# Patient Record
Sex: FEMALE | Employment: OTHER | URBAN - NONMETROPOLITAN AREA
[De-identification: names, ages, dates, MRNs, and addresses within clinical notes are randomized per-mention and may not be internally consistent; named-entity substitution may affect disease eponyms.]

---

## 2023-01-16 PROBLEM — R39.15 URINARY URGENCY: Status: ACTIVE | Noted: 2023-01-16

## 2023-01-16 PROBLEM — F32.A DEPRESSION: Status: ACTIVE | Noted: 2023-01-16

## 2023-01-16 PROBLEM — K59.00 CONSTIPATION: Status: ACTIVE | Noted: 2023-01-16

## 2023-01-16 PROBLEM — M86.9 OSTEOMYELITIS (HCC): Status: ACTIVE | Noted: 2023-01-16

## 2023-03-13 PROBLEM — G40.901 STATUS EPILEPTICUS (HCC): Status: ACTIVE | Noted: 2023-03-13

## 2023-03-14 PROBLEM — M86.612: Status: ACTIVE | Noted: 2023-03-14

## 2023-03-15 PROBLEM — R29.898 WEAKNESS OF BOTH LOWER EXTREMITIES: Status: ACTIVE | Noted: 2023-03-15

## 2023-03-15 PROBLEM — F89 DEVELOPMENTAL DISORDER: Status: ACTIVE | Noted: 2023-03-15

## 2023-03-27 PROBLEM — D64.9 ANEMIA: Status: ACTIVE | Noted: 2023-03-27

## 2023-03-27 PROBLEM — E87.6 HYPOKALEMIA: Status: ACTIVE | Noted: 2023-03-27

## 2023-06-26 PROBLEM — G40.909 SEIZURE DISORDER (HCC): Status: ACTIVE | Noted: 2023-01-16

## 2023-06-26 PROBLEM — Z86.69 HISTORY OF STATUS EPILEPTICUS: Status: ACTIVE | Noted: 2023-06-26

## 2023-06-26 PROBLEM — Z87.39 HISTORY OF OSTEOMYELITIS: Status: ACTIVE | Noted: 2023-06-26

## 2023-07-13 ENCOUNTER — APPOINTMENT (OUTPATIENT)
Dept: GENERAL RADIOLOGY | Age: 68
DRG: 101 | End: 2023-07-13
Payer: MEDICARE

## 2023-07-13 ENCOUNTER — HOSPITAL ENCOUNTER (INPATIENT)
Age: 68
LOS: 1 days | Discharge: ANOTHER HOSPITAL | DRG: 101 | End: 2023-07-14
Attending: EMERGENCY MEDICINE | Admitting: HOSPITALIST
Payer: MEDICARE

## 2023-07-13 ENCOUNTER — APPOINTMENT (OUTPATIENT)
Dept: CT IMAGING | Age: 68
DRG: 101 | End: 2023-07-13
Payer: MEDICARE

## 2023-07-13 DIAGNOSIS — R56.9 SEIZURE (HCC): Primary | ICD-10-CM

## 2023-07-13 LAB
ALBUMIN SERPL BCG-MCNC: 4.2 G/DL (ref 3.5–5.1)
ALP SERPL-CCNC: 89 U/L (ref 38–126)
ALT SERPL W/O P-5'-P-CCNC: 16 U/L (ref 11–66)
AMPHETAMINES UR QL SCN: NEGATIVE
ANION GAP SERPL CALC-SCNC: 21 MEQ/L (ref 8–16)
ARTERIAL PATENCY WRIST A: POSITIVE
AST SERPL-CCNC: 18 U/L (ref 5–40)
BACTERIA URNS QL MICRO: ABNORMAL /HPF
BARBITURATES UR QL SCN: NEGATIVE
BASE EXCESS BLDA CALC-SCNC: 0.3 MMOL/L (ref -2.5–2.5)
BASOPHILS ABSOLUTE: 0 THOU/MM3 (ref 0–0.1)
BASOPHILS NFR BLD AUTO: 0.4 %
BDY SITE: ABNORMAL
BENZODIAZ UR QL SCN: POSITIVE
BILIRUB CONJ SERPL-MCNC: < 0.2 MG/DL (ref 0–0.3)
BILIRUB SERPL-MCNC: 0.4 MG/DL (ref 0.3–1.2)
BILIRUB UR QL STRIP.AUTO: NEGATIVE
BUN SERPL-MCNC: 14 MG/DL (ref 7–22)
BZE UR QL SCN: NEGATIVE
CALCIUM SERPL-MCNC: 9.8 MG/DL (ref 8.5–10.5)
CANNABINOIDS UR QL SCN: NEGATIVE
CASTS #/AREA URNS LPF: ABNORMAL /LPF
CASTS 2: ABNORMAL /LPF
CHARACTER UR: ABNORMAL
CHLORIDE SERPL-SCNC: 100 MEQ/L (ref 98–111)
CK SERPL-CCNC: 76 U/L (ref 30–135)
CO2 SERPL-SCNC: 19 MEQ/L (ref 23–33)
COLLECTED BY:: ABNORMAL
COLOR: YELLOW
CREAT SERPL-MCNC: 0.7 MG/DL (ref 0.4–1.2)
CRYSTALS URNS MICRO: ABNORMAL
DEPRECATED RDW RBC AUTO: 46.5 FL (ref 35–45)
DEVICE: ABNORMAL
EKG ATRIAL RATE: 107 BPM
EKG P AXIS: 56 DEGREES
EKG P-R INTERVAL: 144 MS
EKG Q-T INTERVAL: 354 MS
EKG QRS DURATION: 82 MS
EKG QTC CALCULATION (BAZETT): 472 MS
EKG R AXIS: 49 DEGREES
EKG T AXIS: 63 DEGREES
EKG VENTRICULAR RATE: 107 BPM
EOSINOPHIL NFR BLD AUTO: 1 %
EOSINOPHILS ABSOLUTE: 0 THOU/MM3 (ref 0–0.4)
EPITHELIAL CELLS, UA: ABNORMAL /HPF
ERYTHROCYTE [DISTWIDTH] IN BLOOD BY AUTOMATED COUNT: 12.9 % (ref 11.5–14.5)
FENTANYL: NEGATIVE
FIO2 ON VENT O2 ANALYZER: 4 %
GFR SERPL CREATININE-BSD FRML MDRD: > 60 ML/MIN/1.73M2
GLUCOSE BLD STRIP.AUTO-MCNC: 109 MG/DL (ref 70–108)
GLUCOSE SERPL-MCNC: 107 MG/DL (ref 70–108)
GLUCOSE UR QL STRIP.AUTO: NEGATIVE MG/DL
HCO3 BLDA-SCNC: 25 MMOL/L (ref 23–28)
HCT VFR BLD AUTO: 42.3 % (ref 37–47)
HGB BLD-MCNC: 13.4 GM/DL (ref 12–16)
HGB UR QL STRIP.AUTO: NEGATIVE
IMM GRANULOCYTES # BLD AUTO: 0.02 THOU/MM3 (ref 0–0.07)
IMM GRANULOCYTES NFR BLD AUTO: 0.4 %
KETONES UR QL STRIP.AUTO: 15
LACTIC ACID, SEPSIS: 2.5 MMOL/L (ref 0.5–1.9)
LACTIC ACID, SEPSIS: 6.4 MMOL/L (ref 0.5–1.9)
LIPASE SERPL-CCNC: 27.6 U/L (ref 5.6–51.3)
LYMPHOCYTES ABSOLUTE: 2.3 THOU/MM3 (ref 1–4.8)
LYMPHOCYTES NFR BLD AUTO: 46.1 %
MAGNESIUM SERPL-MCNC: 1.9 MG/DL (ref 1.6–2.4)
MCH RBC QN AUTO: 30.9 PG (ref 26–33)
MCHC RBC AUTO-ENTMCNC: 31.7 GM/DL (ref 32.2–35.5)
MCV RBC AUTO: 97.5 FL (ref 81–99)
MISCELLANEOUS 2: ABNORMAL
MONOCYTES ABSOLUTE: 0.5 THOU/MM3 (ref 0.4–1.3)
MONOCYTES NFR BLD AUTO: 10.7 %
NEUTROPHILS NFR BLD AUTO: 41.4 %
NITRITE UR QL STRIP: POSITIVE
NRBC BLD AUTO-RTO: 0 /100 WBC
OPIATES UR QL SCN: NEGATIVE
OSMOLALITY SERPL CALC.SUM OF ELEC: 280.3 MOSMOL/KG (ref 275–300)
OXYCODONE, OPI5M: NEGATIVE
PCO2 BLDA: 38 MMHG (ref 35–45)
PCP UR QL SCN: NEGATIVE
PH BLDA: 7.42 [PH] (ref 7.35–7.45)
PH UR STRIP.AUTO: 7 [PH] (ref 5–9)
PLATELET # BLD AUTO: 153 THOU/MM3 (ref 130–400)
PMV BLD AUTO: 13.5 FL (ref 9.4–12.4)
PO2 BLDA: 125 MMHG (ref 71–104)
POTASSIUM SERPL-SCNC: 3.9 MEQ/L (ref 3.5–5.2)
PROLACTIN SERPL-MCNC: 38.8 NG/ML
PROT SERPL-MCNC: 7.5 G/DL (ref 6.1–8)
PROT UR STRIP.AUTO-MCNC: NEGATIVE MG/DL
RBC # BLD AUTO: 4.34 MILL/MM3 (ref 4.2–5.4)
RBC URINE: ABNORMAL /HPF
RENAL EPI CELLS #/AREA URNS HPF: ABNORMAL /[HPF]
SAO2 % BLDA: 99 %
SEGMENTED NEUTROPHILS ABSOLUTE COUNT: 2 THOU/MM3 (ref 1.8–7.7)
SODIUM SERPL-SCNC: 140 MEQ/L (ref 135–145)
SP GR UR REFRACT.AUTO: 1.02 (ref 1–1.03)
TROPONIN, HIGH SENSITIVITY: 16 NG/L (ref 0–12)
TROPONIN, HIGH SENSITIVITY: 20 NG/L (ref 0–12)
TSH SERPL DL<=0.005 MIU/L-ACNC: 1.96 UIU/ML (ref 0.4–4.2)
UROBILINOGEN, URINE: 0.2 EU/DL (ref 0–1)
VALPROATE SERPL-MCNC: 53.3 UG/ML (ref 50–100)
WBC # BLD AUTO: 4.9 THOU/MM3 (ref 4.8–10.8)
WBC #/AREA URNS HPF: ABNORMAL /HPF
WBC #/AREA URNS HPF: ABNORMAL /[HPF]
YEAST LIKE FUNGI URNS QL MICRO: ABNORMAL

## 2023-07-13 PROCEDURE — 6360000002 HC RX W HCPCS

## 2023-07-13 PROCEDURE — 31500 INSERT EMERGENCY AIRWAY: CPT

## 2023-07-13 PROCEDURE — 80175 DRUG SCREEN QUAN LAMOTRIGINE: CPT

## 2023-07-13 PROCEDURE — 6360000002 HC RX W HCPCS: Performed by: STUDENT IN AN ORGANIZED HEALTH CARE EDUCATION/TRAINING PROGRAM

## 2023-07-13 PROCEDURE — 6360000002 HC RX W HCPCS: Performed by: NURSE PRACTITIONER

## 2023-07-13 PROCEDURE — 6370000000 HC RX 637 (ALT 250 FOR IP): Performed by: STUDENT IN AN ORGANIZED HEALTH CARE EDUCATION/TRAINING PROGRAM

## 2023-07-13 PROCEDURE — 80307 DRUG TEST PRSMV CHEM ANLYZR: CPT

## 2023-07-13 PROCEDURE — 02HV33Z INSERTION OF INFUSION DEVICE INTO SUPERIOR VENA CAVA, PERCUTANEOUS APPROACH: ICD-10-PCS

## 2023-07-13 PROCEDURE — 95819 EEG AWAKE AND ASLEEP: CPT

## 2023-07-13 PROCEDURE — 2580000003 HC RX 258: Performed by: STUDENT IN AN ORGANIZED HEALTH CARE EDUCATION/TRAINING PROGRAM

## 2023-07-13 PROCEDURE — 93010 ELECTROCARDIOGRAM REPORT: CPT | Performed by: INTERNAL MEDICINE

## 2023-07-13 PROCEDURE — 70450 CT HEAD/BRAIN W/O DYE: CPT

## 2023-07-13 PROCEDURE — 87086 URINE CULTURE/COLONY COUNT: CPT

## 2023-07-13 PROCEDURE — 85025 COMPLETE CBC W/AUTO DIFF WBC: CPT

## 2023-07-13 PROCEDURE — 81001 URINALYSIS AUTO W/SCOPE: CPT

## 2023-07-13 PROCEDURE — 2700000000 HC OXYGEN THERAPY PER DAY

## 2023-07-13 PROCEDURE — 87077 CULTURE AEROBIC IDENTIFY: CPT

## 2023-07-13 PROCEDURE — 82948 REAGENT STRIP/BLOOD GLUCOSE: CPT

## 2023-07-13 PROCEDURE — 89220 SPUTUM SPECIMEN COLLECTION: CPT

## 2023-07-13 PROCEDURE — 83735 ASSAY OF MAGNESIUM: CPT

## 2023-07-13 PROCEDURE — 94761 N-INVAS EAR/PLS OXIMETRY MLT: CPT

## 2023-07-13 PROCEDURE — 87186 SC STD MICRODIL/AGAR DIL: CPT

## 2023-07-13 PROCEDURE — 80177 DRUG SCRN QUAN LEVETIRACETAM: CPT

## 2023-07-13 PROCEDURE — 03HC33Z INSERTION OF INFUSION DEVICE INTO LEFT RADIAL ARTERY, PERCUTANEOUS APPROACH: ICD-10-PCS

## 2023-07-13 PROCEDURE — 0CJS8ZZ INSPECTION OF LARYNX, VIA NATURAL OR ARTIFICIAL OPENING ENDOSCOPIC: ICD-10-PCS

## 2023-07-13 PROCEDURE — 71045 X-RAY EXAM CHEST 1 VIEW: CPT

## 2023-07-13 PROCEDURE — 82803 BLOOD GASES ANY COMBINATION: CPT

## 2023-07-13 PROCEDURE — 93005 ELECTROCARDIOGRAM TRACING: CPT | Performed by: STUDENT IN AN ORGANIZED HEALTH CARE EDUCATION/TRAINING PROGRAM

## 2023-07-13 PROCEDURE — 82550 ASSAY OF CK (CPK): CPT

## 2023-07-13 PROCEDURE — 36556 INSERT NON-TUNNEL CV CATH: CPT

## 2023-07-13 PROCEDURE — 83605 ASSAY OF LACTIC ACID: CPT

## 2023-07-13 PROCEDURE — 82248 BILIRUBIN DIRECT: CPT

## 2023-07-13 PROCEDURE — 83690 ASSAY OF LIPASE: CPT

## 2023-07-13 PROCEDURE — 2500000003 HC RX 250 WO HCPCS

## 2023-07-13 PROCEDURE — 95816 EEG AWAKE AND DROWSY: CPT | Performed by: PSYCHIATRY & NEUROLOGY

## 2023-07-13 PROCEDURE — 36415 COLL VENOUS BLD VENIPUNCTURE: CPT

## 2023-07-13 PROCEDURE — 99285 EMERGENCY DEPT VISIT HI MDM: CPT

## 2023-07-13 PROCEDURE — 84484 ASSAY OF TROPONIN QUANT: CPT

## 2023-07-13 PROCEDURE — 2580000003 HC RX 258

## 2023-07-13 PROCEDURE — 2000000000 HC ICU R&B

## 2023-07-13 PROCEDURE — 84146 ASSAY OF PROLACTIN: CPT

## 2023-07-13 PROCEDURE — 5A1935Z RESPIRATORY VENTILATION, LESS THAN 24 CONSECUTIVE HOURS: ICD-10-PCS

## 2023-07-13 PROCEDURE — 80053 COMPREHEN METABOLIC PANEL: CPT

## 2023-07-13 PROCEDURE — 80164 ASSAY DIPROPYLACETIC ACD TOT: CPT

## 2023-07-13 PROCEDURE — 96375 TX/PRO/DX INJ NEW DRUG ADDON: CPT

## 2023-07-13 PROCEDURE — 6360000002 HC RX W HCPCS: Performed by: INTERNAL MEDICINE

## 2023-07-13 PROCEDURE — 96374 THER/PROPH/DIAG INJ IV PUSH: CPT

## 2023-07-13 PROCEDURE — 84443 ASSAY THYROID STIM HORMONE: CPT

## 2023-07-13 PROCEDURE — 36600 WITHDRAWAL OF ARTERIAL BLOOD: CPT

## 2023-07-13 PROCEDURE — 94002 VENT MGMT INPAT INIT DAY: CPT

## 2023-07-13 RX ORDER — ATROPINE SULFATE 0.1 MG/ML
INJECTION INTRAVENOUS
Status: DISCONTINUED
Start: 2023-07-13 | End: 2023-07-14 | Stop reason: HOSPADM

## 2023-07-13 RX ORDER — MORPHINE SULFATE 2 MG/ML
INJECTION, SOLUTION INTRAMUSCULAR; INTRAVENOUS
Status: SHIPPED | OUTPATIENT
Start: 2023-07-13

## 2023-07-13 RX ORDER — DIVALPROEX SODIUM 500 MG/1
500 TABLET, EXTENDED RELEASE ORAL DAILY
Status: DISCONTINUED | OUTPATIENT
Start: 2023-07-14 | End: 2023-07-13

## 2023-07-13 RX ORDER — HYDRALAZINE HYDROCHLORIDE 20 MG/ML
20 INJECTION INTRAMUSCULAR; INTRAVENOUS ONCE
Status: COMPLETED | OUTPATIENT
Start: 2023-07-13 | End: 2023-07-13

## 2023-07-13 RX ORDER — MIDAZOLAM HYDROCHLORIDE 1 MG/ML
INJECTION INTRAMUSCULAR; INTRAVENOUS
Status: COMPLETED
Start: 2023-07-13 | End: 2023-07-13

## 2023-07-13 RX ORDER — SODIUM CHLORIDE 0.9 % (FLUSH) 0.9 %
5-40 SYRINGE (ML) INJECTION EVERY 12 HOURS SCHEDULED
Status: DISCONTINUED | OUTPATIENT
Start: 2023-07-13 | End: 2023-07-14 | Stop reason: HOSPADM

## 2023-07-13 RX ORDER — MORPHINE SULFATE 2 MG/ML
INJECTION, SOLUTION INTRAMUSCULAR; INTRAVENOUS
Status: COMPLETED
Start: 2023-07-13 | End: 2023-07-13

## 2023-07-13 RX ORDER — LORAZEPAM 2 MG/ML
INJECTION INTRAMUSCULAR
Status: COMPLETED
Start: 2023-07-13 | End: 2023-07-13

## 2023-07-13 RX ORDER — MIDAZOLAM HYDROCHLORIDE 1 MG/ML
INJECTION INTRAMUSCULAR; INTRAVENOUS
Status: COMPLETED | OUTPATIENT
Start: 2023-07-13 | End: 2023-07-13

## 2023-07-13 RX ORDER — ONDANSETRON 2 MG/ML
4 INJECTION INTRAMUSCULAR; INTRAVENOUS EVERY 6 HOURS PRN
Status: DISCONTINUED | OUTPATIENT
Start: 2023-07-13 | End: 2023-07-14 | Stop reason: HOSPADM

## 2023-07-13 RX ORDER — FENTANYL CITRATE 50 UG/ML
INJECTION, SOLUTION INTRAMUSCULAR; INTRAVENOUS
Status: COMPLETED | OUTPATIENT
Start: 2023-07-13 | End: 2023-07-13

## 2023-07-13 RX ORDER — SODIUM CHLORIDE 9 MG/ML
INJECTION, SOLUTION INTRAVENOUS PRN
Status: DISCONTINUED | OUTPATIENT
Start: 2023-07-13 | End: 2023-07-14 | Stop reason: HOSPADM

## 2023-07-13 RX ORDER — PROPOFOL 10 MG/ML
5-50 INJECTION, EMULSION INTRAVENOUS CONTINUOUS
Status: DISCONTINUED | OUTPATIENT
Start: 2023-07-13 | End: 2023-07-14 | Stop reason: HOSPADM

## 2023-07-13 RX ORDER — SODIUM CHLORIDE 0.9 % (FLUSH) 0.9 %
10 SYRINGE (ML) INJECTION PRN
Status: DISCONTINUED | OUTPATIENT
Start: 2023-07-13 | End: 2023-07-14 | Stop reason: HOSPADM

## 2023-07-13 RX ORDER — NOREPINEPHRINE BITARTRATE 0.06 MG/ML
INJECTION, SOLUTION INTRAVENOUS
Status: DISCONTINUED
Start: 2023-07-13 | End: 2023-07-14 | Stop reason: HOSPADM

## 2023-07-13 RX ORDER — LORAZEPAM 2 MG/ML
1 INJECTION INTRAMUSCULAR ONCE
Status: COMPLETED | OUTPATIENT
Start: 2023-07-13 | End: 2023-07-13

## 2023-07-13 RX ORDER — LAMOTRIGINE 100 MG/1
100 TABLET, EXTENDED RELEASE ORAL DAILY
Status: DISCONTINUED | OUTPATIENT
Start: 2023-07-14 | End: 2023-07-14 | Stop reason: HOSPADM

## 2023-07-13 RX ORDER — LEVETIRACETAM 500 MG/1
1500 TABLET ORAL 2 TIMES DAILY
Status: DISCONTINUED | OUTPATIENT
Start: 2023-07-13 | End: 2023-07-13

## 2023-07-13 RX ORDER — POTASSIUM CHLORIDE 20 MEQ/1
40 TABLET, EXTENDED RELEASE ORAL PRN
Status: DISCONTINUED | OUTPATIENT
Start: 2023-07-13 | End: 2023-07-14 | Stop reason: HOSPADM

## 2023-07-13 RX ORDER — NOREPINEPHRINE BITARTRATE 0.06 MG/ML
1-100 INJECTION, SOLUTION INTRAVENOUS CONTINUOUS
Status: DISCONTINUED | OUTPATIENT
Start: 2023-07-13 | End: 2023-07-14 | Stop reason: HOSPADM

## 2023-07-13 RX ORDER — FENTANYL CITRATE-0.9 % NACL/PF 10 MCG/ML
25-200 PLASTIC BAG, INJECTION (ML) INTRAVENOUS CONTINUOUS
Status: DISCONTINUED | OUTPATIENT
Start: 2023-07-13 | End: 2023-07-13

## 2023-07-13 RX ORDER — ACETAMINOPHEN 650 MG/1
650 SUPPOSITORY RECTAL EVERY 6 HOURS PRN
Status: DISCONTINUED | OUTPATIENT
Start: 2023-07-13 | End: 2023-07-14 | Stop reason: HOSPADM

## 2023-07-13 RX ORDER — MAGNESIUM SULFATE IN WATER 40 MG/ML
2000 INJECTION, SOLUTION INTRAVENOUS PRN
Status: DISCONTINUED | OUTPATIENT
Start: 2023-07-13 | End: 2023-07-14 | Stop reason: HOSPADM

## 2023-07-13 RX ORDER — POTASSIUM CHLORIDE 7.45 MG/ML
10 INJECTION INTRAVENOUS PRN
Status: DISCONTINUED | OUTPATIENT
Start: 2023-07-13 | End: 2023-07-14 | Stop reason: HOSPADM

## 2023-07-13 RX ORDER — VENLAFAXINE 50 MG/1
50 TABLET ORAL 3 TIMES DAILY
Status: DISCONTINUED | OUTPATIENT
Start: 2023-07-13 | End: 2023-07-14 | Stop reason: HOSPADM

## 2023-07-13 RX ORDER — KETAMINE HCL IN NACL, ISO-OSM 100MG/10ML
SYRINGE (ML) INJECTION
Status: COMPLETED | OUTPATIENT
Start: 2023-07-13 | End: 2023-07-13

## 2023-07-13 RX ORDER — LORAZEPAM 2 MG/ML
1 INJECTION INTRAMUSCULAR
Status: COMPLETED | OUTPATIENT
Start: 2023-07-13 | End: 2023-07-13

## 2023-07-13 RX ORDER — ENOXAPARIN SODIUM 100 MG/ML
30 INJECTION SUBCUTANEOUS 2 TIMES DAILY
Status: DISCONTINUED | OUTPATIENT
Start: 2023-07-13 | End: 2023-07-14 | Stop reason: HOSPADM

## 2023-07-13 RX ORDER — POLYETHYLENE GLYCOL 3350 17 G/17G
17 POWDER, FOR SOLUTION ORAL DAILY PRN
Status: DISCONTINUED | OUTPATIENT
Start: 2023-07-13 | End: 2023-07-14 | Stop reason: HOSPADM

## 2023-07-13 RX ORDER — ENOXAPARIN SODIUM 100 MG/ML
40 INJECTION SUBCUTANEOUS DAILY
Status: DISCONTINUED | OUTPATIENT
Start: 2023-07-13 | End: 2023-07-13 | Stop reason: DRUGHIGH

## 2023-07-13 RX ORDER — KETAMINE HCL IN NACL, ISO-OSM 100MG/10ML
SYRINGE (ML) INJECTION
Status: DISCONTINUED
Start: 2023-07-13 | End: 2023-07-14 | Stop reason: HOSPADM

## 2023-07-13 RX ORDER — ATROPINE SULFATE 1 MG/ML
INJECTION, SOLUTION INTRAMUSCULAR; INTRAVENOUS; SUBCUTANEOUS
Status: COMPLETED | OUTPATIENT
Start: 2023-07-13 | End: 2023-07-13

## 2023-07-13 RX ORDER — ACETAMINOPHEN 325 MG/1
650 TABLET ORAL EVERY 6 HOURS PRN
Status: DISCONTINUED | OUTPATIENT
Start: 2023-07-13 | End: 2023-07-14 | Stop reason: HOSPADM

## 2023-07-13 RX ORDER — SODIUM CHLORIDE 9 MG/ML
INJECTION, SOLUTION INTRAVENOUS CONTINUOUS
Status: DISCONTINUED | OUTPATIENT
Start: 2023-07-13 | End: 2023-07-14 | Stop reason: HOSPADM

## 2023-07-13 RX ORDER — PROPOFOL 10 MG/ML
INJECTION, EMULSION INTRAVENOUS
Status: COMPLETED
Start: 2023-07-13 | End: 2023-07-13

## 2023-07-13 RX ORDER — ONDANSETRON 4 MG/1
4 TABLET, ORALLY DISINTEGRATING ORAL EVERY 8 HOURS PRN
Status: DISCONTINUED | OUTPATIENT
Start: 2023-07-13 | End: 2023-07-14 | Stop reason: HOSPADM

## 2023-07-13 RX ORDER — DEXTROSE MONOHYDRATE 100 MG/ML
INJECTION, SOLUTION INTRAVENOUS CONTINUOUS PRN
Status: DISCONTINUED | OUTPATIENT
Start: 2023-07-13 | End: 2023-07-14 | Stop reason: HOSPADM

## 2023-07-13 RX ORDER — FENTANYL CITRATE 50 UG/ML
INJECTION, SOLUTION INTRAMUSCULAR; INTRAVENOUS
Status: DISCONTINUED
Start: 2023-07-13 | End: 2023-07-14 | Stop reason: HOSPADM

## 2023-07-13 RX ADMIN — FENTANYL CITRATE 50 MCG: 50 INJECTION, SOLUTION INTRAMUSCULAR; INTRAVENOUS at 20:22

## 2023-07-13 RX ADMIN — SODIUM CHLORIDE, PRESERVATIVE FREE 10 ML: 5 INJECTION INTRAVENOUS at 21:00

## 2023-07-13 RX ADMIN — LEVETIRACETAM 1500 MG: 100 INJECTION, SOLUTION INTRAVENOUS at 23:03

## 2023-07-13 RX ADMIN — Medication: at 20:16

## 2023-07-13 RX ADMIN — MIDAZOLAM HYDROCHLORIDE: 1 INJECTION, SOLUTION INTRAMUSCULAR; INTRAVENOUS at 19:58

## 2023-07-13 RX ADMIN — VALPROATE SODIUM 2000 MG: 100 INJECTION, SOLUTION INTRAVENOUS at 23:17

## 2023-07-13 RX ADMIN — LORAZEPAM 1 MG: 2 INJECTION INTRAMUSCULAR at 13:18

## 2023-07-13 RX ADMIN — MORPHINE SULFATE: 2 INJECTION, SOLUTION INTRAMUSCULAR; INTRAVENOUS at 19:57

## 2023-07-13 RX ADMIN — PROPOFOL 10 MCG/KG/MIN: 10 INJECTION, EMULSION INTRAVENOUS at 20:41

## 2023-07-13 RX ADMIN — ATROPINE SULFATE: 0.1 INJECTION INTRAVENOUS at 20:19

## 2023-07-13 RX ADMIN — MIDAZOLAM: 1 INJECTION INTRAMUSCULAR; INTRAVENOUS at 19:58

## 2023-07-13 RX ADMIN — LORAZEPAM 1 MG: 2 INJECTION INTRAMUSCULAR; INTRAVENOUS at 13:18

## 2023-07-13 RX ADMIN — SODIUM CHLORIDE: 9 INJECTION, SOLUTION INTRAVENOUS at 18:40

## 2023-07-13 RX ADMIN — Medication 50 MG: at 20:16

## 2023-07-13 RX ADMIN — VENLAFAXINE HYDROCHLORIDE 50 MG: 50 TABLET ORAL at 23:12

## 2023-07-13 RX ADMIN — FENTANYL CITRATE 50 MCG: 50 INJECTION, SOLUTION INTRAMUSCULAR; INTRAVENOUS at 20:30

## 2023-07-13 RX ADMIN — HYDRALAZINE HYDROCHLORIDE 20 MG: 20 INJECTION INTRAMUSCULAR; INTRAVENOUS at 23:19

## 2023-07-13 RX ADMIN — Medication 100 MG: at 19:58

## 2023-07-13 RX ADMIN — MIDAZOLAM: 1 INJECTION INTRAMUSCULAR; INTRAVENOUS at 20:13

## 2023-07-13 RX ADMIN — LORAZEPAM 1 MG: 2 INJECTION INTRAMUSCULAR; INTRAVENOUS at 13:25

## 2023-07-13 RX ADMIN — LEVETIRACETAM 1500 MG: 100 INJECTION, SOLUTION INTRAVENOUS at 13:15

## 2023-07-13 RX ADMIN — ATROPINE SULFATE 1 MG: 1 INJECTION, SOLUTION INTRAMUSCULAR; INTRAVENOUS; SUBCUTANEOUS at 20:19

## 2023-07-13 RX ADMIN — MIDAZOLAM HYDROCHLORIDE: 1 INJECTION, SOLUTION INTRAMUSCULAR; INTRAVENOUS at 20:13

## 2023-07-13 RX ADMIN — Medication 5 MCG/MIN: at 20:31

## 2023-07-13 RX ADMIN — ENOXAPARIN SODIUM 30 MG: 100 INJECTION SUBCUTANEOUS at 23:49

## 2023-07-13 RX ADMIN — MORPHINE SULFATE: 2 INJECTION, SOLUTION INTRAMUSCULAR; INTRAVENOUS at 20:17

## 2023-07-13 ASSESSMENT — PULMONARY FUNCTION TESTS: PIF_VALUE: 19

## 2023-07-13 NOTE — ED NOTES
ED to inpatient nurses report    Chief Complaint   Patient presents with    Seizures      Present to ED from nursing home  LOC: alert to only name  Vital signs   Vitals:    07/13/23 1320 07/13/23 1329 07/13/23 1451 07/13/23 1518   BP:  (!) 163/95  (!) 134/99   Pulse: (!) 104 (!) 101 (!) 104 (!) 103   Resp: 13 25 (!) 31 30   Temp:       TempSrc:       SpO2: 98% 97% 96% 94%   Weight:          Oxygen Baseline 94% on 5L o2 via nc    Current needs required 5L O2 via nc Bipap/Cpap No  LDAs:   Peripheral IV 07/13/23 Left Hand (Active)   Site Assessment Clean, dry & intact 07/13/23 1159   Line Status Flushed;Brisk blood return;Normal saline locked 07/13/23 1159   Dressing Status Clean, dry & intact 07/13/23 1159   Dressing Type Transparent 07/13/23 1159   Dressing Intervention New 07/13/23 1159     Mobility: Fully dependent  Pending ED orders: none  Present condition: stable      C-SSRS    Swallow Screening    Preferred Language: Kb     Electronically signed by Mel Emmanuel RN on 7/13/2023 at 4:23 PM       Mel Emmanuel RN  07/13/23 3963

## 2023-07-13 NOTE — PROGRESS NOTES
[facility] Laboratory Technician Worksheet      EEG Date: 2023    Name: Nicolette Schafer   : 1955   Age: 75 y.o. SEX: female    ROOM: ed/ MRN: [id number]           CSN: [id number]      Ordering Provider: Con monet/admit: qi green  EEG Number: [id number] Time of Test: 7445    Hand: unknown   Sedation: no  ativan at 1325    H.V. Done: No  age protocol  Photic: Yes    Sleep: Yes  Drowsy: No   Sleep Deprived: No    Seizures observed: tremors and jerks noted    Mentality no command follow but arousable      Clinical History:seizure. L leg twitch, hx seizure    Past Medical History:       Diagnosis Date    ADHD     Depression     Learning disability     Seizure ([address])     Urinary urgency        Scheduled Meds:  Continuous Infusions:   sodium chloride       PRN Meds:.     Technician: Angel Shen 2023

## 2023-07-13 NOTE — ED NOTES
Pt transported to [facility]. Floor contacted prior to transport, spoke to PERLA Mackenzie  07/13/23 1523

## 2023-07-13 NOTE — SIGNIFICANT EVENT
Call placed to [employer] answering service for consent for emergent intubation secondary to inability to protect airway secondary to status epilepticus. Awaiting response.     Electronically signed by Yoana Inman MD on 7/13/23 at 7:36 PM EDT

## 2023-07-13 NOTE — ED NOTES
Pt now resting on cot with eyes closed. No movement noted in head or leg.  Slight twitching noted to left wrist.     Jared Ventura, DEZ  07/13/23 3401

## 2023-07-13 NOTE — PROGRESS NOTES
Virtual nurse attempted to complete admission with patient but she is sleeping/resting and not answering. Bedside nurse notified via [employer].

## 2023-07-13 NOTE — ED PROVIDER NOTES
[facility] EMERGENCY DEPT    Pt Name: Ronan Em  MRN: [id number]  [facility] 1955  Date of evaluation: 7/13/2023  Resident Physician: Apoorva Huggins MD  Attending Physician: Cleveland Recinos DO      CHIEF COMPLAINT       Chief Complaint   Patient presents with    Seizures     HISTORY OF PRESENT ILLNESS   Ronan Em is a 75 y.o. female with PMHx of epilepsy on Depakote  who presents to the emergency department from nursing home, brought in by EMS for evaluation of seizure. Per EMS, patient had a seizure at the nursing home. EMS was called and in route to the emergency department she had another seizure. She received 2 mg of intranasal Versed. Patient arrives postictal.    The patient has no other acute complaints at this time. PASTMEDICAL HISTORY     Past Medical History:   Diagnosis Date    ADHD     Depression     Learning disability     Seizure [facility])     Urinary urgency        Patient Active Problem List   Diagnosis Code    Osteomyelitis ([address] ) M86.9    Wound infection complicating hardware, sequela T84. 7XXS    Urinary urgency R39.15    Attention deficit hyperactivity disorder (ADHD) F90.9    Depression F32. A    Seizure disorder ([address] ) G40.909    Constipation K59.00    Status epilepticus (HCC) G40.901    Chronic osteomyelitis of left shoulder region [facility]) V31.785    Developmental disorder F89    Weakness of both lower extremities R29.898    Anemia D64.9    Hypokalemia E87.6    History of osteomyelitis Z87.39    History of status epilepticus Z86.69     SURGICAL HISTORY       Past Surgical History:   Procedure Laterality Date    SHOULDER SURGERY Left     ORIF    -- had hardware infection afterwards requiring IV abx       CURRENT MEDICATIONS       Previous Medications    AMPHETAMINE-DEXTROAMPHETAMINE (ADDERALL, 5MG,) 5 MG TABLET    Take 1 tablet by mouth daily for 30 days.  Max Daily Amount: 5 mg    DIVALPROEX (DEPAKOTE ER) 500 MG EXTENDED RELEASE TABLET    Take 1 tablet by mouth daily

## 2023-07-13 NOTE — SIGNIFICANT EVENT
Spoke to [facility] Pay on call at [facility]. Confirmed patient's identity. Confirmed full code. She provides consent for the procedure emergent intubation.     Electronically signed by Miguel Thurman MD on 7/13/23 at 7:42 PM EDT

## 2023-07-13 NOTE — ED NOTES
Pt transported to [facility] on cart in stable condition. Spoke with floor prior to transportation.       [facility]  07/13/23 7246

## 2023-07-13 NOTE — ED NOTES
Pt head, left arm and left leg began to twitch again at this time. Second dose of ativan administered per order.      Kiki Gonzalez RN  07/13/23 9458

## 2023-07-13 NOTE — ED NOTES
EMS was called to nursing home for seizure activity. It is reported that patient is currently being weaned off of her seizure medication. Unsure of why this is being done. Pt was postictal when ems arrival. EMS reports pt had another seizure in route and was given 5mg versed intranasally. Upon arrival, pt sleeping with 4L o2 on via nc. Pt moving arms towards chest and moaning when sternal rubbed, but no other response is noted. EKG completed upon arrival. Glucose checked. No seizure activity noted. Pt hooked up to monitor and telemetry.       Angelina Kumar RN  07/13/23 8520

## 2023-07-13 NOTE — ED NOTES
Left leg stopped twitching, left arm remains twitching at this time.       Mariana Galvez, DEZ  07/13/23 6255

## 2023-07-13 NOTE — H&P
History & Physical       Patient: Oumou Guzmán  YOB: 1955    MRN: [id number]     Acct: [de-identified]    PCP: Pete Smith MD    Date of Admission: 7/13/2023    Date of Service: Patient seen / examined on 07/13/23 and admitted to Inpatient with expected LOS greater than two midnights due to medical therapy. ASSESSMENT / PLAN:  Epilepsy with breakthrough seizure: with history of status epilepticus. Presented with witnessed recurrent seizures. Possibly secondary to medication changes. Metabolic work-up relatively nonsignificant with electrolytes, TSH and CK WNL. Medications reviewed, no seizure threshold lowering agents. Valproic acid level WNL. Prolactin level 38.8. From nursing facility, anticipate compliance with home lamictal 100 mg p.o daily, Keppra 750 mg p.o twice daily and depakote 500 mg p.o daily. Per report, recently tirating off Depakote. S/p 2 mg intranal versed and ativan 1 mg x1. Neurology following. Plan for EEG. Seizure and fall precautions in place. HAGMA, mild: secondary to lactic acidosis in the setting of seizures. Low suspicion for infectious process or hypoperfusion. Unclear if hypoxic during seizure onset, on 4 L NC. Check ABG. LFTs WNL. UA pending. Unlikely medication related as from nursing home, defer ASA/ethanol/acetaminophen levels. Trend lactic acid every 6 hours unitl < 2.0 x2. Started on IVF. 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 Previously followed with Dr. Ruy Sharpe, neurology but unclear if currently following with neurology. Depression: Continue venlafaxine 50 mg p.o BID      Chief Complaint:  Seizure     History of Present Illness:  75 y.o. female with PMH epilepsy/seizures, depression, ADHD and learning disability who presented to [address] from nursing home for following witnessed seizures.  Per report the patient is on three

## 2023-07-13 NOTE — SIGNIFICANT EVENT
The patient was reexamined and noted with no change in mentation since having seen her in the ED when she was noted to be lethargic, responsive to pain and grunting. This at the time was presumed due having been given Ativan. Additionally, the CNP at the nursing home reached out to clarify miscommunication. She stated that the patient is difficult was not being weaned, in fact her valproic acid level was low on 7/7 at 19.1. She continued to have reported seizure-like activity at [facility] and therefore Depakote was increased to 750 mg p.o. twice daily from previously 500 mg p.o. twice daily. She had remained on Keppra 750 mg 2 tablets twice daily with recent levels noted to be mildly elevated. Furthermore, the patient had been referred to see outpatient neurology but an appointment was not available till September. In the interim, the patient's routine 20-minute EEG showed abnormal awake EEG, slowing suggestive of moderate nonspecific encephalopathy, presence of right temporal lateralization periodic changes at 1-2 Hz considered an ictal/interictal spectrum. The case was again discussed with inpatient neurology who recommended that given the patient is on 3 antiepileptics but continues to have no change in baseline, concerning for status epilepticus the patient needs transferred to higher care facility for continuous EEG monitoring. Patient will be transferred to ICU for intubation and chemical sedation. Transfer has been initiated.

## 2023-07-14 ENCOUNTER — APPOINTMENT (OUTPATIENT)
Dept: GENERAL RADIOLOGY | Age: 68
DRG: 100 | End: 2023-07-14
Attending: PSYCHIATRY & NEUROLOGY
Payer: MEDICARE

## 2023-07-14 ENCOUNTER — HOSPITAL ENCOUNTER (INPATIENT)
Age: 68
LOS: 17 days | Discharge: NURSING, REHAB OR LONG-TERM CARE | DRG: 100 | End: 2023-07-31
Attending: PSYCHIATRY & NEUROLOGY | Admitting: PSYCHIATRY & NEUROLOGY
Payer: MEDICARE

## 2023-07-14 VITALS
HEIGHT: 61 IN | TEMPERATURE: 98.4 F | SYSTOLIC BLOOD PRESSURE: 143 MMHG | OXYGEN SATURATION: 100 % | RESPIRATION RATE: 16 BRPM | WEIGHT: 222.94 LBS | BODY MASS INDEX: 42.09 KG/M2 | HEART RATE: 103 BPM | DIASTOLIC BLOOD PRESSURE: 58 MMHG

## 2023-07-14 DIAGNOSIS — G40.919 BREAKTHROUGH SEIZURE (HCC): ICD-10-CM

## 2023-07-14 DIAGNOSIS — G40.911 NEW ONSET REFRACTORY STATUS EPILEPTICUS (HCC): ICD-10-CM

## 2023-07-14 DIAGNOSIS — G93.40 ENCEPHALOPATHY ACUTE: Primary | ICD-10-CM

## 2023-07-14 LAB
ALLEN TEST: ABNORMAL
ANION GAP SERPL CALCULATED.3IONS-SCNC: 16 MMOL/L (ref 9–17)
BACTERIA UR CULT: ABNORMAL
BASOPHILS # BLD: 0.03 K/UL (ref 0–0.2)
BASOPHILS NFR BLD: 0 % (ref 0–2)
BUN SERPL-MCNC: 14 MG/DL (ref 8–23)
CALCIUM SERPL-MCNC: 9.1 MG/DL (ref 8.6–10.4)
CHLORIDE SERPL-SCNC: 104 MMOL/L (ref 98–107)
CO2 SERPL-SCNC: 21 MMOL/L (ref 20–31)
CREAT SERPL-MCNC: 0.5 MG/DL (ref 0.5–0.9)
EKG ATRIAL RATE: 92 BPM
EKG P AXIS: 55 DEGREES
EKG P-R INTERVAL: 138 MS
EKG Q-T INTERVAL: 402 MS
EKG QRS DURATION: 88 MS
EKG QTC CALCULATION (BAZETT): 497 MS
EKG R AXIS: 29 DEGREES
EKG T AXIS: 47 DEGREES
EKG VENTRICULAR RATE: 92 BPM
EOSINOPHIL # BLD: <0.03 K/UL (ref 0–0.44)
EOSINOPHILS RELATIVE PERCENT: 0 % (ref 1–4)
ERYTHROCYTE [DISTWIDTH] IN BLOOD BY AUTOMATED COUNT: 12.9 % (ref 11.8–14.4)
FIO2: 40
GFR SERPL CREATININE-BSD FRML MDRD: >60 ML/MIN/1.73M2
GLUCOSE BLD-MCNC: 102 MG/DL (ref 65–105)
GLUCOSE BLD-MCNC: 107 MG/DL (ref 65–105)
GLUCOSE BLD-MCNC: 80 MG/DL (ref 65–105)
GLUCOSE SERPL-MCNC: 109 MG/DL (ref 70–99)
HCT VFR BLD AUTO: 39.1 % (ref 36.3–47.1)
HGB BLD-MCNC: 13.2 G/DL (ref 11.9–15.1)
IMM GRANULOCYTES # BLD AUTO: 0.05 K/UL (ref 0–0.3)
IMM GRANULOCYTES NFR BLD: 0 %
LACTIC ACID, WHOLE BLOOD: 1.2 MMOL/L (ref 0.7–2.1)
LAMOTRIGINE SERPL-MCNC: 4.1 UG/ML (ref 3–15)
LEVETIRACETAM SERPL-MCNC: 64 UG/ML (ref 10–40)
LYMPHOCYTES # BLD: 29 % (ref 24–43)
LYMPHOCYTES NFR BLD: 3.63 K/UL (ref 1.1–3.7)
MCH RBC QN AUTO: 31.5 PG (ref 25.2–33.5)
MCHC RBC AUTO-ENTMCNC: 33.8 G/DL (ref 28.4–34.8)
MCV RBC AUTO: 93.3 FL (ref 82.6–102.9)
MODE: ABNORMAL
MONOCYTES NFR BLD: 1.07 K/UL (ref 0.1–1.2)
MONOCYTES NFR BLD: 9 % (ref 3–12)
NEUTROPHILS NFR BLD: 62 % (ref 36–65)
NEUTS SEG NFR BLD: 7.74 K/UL (ref 1.5–8.1)
NRBC BLD-RTO: 0 PER 100 WBC
O2 DEVICE/FLOW/%: ABNORMAL
ORGANISM: ABNORMAL
PLATELET # BLD AUTO: 175 K/UL (ref 138–453)
PMV BLD AUTO: 13 FL (ref 8.1–13.5)
POC HCO3: 24.6 MMOL/L (ref 21–28)
POC O2 SATURATION: 99.3 % (ref 94–98)
POC PCO2: 36.6 MM HG (ref 35–48)
POC PH: 7.44 (ref 7.35–7.45)
POC PO2: 142.3 MM HG (ref 83–108)
POSITIVE BASE EXCESS, ART: 0.7 MMOL/L (ref 0–3)
POTASSIUM SERPL-SCNC: 3.9 MMOL/L (ref 3.7–5.3)
RBC # BLD AUTO: 4.19 M/UL (ref 3.95–5.11)
SAMPLE SITE: ABNORMAL
SODIUM SERPL-SCNC: 141 MMOL/L (ref 135–144)
TROPONIN I SERPL HS-MCNC: 29 NG/L (ref 0–14)
TROPONIN I SERPL HS-MCNC: 30 NG/L (ref 0–14)
TROPONIN I SERPL HS-MCNC: 35 NG/L (ref 0–14)
WBC OTHER # BLD: 12.5 K/UL (ref 3.5–11.3)

## 2023-07-14 PROCEDURE — C9113 INJ PANTOPRAZOLE SODIUM, VIA: HCPCS | Performed by: REGISTERED NURSE

## 2023-07-14 PROCEDURE — 93005 ELECTROCARDIOGRAM TRACING: CPT | Performed by: REGISTERED NURSE

## 2023-07-14 PROCEDURE — 2500000003 HC RX 250 WO HCPCS: Performed by: REGISTERED NURSE

## 2023-07-14 PROCEDURE — 71045 X-RAY EXAM CHEST 1 VIEW: CPT

## 2023-07-14 PROCEDURE — 95700 EEG CONT REC W/VID EEG TECH: CPT

## 2023-07-14 PROCEDURE — A4216 STERILE WATER/SALINE, 10 ML: HCPCS | Performed by: REGISTERED NURSE

## 2023-07-14 PROCEDURE — 85027 COMPLETE CBC AUTOMATED: CPT

## 2023-07-14 PROCEDURE — 0DH67UZ INSERTION OF FEEDING DEVICE INTO STOMACH, VIA NATURAL OR ARTIFICIAL OPENING: ICD-10-PCS | Performed by: PSYCHIATRY & NEUROLOGY

## 2023-07-14 PROCEDURE — 2580000003 HC RX 258: Performed by: STUDENT IN AN ORGANIZED HEALTH CARE EDUCATION/TRAINING PROGRAM

## 2023-07-14 PROCEDURE — 99291 CRITICAL CARE FIRST HOUR: CPT | Performed by: PSYCHIATRY & NEUROLOGY

## 2023-07-14 PROCEDURE — 6370000000 HC RX 637 (ALT 250 FOR IP): Performed by: REGISTERED NURSE

## 2023-07-14 PROCEDURE — 2580000003 HC RX 258: Performed by: EMERGENCY MEDICINE

## 2023-07-14 PROCEDURE — 82803 BLOOD GASES ANY COMBINATION: CPT

## 2023-07-14 PROCEDURE — 80048 BASIC METABOLIC PNL TOTAL CA: CPT

## 2023-07-14 PROCEDURE — 6360000002 HC RX W HCPCS

## 2023-07-14 PROCEDURE — 6360000002 HC RX W HCPCS: Performed by: PSYCHIATRY & NEUROLOGY

## 2023-07-14 PROCEDURE — 37799 UNLISTED PX VASCULAR SURGERY: CPT

## 2023-07-14 PROCEDURE — C9254 INJECTION, LACOSAMIDE: HCPCS | Performed by: STUDENT IN AN ORGANIZED HEALTH CARE EDUCATION/TRAINING PROGRAM

## 2023-07-14 PROCEDURE — 6360000002 HC RX W HCPCS: Performed by: EMERGENCY MEDICINE

## 2023-07-14 PROCEDURE — 6360000002 HC RX W HCPCS: Performed by: STUDENT IN AN ORGANIZED HEALTH CARE EDUCATION/TRAINING PROGRAM

## 2023-07-14 PROCEDURE — 87040 BLOOD CULTURE FOR BACTERIA: CPT

## 2023-07-14 PROCEDURE — 51798 US URINE CAPACITY MEASURE: CPT

## 2023-07-14 PROCEDURE — 84484 ASSAY OF TROPONIN QUANT: CPT

## 2023-07-14 PROCEDURE — 6360000002 HC RX W HCPCS: Performed by: REGISTERED NURSE

## 2023-07-14 PROCEDURE — 5A1935Z RESPIRATORY VENTILATION, LESS THAN 24 CONSECUTIVE HOURS: ICD-10-PCS | Performed by: PSYCHIATRY & NEUROLOGY

## 2023-07-14 PROCEDURE — 83605 ASSAY OF LACTIC ACID: CPT

## 2023-07-14 PROCEDURE — 95714 VEEG EA 12-26 HR UNMNTR: CPT

## 2023-07-14 PROCEDURE — 2700000000 HC OXYGEN THERAPY PER DAY

## 2023-07-14 PROCEDURE — 2580000003 HC RX 258: Performed by: PSYCHIATRY & NEUROLOGY

## 2023-07-14 PROCEDURE — 94761 N-INVAS EAR/PLS OXIMETRY MLT: CPT

## 2023-07-14 PROCEDURE — 82947 ASSAY GLUCOSE BLOOD QUANT: CPT

## 2023-07-14 PROCEDURE — 74018 RADEX ABDOMEN 1 VIEW: CPT

## 2023-07-14 PROCEDURE — 2580000003 HC RX 258: Performed by: REGISTERED NURSE

## 2023-07-14 PROCEDURE — 94002 VENT MGMT INPAT INIT DAY: CPT

## 2023-07-14 PROCEDURE — 93005 ELECTROCARDIOGRAM TRACING: CPT | Performed by: PSYCHIATRY & NEUROLOGY

## 2023-07-14 PROCEDURE — 2000000000 HC ICU R&B

## 2023-07-14 PROCEDURE — 36415 COLL VENOUS BLD VENIPUNCTURE: CPT

## 2023-07-14 RX ORDER — LEVETIRACETAM 500 MG/5ML
2000 INJECTION, SOLUTION, CONCENTRATE INTRAVENOUS EVERY 12 HOURS
Status: DISCONTINUED | OUTPATIENT
Start: 2023-07-15 | End: 2023-07-19

## 2023-07-14 RX ORDER — ACETAMINOPHEN 325 MG/1
650 TABLET ORAL EVERY 6 HOURS PRN
Status: DISCONTINUED | OUTPATIENT
Start: 2023-07-14 | End: 2023-07-31 | Stop reason: HOSPADM

## 2023-07-14 RX ORDER — 0.9 % SODIUM CHLORIDE 0.9 %
1000 INTRAVENOUS SOLUTION INTRAVENOUS ONCE
Status: COMPLETED | OUTPATIENT
Start: 2023-07-14 | End: 2023-07-14

## 2023-07-14 RX ORDER — ONDANSETRON 2 MG/ML
4 INJECTION INTRAMUSCULAR; INTRAVENOUS EVERY 6 HOURS PRN
Status: DISCONTINUED | OUTPATIENT
Start: 2023-07-14 | End: 2023-07-31 | Stop reason: HOSPADM

## 2023-07-14 RX ORDER — FENTANYL CITRATE 50 UG/ML
INJECTION, SOLUTION INTRAMUSCULAR; INTRAVENOUS
Status: COMPLETED
Start: 2023-07-14 | End: 2023-07-14

## 2023-07-14 RX ORDER — ENOXAPARIN SODIUM 100 MG/ML
40 INJECTION SUBCUTANEOUS DAILY
Status: DISCONTINUED | OUTPATIENT
Start: 2023-07-14 | End: 2023-07-31 | Stop reason: HOSPADM

## 2023-07-14 RX ORDER — FENTANYL CITRATE 50 UG/ML
25 INJECTION, SOLUTION INTRAMUSCULAR; INTRAVENOUS ONCE
Status: COMPLETED | OUTPATIENT
Start: 2023-07-14 | End: 2023-07-14

## 2023-07-14 RX ORDER — SODIUM CHLORIDE 0.9 % (FLUSH) 0.9 %
5-40 SYRINGE (ML) INJECTION EVERY 12 HOURS SCHEDULED
Status: DISCONTINUED | OUTPATIENT
Start: 2023-07-14 | End: 2023-07-31 | Stop reason: HOSPADM

## 2023-07-14 RX ORDER — CHLORHEXIDINE GLUCONATE 0.12 MG/ML
15 RINSE ORAL 2 TIMES DAILY
Status: DISCONTINUED | OUTPATIENT
Start: 2023-07-14 | End: 2023-07-14

## 2023-07-14 RX ORDER — SODIUM CHLORIDE 0.9 % (FLUSH) 0.9 %
5-40 SYRINGE (ML) INJECTION PRN
Status: DISCONTINUED | OUTPATIENT
Start: 2023-07-14 | End: 2023-07-31 | Stop reason: HOSPADM

## 2023-07-14 RX ORDER — SODIUM CHLORIDE 9 MG/ML
INJECTION, SOLUTION INTRAVENOUS CONTINUOUS
Status: DISCONTINUED | OUTPATIENT
Start: 2023-07-14 | End: 2023-07-19

## 2023-07-14 RX ORDER — ACETAMINOPHEN 325 MG/1
325 TABLET ORAL EVERY 4 HOURS PRN
Status: ON HOLD | COMMUNITY
End: 2023-07-30 | Stop reason: HOSPADM

## 2023-07-14 RX ORDER — LEVETIRACETAM 500 MG/5ML
1000 INJECTION, SOLUTION, CONCENTRATE INTRAVENOUS EVERY 12 HOURS
Status: DISCONTINUED | OUTPATIENT
Start: 2023-07-14 | End: 2023-07-14

## 2023-07-14 RX ORDER — DIVALPROEX SODIUM 250 MG/1
250 TABLET, EXTENDED RELEASE ORAL DAILY
Status: ON HOLD | COMMUNITY
End: 2023-07-30 | Stop reason: HOSPADM

## 2023-07-14 RX ORDER — POLYETHYLENE GLYCOL 3350 17 G/17G
17 POWDER, FOR SOLUTION ORAL DAILY PRN
Status: DISCONTINUED | OUTPATIENT
Start: 2023-07-14 | End: 2023-07-31 | Stop reason: HOSPADM

## 2023-07-14 RX ORDER — PROPOFOL 10 MG/ML
5-50 INJECTION, EMULSION INTRAVENOUS CONTINUOUS
Status: DISCONTINUED | OUTPATIENT
Start: 2023-07-14 | End: 2023-07-14

## 2023-07-14 RX ORDER — LEVETIRACETAM 500 MG/5ML
1500 INJECTION, SOLUTION, CONCENTRATE INTRAVENOUS EVERY 12 HOURS
Status: DISCONTINUED | OUTPATIENT
Start: 2023-07-14 | End: 2023-07-14

## 2023-07-14 RX ORDER — ACETAMINOPHEN 650 MG/1
650 SUPPOSITORY RECTAL EVERY 6 HOURS PRN
Status: DISCONTINUED | OUTPATIENT
Start: 2023-07-14 | End: 2023-07-31 | Stop reason: HOSPADM

## 2023-07-14 RX ORDER — SODIUM CHLORIDE 9 MG/ML
INJECTION, SOLUTION INTRAVENOUS PRN
Status: DISCONTINUED | OUTPATIENT
Start: 2023-07-14 | End: 2023-07-31 | Stop reason: HOSPADM

## 2023-07-14 RX ORDER — 0.9 % SODIUM CHLORIDE 0.9 %
500 INTRAVENOUS SOLUTION INTRAVENOUS ONCE
Status: DISCONTINUED | OUTPATIENT
Start: 2023-07-14 | End: 2023-07-14

## 2023-07-14 RX ORDER — LAMOTRIGINE 25 MG/1
50 TABLET ORAL 2 TIMES DAILY
Status: DISCONTINUED | OUTPATIENT
Start: 2023-07-14 | End: 2023-07-17

## 2023-07-14 RX ORDER — FENTANYL CITRATE 50 UG/ML
50 INJECTION, SOLUTION INTRAMUSCULAR; INTRAVENOUS ONCE
Status: COMPLETED | OUTPATIENT
Start: 2023-07-14 | End: 2023-07-14

## 2023-07-14 RX ORDER — ONDANSETRON 4 MG/1
4 TABLET, ORALLY DISINTEGRATING ORAL EVERY 8 HOURS PRN
Status: DISCONTINUED | OUTPATIENT
Start: 2023-07-14 | End: 2023-07-31 | Stop reason: HOSPADM

## 2023-07-14 RX ORDER — MORPHINE SULFATE 2 MG/ML
INJECTION, SOLUTION INTRAMUSCULAR; INTRAVENOUS
Status: COMPLETED | OUTPATIENT
Start: 2023-07-13 | End: 2023-07-13

## 2023-07-14 RX ORDER — LEVETIRACETAM 15 MG/ML
1500 INJECTION INTRAVASCULAR EVERY 12 HOURS
Status: DISCONTINUED | OUTPATIENT
Start: 2023-07-14 | End: 2023-07-14

## 2023-07-14 RX ADMIN — FENTANYL CITRATE 50 MCG: 50 INJECTION, SOLUTION INTRAMUSCULAR; INTRAVENOUS at 18:17

## 2023-07-14 RX ADMIN — CHLORHEXIDINE GLUCONATE 15 ML: 1.2 RINSE ORAL at 09:00

## 2023-07-14 RX ADMIN — SODIUM CHLORIDE, PRESERVATIVE FREE 10 ML: 5 INJECTION INTRAVENOUS at 20:05

## 2023-07-14 RX ADMIN — VALPROATE SODIUM 500 MG: 100 INJECTION, SOLUTION INTRAVENOUS at 23:48

## 2023-07-14 RX ADMIN — ENOXAPARIN SODIUM 40 MG: 100 INJECTION SUBCUTANEOUS at 10:37

## 2023-07-14 RX ADMIN — VALPROATE SODIUM 500 MG: 100 INJECTION, SOLUTION INTRAVENOUS at 06:34

## 2023-07-14 RX ADMIN — SODIUM CHLORIDE, PRESERVATIVE FREE 10 ML: 5 INJECTION INTRAVENOUS at 09:00

## 2023-07-14 RX ADMIN — ONDANSETRON 4 MG: 2 INJECTION INTRAMUSCULAR; INTRAVENOUS at 13:25

## 2023-07-14 RX ADMIN — SODIUM CHLORIDE 1000 ML: 9 INJECTION, SOLUTION INTRAVENOUS at 12:17

## 2023-07-14 RX ADMIN — FENTANYL CITRATE 25 MCG: 50 INJECTION, SOLUTION INTRAMUSCULAR; INTRAVENOUS at 17:59

## 2023-07-14 RX ADMIN — LAMOTRIGINE 50 MG: 25 TABLET ORAL at 20:05

## 2023-07-14 RX ADMIN — SODIUM CHLORIDE: 9 INJECTION, SOLUTION INTRAVENOUS at 03:18

## 2023-07-14 RX ADMIN — VALPROATE SODIUM 500 MG: 100 INJECTION, SOLUTION INTRAVENOUS at 15:33

## 2023-07-14 RX ADMIN — CEFEPIME 1000 MG: 1 INJECTION, POWDER, FOR SOLUTION INTRAMUSCULAR; INTRAVENOUS at 03:22

## 2023-07-14 RX ADMIN — PROPOFOL 30 MCG/KG/MIN: 10 INJECTION, EMULSION INTRAVENOUS at 03:23

## 2023-07-14 RX ADMIN — PROPOFOL 35 MCG/KG/MIN: 10 INJECTION, EMULSION INTRAVENOUS at 00:24

## 2023-07-14 RX ADMIN — SODIUM CHLORIDE 40 MG: 9 INJECTION INTRAMUSCULAR; INTRAVENOUS; SUBCUTANEOUS at 10:37

## 2023-07-14 RX ADMIN — PROPOFOL 20 MCG/KG/MIN: 10 INJECTION, EMULSION INTRAVENOUS at 05:44

## 2023-07-14 RX ADMIN — ACETAMINOPHEN 650 MG: 325 TABLET ORAL at 20:14

## 2023-07-14 RX ADMIN — ZIPRASIDONE MESYLATE 10 MG: 20 INJECTION, POWDER, LYOPHILIZED, FOR SOLUTION INTRAMUSCULAR at 19:20

## 2023-07-14 RX ADMIN — LEVETIRACETAM 3000 MG: 100 INJECTION, SOLUTION INTRAVENOUS at 20:17

## 2023-07-14 RX ADMIN — SODIUM CHLORIDE: 9 INJECTION, SOLUTION INTRAVENOUS at 02:27

## 2023-07-14 RX ADMIN — LACOSAMIDE 200 MG: 10 INJECTION INTRAVENOUS at 23:51

## 2023-07-14 RX ADMIN — LEVETIRACETAM 1500 MG: 100 INJECTION, SOLUTION INTRAVENOUS at 11:41

## 2023-07-14 RX ADMIN — PROPOFOL 20 MCG/KG/MIN: 10 INJECTION, EMULSION INTRAVENOUS at 04:55

## 2023-07-14 RX ADMIN — SODIUM CHLORIDE 1000 ML: 9 INJECTION, SOLUTION INTRAVENOUS at 15:43

## 2023-07-14 RX ADMIN — CEFTRIAXONE SODIUM 1000 MG: 10 INJECTION, POWDER, FOR SOLUTION INTRAVENOUS at 15:39

## 2023-07-14 ASSESSMENT — PULMONARY FUNCTION TESTS
PIF_VALUE: 20
PIF_VALUE: 19
PIF_VALUE: 20
PIF_VALUE: 19
PIF_VALUE: 21
PIF_VALUE: 14
PIF_VALUE: 20
PIF_VALUE: 21
PIF_VALUE: 19
PIF_VALUE: 21
PIF_VALUE: 21
PIF_VALUE: 26
PIF_VALUE: 18
PIF_VALUE: 18
PIF_VALUE: 20
PIF_VALUE: 20

## 2023-07-14 NOTE — CARE COORDINATION
Transitional planning: Phone call to [facility] office to obtain address to send IMM for signature. Spoke to [place] who provided address. [address] for Dar Barnard RN Case Manager with [facility] [phone] (fax [phone]) requesting return call to confirm if patient is a bed hold. Call back number provided. Referral placed to [facility]. 1329 Phone call from Dar Barnard RN Case Manager with [facility] confirming patient is a bed hold and can return when discharged.

## 2023-07-14 NOTE — CARE COORDINATION
Case Management Assessment  Initial Evaluation    Date/Time of Evaluation: 7/14/2023 11:10 AM  Assessment Completed by: Austin Ramírez RN    If patient is discharged prior to next notation, then this note serves as note for discharge by case management. Patient Name: Rodrigue Ledesma                   YOB: 1955  Diagnosis: Status epilepticus [facility]) Jordana Velázquez                   Date / Time: 7/14/2023  2:34 AM    Patient Admission Status: Inpatient   Readmission Risk (Low < 19, Mod (19-27), High > 27): Readmission Risk Score: 14.6    Current PCP: Amanda Leon MD  PCP verified by CM? (P) Yes ([facility] staff physician)    Chart Reviewed: Yes      History Provided by: (P) Patient  Patient Orientation: (P) Alert and Oriented    Patient Cognition: (P) Alert    Hospitalization in the last 30 days (Readmission):  No    If yes, Readmission Assessment in CM Navigator will be completed.     Advance Directives:      Code Status: Full Code   Patient's Primary Decision Maker is: (P) Named in Scanned ACP Document      Discharge Planning:    Patient lives with: (P) Other (Comment) (extended care facility) Type of Home: (P) Long-Term Care  Primary Care Giver: (P) Other (Comment) (extended care facility)  Patient Support Systems include: (P) Other (Comment) (extended care facility)   Current Financial resources: (P) Medicaid, Medicare  Current community resources: (P) ECF/Home Care, Institutional Placement  Current services prior to admission: (P) Extended Care Facility            Current DME:              Type of Home Care services:  (P) None    ADLS  Prior functional level: (P) Assistance with the following:, Dressing, Bathing, Cooking  Current functional level: (P) Assistance with the following:, Dressing, Bathing, Cooking    PT AM-PAC:   /24  OT AM-PAC:   /24    Family can provide assistance at DC: (P) Other (comment) (extended care facility staff)  Would you like Case Management to discuss the discharge

## 2023-07-14 NOTE — CONSULTS
CRITICAL CARE Consults      Patient:  Ronan Em    Unit/Bed:4D-17/017-A  YOB: 1955  MRN: [id number]   PCP: Triston Blanco MD  Date of Admission: 7/13/2023  Chief Complaint:- Seizure, AMS    Assessment and Plan:    Epilepsy with breakthrough seizure, concern for status epilepticus:  Initial presentation with recurrent seizures, noted previous history of status. OP anti-epileptics currently being up-titrated. On arrival, EEG performed that demonstrated acute ictal state. Neuro consulted per hospitalist and patient was recommended for transfer to [facility] for continuous EEG. Transfer initiated per hospitalist. Discussed case with neuro-interventionalist over phone who accepted admission. Patient was given Keppra load, Depacon load. Abx/cultures per below. Transferred to [facility] throughout the night. UTI: UA on arrival: 25-50 WBC, many bacteria, cloudy, moderate leukocyte esterase. Urine cx pending. Blood cultures ordered. Cefepime initiated, tolerated Rocephim previously  HAGMA:  CO2 19 on admission, AG 21. Likely 2/2 lactic acidosis. Trending lactic, receiving IVF/abx. Spastic diplegic cerebral palsy:  History of. Primary congenital diplegia with predominantly spastic L hemiparesis. Unclear if currently follows with neurology. Depression:  Noted. Continued on venlafaxine. INITIAL H AND P AND ICU COURSE:  74 yo F PMHx epilepsy/seizures, ADHD, learning disability who presented to [address] [place] [address] from nursing home following witnessed seizure activity. Patient reportedly on 3 OP anti-epileptics that are actively being up-titrated due to uncontrolled seizures at home. On arrival, patient reportedly post-ictal with multiple episodes of seizure-like activity. She was given ativan and neurology consulted. Patient underwent EEG on 7/14/23 that demonstrated an ictal state. Neurology recommended transfer for continuous EEG as well as transfer to the ICU for intubation and propofol.
dosing when appropriate to reduce the radiation dose to as low as reasonably achievable. FINDINGS: Diffuse cerebral and cerebellar volume loss. Periventricular  and subcortical white matter hypodensities that likely relate to chronic microangiopathic disease. No ventriculomegaly. No midline shift or mass effect. No acute intracranial hemorrhage. No intracranial collection. Gray-white differentiation is unremarkable. The posterior fossa is unremarkable. The craniocervical junction is unremarkable. No acute bony abnormality. The  paranasal sinuses are clear. The  mastoid air cells are clear. The orbits are unremarkable. 1. No acute intracranial abnormality. 2. Sequela of chronic microvascular changes. **This report has been created using voice recognition software. It may contain minor errors which are inherent in voice recognition technology. ** Final report electronically signed by Dr Levy Leyden on 7/13/2023 1:00 PM    XR CHEST PORTABLE    Result Date: 7/13/2023  PROCEDURE: XR CHEST PORTABLE CLINICAL INFORMATION: AMS COMPARISON: 3/13/2023 TECHNIQUE: AP portable chest radiograph performed. FINDINGS: No focal pulmonary consolidation. Cardiac silhouette is not enlarged. No pleural effusion. No pneumothorax. No acute bony abnormality. 1. No acute cardiopulmonary finding. **This report has been created using voice recognition software. It may contain minor errors which are inherent in voice recognition technology. ** Final report electronically signed by Dr Levy Leyden on 7/13/2023 12:41 PM        Assessment and Plan:        Breakthrough Seizure, concern for status epilepticus:  Patient given 2mg IN Versed, 1mg Ativan, 1500mg Keppra in ED  Stat EEG obtained following Keppra load and revealed presence of right temporal lateralized periodic discharges at 1-2 Hz considered in ictal/interictal spectrum, long term monitoring should be considered. Patient on Depakote, Lamictal, and Keppra at baseline.

## 2023-07-14 NOTE — DISCHARGE SUMMARY
Apparently, Pt was transferred- as previously initiated and planned- to Neuro ICU at [facility] in stable medical conditions. For further detailed information please refer to the progress notes from the same day.

## 2023-07-14 NOTE — PROGRESS NOTES
A size 7.5 endotracheal tube was successfully placed using a size 4 blade. The Lot number for the endotracheal tube was [id number]. Tube secured 24cm at the lip. Placement verified by positive color change and bilateral breath sounds.

## 2023-07-14 NOTE — CARE COORDINATION
07/14/23 0849   Readmission Assessment   Number of Days since last admission? 1-7 days  (Readmission assessment not appropriate-transfer from [facility])   Previous Disposition Other (comment)  (Readmission assessment not appropriate-transfer from [address] [place] [address])   Who is being Interviewed Patient   What was the patient's/caregiver's perception as to why they think they needed to return back to the hospital? Other (Comment)   Did you visit your Primary Care Physician after you left the hospital, before you returned this time? No   Why weren't you able to visit your PCP? Other (Comment)   Did you see a specialist, such as Cardiac, Pulmonary, Orthopedic Physician, etc. after you left the hospital? No   Who advised the patient to return to the hospital? Other (Comment)   Does the patient report anything that got in the way of taking their medications? No   In our efforts to provide the best possible care to you and others like you, can you think of anything that we could have done to help you after you left the hospital the first time, so that you might not have needed to return so soon?  Other (Comment)  (Readmission assessment not appropriate-transfer from [address] [place] [address])

## 2023-07-14 NOTE — PROGRESS NOTES
2030 BP 81/59  2031 Levo started at 5 mcg  2033 BP 54/26   Levo increased 15 mcg  2036 BP 65/33  100 indigo pushed by Dr. Janell Hernandez  2038 R IJ CVC placed by Dr. Montse Chaudhry  2040 Levo increased 20mcg  2041 Propofol restarted 10mcg   Levo decreased 15mcg  2045 Propofol increased 20mcg  2058 Levo decreased to 5 mcg  2059 Levo stopped  2101 len placed by Dr. Dariana Noble  2111 [address] placed 60  2117 xray for line placement  2118 ETT retracted 21 MARYLIN  0037- Pt left with [facility] at this time. Report given to transport RN all infusions verified at this time. 6558- Report called to [facility] ang given to [facility] RN at this time. All questions answered at this time.

## 2023-07-15 LAB
ANION GAP SERPL CALCULATED.3IONS-SCNC: 11 MMOL/L (ref 9–17)
BASOPHILS # BLD: <0.03 K/UL (ref 0–0.2)
BASOPHILS NFR BLD: 0 % (ref 0–2)
BUN SERPL-MCNC: 10 MG/DL (ref 8–23)
CALCIUM SERPL-MCNC: 8.8 MG/DL (ref 8.6–10.4)
CHLORIDE SERPL-SCNC: 106 MMOL/L (ref 98–107)
CO2 SERPL-SCNC: 23 MMOL/L (ref 20–31)
CREAT SERPL-MCNC: 0.4 MG/DL (ref 0.5–0.9)
EKG ATRIAL RATE: 106 BPM
EKG P AXIS: 64 DEGREES
EKG P-R INTERVAL: 152 MS
EKG Q-T INTERVAL: 358 MS
EKG QRS DURATION: 82 MS
EKG QTC CALCULATION (BAZETT): 475 MS
EKG R AXIS: 48 DEGREES
EKG T AXIS: 49 DEGREES
EKG VENTRICULAR RATE: 106 BPM
EOSINOPHIL # BLD: <0.03 K/UL (ref 0–0.44)
EOSINOPHILS RELATIVE PERCENT: 0 % (ref 1–4)
ERYTHROCYTE [DISTWIDTH] IN BLOOD BY AUTOMATED COUNT: 12.9 % (ref 11.8–14.4)
GFR SERPL CREATININE-BSD FRML MDRD: >60 ML/MIN/1.73M2
GLUCOSE SERPL-MCNC: 97 MG/DL (ref 70–99)
HCT VFR BLD AUTO: 36.8 % (ref 36.3–47.1)
HGB BLD-MCNC: 12.1 G/DL (ref 11.9–15.1)
IMM GRANULOCYTES # BLD AUTO: 0.04 K/UL (ref 0–0.3)
IMM GRANULOCYTES NFR BLD: 0 %
LYMPHOCYTES # BLD: 22 % (ref 24–43)
LYMPHOCYTES NFR BLD: 2.13 K/UL (ref 1.1–3.7)
MCH RBC QN AUTO: 31 PG (ref 25.2–33.5)
MCHC RBC AUTO-ENTMCNC: 32.9 G/DL (ref 28.4–34.8)
MCV RBC AUTO: 94.4 FL (ref 82.6–102.9)
METER GLUCOSE: 71 MG/DL (ref 65–105)
METER GLUCOSE: 72 MG/DL (ref 65–105)
METER GLUCOSE: 75 MG/DL (ref 65–105)
METER GLUCOSE: 76 MG/DL (ref 65–105)
METER GLUCOSE: 87 MG/DL (ref 65–105)
METER GLUCOSE: 88 MG/DL (ref 65–105)
MONOCYTES NFR BLD: 1.07 K/UL (ref 0.1–1.2)
MONOCYTES NFR BLD: 11 % (ref 3–12)
NEUTROPHILS NFR BLD: 66 % (ref 36–65)
NEUTS SEG NFR BLD: 6.44 K/UL (ref 1.5–8.1)
NRBC BLD-RTO: 0 PER 100 WBC
PLATELET # BLD AUTO: ABNORMAL K/UL (ref 138–453)
PLATELET, FLUORESCENCE: ABNORMAL K/UL (ref 138–453)
POTASSIUM SERPL-SCNC: 4.9 MMOL/L (ref 3.7–5.3)
RBC # BLD AUTO: 3.9 M/UL (ref 3.95–5.11)
REASON FOR REJECTION: NORMAL
SODIUM SERPL-SCNC: 140 MMOL/L (ref 135–144)
SPECIMEN SOURCE: NORMAL
WBC OTHER # BLD: 9.7 K/UL (ref 3.5–11.3)
ZZ NTE CLEAN UP: ORDERED TEST: NORMAL

## 2023-07-15 PROCEDURE — 94761 N-INVAS EAR/PLS OXIMETRY MLT: CPT

## 2023-07-15 PROCEDURE — 6370000000 HC RX 637 (ALT 250 FOR IP): Performed by: REGISTERED NURSE

## 2023-07-15 PROCEDURE — 80048 BASIC METABOLIC PNL TOTAL CA: CPT

## 2023-07-15 PROCEDURE — 85055 RETICULATED PLATELET ASSAY: CPT

## 2023-07-15 PROCEDURE — A4216 STERILE WATER/SALINE, 10 ML: HCPCS | Performed by: REGISTERED NURSE

## 2023-07-15 PROCEDURE — 2500000003 HC RX 250 WO HCPCS: Performed by: REGISTERED NURSE

## 2023-07-15 PROCEDURE — 6360000002 HC RX W HCPCS: Performed by: EMERGENCY MEDICINE

## 2023-07-15 PROCEDURE — 2580000003 HC RX 258: Performed by: REGISTERED NURSE

## 2023-07-15 PROCEDURE — 2580000003 HC RX 258: Performed by: PSYCHIATRY & NEUROLOGY

## 2023-07-15 PROCEDURE — 6360000002 HC RX W HCPCS: Performed by: REGISTERED NURSE

## 2023-07-15 PROCEDURE — 2700000000 HC OXYGEN THERAPY PER DAY

## 2023-07-15 PROCEDURE — 85027 COMPLETE CBC AUTOMATED: CPT

## 2023-07-15 PROCEDURE — 95720 EEG PHY/QHP EA INCR W/VEEG: CPT | Performed by: PSYCHIATRY & NEUROLOGY

## 2023-07-15 PROCEDURE — 95714 VEEG EA 12-26 HR UNMNTR: CPT

## 2023-07-15 PROCEDURE — 2500000003 HC RX 250 WO HCPCS: Performed by: PSYCHIATRY & NEUROLOGY

## 2023-07-15 PROCEDURE — 36415 COLL VENOUS BLD VENIPUNCTURE: CPT

## 2023-07-15 PROCEDURE — 82947 ASSAY GLUCOSE BLOOD QUANT: CPT

## 2023-07-15 PROCEDURE — 2000000000 HC ICU R&B

## 2023-07-15 PROCEDURE — C9254 INJECTION, LACOSAMIDE: HCPCS | Performed by: STUDENT IN AN ORGANIZED HEALTH CARE EDUCATION/TRAINING PROGRAM

## 2023-07-15 PROCEDURE — C9113 INJ PANTOPRAZOLE SODIUM, VIA: HCPCS | Performed by: REGISTERED NURSE

## 2023-07-15 PROCEDURE — 2580000003 HC RX 258: Performed by: STUDENT IN AN ORGANIZED HEALTH CARE EDUCATION/TRAINING PROGRAM

## 2023-07-15 PROCEDURE — 6360000002 HC RX W HCPCS: Performed by: PSYCHIATRY & NEUROLOGY

## 2023-07-15 PROCEDURE — 99232 SBSQ HOSP IP/OBS MODERATE 35: CPT | Performed by: PSYCHIATRY & NEUROLOGY

## 2023-07-15 PROCEDURE — 6360000002 HC RX W HCPCS: Performed by: STUDENT IN AN ORGANIZED HEALTH CARE EDUCATION/TRAINING PROGRAM

## 2023-07-15 RX ORDER — DEXTROSE MONOHYDRATE 100 MG/ML
INJECTION, SOLUTION INTRAVENOUS CONTINUOUS PRN
Status: DISCONTINUED | OUTPATIENT
Start: 2023-07-15 | End: 2023-07-31 | Stop reason: HOSPADM

## 2023-07-15 RX ADMIN — LAMOTRIGINE 50 MG: 25 TABLET ORAL at 08:29

## 2023-07-15 RX ADMIN — VALPROATE SODIUM 1000 MG: 100 INJECTION, SOLUTION INTRAVENOUS at 16:29

## 2023-07-15 RX ADMIN — LEVETIRACETAM 2000 MG: 100 INJECTION, SOLUTION INTRAVENOUS at 08:20

## 2023-07-15 RX ADMIN — ACETAMINOPHEN 650 MG: 325 TABLET ORAL at 08:20

## 2023-07-15 RX ADMIN — LEVETIRACETAM 2000 MG: 100 INJECTION, SOLUTION INTRAVENOUS at 19:59

## 2023-07-15 RX ADMIN — VALPROATE SODIUM 3500 MG: 100 INJECTION, SOLUTION INTRAVENOUS at 10:15

## 2023-07-15 RX ADMIN — CEFTRIAXONE SODIUM 1000 MG: 10 INJECTION, POWDER, FOR SOLUTION INTRAVENOUS at 15:29

## 2023-07-15 RX ADMIN — VALPROATE SODIUM 500 MG: 100 INJECTION, SOLUTION INTRAVENOUS at 06:29

## 2023-07-15 RX ADMIN — SODIUM CHLORIDE: 9 INJECTION, SOLUTION INTRAVENOUS at 06:05

## 2023-07-15 RX ADMIN — LACOSAMIDE 100 MG: 10 INJECTION INTRAVENOUS at 20:19

## 2023-07-15 RX ADMIN — ENOXAPARIN SODIUM 40 MG: 100 INJECTION SUBCUTANEOUS at 08:19

## 2023-07-15 RX ADMIN — LACOSAMIDE 400 MG: 10 INJECTION INTRAVENOUS at 23:21

## 2023-07-15 RX ADMIN — LACOSAMIDE 100 MG: 10 INJECTION INTRAVENOUS at 10:12

## 2023-07-15 RX ADMIN — SODIUM CHLORIDE 40 MG: 9 INJECTION INTRAMUSCULAR; INTRAVENOUS; SUBCUTANEOUS at 08:21

## 2023-07-15 RX ADMIN — ACETAMINOPHEN 650 MG: 325 TABLET ORAL at 16:34

## 2023-07-15 RX ADMIN — LAMOTRIGINE 50 MG: 25 TABLET ORAL at 20:00

## 2023-07-15 RX ADMIN — SODIUM CHLORIDE, PRESERVATIVE FREE 10 ML: 5 INJECTION INTRAVENOUS at 20:26

## 2023-07-16 ENCOUNTER — APPOINTMENT (OUTPATIENT)
Dept: CT IMAGING | Age: 68
DRG: 100 | End: 2023-07-16
Attending: PSYCHIATRY & NEUROLOGY
Payer: MEDICARE

## 2023-07-16 ENCOUNTER — APPOINTMENT (OUTPATIENT)
Dept: MRI IMAGING | Age: 68
DRG: 100 | End: 2023-07-16
Attending: PSYCHIATRY & NEUROLOGY
Payer: MEDICARE

## 2023-07-16 ENCOUNTER — APPOINTMENT (OUTPATIENT)
Dept: GENERAL RADIOLOGY | Age: 68
DRG: 100 | End: 2023-07-16
Attending: PSYCHIATRY & NEUROLOGY
Payer: MEDICARE

## 2023-07-16 LAB
AMMONIA PLAS-SCNC: 27 UMOL/L (ref 11–51)
ANION GAP SERPL CALCULATED.3IONS-SCNC: 10 MMOL/L (ref 9–17)
BACTERIA BLD AEROBE CULT: NORMAL
BACTERIA BLD AEROBE CULT: NORMAL
BASOPHILS # BLD: <0.03 K/UL (ref 0–0.2)
BASOPHILS NFR BLD: 0 % (ref 0–2)
BUN SERPL-MCNC: 10 MG/DL (ref 8–23)
CALCIUM SERPL-MCNC: 9 MG/DL (ref 8.6–10.4)
CHLORIDE SERPL-SCNC: 106 MMOL/L (ref 98–107)
CO2 SERPL-SCNC: 24 MMOL/L (ref 20–31)
CREAT SERPL-MCNC: 0.4 MG/DL (ref 0.5–0.9)
EOSINOPHIL # BLD: 0.04 K/UL (ref 0–0.44)
EOSINOPHILS RELATIVE PERCENT: 1 % (ref 1–4)
ERYTHROCYTE [DISTWIDTH] IN BLOOD BY AUTOMATED COUNT: 12.9 % (ref 11.8–14.4)
GFR SERPL CREATININE-BSD FRML MDRD: >60 ML/MIN/1.73M2
GLUCOSE SERPL-MCNC: 94 MG/DL (ref 70–99)
HCT VFR BLD AUTO: 33.5 % (ref 36.3–47.1)
HGB BLD-MCNC: 10.5 G/DL (ref 11.9–15.1)
IMM GRANULOCYTES # BLD AUTO: 0.03 K/UL (ref 0–0.3)
IMM GRANULOCYTES NFR BLD: 0 %
INR PPP: 1.2
LYMPHOCYTES # BLD: 41 % (ref 24–43)
LYMPHOCYTES NFR BLD: 2.8 K/UL (ref 1.1–3.7)
MAGNESIUM SERPL-MCNC: 1.8 MG/DL (ref 1.6–2.6)
MCH RBC QN AUTO: 30.8 PG (ref 25.2–33.5)
MCHC RBC AUTO-ENTMCNC: 31.3 G/DL (ref 28.4–34.8)
MCV RBC AUTO: 98.2 FL (ref 82.6–102.9)
METER GLUCOSE: 79 MG/DL (ref 65–105)
METER GLUCOSE: 95 MG/DL (ref 65–105)
METER GLUCOSE: 95 MG/DL (ref 65–105)
MONOCYTES NFR BLD: 0.8 K/UL (ref 0.1–1.2)
MONOCYTES NFR BLD: 12 % (ref 3–12)
NEUTROPHILS NFR BLD: 46 % (ref 36–65)
NEUTS SEG NFR BLD: 3.18 K/UL (ref 1.5–8.1)
NRBC BLD-RTO: 0 PER 100 WBC
PARTIAL THROMBOPLASTIN TIME: 25.9 SEC (ref 23–36.5)
PLATELET # BLD AUTO: 118 K/UL (ref 138–453)
PMV BLD AUTO: 13 FL (ref 8.1–13.5)
POTASSIUM SERPL-SCNC: 3.4 MMOL/L (ref 3.7–5.3)
PROTHROMBIN TIME: 14.8 SEC (ref 11.7–14.9)
RBC # BLD AUTO: 3.41 M/UL (ref 3.95–5.11)
SODIUM SERPL-SCNC: 140 MMOL/L (ref 135–144)
VALPROATE FREE SERPL-MCNC: 31.5 UG/ML (ref 7–23)
VALPROATE SERPL-MCNC: 126 UG/ML (ref 50–125)
WBC OTHER # BLD: 6.9 K/UL (ref 3.5–11.3)

## 2023-07-16 PROCEDURE — 6370000000 HC RX 637 (ALT 250 FOR IP): Performed by: REGISTERED NURSE

## 2023-07-16 PROCEDURE — C9254 INJECTION, LACOSAMIDE: HCPCS | Performed by: STUDENT IN AN ORGANIZED HEALTH CARE EDUCATION/TRAINING PROGRAM

## 2023-07-16 PROCEDURE — 6360000002 HC RX W HCPCS

## 2023-07-16 PROCEDURE — 83735 ASSAY OF MAGNESIUM: CPT

## 2023-07-16 PROCEDURE — 82947 ASSAY GLUCOSE BLOOD QUANT: CPT

## 2023-07-16 PROCEDURE — 2580000003 HC RX 258: Performed by: EMERGENCY MEDICINE

## 2023-07-16 PROCEDURE — 2000000000 HC ICU R&B

## 2023-07-16 PROCEDURE — 6370000000 HC RX 637 (ALT 250 FOR IP): Performed by: STUDENT IN AN ORGANIZED HEALTH CARE EDUCATION/TRAINING PROGRAM

## 2023-07-16 PROCEDURE — 6360000002 HC RX W HCPCS: Performed by: PSYCHIATRY & NEUROLOGY

## 2023-07-16 PROCEDURE — C9113 INJ PANTOPRAZOLE SODIUM, VIA: HCPCS | Performed by: REGISTERED NURSE

## 2023-07-16 PROCEDURE — 36415 COLL VENOUS BLD VENIPUNCTURE: CPT

## 2023-07-16 PROCEDURE — 95714 VEEG EA 12-26 HR UNMNTR: CPT

## 2023-07-16 PROCEDURE — 2500000003 HC RX 250 WO HCPCS: Performed by: PSYCHIATRY & NEUROLOGY

## 2023-07-16 PROCEDURE — 6360000004 HC RX CONTRAST MEDICATION: Performed by: EMERGENCY MEDICINE

## 2023-07-16 PROCEDURE — 74018 RADEX ABDOMEN 1 VIEW: CPT

## 2023-07-16 PROCEDURE — 6360000002 HC RX W HCPCS: Performed by: EMERGENCY MEDICINE

## 2023-07-16 PROCEDURE — A4216 STERILE WATER/SALINE, 10 ML: HCPCS | Performed by: REGISTERED NURSE

## 2023-07-16 PROCEDURE — 80164 ASSAY DIPROPYLACETIC ACD TOT: CPT

## 2023-07-16 PROCEDURE — 2580000003 HC RX 258: Performed by: PSYCHIATRY & NEUROLOGY

## 2023-07-16 PROCEDURE — 70498 CT ANGIOGRAPHY NECK: CPT

## 2023-07-16 PROCEDURE — 94761 N-INVAS EAR/PLS OXIMETRY MLT: CPT

## 2023-07-16 PROCEDURE — 95720 EEG PHY/QHP EA INCR W/VEEG: CPT | Performed by: PSYCHIATRY & NEUROLOGY

## 2023-07-16 PROCEDURE — 80165 DIPROPYLACETIC ACID FREE: CPT

## 2023-07-16 PROCEDURE — 80048 BASIC METABOLIC PNL TOTAL CA: CPT

## 2023-07-16 PROCEDURE — 6370000000 HC RX 637 (ALT 250 FOR IP): Performed by: EMERGENCY MEDICINE

## 2023-07-16 PROCEDURE — 6360000002 HC RX W HCPCS: Performed by: STUDENT IN AN ORGANIZED HEALTH CARE EDUCATION/TRAINING PROGRAM

## 2023-07-16 PROCEDURE — 2580000003 HC RX 258: Performed by: STUDENT IN AN ORGANIZED HEALTH CARE EDUCATION/TRAINING PROGRAM

## 2023-07-16 PROCEDURE — 2580000003 HC RX 258: Performed by: REGISTERED NURSE

## 2023-07-16 PROCEDURE — 70551 MRI BRAIN STEM W/O DYE: CPT

## 2023-07-16 PROCEDURE — 99232 SBSQ HOSP IP/OBS MODERATE 35: CPT | Performed by: PSYCHIATRY & NEUROLOGY

## 2023-07-16 PROCEDURE — 85730 THROMBOPLASTIN TIME PARTIAL: CPT

## 2023-07-16 PROCEDURE — 85610 PROTHROMBIN TIME: CPT

## 2023-07-16 PROCEDURE — 85027 COMPLETE CBC AUTOMATED: CPT

## 2023-07-16 PROCEDURE — 6360000002 HC RX W HCPCS: Performed by: REGISTERED NURSE

## 2023-07-16 PROCEDURE — 82140 ASSAY OF AMMONIA: CPT

## 2023-07-16 RX ORDER — FENTANYL CITRATE 50 UG/ML
50 INJECTION, SOLUTION INTRAMUSCULAR; INTRAVENOUS ONCE
Status: COMPLETED | OUTPATIENT
Start: 2023-07-16 | End: 2023-07-16

## 2023-07-16 RX ORDER — FENTANYL CITRATE 50 UG/ML
INJECTION, SOLUTION INTRAMUSCULAR; INTRAVENOUS
Status: COMPLETED
Start: 2023-07-16 | End: 2023-07-16

## 2023-07-16 RX ORDER — LORAZEPAM 2 MG/ML
INJECTION INTRAMUSCULAR
Status: COMPLETED
Start: 2023-07-16 | End: 2023-07-16

## 2023-07-16 RX ORDER — LORAZEPAM 2 MG/ML
0.5 INJECTION INTRAMUSCULAR ONCE
Status: COMPLETED | OUTPATIENT
Start: 2023-07-16 | End: 2023-07-16

## 2023-07-16 RX ORDER — 0.9 % SODIUM CHLORIDE 0.9 %
1000 INTRAVENOUS SOLUTION INTRAVENOUS ONCE
Status: COMPLETED | OUTPATIENT
Start: 2023-07-16 | End: 2023-07-16

## 2023-07-16 RX ORDER — TOPIRAMATE 100 MG/1
100 TABLET, FILM COATED ORAL 2 TIMES DAILY
Status: DISCONTINUED | OUTPATIENT
Start: 2023-07-17 | End: 2023-07-17

## 2023-07-16 RX ORDER — PHENOBARBITAL SODIUM 65 MG/ML
100 INJECTION INTRAMUSCULAR 2 TIMES DAILY
Status: DISCONTINUED | OUTPATIENT
Start: 2023-07-16 | End: 2023-07-18

## 2023-07-16 RX ADMIN — SODIUM CHLORIDE 1000 ML: 9 INJECTION, SOLUTION INTRAVENOUS at 15:10

## 2023-07-16 RX ADMIN — SODIUM CHLORIDE, PRESERVATIVE FREE 10 ML: 5 INJECTION INTRAVENOUS at 10:47

## 2023-07-16 RX ADMIN — FENTANYL CITRATE 50 MCG: 50 INJECTION, SOLUTION INTRAMUSCULAR; INTRAVENOUS at 21:00

## 2023-07-16 RX ADMIN — LEVETIRACETAM 2000 MG: 100 INJECTION, SOLUTION INTRAVENOUS at 08:41

## 2023-07-16 RX ADMIN — LORAZEPAM 0.5 MG: 2 INJECTION INTRAMUSCULAR at 10:37

## 2023-07-16 RX ADMIN — LACOSAMIDE 200 MG: 10 INJECTION INTRAVENOUS at 10:42

## 2023-07-16 RX ADMIN — PHENOBARBITAL SODIUM 100 MG: 65 INJECTION INTRAMUSCULAR; INTRAVENOUS at 20:15

## 2023-07-16 RX ADMIN — LAMOTRIGINE 50 MG: 25 TABLET ORAL at 08:42

## 2023-07-16 RX ADMIN — LEVETIRACETAM 2000 MG: 100 INJECTION, SOLUTION INTRAVENOUS at 20:00

## 2023-07-16 RX ADMIN — LACOSAMIDE 200 MG: 10 INJECTION INTRAVENOUS at 21:20

## 2023-07-16 RX ADMIN — CEFTRIAXONE SODIUM 1000 MG: 10 INJECTION, POWDER, FOR SOLUTION INTRAVENOUS at 15:34

## 2023-07-16 RX ADMIN — TOPIRAMATE 200 MG: 200 TABLET, FILM COATED ORAL at 22:15

## 2023-07-16 RX ADMIN — LORAZEPAM 0.5 MG: 2 INJECTION INTRAMUSCULAR; INTRAVENOUS at 10:37

## 2023-07-16 RX ADMIN — LAMOTRIGINE 50 MG: 25 TABLET ORAL at 20:28

## 2023-07-16 RX ADMIN — SODIUM CHLORIDE 40 MG: 9 INJECTION INTRAMUSCULAR; INTRAVENOUS; SUBCUTANEOUS at 08:41

## 2023-07-16 RX ADMIN — VALPROATE SODIUM 1000 MG: 100 INJECTION, SOLUTION INTRAVENOUS at 01:08

## 2023-07-16 RX ADMIN — VALPROATE SODIUM 1000 MG: 100 INJECTION, SOLUTION INTRAVENOUS at 17:44

## 2023-07-16 RX ADMIN — PHENOBARBITAL SODIUM 1411.8 MG: 130 INJECTION INTRAMUSCULAR; INTRAVENOUS at 10:29

## 2023-07-16 RX ADMIN — IOPAMIDOL 90 ML: 755 INJECTION, SOLUTION INTRAVENOUS at 17:20

## 2023-07-16 RX ADMIN — POTASSIUM BICARBONATE 40 MEQ: 782 TABLET, EFFERVESCENT ORAL at 08:40

## 2023-07-16 RX ADMIN — ENOXAPARIN SODIUM 40 MG: 100 INJECTION SUBCUTANEOUS at 08:41

## 2023-07-16 RX ADMIN — SODIUM CHLORIDE, PRESERVATIVE FREE 10 ML: 5 INJECTION INTRAVENOUS at 22:16

## 2023-07-16 RX ADMIN — VALPROATE SODIUM 1000 MG: 100 INJECTION, SOLUTION INTRAVENOUS at 08:44

## 2023-07-16 NOTE — FLOWSHEET NOTE
Upon assessment RN noticed pupils dilated with upward gaze  and not moving left arm to pain, Neuro Crit at bedside, MRI ordered and patient transported to MRI with RN and DR monitored

## 2023-07-16 NOTE — FLOWSHEET NOTE
Patient's BP is low , Neuro Crit notified.  Orders received Well Child Visit at 12 Months   AMBULATORY CARE:   A well child visit  is when your child sees a healthcare provider to prevent health problems  Well child visits are used to track your child's growth and development  It is also a time for you to ask questions and to get information on how to keep your child safe  Write down your questions so you remember to ask them  Your child should have regular well child visits from birth to 16 years  Development milestones your child may reach at 12 months:  Each child develops at his or her own pace  Your child might have already reached the following milestones, or he or she may reach them later:  · Stand by himself or herself, walk with 1 hand held, or take a few steps on his or her own    · Say words other than mama or kumar    · Repeat words he or she hears or name objects, such as book    ·  objects with his or her fingers, including food he or she feeds himself or herself    · Play with others, such as rolling or throwing a ball with someone    · Sleep for 8 to 10 hours every night and take 1 to 2 naps per day  Keep your child safe in the car:   · Always place your child in a rear-facing car seat  Choose a seat that meets the Federal Motor Vehicle Safety Standard 213  Make sure the child safety seat has a harness and clip  Also make sure that the harness and clips fit snugly against your child  There should be no more than a finger width of space between the strap and your child's chest  Ask your healthcare provider for more information on car safety seats  · Always put your child's car seat in the back seat  Never put your child's car seat in the front  This will help prevent him or her from being injured in an accident  Keep your child safe at home:   · Place addison at the top and bottom of stairs  Always make sure that the gate is closed and locked  Ed Crews will help protect your child from injury       · Place guards over windows on the second floor or higher  This will prevent your child from falling out of the window  Keep furniture away from windows  · Secure heavy or large items  This includes bookshelves, TVs, dressers, cabinets, and lamps  Make sure these items are held in place or nailed into the wall  · Keep all medicines, car supplies, lawn supplies, and cleaning supplies out of your child's reach  Keep these items in a locked cabinet or closet  Call Poison Help (0-143.636.9070) if your child eats anything that could be harmful  · Store and lock all guns and weapons  Make sure all guns are unloaded before you store them  Make sure your child cannot reach or find where weapons are kept  Never  leave a loaded gun unattended  Keep your child safe in the sun and near water:   · Always keep your child within reach near water  This includes any time you are near ponds, lakes, pools, the ocean, or the bathtub  Never  leave your child alone in the bathtub or sink  A child can drown in less than 1 inch of water  · Put sunscreen on your child  Ask your healthcare provider which sunscreen is safe for your child  Do not apply sunscreen to your child's eyes, mouth, or hands  Other ways to keep your child safe:   · Always follow directions on the medicine label when you give your child medicine  Ask your child's healthcare provider for directions if you do not know how to give the medicine  If your child misses a dose, do not double the next dose  Ask how to make up the missed dose  Do not give aspirin to children under 25years of age  Your child could develop Reye syndrome if he takes aspirin  Reye syndrome can cause life-threatening brain and liver damage  Check your child's medicine labels for aspirin, salicylates, or oil of wintergreen  · Keep plastic bags, latex balloons, and small objects away from your child  This includes marbles and small toys  These items can cause choking or suffocation   Regularly check the floor for these objects  · Do not let your child use a walker  Walkers are not safe for your child  Walkers do not help your child learn to walk  Your child can roll down the stairs  Walkers also allow your child to reach higher  Your child might reach for hot drinks, grab pot handles off the stove, or reach for medicines or other unsafe items  · Never leave your child in a room alone  Make sure there is always a responsible adult with your child  What you need to know about nutrition for your child:   · Give your child a variety of healthy foods  Healthy foods include fruits, vegetables, lean meats, and whole grains  Cut all foods into small pieces  Ask your healthcare provider how much of each type of food your child needs  The following are examples of healthy foods:     ¨ Whole grains such as bread, hot or cold cereal, and cooked pasta or rice    ¨ Protein from lean meats, chicken, fish, beans, or eggs    [place] such as whole milk, cheese, or yogurt    ¨ Vegetables such as carrots, broccoli, or spinach    ¨ Fruits such as strawberries, oranges, apples, or tomatoes    · Give your child whole milk until he or she is 3years old  Give your child no more than 2 to 3 cups of whole milk each day  Your child's body needs the extra fat in whole milk to help him or her grow  After your child turns 2, he or she can drink skim or low-fat milk (such as 1% or 2% milk)  · Limit foods high in fat and sugar  These foods do not have the nutrients your child needs to be healthy  Food high in fat and sugar include snack foods (potato chips, candy, and other sweets), juice, fruit drinks, and soda  If your child eats these foods often, he or she may eat fewer healthy foods during meals  He or she may gain too much weight  · Do not give your child foods that could cause him or her to choke  Examples include nuts, popcorn, and hard, raw vegetables  Cut round or hard foods into thin slices   Grapes and hotdogs are examples of round foods  Carrots are an example of hard foods  · Give your child 3 meals and 2 to 3 snacks per day  Cut all food into small pieces  Examples of healthy snacks include applesauce, bananas, crackers, and cheese  · Encourage your child to feed himself or herself  Give your child a cup to drink from and spoon to eat with  Be patient with your child  Food may end up on the floor or on your child instead of in his or her mouth  It will take time for him or her to learn how to use a spoon to feed himself or herself  · Have your child eat with other family members  This give your child the opportunity to watch and learn how others eat  · Let your child decide how much to eat  Give your child small portions  Let your child have another serving if he or she asks for one  Your child will be very hungry on some days and want to eat more  For example, your child may want to eat more on days when he or she is more active  Your child may also eat more if he or she is going through a growth spurt  There may be days when he or she eats less than usual      · Know that picky eating is a normal behavior in children under 3years of age  Your child may like a certain food on one day and then decide he or she does not like it the next day  He or she may eat only 1 or 2 foods for a whole week or longer  Your child may not like mixed foods, or he or she may not want different foods on the plate to touch  These eating habits are all normal  Continue to offer 2 or 3 different foods at each meal, even if your child is going through this phase  Keep your child's teeth healthy:   · Help your child brush his or her teeth 2 times each day  Brush his or her teeth after breakfast and before bed  Use a soft toothbrush and plain water  · Take your child to the dentist regularly  A dentist can make sure your child's teeth and gums are developing properly   Your child may be given a fluoride treatment to prevent cavities  Ask your child's dentist how often he or she needs to visit  Create routines for your child:   · Have your child take at least 1 nap each day  Plan the nap early enough in the day so your child is still tired at bedtime  Your child needs between 8 to 10 hours of sleep every night  · Create a bedtime routine  This may include 1 hour of calm and quiet activities before bed  You can read to your child or listen to music  Brush your child's teeth during his or her bedtime routine  · Plan for family time  Start family traditions such as going for a walk, listening to music, or playing games  Do not watch TV during family time  Have your child play with other family members during family time  Other ways to support your child:   · Do not punish your child with hitting, spanking, or yelling  Never  shake your child  Tell your child "no " Give your child short and simple rules  Put your child in time-out for 1 to 2 minutes in his or her crib or playpen  You can distract your child with a new activity when he or she behaves badly  Make sure everyone who cares for your child disciplines him or her the same way  · Reward your child for good behavior  This will encourage your child to behave well  · Talk to your child's healthcare provider about TV time  Experts usually recommend no TV for children younger than 18 months  Your child's brain will develop best through interaction with other people  This includes video chatting through a computer or phone with family or friends  Talk to your child's healthcare provider if you want to let your child watch TV  He or she can help you set healthy limits  Your provider may also be able to recommend appropriate programs for your child  · Engage with your child if he or she watches TV  Do not let your child watch TV alone, if possible  You or another adult should watch with your child  Talk with your child about what he or she is watching   When TV time is done, try to apply what you and your child saw  For example, if your child saw someone throw a ball, have your child throw a ball  TV time should never replace active playtime  Turn the TV off when your child plays  Do not let your child watch TV during meals or within 1 hour of bedtime  · Read to your child  This will comfort your child and help his or her brain develop  Point to pictures as you read  This will help your child make connections between pictures and words  Have other family members or caregivers read to your child  · Play with your child  This will help your child develop social skills, motor skills, and speech  · Take your child to play groups or activities  Let your child play with other children  This will help him or her grow and develop  · Respect your child's fear of strangers  It is normal for your child to be afraid of strangers at this age  Do not force your child to talk or play with people he or she does not know  What you need to know about your child's next well child visit:  Your child's healthcare provider will tell you when to bring him or her in again  The next well child visit is usually at 15 months  Contact your child's healthcare provider if you have questions or concerns about his or her health or care before the next visit  Your child's healthcare provider will discuss your child's speech, feelings, and sleep  He or she will also ask about your child's temper tantrums and how you discipline your child  Your child may get the following vaccines at his or her next visit: hepatitis B, hepatitis A, DTaP, HiB, pneumococcal, polio, MMR, and chickenpox  Remember to take your child in for a yearly flu vaccine  © 2017 [facility] Information is for End User's use only and may not be sold, redistributed or otherwise used for commercial purposes   All illustrations and images included in CareNotes® are the copyrighted property of KARLEY BOLANOS Mulugeta  or Jamie Pina  The above information is an  only  It is not intended as medical advice for individual conditions or treatments  Talk to your doctor, nurse or pharmacist before following any medical regimen to see if it is safe and effective for you

## 2023-07-17 ENCOUNTER — APPOINTMENT (OUTPATIENT)
Dept: INTERVENTIONAL RADIOLOGY/VASCULAR | Age: 68
DRG: 100 | End: 2023-07-17
Attending: PSYCHIATRY & NEUROLOGY
Payer: MEDICARE

## 2023-07-17 ENCOUNTER — APPOINTMENT (OUTPATIENT)
Dept: GENERAL RADIOLOGY | Age: 68
DRG: 100 | End: 2023-07-17
Attending: PSYCHIATRY & NEUROLOGY
Payer: MEDICARE

## 2023-07-17 LAB
ALBUMIN SERPL-MCNC: 3.3 G/DL (ref 3.5–5.2)
ALBUMIN SERPL-MCNC: NORMAL G/DL (ref 3.5–5.2)
ALBUMIN/GLOB SERPL: 1.7 {RATIO} (ref 1–2.5)
ALBUMIN/GLOB SERPL: NORMAL {RATIO} (ref 1–2.5)
ALP SERPL-CCNC: 48 U/L (ref 35–104)
ALP SERPL-CCNC: NORMAL U/L (ref 35–104)
ALT SERPL-CCNC: 10 U/L (ref 5–33)
ALT SERPL-CCNC: NORMAL U/L (ref 5–33)
ANION GAP SERPL CALCULATED.3IONS-SCNC: 8 MMOL/L (ref 9–17)
ANION GAP SERPL CALCULATED.3IONS-SCNC: NORMAL MMOL/L (ref 9–17)
APPEARANCE CSF: CLEAR
AST SERPL-CCNC: 18 U/L
AST SERPL-CCNC: NORMAL U/L
BASOPHILS # BLD: <0.03 K/UL (ref 0–0.2)
BASOPHILS NFR BLD: 0 % (ref 0–2)
BILIRUB DIRECT SERPL-MCNC: <0.1 MG/DL
BILIRUB DIRECT SERPL-MCNC: NORMAL MG/DL
BILIRUB INDIRECT SERPL-MCNC: ABNORMAL MG/DL (ref 0–1)
BILIRUB SERPL-MCNC: 0.2 MG/DL (ref 0.3–1.2)
BILIRUB SERPL-MCNC: NORMAL MG/DL (ref 0.3–1.2)
BUN SERPL-MCNC: 16 MG/DL (ref 8–23)
BUN SERPL-MCNC: NORMAL MG/DL (ref 8–23)
C GATTII+NEOFOR DNA CSF QL NAA+NON-PROBE: NOT DETECTED
CALCIUM SERPL-MCNC: 8.6 MG/DL (ref 8.6–10.4)
CALCIUM SERPL-MCNC: NORMAL MG/DL (ref 8.6–10.4)
CHLORIDE SERPL-SCNC: 104 MMOL/L (ref 98–107)
CHLORIDE SERPL-SCNC: NORMAL MMOL/L (ref 98–107)
CHOLEST SERPL-MCNC: 144 MG/DL
CHOLESTEROL/HDL RATIO: 3.5
CMV DNA CSF QL NAA+NON-PROBE: NOT DETECTED
CO2 SERPL-SCNC: 27 MMOL/L (ref 20–31)
CO2 SERPL-SCNC: NORMAL MMOL/L (ref 20–31)
CREAT SERPL-MCNC: 0.3 MG/DL (ref 0.5–0.9)
CREAT SERPL-MCNC: NORMAL MG/DL (ref 0.5–0.9)
E COLI K1 DNA CSF QL NAA+NON-PROBE: NOT DETECTED
EOSINOPHIL # BLD: 0.08 K/UL (ref 0–0.44)
EOSINOPHILS RELATIVE PERCENT: 2 % (ref 1–4)
ERYTHROCYTE [DISTWIDTH] IN BLOOD BY AUTOMATED COUNT: 12.9 % (ref 11.8–14.4)
EST. AVERAGE GLUCOSE BLD GHB EST-MCNC: 94 MG/DL
EV RNA CSF QL NAA+NON-PROBE: NOT DETECTED
GFR SERPL CREATININE-BSD FRML MDRD: >60 ML/MIN/1.73M2
GFR SERPL CREATININE-BSD FRML MDRD: NORMAL ML/MIN/1.73M2
GLUCOSE CSF-MCNC: 64 MG/DL (ref 40–70)
GLUCOSE SERPL-MCNC: 120 MG/DL (ref 70–99)
GLUCOSE SERPL-MCNC: NORMAL MG/DL (ref 70–99)
GP B STREP DNA CSF QL NAA+NON-PROBE: NOT DETECTED
HAEM INFLU DNA CSF QL NAA+NON-PROBE: NOT DETECTED
HBA1C MFR BLD: 4.9 % (ref 4–6)
HCT VFR BLD AUTO: 31.1 % (ref 36.3–47.1)
HDLC SERPL-MCNC: 41 MG/DL
HGB BLD-MCNC: 10 G/DL (ref 11.9–15.1)
HHV6 DNA CSF QL NAA+NON-PROBE: NOT DETECTED
HSV1 DNA CSF QL NAA+NON-PROBE: NOT DETECTED
HSV2 DNA CSF QL NAA+NON-PROBE: NOT DETECTED
IMM GRANULOCYTES # BLD AUTO: <0.03 K/UL (ref 0–0.3)
IMM GRANULOCYTES NFR BLD: 0 %
L MONOCYTOG DNA CSF QL NAA+NON-PROBE: NOT DETECTED
LDLC SERPL CALC-MCNC: 86 MG/DL (ref 0–130)
LYMPHOCYTES # BLD: 25 % (ref 24–43)
LYMPHOCYTES NFR BLD: 1.39 K/UL (ref 1.1–3.7)
MAGNESIUM SERPL-MCNC: 1.6 MG/DL (ref 1.6–2.6)
MCH RBC QN AUTO: 31.9 PG (ref 25.2–33.5)
MCHC RBC AUTO-ENTMCNC: 32.2 G/DL (ref 28.4–34.8)
MCV RBC AUTO: 99.4 FL (ref 82.6–102.9)
METER GLUCOSE: 97 MG/DL (ref 65–105)
MONOCYTES NFR BLD: 0.62 K/UL (ref 0.1–1.2)
MONOCYTES NFR BLD: 11 % (ref 3–12)
N MEN DNA CSF QL NAA+NON-PROBE: NOT DETECTED
NEUTROPHILS NFR BLD: 61 % (ref 36–65)
NEUTS SEG NFR BLD: 3.37 K/UL (ref 1.5–8.1)
NRBC BLD-RTO: 0 PER 100 WBC
NUC CELL # FLD MANUAL: 1 CELLS/UL
PARECHOVIRUS A RNA CSF QL NAA+NON-PROBE: NOT DETECTED
PHENOBARBITAL: 24.6 UG/ML (ref 15–40)
PLATELET # BLD AUTO: ABNORMAL K/UL (ref 138–453)
PLATELET, FLUORESCENCE: 108 K/UL (ref 138–453)
PLATELETS.RETICULATED NFR BLD AUTO: 8.3 % (ref 1.1–10.3)
POTASSIUM SERPL-SCNC: 3.7 MMOL/L (ref 3.7–5.3)
POTASSIUM SERPL-SCNC: NORMAL MMOL/L (ref 3.7–5.3)
PROT CSF-MCNC: 43.7 MG/DL (ref 15–45)
PROT SERPL-MCNC: 5.3 G/DL (ref 6.4–8.3)
PROT SERPL-MCNC: NORMAL G/DL (ref 6.4–8.3)
RBC # BLD AUTO: 3.13 M/UL (ref 3.95–5.11)
RBC # FLD MANUAL: 3 CELLS/UL
REASON FOR REJECTION: NORMAL
S PNEUM DNA CSF QL NAA+NON-PROBE: NOT DETECTED
SODIUM SERPL-SCNC: 139 MMOL/L (ref 135–144)
SODIUM SERPL-SCNC: NORMAL MMOL/L (ref 135–144)
SPECIMEN DESCRIPTION: NORMAL
SPECIMEN SOURCE: NORMAL
SPECIMEN VOL CSF: 12 ML
TRIGL SERPL-MCNC: 83 MG/DL
TUBE # CSF: 3
VALPROATE FREE MFR SERPL: 14.9 % (ref 5–18.4)
VALPROATE FREE SERPL-MCNC: 30.9 UG/ML (ref 7–23)
VALPROATE FREE SERPL-MCNC: 9.7 UG/ML (ref 7–23)
VALPROATE SERPL-MCNC: 65 UG/ML (ref 50–125)
VALPROATE SERPL-MCNC: 97 UG/ML (ref 50–125)
VZV DNA CSF QL NAA+NON-PROBE: NOT DETECTED
WBC OTHER # BLD: 5.5 K/UL (ref 3.5–11.3)
XANTHOCHROMIA CSF QL: ABNORMAL
ZZ NTE CLEAN UP: ORDERED TEST: NORMAL

## 2023-07-17 PROCEDURE — 86255 FLUORESCENT ANTIBODY SCREEN: CPT

## 2023-07-17 PROCEDURE — 85027 COMPLETE CBC AUTOMATED: CPT

## 2023-07-17 PROCEDURE — 82945 GLUCOSE OTHER FLUID: CPT

## 2023-07-17 PROCEDURE — C9254 INJECTION, LACOSAMIDE: HCPCS | Performed by: STUDENT IN AN ORGANIZED HEALTH CARE EDUCATION/TRAINING PROGRAM

## 2023-07-17 PROCEDURE — 6360000002 HC RX W HCPCS: Performed by: PSYCHIATRY & NEUROLOGY

## 2023-07-17 PROCEDURE — 94640 AIRWAY INHALATION TREATMENT: CPT

## 2023-07-17 PROCEDURE — 2580000003 HC RX 258: Performed by: STUDENT IN AN ORGANIZED HEALTH CARE EDUCATION/TRAINING PROGRAM

## 2023-07-17 PROCEDURE — 87070 CULTURE OTHR SPECIMN AEROBIC: CPT

## 2023-07-17 PROCEDURE — 80164 ASSAY DIPROPYLACETIC ACD TOT: CPT

## 2023-07-17 PROCEDURE — 36415 COLL VENOUS BLD VENIPUNCTURE: CPT

## 2023-07-17 PROCEDURE — 2580000003 HC RX 258

## 2023-07-17 PROCEDURE — 80165 DIPROPYLACETIC ACID FREE: CPT

## 2023-07-17 PROCEDURE — 80184 ASSAY OF PHENOBARBITAL: CPT

## 2023-07-17 PROCEDURE — 6360000002 HC RX W HCPCS: Performed by: REGISTERED NURSE

## 2023-07-17 PROCEDURE — 87205 SMEAR GRAM STAIN: CPT

## 2023-07-17 PROCEDURE — 6360000002 HC RX W HCPCS: Performed by: EMERGENCY MEDICINE

## 2023-07-17 PROCEDURE — 2580000003 HC RX 258: Performed by: REGISTERED NURSE

## 2023-07-17 PROCEDURE — 86341 ISLET CELL ANTIBODY: CPT

## 2023-07-17 PROCEDURE — A4216 STERILE WATER/SALINE, 10 ML: HCPCS | Performed by: REGISTERED NURSE

## 2023-07-17 PROCEDURE — 85055 RETICULATED PLATELET ASSAY: CPT

## 2023-07-17 PROCEDURE — 80061 LIPID PANEL: CPT

## 2023-07-17 PROCEDURE — 6370000000 HC RX 637 (ALT 250 FOR IP): Performed by: EMERGENCY MEDICINE

## 2023-07-17 PROCEDURE — 83036 HEMOGLOBIN GLYCOSYLATED A1C: CPT

## 2023-07-17 PROCEDURE — 89050 BODY FLUID CELL COUNT: CPT

## 2023-07-17 PROCEDURE — 95720 EEG PHY/QHP EA INCR W/VEEG: CPT | Performed by: PSYCHIATRY & NEUROLOGY

## 2023-07-17 PROCEDURE — 2580000003 HC RX 258: Performed by: PSYCHIATRY & NEUROLOGY

## 2023-07-17 PROCEDURE — 2500000003 HC RX 250 WO HCPCS: Performed by: EMERGENCY MEDICINE

## 2023-07-17 PROCEDURE — 62328 DX LMBR SPI PNXR W/FLUOR/CT: CPT

## 2023-07-17 PROCEDURE — 95714 VEEG EA 12-26 HR UNMNTR: CPT

## 2023-07-17 PROCEDURE — 84182 PROTEIN WESTERN BLOT TEST: CPT

## 2023-07-17 PROCEDURE — 87483 CNS DNA AMP PROBE TYPE 12-25: CPT

## 2023-07-17 PROCEDURE — 71045 X-RAY EXAM CHEST 1 VIEW: CPT

## 2023-07-17 PROCEDURE — 99291 CRITICAL CARE FIRST HOUR: CPT | Performed by: RADIOLOGY

## 2023-07-17 PROCEDURE — 2500000003 HC RX 250 WO HCPCS: Performed by: PSYCHIATRY & NEUROLOGY

## 2023-07-17 PROCEDURE — 80048 BASIC METABOLIC PNL TOTAL CA: CPT

## 2023-07-17 PROCEDURE — 83735 ASSAY OF MAGNESIUM: CPT

## 2023-07-17 PROCEDURE — 84157 ASSAY OF PROTEIN OTHER: CPT

## 2023-07-17 PROCEDURE — 2580000003 HC RX 258: Performed by: EMERGENCY MEDICINE

## 2023-07-17 PROCEDURE — 6370000000 HC RX 637 (ALT 250 FOR IP): Performed by: REGISTERED NURSE

## 2023-07-17 PROCEDURE — C9113 INJ PANTOPRAZOLE SODIUM, VIA: HCPCS | Performed by: REGISTERED NURSE

## 2023-07-17 PROCEDURE — 86317 IMMUNOASSAY INFECTIOUS AGENT: CPT

## 2023-07-17 PROCEDURE — 0035U NEURO CSF PRION PRTN QUAL: CPT

## 2023-07-17 PROCEDURE — 2709999900 IR LUMBAR PUNCTURE FOR DIAGNOSIS

## 2023-07-17 PROCEDURE — 80076 HEPATIC FUNCTION PANEL: CPT

## 2023-07-17 PROCEDURE — 87015 SPECIMEN INFECT AGNT CONCNTJ: CPT

## 2023-07-17 PROCEDURE — 2000000000 HC ICU R&B

## 2023-07-17 PROCEDURE — 82947 ASSAY GLUCOSE BLOOD QUANT: CPT

## 2023-07-17 PROCEDURE — 6360000002 HC RX W HCPCS: Performed by: STUDENT IN AN ORGANIZED HEALTH CARE EDUCATION/TRAINING PROGRAM

## 2023-07-17 PROCEDURE — 009U3ZX DRAINAGE OF SPINAL CANAL, PERCUTANEOUS APPROACH, DIAGNOSTIC: ICD-10-PCS | Performed by: STUDENT IN AN ORGANIZED HEALTH CARE EDUCATION/TRAINING PROGRAM

## 2023-07-17 PROCEDURE — 6370000000 HC RX 637 (ALT 250 FOR IP)

## 2023-07-17 RX ORDER — 0.9 % SODIUM CHLORIDE 0.9 %
500 INTRAVENOUS SOLUTION INTRAVENOUS ONCE
Status: COMPLETED | OUTPATIENT
Start: 2023-07-17 | End: 2023-07-17

## 2023-07-17 RX ORDER — LABETALOL HYDROCHLORIDE 5 MG/ML
5 INJECTION, SOLUTION INTRAVENOUS ONCE
Status: COMPLETED | OUTPATIENT
Start: 2023-07-17 | End: 2023-07-17

## 2023-07-17 RX ORDER — MAGNESIUM SULFATE IN WATER 40 MG/ML
2000 INJECTION, SOLUTION INTRAVENOUS ONCE
Status: COMPLETED | OUTPATIENT
Start: 2023-07-17 | End: 2023-07-17

## 2023-07-17 RX ORDER — LAMOTRIGINE 25 MG/1
75 TABLET ORAL 2 TIMES DAILY
Status: DISCONTINUED | OUTPATIENT
Start: 2023-07-17 | End: 2023-07-18

## 2023-07-17 RX ORDER — IPRATROPIUM BROMIDE AND ALBUTEROL SULFATE 2.5; .5 MG/3ML; MG/3ML
1 SOLUTION RESPIRATORY (INHALATION) EVERY 4 HOURS PRN
Status: DISCONTINUED | OUTPATIENT
Start: 2023-07-17 | End: 2023-07-31 | Stop reason: HOSPADM

## 2023-07-17 RX ADMIN — VALPROATE SODIUM 1000 MG: 100 INJECTION, SOLUTION INTRAVENOUS at 01:43

## 2023-07-17 RX ADMIN — LEVETIRACETAM 2000 MG: 100 INJECTION, SOLUTION INTRAVENOUS at 19:32

## 2023-07-17 RX ADMIN — LACOSAMIDE 200 MG: 10 INJECTION INTRAVENOUS at 12:20

## 2023-07-17 RX ADMIN — SODIUM CHLORIDE: 9 INJECTION, SOLUTION INTRAVENOUS at 22:23

## 2023-07-17 RX ADMIN — IPRATROPIUM BROMIDE AND ALBUTEROL SULFATE 1 DOSE: .5; 3 SOLUTION RESPIRATORY (INHALATION) at 00:55

## 2023-07-17 RX ADMIN — LACOSAMIDE 200 MG: 10 INJECTION INTRAVENOUS at 20:55

## 2023-07-17 RX ADMIN — MAGNESIUM SULFATE HEPTAHYDRATE 2000 MG: 40 INJECTION, SOLUTION INTRAVENOUS at 14:14

## 2023-07-17 RX ADMIN — SODIUM CHLORIDE 500 ML: 0.9 INJECTION, SOLUTION INTRAVENOUS at 03:20

## 2023-07-17 RX ADMIN — LAMOTRIGINE 75 MG: 25 TABLET ORAL at 20:47

## 2023-07-17 RX ADMIN — CEFTRIAXONE SODIUM 1000 MG: 10 INJECTION, POWDER, FOR SOLUTION INTRAVENOUS at 18:56

## 2023-07-17 RX ADMIN — SERTRALINE 50 MG: 50 TABLET, FILM COATED ORAL at 18:56

## 2023-07-17 RX ADMIN — Medication 5 MG: at 00:54

## 2023-07-17 RX ADMIN — SODIUM CHLORIDE 40 MG: 9 INJECTION INTRAMUSCULAR; INTRAVENOUS; SUBCUTANEOUS at 12:18

## 2023-07-17 RX ADMIN — LEVETIRACETAM 2000 MG: 100 INJECTION, SOLUTION INTRAVENOUS at 08:49

## 2023-07-17 RX ADMIN — ENOXAPARIN SODIUM 40 MG: 100 INJECTION SUBCUTANEOUS at 12:18

## 2023-07-17 RX ADMIN — SODIUM CHLORIDE, PRESERVATIVE FREE 10 ML: 5 INJECTION INTRAVENOUS at 20:55

## 2023-07-17 RX ADMIN — LAMOTRIGINE 50 MG: 25 TABLET ORAL at 12:13

## 2023-07-17 RX ADMIN — PHENOBARBITAL SODIUM 100 MG: 65 INJECTION INTRAMUSCULAR; INTRAVENOUS at 20:43

## 2023-07-17 RX ADMIN — VALPROATE SODIUM 1000 MG: 100 INJECTION, SOLUTION INTRAVENOUS at 18:55

## 2023-07-17 RX ADMIN — PHENOBARBITAL SODIUM 100 MG: 65 INJECTION INTRAMUSCULAR; INTRAVENOUS at 09:03

## 2023-07-17 NOTE — BRIEF OP NOTE
Brief Postoperative Note Lumbar Puncture    Bertrand Jimenez  YOB: 1955  [id number]    Pre-operative Diagnosis: Seizure    Post-operative Diagnosis: Same    Procedure: Fluoro guided Lumbar Puncture    Anesthesia: 1% Lidocaine    Surgeons/Assistants: Traci Chirinos PA-C    Complications: None    EBL: Minimal    Specimens: Obtained and sent to lab    Fluoroscopy guided lumbar puncture. 20 gauge spinal needle. L2-L3. 12 ml clear CSF obtained. Dressing applied. Instructions given.     Electronically signed by BERT Murrieta on 7/17/2023 at 10:06 AM

## 2023-07-18 LAB
ANION GAP SERPL CALCULATED.3IONS-SCNC: 9 MMOL/L (ref 9–17)
BASOPHILS # BLD: <0.03 K/UL (ref 0–0.2)
BASOPHILS NFR BLD: 0 % (ref 0–2)
BUN SERPL-MCNC: 14 MG/DL (ref 8–23)
CALCIUM SERPL-MCNC: 8.5 MG/DL (ref 8.6–10.4)
CHLORIDE SERPL-SCNC: 102 MMOL/L (ref 98–107)
CO2 SERPL-SCNC: 24 MMOL/L (ref 20–31)
CREAT SERPL-MCNC: 0.3 MG/DL (ref 0.5–0.9)
EOSINOPHIL # BLD: 0.16 K/UL (ref 0–0.44)
EOSINOPHILS RELATIVE PERCENT: 3 % (ref 1–4)
ERYTHROCYTE [DISTWIDTH] IN BLOOD BY AUTOMATED COUNT: 13.1 % (ref 11.8–14.4)
GFR SERPL CREATININE-BSD FRML MDRD: >60 ML/MIN/1.73M2
GLUCOSE SERPL-MCNC: 114 MG/DL (ref 70–99)
HCT VFR BLD AUTO: 32.2 % (ref 36.3–47.1)
HGB BLD-MCNC: 10.9 G/DL (ref 11.9–15.1)
IMM GRANULOCYTES # BLD AUTO: <0.03 K/UL (ref 0–0.3)
IMM GRANULOCYTES NFR BLD: 0 %
LYMPHOCYTES # BLD: 36 % (ref 24–43)
LYMPHOCYTES NFR BLD: 1.81 K/UL (ref 1.1–3.7)
MAGNESIUM SERPL-MCNC: 2.1 MG/DL (ref 1.6–2.6)
MCH RBC QN AUTO: 31.9 PG (ref 25.2–33.5)
MCHC RBC AUTO-ENTMCNC: 33.9 G/DL (ref 28.4–34.8)
MCV RBC AUTO: 94.2 FL (ref 82.6–102.9)
MONOCYTES NFR BLD: 0.9 K/UL (ref 0.1–1.2)
MONOCYTES NFR BLD: 18 % (ref 3–12)
NEUTROPHILS NFR BLD: 42 % (ref 36–65)
NEUTS SEG NFR BLD: 2.09 K/UL (ref 1.5–8.1)
NRBC BLD-RTO: 0 PER 100 WBC
PLATELET # BLD AUTO: 97 K/UL (ref 138–453)
PMV BLD AUTO: 13 FL (ref 8.1–13.5)
POTASSIUM SERPL-SCNC: 3.7 MMOL/L (ref 3.7–5.3)
RBC # BLD AUTO: 3.42 M/UL (ref 3.95–5.11)
SODIUM SERPL-SCNC: 135 MMOL/L (ref 135–144)
WBC OTHER # BLD: 5 K/UL (ref 3.5–11.3)

## 2023-07-18 PROCEDURE — 6360000002 HC RX W HCPCS: Performed by: REGISTERED NURSE

## 2023-07-18 PROCEDURE — 94761 N-INVAS EAR/PLS OXIMETRY MLT: CPT

## 2023-07-18 PROCEDURE — 2500000003 HC RX 250 WO HCPCS: Performed by: EMERGENCY MEDICINE

## 2023-07-18 PROCEDURE — 2580000003 HC RX 258: Performed by: STUDENT IN AN ORGANIZED HEALTH CARE EDUCATION/TRAINING PROGRAM

## 2023-07-18 PROCEDURE — 36415 COLL VENOUS BLD VENIPUNCTURE: CPT

## 2023-07-18 PROCEDURE — 2700000000 HC OXYGEN THERAPY PER DAY

## 2023-07-18 PROCEDURE — 6370000000 HC RX 637 (ALT 250 FOR IP): Performed by: STUDENT IN AN ORGANIZED HEALTH CARE EDUCATION/TRAINING PROGRAM

## 2023-07-18 PROCEDURE — A4216 STERILE WATER/SALINE, 10 ML: HCPCS | Performed by: REGISTERED NURSE

## 2023-07-18 PROCEDURE — 95714 VEEG EA 12-26 HR UNMNTR: CPT

## 2023-07-18 PROCEDURE — 6370000000 HC RX 637 (ALT 250 FOR IP): Performed by: EMERGENCY MEDICINE

## 2023-07-18 PROCEDURE — 95720 EEG PHY/QHP EA INCR W/VEEG: CPT | Performed by: PSYCHIATRY & NEUROLOGY

## 2023-07-18 PROCEDURE — 80048 BASIC METABOLIC PNL TOTAL CA: CPT

## 2023-07-18 PROCEDURE — 85027 COMPLETE CBC AUTOMATED: CPT

## 2023-07-18 PROCEDURE — 99232 SBSQ HOSP IP/OBS MODERATE 35: CPT | Performed by: RADIOLOGY

## 2023-07-18 PROCEDURE — 6360000002 HC RX W HCPCS: Performed by: STUDENT IN AN ORGANIZED HEALTH CARE EDUCATION/TRAINING PROGRAM

## 2023-07-18 PROCEDURE — 2580000003 HC RX 258: Performed by: EMERGENCY MEDICINE

## 2023-07-18 PROCEDURE — 6360000002 HC RX W HCPCS: Performed by: PSYCHIATRY & NEUROLOGY

## 2023-07-18 PROCEDURE — 2580000003 HC RX 258: Performed by: REGISTERED NURSE

## 2023-07-18 PROCEDURE — C9254 INJECTION, LACOSAMIDE: HCPCS | Performed by: STUDENT IN AN ORGANIZED HEALTH CARE EDUCATION/TRAINING PROGRAM

## 2023-07-18 PROCEDURE — C9113 INJ PANTOPRAZOLE SODIUM, VIA: HCPCS | Performed by: REGISTERED NURSE

## 2023-07-18 PROCEDURE — 83735 ASSAY OF MAGNESIUM: CPT

## 2023-07-18 PROCEDURE — 2000000000 HC ICU R&B

## 2023-07-18 PROCEDURE — 6360000002 HC RX W HCPCS: Performed by: EMERGENCY MEDICINE

## 2023-07-18 RX ORDER — PHENOBARBITAL SODIUM 130 MG/ML
100 INJECTION INTRAMUSCULAR 2 TIMES DAILY
Status: DISCONTINUED | OUTPATIENT
Start: 2023-07-18 | End: 2023-07-19

## 2023-07-18 RX ORDER — ASPIRIN 81 MG/1
81 TABLET, CHEWABLE ORAL DAILY
Status: DISCONTINUED | OUTPATIENT
Start: 2023-07-18 | End: 2023-07-31 | Stop reason: HOSPADM

## 2023-07-18 RX ORDER — LAMOTRIGINE 100 MG/1
100 TABLET ORAL 2 TIMES DAILY
Status: DISCONTINUED | OUTPATIENT
Start: 2023-07-18 | End: 2023-07-31 | Stop reason: HOSPADM

## 2023-07-18 RX ORDER — ATORVASTATIN CALCIUM 20 MG/1
20 TABLET, FILM COATED ORAL NIGHTLY
Status: DISCONTINUED | OUTPATIENT
Start: 2023-07-18 | End: 2023-07-31 | Stop reason: HOSPADM

## 2023-07-18 RX ADMIN — LACOSAMIDE 200 MG: 10 INJECTION INTRAVENOUS at 21:14

## 2023-07-18 RX ADMIN — ENOXAPARIN SODIUM 40 MG: 100 INJECTION SUBCUTANEOUS at 08:46

## 2023-07-18 RX ADMIN — PHENOBARBITAL SODIUM 100 MG: 65 INJECTION INTRAMUSCULAR; INTRAVENOUS at 08:47

## 2023-07-18 RX ADMIN — VALPROATE SODIUM 1000 MG: 100 INJECTION, SOLUTION INTRAVENOUS at 08:49

## 2023-07-18 RX ADMIN — ATORVASTATIN CALCIUM 20 MG: 20 TABLET, FILM COATED ORAL at 20:13

## 2023-07-18 RX ADMIN — LAMOTRIGINE 75 MG: 25 TABLET ORAL at 08:47

## 2023-07-18 RX ADMIN — LEVETIRACETAM 2000 MG: 100 INJECTION, SOLUTION INTRAVENOUS at 08:47

## 2023-07-18 RX ADMIN — SODIUM CHLORIDE 40 MG: 9 INJECTION INTRAMUSCULAR; INTRAVENOUS; SUBCUTANEOUS at 08:46

## 2023-07-18 RX ADMIN — LEVETIRACETAM 2000 MG: 100 INJECTION, SOLUTION INTRAVENOUS at 20:13

## 2023-07-18 RX ADMIN — ASPIRIN 81 MG CHEWABLE TABLET 81 MG: 81 TABLET CHEWABLE at 20:30

## 2023-07-18 RX ADMIN — CEFTRIAXONE SODIUM 1000 MG: 10 INJECTION, POWDER, FOR SOLUTION INTRAVENOUS at 17:03

## 2023-07-18 RX ADMIN — VALPROATE SODIUM 1000 MG: 100 INJECTION, SOLUTION INTRAVENOUS at 01:54

## 2023-07-18 RX ADMIN — SODIUM CHLORIDE, PRESERVATIVE FREE 10 ML: 5 INJECTION INTRAVENOUS at 20:18

## 2023-07-18 RX ADMIN — LACOSAMIDE 200 MG: 10 INJECTION INTRAVENOUS at 10:59

## 2023-07-18 RX ADMIN — SERTRALINE 50 MG: 50 TABLET, FILM COATED ORAL at 08:46

## 2023-07-18 RX ADMIN — VALPROATE SODIUM 1000 MG: 100 INJECTION, SOLUTION INTRAVENOUS at 16:10

## 2023-07-18 RX ADMIN — LAMOTRIGINE 100 MG: 100 TABLET ORAL at 20:30

## 2023-07-18 RX ADMIN — PHENOBARBITAL SODIUM 100 MG: 130 INJECTION INTRAMUSCULAR; INTRAVENOUS at 20:13

## 2023-07-19 LAB
ANION GAP SERPL CALCULATED.3IONS-SCNC: 11 MMOL/L (ref 9–17)
BACTERIA BLD AEROBE CULT: NORMAL
BACTERIA BLD AEROBE CULT: NORMAL
BASOPHILS # BLD: <0.03 K/UL (ref 0–0.2)
BASOPHILS NFR BLD: 1 % (ref 0–2)
BUN SERPL-MCNC: 15 MG/DL (ref 8–23)
CALCIUM SERPL-MCNC: 8.3 MG/DL (ref 8.6–10.4)
CHLORIDE SERPL-SCNC: 103 MMOL/L (ref 98–107)
CO2 SERPL-SCNC: 27 MMOL/L (ref 20–31)
CREAT SERPL-MCNC: 0.3 MG/DL (ref 0.5–0.9)
EOSINOPHIL # BLD: 0.15 K/UL (ref 0–0.44)
EOSINOPHILS RELATIVE PERCENT: 3 % (ref 1–4)
ERYTHROCYTE [DISTWIDTH] IN BLOOD BY AUTOMATED COUNT: 13.2 % (ref 11.8–14.4)
GFR SERPL CREATININE-BSD FRML MDRD: >60 ML/MIN/1.73M2
GLUCOSE SERPL-MCNC: 97 MG/DL (ref 70–99)
HCT VFR BLD AUTO: 34.2 % (ref 36.3–47.1)
HGB BLD-MCNC: 10.7 G/DL (ref 11.9–15.1)
IMM GRANULOCYTES # BLD AUTO: <0.03 K/UL (ref 0–0.3)
IMM GRANULOCYTES NFR BLD: 1 %
LV EF: 55 %
LVEF MODALITY: NORMAL
LYMPHOCYTES # BLD: 36 % (ref 24–43)
LYMPHOCYTES NFR BLD: 1.6 K/UL (ref 1.1–3.7)
MAGNESIUM SERPL-MCNC: 2 MG/DL (ref 1.6–2.6)
MCH RBC QN AUTO: 30.8 PG (ref 25.2–33.5)
MCHC RBC AUTO-ENTMCNC: 31.3 G/DL (ref 28.4–34.8)
MCV RBC AUTO: 98.6 FL (ref 82.6–102.9)
METER GLUCOSE: 120 MG/DL (ref 65–105)
MONOCYTES NFR BLD: 0.69 K/UL (ref 0.1–1.2)
MONOCYTES NFR BLD: 16 % (ref 3–12)
NEUTROPHILS NFR BLD: 43 % (ref 36–65)
NEUTS SEG NFR BLD: 1.93 K/UL (ref 1.5–8.1)
NRBC BLD-RTO: 0 PER 100 WBC
PLATELET # BLD AUTO: 140 K/UL (ref 138–453)
PMV BLD AUTO: 12.6 FL (ref 8.1–13.5)
POTASSIUM SERPL-SCNC: 3.5 MMOL/L (ref 3.7–5.3)
RBC # BLD AUTO: 3.47 M/UL (ref 3.95–5.11)
SODIUM SERPL-SCNC: 141 MMOL/L (ref 135–144)
WBC OTHER # BLD: 4.4 K/UL (ref 3.5–11.3)

## 2023-07-19 PROCEDURE — 6370000000 HC RX 637 (ALT 250 FOR IP): Performed by: STUDENT IN AN ORGANIZED HEALTH CARE EDUCATION/TRAINING PROGRAM

## 2023-07-19 PROCEDURE — 93306 TTE W/DOPPLER COMPLETE: CPT

## 2023-07-19 PROCEDURE — 80048 BASIC METABOLIC PNL TOTAL CA: CPT

## 2023-07-19 PROCEDURE — 2700000000 HC OXYGEN THERAPY PER DAY

## 2023-07-19 PROCEDURE — 2580000003 HC RX 258: Performed by: REGISTERED NURSE

## 2023-07-19 PROCEDURE — 2580000003 HC RX 258: Performed by: EMERGENCY MEDICINE

## 2023-07-19 PROCEDURE — A4216 STERILE WATER/SALINE, 10 ML: HCPCS | Performed by: REGISTERED NURSE

## 2023-07-19 PROCEDURE — 6360000002 HC RX W HCPCS: Performed by: EMERGENCY MEDICINE

## 2023-07-19 PROCEDURE — 36415 COLL VENOUS BLD VENIPUNCTURE: CPT

## 2023-07-19 PROCEDURE — 94761 N-INVAS EAR/PLS OXIMETRY MLT: CPT

## 2023-07-19 PROCEDURE — 99232 SBSQ HOSP IP/OBS MODERATE 35: CPT | Performed by: RADIOLOGY

## 2023-07-19 PROCEDURE — 6360000002 HC RX W HCPCS: Performed by: REGISTERED NURSE

## 2023-07-19 PROCEDURE — 6370000000 HC RX 637 (ALT 250 FOR IP): Performed by: NURSE PRACTITIONER

## 2023-07-19 PROCEDURE — C9254 INJECTION, LACOSAMIDE: HCPCS | Performed by: STUDENT IN AN ORGANIZED HEALTH CARE EDUCATION/TRAINING PROGRAM

## 2023-07-19 PROCEDURE — 95714 VEEG EA 12-26 HR UNMNTR: CPT

## 2023-07-19 PROCEDURE — C9113 INJ PANTOPRAZOLE SODIUM, VIA: HCPCS | Performed by: REGISTERED NURSE

## 2023-07-19 PROCEDURE — 2500000003 HC RX 250 WO HCPCS: Performed by: EMERGENCY MEDICINE

## 2023-07-19 PROCEDURE — 6360000002 HC RX W HCPCS: Performed by: PSYCHIATRY & NEUROLOGY

## 2023-07-19 PROCEDURE — 2580000003 HC RX 258: Performed by: STUDENT IN AN ORGANIZED HEALTH CARE EDUCATION/TRAINING PROGRAM

## 2023-07-19 PROCEDURE — 6370000000 HC RX 637 (ALT 250 FOR IP): Performed by: EMERGENCY MEDICINE

## 2023-07-19 PROCEDURE — 95720 EEG PHY/QHP EA INCR W/VEEG: CPT | Performed by: PSYCHIATRY & NEUROLOGY

## 2023-07-19 PROCEDURE — 6360000002 HC RX W HCPCS: Performed by: STUDENT IN AN ORGANIZED HEALTH CARE EDUCATION/TRAINING PROGRAM

## 2023-07-19 PROCEDURE — 82947 ASSAY GLUCOSE BLOOD QUANT: CPT

## 2023-07-19 PROCEDURE — 83735 ASSAY OF MAGNESIUM: CPT

## 2023-07-19 PROCEDURE — 2060000000 HC ICU INTERMEDIATE R&B

## 2023-07-19 PROCEDURE — 85027 COMPLETE CBC AUTOMATED: CPT

## 2023-07-19 RX ORDER — PHENOBARBITAL 20 MG/5ML
100 ELIXIR ORAL 2 TIMES DAILY
Status: DISCONTINUED | OUTPATIENT
Start: 2023-07-19 | End: 2023-07-20

## 2023-07-19 RX ORDER — VALPROIC ACID 250 MG/5ML
1000 SOLUTION ORAL EVERY 8 HOURS SCHEDULED
Status: DISCONTINUED | OUTPATIENT
Start: 2023-07-19 | End: 2023-07-31 | Stop reason: HOSPADM

## 2023-07-19 RX ORDER — LEVETIRACETAM 100 MG/ML
2000 SOLUTION ORAL 2 TIMES DAILY
Status: DISCONTINUED | OUTPATIENT
Start: 2023-07-19 | End: 2023-07-31 | Stop reason: HOSPADM

## 2023-07-19 RX ORDER — LACOSAMIDE 10 MG/ML
200 SOLUTION ORAL 2 TIMES DAILY
Status: DISCONTINUED | OUTPATIENT
Start: 2023-07-19 | End: 2023-07-31 | Stop reason: HOSPADM

## 2023-07-19 RX ADMIN — VALPROATE SODIUM 1000 MG: 100 INJECTION, SOLUTION INTRAVENOUS at 08:24

## 2023-07-19 RX ADMIN — SODIUM CHLORIDE 40 MG: 9 INJECTION INTRAMUSCULAR; INTRAVENOUS; SUBCUTANEOUS at 08:42

## 2023-07-19 RX ADMIN — VALPROATE SODIUM 1000 MG: 100 INJECTION, SOLUTION INTRAVENOUS at 01:00

## 2023-07-19 RX ADMIN — POTASSIUM BICARBONATE 40 MEQ: 782 TABLET, EFFERVESCENT ORAL at 18:15

## 2023-07-19 RX ADMIN — ENOXAPARIN SODIUM 40 MG: 100 INJECTION SUBCUTANEOUS at 08:26

## 2023-07-19 RX ADMIN — LEVETIRACETAM 2000 MG: 100 INJECTION, SOLUTION INTRAVENOUS at 08:27

## 2023-07-19 RX ADMIN — SODIUM CHLORIDE: 9 INJECTION, SOLUTION INTRAVENOUS at 09:42

## 2023-07-19 RX ADMIN — LAMOTRIGINE 100 MG: 100 TABLET ORAL at 08:42

## 2023-07-19 RX ADMIN — LACOSAMIDE 200 MG: 10 INJECTION INTRAVENOUS at 09:12

## 2023-07-19 RX ADMIN — ASPIRIN 81 MG CHEWABLE TABLET 81 MG: 81 TABLET CHEWABLE at 08:42

## 2023-07-19 RX ADMIN — SODIUM CHLORIDE, PRESERVATIVE FREE 10 ML: 5 INJECTION INTRAVENOUS at 22:03

## 2023-07-19 RX ADMIN — LAMOTRIGINE 100 MG: 100 TABLET ORAL at 21:15

## 2023-07-19 RX ADMIN — ATORVASTATIN CALCIUM 20 MG: 20 TABLET, FILM COATED ORAL at 21:22

## 2023-07-19 RX ADMIN — SERTRALINE 50 MG: 50 TABLET, FILM COATED ORAL at 08:42

## 2023-07-19 RX ADMIN — PHENOBARBITAL 100 MG: 20 LIQUID ORAL at 21:15

## 2023-07-19 RX ADMIN — SODIUM CHLORIDE, PRESERVATIVE FREE 10 ML: 5 INJECTION INTRAVENOUS at 08:42

## 2023-07-19 RX ADMIN — CEFTRIAXONE SODIUM 1000 MG: 10 INJECTION, POWDER, FOR SOLUTION INTRAVENOUS at 15:12

## 2023-07-19 RX ADMIN — LACOSAMIDE 200 MG: 10 SOLUTION ORAL at 21:15

## 2023-07-19 RX ADMIN — LEVETIRACETAM 2000 MG: 500 SOLUTION ORAL at 21:15

## 2023-07-19 RX ADMIN — VALPROIC ACID 1000 MG: 250 SOLUTION ORAL at 18:15

## 2023-07-19 RX ADMIN — PHENOBARBITAL SODIUM 100 MG: 130 INJECTION INTRAMUSCULAR; INTRAVENOUS at 08:22

## 2023-07-19 NOTE — H&P
Neuro ICU History & Physical    Patient Name: Paula Zimmer  Patient : 1955  Room/Bed: 0126/0126-01  Code Status: Full code   Allergies: Allergies   Allergen Reactions    Pcn [Penicillins]        CHIEF COMPLAINT     Seizures     HPI    History Obtained From: [facility], patient intubated     The patient is a 75 y.o. female with a PMH of ADHD, cerebral palsy, depression, and epilepsy (home meds: Keppra 750 mg BID, Lamictal  mg daily, Depakote  mg daily), who presented as a transfer from [facility]. Kianna[facility] due to concerns for status epilepticus. Per OSH notes, patient was transferred to OSH from her nursing home after multiple witnessed seizures. Per OSH notes, the nursing home was trying to attempting to wean her off her seizure medication. It is unclear why the ptient was being weaned off of her medication. Patient had another witnessed seizure by EMS and received intranasal versed. Patient had left sided twitching in the ED at the OSH. She was then given ativan x2 with resolution of symptoms. Prior to the seizure in the ED, the patient was apparently lethargic but answering questions appropriately. She was loaded with 1500 mg Keppra on arrival. Neurology was consulted at the OSH. CT head showed no acute abnormalities. An EEG was completed and showed right temporal lateralized periodic discharges at 1-2 Hz considered in ictal/interictal spectrum. Neurology recommended the patient be transferred to the ICU for intubation and sedation with propofol. After intubation, patient was loaded with another 1500 mg Keppra and 2g Valproate. Patient was supposed to be started on cefepime at the OSH for UTI but first dose was never given. Patient was transferred to [address] [place] [address] for escalation of care and LTME. Upon arrival to the NICU, patient was following minimal commands with sedation held. Patient able to stick out her tongue, wiggles toes on the right. Patient seems to be weaker on the left.  Unsure if this is
BID  levETIRAcetam (KEPPRA) 100 MG/ML solution 2,000 mg, 2,000 mg, Oral, BID  valproic acid (DEPAKENE) 250 MG/5ML oral solution 1,000 mg, 1,000 mg, Per NG tube, 3 times per day  PHENobarbital (LUMINAL) 20 MG/5ML elixir 100 mg, 100 mg, Per NG tube, BID  potassium bicarb-citric acid (EFFER-K) effervescent tablet 40 mEq, 40 mEq, Per NG tube, Once  lamoTRIgine (LAMICTAL) tablet 100 mg, 100 mg, Orogastric, BID  atorvastatin (LIPITOR) tablet 20 mg, 20 mg, Oral, Nightly  aspirin chewable tablet 81 mg, 81 mg, Oral, Daily  ipratropium 0.5 mg-albuterol 2.5 mg (DUONEB) nebulizer solution 1 Dose, 1 Dose, Inhalation, Q4H PRN  sertraline (ZOLOFT) tablet 50 mg, 50 mg, Per NG tube, Daily  cefTRIAXone (ROCEPHIN) 1,000 mg in sterile water 10 mL IV syringe, 1,000 mg, IntraVENous, Q24H  glucose chewable tablet 16 g, 4 tablet, Oral, PRN  dextrose bolus 10% 125 mL, 125 mL, IntraVENous, PRN **OR** dextrose bolus 10% 250 mL, 250 mL, IntraVENous, PRN  glucagon (rDNA) injection 1 mg, 1 mg, SubCUTAneous, PRN  dextrose 10 % infusion, , IntraVENous, Continuous PRN  sodium chloride flush 0.9 % injection 5-40 mL, 5-40 mL, IntraVENous, 2 times per day  sodium chloride flush 0.9 % injection 5-40 mL, 5-40 mL, IntraVENous, PRN  0.9 % sodium chloride infusion, , IntraVENous, PRN  enoxaparin (LOVENOX) injection 40 mg, 40 mg, SubCUTAneous, Daily  ondansetron (ZOFRAN-ODT) disintegrating tablet 4 mg, 4 mg, Oral, Q8H PRN **OR** ondansetron (ZOFRAN) injection 4 mg, 4 mg, IntraVENous, Q6H PRN  polyethylene glycol (GLYCOLAX) packet 17 g, 17 g, Per NG tube, Daily PRN  acetaminophen (TYLENOL) tablet 650 mg, 650 mg, Oral, Q6H PRN **OR** acetaminophen (TYLENOL) suppository 650 mg, 650 mg, Rectal, Q6H PRN  pantoprazole (PROTONIX) 40 mg in sodium chloride (PF) 0.9 % 10 mL injection, 40 mg, IntraVENous, Daily  Facility-Administered Medications Ordered in Other Encounters: morphine injection, , , Once PRN  Allergies:  Pcn [penicillins]  Physical Exam     Vitals:

## 2023-07-20 PROBLEM — G80.9 CEREBRAL PALSY (HCC): Status: ACTIVE | Noted: 2023-07-20

## 2023-07-20 PROBLEM — G93.40 ENCEPHALOPATHY ACUTE: Status: ACTIVE | Noted: 2023-07-20

## 2023-07-20 LAB
ANION GAP SERPL CALCULATED.3IONS-SCNC: 10 MMOL/L (ref 9–17)
BASOPHILS # BLD: <0.03 K/UL (ref 0–0.2)
BASOPHILS NFR BLD: 0 % (ref 0–2)
BUN SERPL-MCNC: 16 MG/DL (ref 8–23)
CALCIUM SERPL-MCNC: 8.8 MG/DL (ref 8.6–10.4)
CHLORIDE SERPL-SCNC: 100 MMOL/L (ref 98–107)
CO2 SERPL-SCNC: 27 MMOL/L (ref 20–31)
CREAT SERPL-MCNC: 0.3 MG/DL (ref 0.5–0.9)
EOSINOPHIL # BLD: 0.11 K/UL (ref 0–0.44)
EOSINOPHILS RELATIVE PERCENT: 2 % (ref 1–4)
ERYTHROCYTE [DISTWIDTH] IN BLOOD BY AUTOMATED COUNT: 13 % (ref 11.8–14.4)
GFR SERPL CREATININE-BSD FRML MDRD: >60 ML/MIN/1.73M2
GLUCOSE SERPL-MCNC: 110 MG/DL (ref 70–99)
HCT VFR BLD AUTO: 34.3 % (ref 36.3–47.1)
HGB BLD-MCNC: 11.2 G/DL (ref 11.9–15.1)
IMM GRANULOCYTES # BLD AUTO: <0.03 K/UL (ref 0–0.3)
IMM GRANULOCYTES NFR BLD: 0 %
LAMOTRIGINE SERPL-MCNC: 4.4 UG/ML (ref 3–15)
LYMPHOCYTES NFR BLD: 1.4 K/UL (ref 1.1–3.7)
LYMPHOCYTES RELATIVE PERCENT: 28 % (ref 24–43)
MAGNESIUM SERPL-MCNC: 1.8 MG/DL (ref 1.6–2.6)
MCH RBC QN AUTO: 31.3 PG (ref 25.2–33.5)
MCHC RBC AUTO-ENTMCNC: 32.7 G/DL (ref 28.4–34.8)
MCV RBC AUTO: 95.8 FL (ref 82.6–102.9)
METER GLUCOSE: 107 MG/DL (ref 65–105)
METER GLUCOSE: 113 MG/DL (ref 65–105)
METER GLUCOSE: 116 MG/DL (ref 65–105)
MICROORGANISM SPEC CULT: ABNORMAL
MICROORGANISM/AGENT SPEC: ABNORMAL
MONOCYTES NFR BLD: 0.75 K/UL (ref 0.1–1.2)
MONOCYTES NFR BLD: 15 % (ref 3–12)
NEUTROPHILS NFR BLD: 55 % (ref 36–65)
NEUTS SEG NFR BLD: 2.72 K/UL (ref 1.5–8.1)
NRBC BLD-RTO: 0 PER 100 WBC
PHENOBARBITAL: 29 UG/ML (ref 15–40)
PLATELET # BLD AUTO: 124 K/UL (ref 138–453)
PMV BLD AUTO: 12.5 FL (ref 8.1–13.5)
POTASSIUM SERPL-SCNC: 3.8 MMOL/L (ref 3.7–5.3)
RBC # BLD AUTO: 3.58 M/UL (ref 3.95–5.11)
SODIUM SERPL-SCNC: 137 MMOL/L (ref 135–144)
SPECIMEN DESCRIPTION: ABNORMAL
VALPROATE FREE MFR SERPL: 22.3 % (ref 5–18.4)
VALPROATE FREE SERPL-MCNC: 20.5 UG/ML (ref 7–23)
VALPROATE SERPL-MCNC: 92 UG/ML (ref 50–125)
WBC OTHER # BLD: 5 K/UL (ref 3.5–11.3)

## 2023-07-20 PROCEDURE — 6360000002 HC RX W HCPCS: Performed by: REGISTERED NURSE

## 2023-07-20 PROCEDURE — 2580000003 HC RX 258: Performed by: REGISTERED NURSE

## 2023-07-20 PROCEDURE — 6360000002 HC RX W HCPCS: Performed by: EMERGENCY MEDICINE

## 2023-07-20 PROCEDURE — 2580000003 HC RX 258: Performed by: EMERGENCY MEDICINE

## 2023-07-20 PROCEDURE — 80175 DRUG SCREEN QUAN LAMOTRIGINE: CPT

## 2023-07-20 PROCEDURE — A4216 STERILE WATER/SALINE, 10 ML: HCPCS | Performed by: REGISTERED NURSE

## 2023-07-20 PROCEDURE — 6370000000 HC RX 637 (ALT 250 FOR IP): Performed by: NURSE PRACTITIONER

## 2023-07-20 PROCEDURE — 80048 BASIC METABOLIC PNL TOTAL CA: CPT

## 2023-07-20 PROCEDURE — 6370000000 HC RX 637 (ALT 250 FOR IP): Performed by: STUDENT IN AN ORGANIZED HEALTH CARE EDUCATION/TRAINING PROGRAM

## 2023-07-20 PROCEDURE — 80165 DIPROPYLACETIC ACID FREE: CPT

## 2023-07-20 PROCEDURE — 80184 ASSAY OF PHENOBARBITAL: CPT

## 2023-07-20 PROCEDURE — 99221 1ST HOSP IP/OBS SF/LOW 40: CPT | Performed by: FAMILY MEDICINE

## 2023-07-20 PROCEDURE — 85027 COMPLETE CBC AUTOMATED: CPT

## 2023-07-20 PROCEDURE — 36415 COLL VENOUS BLD VENIPUNCTURE: CPT

## 2023-07-20 PROCEDURE — C9113 INJ PANTOPRAZOLE SODIUM, VIA: HCPCS | Performed by: REGISTERED NURSE

## 2023-07-20 PROCEDURE — 80235 DRUG ASSAY LACOSAMIDE: CPT

## 2023-07-20 PROCEDURE — 6370000000 HC RX 637 (ALT 250 FOR IP): Performed by: EMERGENCY MEDICINE

## 2023-07-20 PROCEDURE — 95720 EEG PHY/QHP EA INCR W/VEEG: CPT | Performed by: PSYCHIATRY & NEUROLOGY

## 2023-07-20 PROCEDURE — 80164 ASSAY DIPROPYLACETIC ACD TOT: CPT

## 2023-07-20 PROCEDURE — 2060000000 HC ICU INTERMEDIATE R&B

## 2023-07-20 PROCEDURE — 83735 ASSAY OF MAGNESIUM: CPT

## 2023-07-20 PROCEDURE — 99233 SBSQ HOSP IP/OBS HIGH 50: CPT | Performed by: PSYCHIATRY & NEUROLOGY

## 2023-07-20 PROCEDURE — 95714 VEEG EA 12-26 HR UNMNTR: CPT

## 2023-07-20 PROCEDURE — 82947 ASSAY GLUCOSE BLOOD QUANT: CPT

## 2023-07-20 RX ORDER — PHENOBARBITAL 20 MG/5ML
100 ELIXIR ORAL 2 TIMES DAILY
Status: DISCONTINUED | OUTPATIENT
Start: 2023-07-20 | End: 2023-07-21

## 2023-07-20 RX ADMIN — LAMOTRIGINE 100 MG: 100 TABLET ORAL at 21:43

## 2023-07-20 RX ADMIN — SERTRALINE 50 MG: 50 TABLET, FILM COATED ORAL at 08:28

## 2023-07-20 RX ADMIN — LAMOTRIGINE 100 MG: 100 TABLET ORAL at 08:58

## 2023-07-20 RX ADMIN — CEFTRIAXONE SODIUM 1000 MG: 10 INJECTION, POWDER, FOR SOLUTION INTRAVENOUS at 14:36

## 2023-07-20 RX ADMIN — PHENOBARBITAL 100 MG: 20 LIQUID ORAL at 08:58

## 2023-07-20 RX ADMIN — LEVETIRACETAM 2000 MG: 500 SOLUTION ORAL at 21:43

## 2023-07-20 RX ADMIN — LEVETIRACETAM 2000 MG: 500 SOLUTION ORAL at 08:29

## 2023-07-20 RX ADMIN — VALPROIC ACID 1000 MG: 250 SOLUTION ORAL at 21:44

## 2023-07-20 RX ADMIN — ATORVASTATIN CALCIUM 20 MG: 20 TABLET, FILM COATED ORAL at 21:48

## 2023-07-20 RX ADMIN — ENOXAPARIN SODIUM 40 MG: 100 INJECTION SUBCUTANEOUS at 08:28

## 2023-07-20 RX ADMIN — VALPROIC ACID 1000 MG: 250 SOLUTION ORAL at 05:42

## 2023-07-20 RX ADMIN — LACOSAMIDE 200 MG: 10 SOLUTION ORAL at 08:28

## 2023-07-20 RX ADMIN — PHENOBARBITAL 100 MG: 20 LIQUID ORAL at 21:44

## 2023-07-20 RX ADMIN — VALPROIC ACID 1000 MG: 250 SOLUTION ORAL at 14:36

## 2023-07-20 RX ADMIN — ASPIRIN 81 MG CHEWABLE TABLET 81 MG: 81 TABLET CHEWABLE at 08:28

## 2023-07-20 RX ADMIN — SODIUM CHLORIDE, PRESERVATIVE FREE 10 ML: 5 INJECTION INTRAVENOUS at 08:29

## 2023-07-20 RX ADMIN — LACOSAMIDE 200 MG: 10 SOLUTION ORAL at 21:45

## 2023-07-20 RX ADMIN — SODIUM CHLORIDE, PRESERVATIVE FREE 10 ML: 5 INJECTION INTRAVENOUS at 21:45

## 2023-07-20 RX ADMIN — SODIUM CHLORIDE 40 MG: 9 INJECTION INTRAMUSCULAR; INTRAVENOUS; SUBCUTANEOUS at 08:28

## 2023-07-20 NOTE — CARE COORDINATION
CM discussed patient with bedside RN Isaias Munoz. NG tube in place and RN reports patient is still too lethargic to attempt PO intake at this time. Patient is bed hold at  [facility] with plans to return at discharge. CM called and spoke with Randolph Brower at [facility] and she states that patient is unable to return with NG tube. PEG tube if patient unable to have PO intake at discharge. Patient has legal guardian Jodie Hart [phone]. JAMEEL discussed with Dr. Dexter Velarde that patient is unable to return to [facility] with NG tube. If patient continues to need TF PEG tube options will need to be discussed with legal guardian prior to patient being ready for discharge.

## 2023-07-21 LAB
ANION GAP SERPL CALCULATED.3IONS-SCNC: 10 MMOL/L (ref 9–17)
BASOPHILS # BLD: 0.03 K/UL (ref 0–0.2)
BASOPHILS NFR BLD: 1 % (ref 0–2)
BUN SERPL-MCNC: 23 MG/DL (ref 8–23)
CALCIUM SERPL-MCNC: 8.8 MG/DL (ref 8.6–10.4)
CHLORIDE SERPL-SCNC: 96 MMOL/L (ref 98–107)
CO2 SERPL-SCNC: 29 MMOL/L (ref 20–31)
CREAT SERPL-MCNC: 0.4 MG/DL (ref 0.5–0.9)
EOSINOPHIL # BLD: 0.17 K/UL (ref 0–0.44)
EOSINOPHILS RELATIVE PERCENT: 3 % (ref 1–4)
ERYTHROCYTE [DISTWIDTH] IN BLOOD BY AUTOMATED COUNT: 13.2 % (ref 11.8–14.4)
GFR SERPL CREATININE-BSD FRML MDRD: >60 ML/MIN/1.73M2
GLUCOSE SERPL-MCNC: 102 MG/DL (ref 70–99)
HCT VFR BLD AUTO: 36.2 % (ref 36.3–47.1)
HGB BLD-MCNC: 11.3 G/DL (ref 11.9–15.1)
IMM GRANULOCYTES # BLD AUTO: 0.05 K/UL (ref 0–0.3)
IMM GRANULOCYTES NFR BLD: 1 %
LYMPHOCYTES NFR BLD: 2 K/UL (ref 1.1–3.7)
LYMPHOCYTES RELATIVE PERCENT: 30 % (ref 24–43)
MAGNESIUM SERPL-MCNC: 2.1 MG/DL (ref 1.6–2.6)
MCH RBC QN AUTO: 30.9 PG (ref 25.2–33.5)
MCHC RBC AUTO-ENTMCNC: 31.2 G/DL (ref 28.4–34.8)
MCV RBC AUTO: 98.9 FL (ref 82.6–102.9)
METER GLUCOSE: 110 MG/DL (ref 65–105)
METER GLUCOSE: 123 MG/DL (ref 65–105)
METER GLUCOSE: 92 MG/DL (ref 65–105)
METER GLUCOSE: 98 MG/DL (ref 65–105)
MONOCYTES NFR BLD: 0.85 K/UL (ref 0.1–1.2)
MONOCYTES NFR BLD: 13 % (ref 3–12)
NEUTROPHILS NFR BLD: 52 % (ref 36–65)
NEUTS SEG NFR BLD: 3.51 K/UL (ref 1.5–8.1)
NRBC BLD-RTO: 0 PER 100 WBC
PLATELET # BLD AUTO: 138 K/UL (ref 138–453)
PMV BLD AUTO: 12.9 FL (ref 8.1–13.5)
POTASSIUM SERPL-SCNC: 3.9 MMOL/L (ref 3.7–5.3)
RBC # BLD AUTO: 3.66 M/UL (ref 3.95–5.11)
SEND OUT REPORT: NORMAL
SODIUM SERPL-SCNC: 135 MMOL/L (ref 135–144)
TEST NAME: NORMAL
WBC OTHER # BLD: 6.6 K/UL (ref 3.5–11.3)

## 2023-07-21 PROCEDURE — 94761 N-INVAS EAR/PLS OXIMETRY MLT: CPT

## 2023-07-21 PROCEDURE — 83735 ASSAY OF MAGNESIUM: CPT

## 2023-07-21 PROCEDURE — C9113 INJ PANTOPRAZOLE SODIUM, VIA: HCPCS | Performed by: REGISTERED NURSE

## 2023-07-21 PROCEDURE — 82947 ASSAY GLUCOSE BLOOD QUANT: CPT

## 2023-07-21 PROCEDURE — 2580000003 HC RX 258: Performed by: REGISTERED NURSE

## 2023-07-21 PROCEDURE — 2060000000 HC ICU INTERMEDIATE R&B

## 2023-07-21 PROCEDURE — 6370000000 HC RX 637 (ALT 250 FOR IP): Performed by: STUDENT IN AN ORGANIZED HEALTH CARE EDUCATION/TRAINING PROGRAM

## 2023-07-21 PROCEDURE — 6370000000 HC RX 637 (ALT 250 FOR IP): Performed by: NURSE PRACTITIONER

## 2023-07-21 PROCEDURE — 6370000000 HC RX 637 (ALT 250 FOR IP): Performed by: EMERGENCY MEDICINE

## 2023-07-21 PROCEDURE — 95720 EEG PHY/QHP EA INCR W/VEEG: CPT | Performed by: PSYCHIATRY & NEUROLOGY

## 2023-07-21 PROCEDURE — 99233 SBSQ HOSP IP/OBS HIGH 50: CPT | Performed by: PSYCHIATRY & NEUROLOGY

## 2023-07-21 PROCEDURE — 80048 BASIC METABOLIC PNL TOTAL CA: CPT

## 2023-07-21 PROCEDURE — 6360000002 HC RX W HCPCS: Performed by: REGISTERED NURSE

## 2023-07-21 PROCEDURE — 95714 VEEG EA 12-26 HR UNMNTR: CPT

## 2023-07-21 PROCEDURE — 85027 COMPLETE CBC AUTOMATED: CPT

## 2023-07-21 PROCEDURE — 2700000000 HC OXYGEN THERAPY PER DAY

## 2023-07-21 PROCEDURE — A4216 STERILE WATER/SALINE, 10 ML: HCPCS | Performed by: REGISTERED NURSE

## 2023-07-21 PROCEDURE — 36415 COLL VENOUS BLD VENIPUNCTURE: CPT

## 2023-07-21 PROCEDURE — 6370000000 HC RX 637 (ALT 250 FOR IP): Performed by: PSYCHIATRY & NEUROLOGY

## 2023-07-21 RX ORDER — PHENOBARBITAL 20 MG/5ML
60 ELIXIR ORAL 2 TIMES DAILY
Status: DISCONTINUED | OUTPATIENT
Start: 2023-07-21 | End: 2023-07-23

## 2023-07-21 RX ADMIN — LACOSAMIDE 200 MG: 10 SOLUTION ORAL at 09:20

## 2023-07-21 RX ADMIN — SODIUM CHLORIDE, PRESERVATIVE FREE 10 ML: 5 INJECTION INTRAVENOUS at 09:21

## 2023-07-21 RX ADMIN — SODIUM CHLORIDE 40 MG: 9 INJECTION INTRAMUSCULAR; INTRAVENOUS; SUBCUTANEOUS at 09:20

## 2023-07-21 RX ADMIN — ENOXAPARIN SODIUM 40 MG: 100 INJECTION SUBCUTANEOUS at 09:20

## 2023-07-21 RX ADMIN — PHENOBARBITAL 60 MG: 20 LIQUID ORAL at 20:11

## 2023-07-21 RX ADMIN — ATORVASTATIN CALCIUM 20 MG: 20 TABLET, FILM COATED ORAL at 20:14

## 2023-07-21 RX ADMIN — VALPROIC ACID 1000 MG: 250 SOLUTION ORAL at 05:48

## 2023-07-21 RX ADMIN — LACOSAMIDE 200 MG: 10 SOLUTION ORAL at 20:08

## 2023-07-21 RX ADMIN — VALPROIC ACID 1000 MG: 250 SOLUTION ORAL at 14:53

## 2023-07-21 RX ADMIN — LEVETIRACETAM 2000 MG: 500 SOLUTION ORAL at 09:19

## 2023-07-21 RX ADMIN — PHENOBARBITAL 100 MG: 20 LIQUID ORAL at 09:19

## 2023-07-21 RX ADMIN — LAMOTRIGINE 100 MG: 100 TABLET ORAL at 20:06

## 2023-07-21 RX ADMIN — SERTRALINE 50 MG: 50 TABLET, FILM COATED ORAL at 09:35

## 2023-07-21 RX ADMIN — ASPIRIN 81 MG CHEWABLE TABLET 81 MG: 81 TABLET CHEWABLE at 09:20

## 2023-07-21 RX ADMIN — LAMOTRIGINE 100 MG: 100 TABLET ORAL at 09:19

## 2023-07-21 RX ADMIN — LEVETIRACETAM 2000 MG: 500 SOLUTION ORAL at 20:06

## 2023-07-21 RX ADMIN — VALPROIC ACID 1000 MG: 250 SOLUTION ORAL at 20:06

## 2023-07-21 NOTE — CARE COORDINATION
[address] Flow/Interdisciplinary Rounds Progress Note    Quality Flow Rounds held on July 21, 2023 at [facility] Attending:  Bedside Nurse and     Barriers to Discharge: Clinical status    Anticipated Discharge Date:   7/23/23    Anticipated Discharge Disposition: Bed hold at [facility]. Readmission Risk              Risk of Unplanned Readmission:  21           Discussed patient goal for the day, patient clinical progression, and barriers to discharge. Day 5 LTME. Patient still unable to take PO and requiring NG for tube feeds. Patient unable to return to [facility] with NG tube. CM spoke with Dr. Chad Celestin during IDR and discussed the importance of potential PEG tube being addressed prior to patient being ready for discharge.       Kirby Messer RN  July 21, 2023

## 2023-07-22 LAB
ANION GAP SERPL CALCULATED.3IONS-SCNC: 11 MMOL/L (ref 9–17)
BASOPHILS # BLD: 0.03 K/UL (ref 0–0.2)
BASOPHILS NFR BLD: 0 % (ref 0–2)
BUN SERPL-MCNC: 23 MG/DL (ref 8–23)
CALCIUM SERPL-MCNC: 8.8 MG/DL (ref 8.6–10.4)
CHLORIDE SERPL-SCNC: 98 MMOL/L (ref 98–107)
CO2 SERPL-SCNC: 24 MMOL/L (ref 20–31)
CREAT SERPL-MCNC: 0.3 MG/DL (ref 0.5–0.9)
EOSINOPHIL # BLD: 0.21 K/UL (ref 0–0.44)
EOSINOPHILS RELATIVE PERCENT: 2 % (ref 1–4)
ERYTHROCYTE [DISTWIDTH] IN BLOOD BY AUTOMATED COUNT: 13.1 % (ref 11.8–14.4)
GFR SERPL CREATININE-BSD FRML MDRD: >60 ML/MIN/1.73M2
GLUCOSE SERPL-MCNC: 104 MG/DL (ref 70–99)
HCT VFR BLD AUTO: 36.6 % (ref 36.3–47.1)
HGB BLD-MCNC: 11.5 G/DL (ref 11.9–15.1)
IMM GRANULOCYTES # BLD AUTO: 0.04 K/UL (ref 0–0.3)
IMM GRANULOCYTES NFR BLD: 1 %
LYMPHOCYTES NFR BLD: 2.29 K/UL (ref 1.1–3.7)
LYMPHOCYTES RELATIVE PERCENT: 27 % (ref 24–43)
MAGNESIUM SERPL-MCNC: 2.3 MG/DL (ref 1.6–2.6)
MCH RBC QN AUTO: 31.6 PG (ref 25.2–33.5)
MCHC RBC AUTO-ENTMCNC: 31.4 G/DL (ref 28.4–34.8)
MCV RBC AUTO: 100.5 FL (ref 82.6–102.9)
METER GLUCOSE: 104 MG/DL (ref 65–105)
METER GLUCOSE: 110 MG/DL (ref 65–105)
METER GLUCOSE: 113 MG/DL (ref 65–105)
METER GLUCOSE: 98 MG/DL (ref 65–105)
MONOCYTES NFR BLD: 0.98 K/UL (ref 0.1–1.2)
MONOCYTES NFR BLD: 11 % (ref 3–12)
NEUTROPHILS NFR BLD: 59 % (ref 36–65)
NEUTS SEG NFR BLD: 5.05 K/UL (ref 1.5–8.1)
NRBC BLD-RTO: 0 PER 100 WBC
PLATELET # BLD AUTO: 162 K/UL (ref 138–453)
PMV BLD AUTO: 12.7 FL (ref 8.1–13.5)
POTASSIUM SERPL-SCNC: 4.3 MMOL/L (ref 3.7–5.3)
RBC # BLD AUTO: 3.64 M/UL (ref 3.95–5.11)
SODIUM SERPL-SCNC: 133 MMOL/L (ref 135–144)
WBC OTHER # BLD: 8.6 K/UL (ref 3.5–11.3)

## 2023-07-22 PROCEDURE — 85027 COMPLETE CBC AUTOMATED: CPT

## 2023-07-22 PROCEDURE — 80048 BASIC METABOLIC PNL TOTAL CA: CPT

## 2023-07-22 PROCEDURE — 6370000000 HC RX 637 (ALT 250 FOR IP): Performed by: NURSE PRACTITIONER

## 2023-07-22 PROCEDURE — 6370000000 HC RX 637 (ALT 250 FOR IP): Performed by: STUDENT IN AN ORGANIZED HEALTH CARE EDUCATION/TRAINING PROGRAM

## 2023-07-22 PROCEDURE — 83735 ASSAY OF MAGNESIUM: CPT

## 2023-07-22 PROCEDURE — 82947 ASSAY GLUCOSE BLOOD QUANT: CPT

## 2023-07-22 PROCEDURE — 6360000002 HC RX W HCPCS: Performed by: REGISTERED NURSE

## 2023-07-22 PROCEDURE — 6370000000 HC RX 637 (ALT 250 FOR IP): Performed by: PSYCHIATRY & NEUROLOGY

## 2023-07-22 PROCEDURE — A4216 STERILE WATER/SALINE, 10 ML: HCPCS | Performed by: REGISTERED NURSE

## 2023-07-22 PROCEDURE — 2580000003 HC RX 258: Performed by: REGISTERED NURSE

## 2023-07-22 PROCEDURE — 2060000000 HC ICU INTERMEDIATE R&B

## 2023-07-22 PROCEDURE — 36415 COLL VENOUS BLD VENIPUNCTURE: CPT

## 2023-07-22 PROCEDURE — 99232 SBSQ HOSP IP/OBS MODERATE 35: CPT | Performed by: PSYCHIATRY & NEUROLOGY

## 2023-07-22 PROCEDURE — 6370000000 HC RX 637 (ALT 250 FOR IP): Performed by: EMERGENCY MEDICINE

## 2023-07-22 PROCEDURE — 95714 VEEG EA 12-26 HR UNMNTR: CPT

## 2023-07-22 PROCEDURE — C9113 INJ PANTOPRAZOLE SODIUM, VIA: HCPCS | Performed by: REGISTERED NURSE

## 2023-07-22 RX ADMIN — LACOSAMIDE 200 MG: 10 SOLUTION ORAL at 21:03

## 2023-07-22 RX ADMIN — LACOSAMIDE 200 MG: 10 SOLUTION ORAL at 09:04

## 2023-07-22 RX ADMIN — VALPROIC ACID 1000 MG: 250 SOLUTION ORAL at 15:04

## 2023-07-22 RX ADMIN — SODIUM CHLORIDE, PRESERVATIVE FREE 10 ML: 5 INJECTION INTRAVENOUS at 09:05

## 2023-07-22 RX ADMIN — SODIUM CHLORIDE 40 MG: 9 INJECTION INTRAMUSCULAR; INTRAVENOUS; SUBCUTANEOUS at 09:04

## 2023-07-22 RX ADMIN — ATORVASTATIN CALCIUM 20 MG: 20 TABLET, FILM COATED ORAL at 21:02

## 2023-07-22 RX ADMIN — ENOXAPARIN SODIUM 40 MG: 100 INJECTION SUBCUTANEOUS at 09:04

## 2023-07-22 RX ADMIN — PHENOBARBITAL 60 MG: 20 LIQUID ORAL at 09:04

## 2023-07-22 RX ADMIN — VALPROIC ACID 1000 MG: 250 SOLUTION ORAL at 21:30

## 2023-07-22 RX ADMIN — LAMOTRIGINE 100 MG: 100 TABLET ORAL at 21:02

## 2023-07-22 RX ADMIN — LAMOTRIGINE 100 MG: 100 TABLET ORAL at 09:04

## 2023-07-22 RX ADMIN — PHENOBARBITAL 60 MG: 20 LIQUID ORAL at 21:03

## 2023-07-22 RX ADMIN — LEVETIRACETAM 2000 MG: 500 SOLUTION ORAL at 21:03

## 2023-07-22 RX ADMIN — VALPROIC ACID 1000 MG: 250 SOLUTION ORAL at 05:18

## 2023-07-22 RX ADMIN — SERTRALINE 50 MG: 50 TABLET, FILM COATED ORAL at 09:04

## 2023-07-22 RX ADMIN — ASPIRIN 81 MG CHEWABLE TABLET 81 MG: 81 TABLET CHEWABLE at 09:04

## 2023-07-22 RX ADMIN — LEVETIRACETAM 2000 MG: 500 SOLUTION ORAL at 09:04

## 2023-07-22 NOTE — DISCHARGE INSTR - COC
Continuity of Care Form    Patient Name: Denise Zuniga   :  1955  MRN:  [id number]    Admit date:  2023  Discharge date:  23    Code Status Order: Full Code   Advance Directives:     Admitting Physician:  Zahira Manzo MD  PCP: Janine Michelle MD    Discharging Nurse: [facility] Unit/Room#: 0108/0108-01  Discharging Unit Phone Number: [phone]    Emergency Contact:   Extended Emergency Contact Information  Primary Emergency Contact: Diana Constantino)  [address] Phone: [phone]  Mobile Phone: [phone]  Relation: Other  Preferred language: English   needed? No    Past Surgical History:  Past Surgical History:   Procedure Laterality Date    SHOULDER SURGERY Left     ORIF    -- had hardware infection afterwards requiring IV abx       Immunization History: There is no immunization history on file for this patient. Active Problems:  Patient Active Problem List   Diagnosis Code    Osteomyelitis ([address]) M86.9    Wound infection complicating hardware, sequela T84. 7XXS    Urinary urgency R39.15    Attention deficit hyperactivity disorder (ADHD) F90.9    Depression F32. A    Seizure disorder ([address]) G40.909    Constipation K59.00    Status epilepticus (HCC) G40.901    Chronic osteomyelitis of left shoulder region [facility]) A59.909    Developmental disorder F89    Weakness of both lower extremities R29.898    Normocytic anemia D64.9    Hypokalemia E87.6    History of osteomyelitis Z87.39    History of status epilepticus Z86.69    Seizure ([address]) R56.9    Breakthrough seizure ([address]) G40.919    Encephalopathy acute G93.40    Cerebral palsy (HCC) G80.9       Isolation/Infection:   Isolation            No Isolation          Patient Infection Status       Infection Onset Added Last Indicated Last Indicated By Review Planned Expiration Resolved Resolved By    None active    Resolved    C-diff Rule Out 23 Clostridium Difficile

## 2023-07-23 LAB
ANION GAP SERPL CALCULATED.3IONS-SCNC: 9 MMOL/L (ref 9–17)
BASOPHILS # BLD: 0.04 K/UL (ref 0–0.2)
BASOPHILS NFR BLD: 0 % (ref 0–2)
BUN SERPL-MCNC: 21 MG/DL (ref 8–23)
CALCIUM SERPL-MCNC: 9.1 MG/DL (ref 8.6–10.4)
CHLORIDE SERPL-SCNC: 98 MMOL/L (ref 98–107)
CO2 SERPL-SCNC: 27 MMOL/L (ref 20–31)
CREAT SERPL-MCNC: 0.4 MG/DL (ref 0.5–0.9)
EOSINOPHIL # BLD: 0.29 K/UL (ref 0–0.44)
EOSINOPHILS RELATIVE PERCENT: 3 % (ref 1–4)
ERYTHROCYTE [DISTWIDTH] IN BLOOD BY AUTOMATED COUNT: 13.2 % (ref 11.8–14.4)
GFR SERPL CREATININE-BSD FRML MDRD: >60 ML/MIN/1.73M2
GLUCOSE SERPL-MCNC: 96 MG/DL (ref 70–99)
HCT VFR BLD AUTO: 39.6 % (ref 36.3–47.1)
HGB BLD-MCNC: 12 G/DL (ref 11.9–15.1)
IMM GRANULOCYTES # BLD AUTO: 0.06 K/UL (ref 0–0.3)
IMM GRANULOCYTES NFR BLD: 1 %
LYMPHOCYTES NFR BLD: 2.51 K/UL (ref 1.1–3.7)
LYMPHOCYTES RELATIVE PERCENT: 26 % (ref 24–43)
MAGNESIUM SERPL-MCNC: 2.3 MG/DL (ref 1.6–2.6)
MCH RBC QN AUTO: 30.9 PG (ref 25.2–33.5)
MCHC RBC AUTO-ENTMCNC: 30.3 G/DL (ref 28.4–34.8)
MCV RBC AUTO: 102.1 FL (ref 82.6–102.9)
METER GLUCOSE: 101 MG/DL (ref 65–105)
METER GLUCOSE: 105 MG/DL (ref 65–105)
METER GLUCOSE: 107 MG/DL (ref 65–105)
METER GLUCOSE: 97 MG/DL (ref 65–105)
MONOCYTES NFR BLD: 1.17 K/UL (ref 0.1–1.2)
MONOCYTES NFR BLD: 12 % (ref 3–12)
NEUTROPHILS NFR BLD: 58 % (ref 36–65)
NEUTS SEG NFR BLD: 5.75 K/UL (ref 1.5–8.1)
NRBC BLD-RTO: 0 PER 100 WBC
PLATELET # BLD AUTO: 142 K/UL (ref 138–453)
PMV BLD AUTO: 12.1 FL (ref 8.1–13.5)
POTASSIUM SERPL-SCNC: 4.8 MMOL/L (ref 3.7–5.3)
RBC # BLD AUTO: 3.88 M/UL (ref 3.95–5.11)
SODIUM SERPL-SCNC: 134 MMOL/L (ref 135–144)
WBC OTHER # BLD: 9.8 K/UL (ref 3.5–11.3)

## 2023-07-23 PROCEDURE — 6370000000 HC RX 637 (ALT 250 FOR IP): Performed by: NURSE PRACTITIONER

## 2023-07-23 PROCEDURE — 83735 ASSAY OF MAGNESIUM: CPT

## 2023-07-23 PROCEDURE — 6370000000 HC RX 637 (ALT 250 FOR IP): Performed by: STUDENT IN AN ORGANIZED HEALTH CARE EDUCATION/TRAINING PROGRAM

## 2023-07-23 PROCEDURE — 85027 COMPLETE CBC AUTOMATED: CPT

## 2023-07-23 PROCEDURE — 2060000000 HC ICU INTERMEDIATE R&B

## 2023-07-23 PROCEDURE — 6360000002 HC RX W HCPCS: Performed by: REGISTERED NURSE

## 2023-07-23 PROCEDURE — A4216 STERILE WATER/SALINE, 10 ML: HCPCS | Performed by: REGISTERED NURSE

## 2023-07-23 PROCEDURE — 2580000003 HC RX 258: Performed by: REGISTERED NURSE

## 2023-07-23 PROCEDURE — 6370000000 HC RX 637 (ALT 250 FOR IP): Performed by: PSYCHIATRY & NEUROLOGY

## 2023-07-23 PROCEDURE — 95711 VEEG 2-12 HR UNMONITORED: CPT

## 2023-07-23 PROCEDURE — C9113 INJ PANTOPRAZOLE SODIUM, VIA: HCPCS | Performed by: REGISTERED NURSE

## 2023-07-23 PROCEDURE — 36415 COLL VENOUS BLD VENIPUNCTURE: CPT

## 2023-07-23 PROCEDURE — 80048 BASIC METABOLIC PNL TOTAL CA: CPT

## 2023-07-23 PROCEDURE — 82947 ASSAY GLUCOSE BLOOD QUANT: CPT

## 2023-07-23 PROCEDURE — 99232 SBSQ HOSP IP/OBS MODERATE 35: CPT | Performed by: PSYCHIATRY & NEUROLOGY

## 2023-07-23 PROCEDURE — 6370000000 HC RX 637 (ALT 250 FOR IP): Performed by: EMERGENCY MEDICINE

## 2023-07-23 PROCEDURE — 95720 EEG PHY/QHP EA INCR W/VEEG: CPT | Performed by: PSYCHIATRY & NEUROLOGY

## 2023-07-23 RX ORDER — PHENOBARBITAL 20 MG/5ML
30 ELIXIR ORAL EVERY MORNING
Status: DISCONTINUED | OUTPATIENT
Start: 2023-07-24 | End: 2023-07-31 | Stop reason: HOSPADM

## 2023-07-23 RX ORDER — PHENOBARBITAL 20 MG/5ML
60 ELIXIR ORAL NIGHTLY
Status: DISCONTINUED | OUTPATIENT
Start: 2023-07-23 | End: 2023-07-31 | Stop reason: HOSPADM

## 2023-07-23 RX ADMIN — SODIUM CHLORIDE, PRESERVATIVE FREE 10 ML: 5 INJECTION INTRAVENOUS at 09:53

## 2023-07-23 RX ADMIN — VALPROIC ACID 1000 MG: 250 SOLUTION ORAL at 20:43

## 2023-07-23 RX ADMIN — PHENOBARBITAL 60 MG: 20 LIQUID ORAL at 09:52

## 2023-07-23 RX ADMIN — VALPROIC ACID 1000 MG: 250 SOLUTION ORAL at 15:04

## 2023-07-23 RX ADMIN — ATORVASTATIN CALCIUM 20 MG: 20 TABLET, FILM COATED ORAL at 20:49

## 2023-07-23 RX ADMIN — LACOSAMIDE 200 MG: 10 SOLUTION ORAL at 09:52

## 2023-07-23 RX ADMIN — LAMOTRIGINE 100 MG: 100 TABLET ORAL at 09:52

## 2023-07-23 RX ADMIN — SERTRALINE 50 MG: 50 TABLET, FILM COATED ORAL at 09:53

## 2023-07-23 RX ADMIN — VALPROIC ACID 1000 MG: 250 SOLUTION ORAL at 06:03

## 2023-07-23 RX ADMIN — ENOXAPARIN SODIUM 40 MG: 100 INJECTION SUBCUTANEOUS at 09:52

## 2023-07-23 RX ADMIN — SODIUM CHLORIDE 40 MG: 9 INJECTION INTRAMUSCULAR; INTRAVENOUS; SUBCUTANEOUS at 09:53

## 2023-07-23 RX ADMIN — LACOSAMIDE 200 MG: 10 SOLUTION ORAL at 20:45

## 2023-07-23 RX ADMIN — LEVETIRACETAM 2000 MG: 500 SOLUTION ORAL at 20:44

## 2023-07-23 RX ADMIN — ASPIRIN 81 MG CHEWABLE TABLET 81 MG: 81 TABLET CHEWABLE at 09:52

## 2023-07-23 RX ADMIN — LEVETIRACETAM 2000 MG: 500 SOLUTION ORAL at 09:52

## 2023-07-23 RX ADMIN — PHENOBARBITAL 60 MG: 20 LIQUID ORAL at 20:42

## 2023-07-23 RX ADMIN — LAMOTRIGINE 100 MG: 100 TABLET ORAL at 20:45

## 2023-07-23 NOTE — CARE COORDINATION
Transitional Planning  LTME plan to be discontinued. Patient still has NG tube, spoke with Dr. Edyta Vargas, notified unable to return to [facility] with NG and need feeding plan.

## 2023-07-24 LAB
ANION GAP SERPL CALCULATED.3IONS-SCNC: 12 MMOL/L (ref 9–17)
BASOPHILS # BLD: 0.05 K/UL (ref 0–0.2)
BASOPHILS NFR BLD: 0 % (ref 0–2)
BUN SERPL-MCNC: 23 MG/DL (ref 8–23)
CALCIUM SERPL-MCNC: 9.4 MG/DL (ref 8.6–10.4)
CHLORIDE SERPL-SCNC: 99 MMOL/L (ref 98–107)
CO2 SERPL-SCNC: 21 MMOL/L (ref 20–31)
CREAT SERPL-MCNC: 0.4 MG/DL (ref 0.5–0.9)
EOSINOPHIL # BLD: 0.28 K/UL (ref 0–0.44)
EOSINOPHILS RELATIVE PERCENT: 2 % (ref 1–4)
ERYTHROCYTE [DISTWIDTH] IN BLOOD BY AUTOMATED COUNT: 13.1 % (ref 11.8–14.4)
GFR SERPL CREATININE-BSD FRML MDRD: >60 ML/MIN/1.73M2
GLUCOSE SERPL-MCNC: 100 MG/DL (ref 70–99)
HCT VFR BLD AUTO: 39.5 % (ref 36.3–47.1)
HGB BLD-MCNC: 12.5 G/DL (ref 11.9–15.1)
IMM GRANULOCYTES # BLD AUTO: 0.09 K/UL (ref 0–0.3)
IMM GRANULOCYTES NFR BLD: 1 %
LACOSAMIDE: 8.1 UG/ML (ref 1–10)
LYMPHOCYTES NFR BLD: 3.21 K/UL (ref 1.1–3.7)
LYMPHOCYTES RELATIVE PERCENT: 27 % (ref 24–43)
MAGNESIUM SERPL-MCNC: 2.8 MG/DL (ref 1.6–2.6)
MCH RBC QN AUTO: 31.7 PG (ref 25.2–33.5)
MCHC RBC AUTO-ENTMCNC: 31.6 G/DL (ref 28.4–34.8)
MCV RBC AUTO: 100.3 FL (ref 82.6–102.9)
METER GLUCOSE: 100 MG/DL (ref 65–105)
METER GLUCOSE: 106 MG/DL (ref 65–105)
METER GLUCOSE: 115 MG/DL (ref 65–105)
MONOCYTES NFR BLD: 1.34 K/UL (ref 0.1–1.2)
MONOCYTES NFR BLD: 11 % (ref 3–12)
NEUTROPHILS NFR BLD: 59 % (ref 36–65)
NEUTS SEG NFR BLD: 6.85 K/UL (ref 1.5–8.1)
NRBC BLD-RTO: 0 PER 100 WBC
PLATELET # BLD AUTO: 193 K/UL (ref 138–453)
PMV BLD AUTO: 12.3 FL (ref 8.1–13.5)
POTASSIUM SERPL-SCNC: 4.9 MMOL/L (ref 3.7–5.3)
RBC # BLD AUTO: 3.94 M/UL (ref 3.95–5.11)
SODIUM SERPL-SCNC: 132 MMOL/L (ref 135–144)
WBC OTHER # BLD: 11.8 K/UL (ref 3.5–11.3)

## 2023-07-24 PROCEDURE — C9113 INJ PANTOPRAZOLE SODIUM, VIA: HCPCS | Performed by: REGISTERED NURSE

## 2023-07-24 PROCEDURE — 80048 BASIC METABOLIC PNL TOTAL CA: CPT

## 2023-07-24 PROCEDURE — 6370000000 HC RX 637 (ALT 250 FOR IP): Performed by: NURSE PRACTITIONER

## 2023-07-24 PROCEDURE — 2060000000 HC ICU INTERMEDIATE R&B

## 2023-07-24 PROCEDURE — 6370000000 HC RX 637 (ALT 250 FOR IP): Performed by: EMERGENCY MEDICINE

## 2023-07-24 PROCEDURE — 6370000000 HC RX 637 (ALT 250 FOR IP): Performed by: PSYCHIATRY & NEUROLOGY

## 2023-07-24 PROCEDURE — 36415 COLL VENOUS BLD VENIPUNCTURE: CPT

## 2023-07-24 PROCEDURE — 85027 COMPLETE CBC AUTOMATED: CPT

## 2023-07-24 PROCEDURE — 6370000000 HC RX 637 (ALT 250 FOR IP): Performed by: STUDENT IN AN ORGANIZED HEALTH CARE EDUCATION/TRAINING PROGRAM

## 2023-07-24 PROCEDURE — 2580000003 HC RX 258: Performed by: REGISTERED NURSE

## 2023-07-24 PROCEDURE — 82947 ASSAY GLUCOSE BLOOD QUANT: CPT

## 2023-07-24 PROCEDURE — 6360000002 HC RX W HCPCS: Performed by: REGISTERED NURSE

## 2023-07-24 PROCEDURE — A4216 STERILE WATER/SALINE, 10 ML: HCPCS | Performed by: REGISTERED NURSE

## 2023-07-24 PROCEDURE — 83735 ASSAY OF MAGNESIUM: CPT

## 2023-07-24 RX ORDER — LANSOPRAZOLE 30 MG/1
30 TABLET, ORALLY DISINTEGRATING, DELAYED RELEASE ORAL
Status: DISCONTINUED | OUTPATIENT
Start: 2023-07-25 | End: 2023-07-31 | Stop reason: HOSPADM

## 2023-07-24 RX ADMIN — VALPROIC ACID 1000 MG: 250 SOLUTION ORAL at 04:28

## 2023-07-24 RX ADMIN — SERTRALINE 50 MG: 50 TABLET, FILM COATED ORAL at 14:00

## 2023-07-24 RX ADMIN — LEVETIRACETAM 2000 MG: 500 SOLUTION ORAL at 09:43

## 2023-07-24 RX ADMIN — LACOSAMIDE 200 MG: 10 SOLUTION ORAL at 09:43

## 2023-07-24 RX ADMIN — PHENOBARBITAL 30 MG: 20 ELIXIR ORAL at 09:43

## 2023-07-24 RX ADMIN — ASPIRIN 81 MG CHEWABLE TABLET 81 MG: 81 TABLET CHEWABLE at 09:39

## 2023-07-24 RX ADMIN — SODIUM CHLORIDE, PRESERVATIVE FREE 10 ML: 5 INJECTION INTRAVENOUS at 20:35

## 2023-07-24 RX ADMIN — LACOSAMIDE 200 MG: 10 SOLUTION ORAL at 20:34

## 2023-07-24 RX ADMIN — ENOXAPARIN SODIUM 40 MG: 100 INJECTION SUBCUTANEOUS at 09:39

## 2023-07-24 RX ADMIN — LEVETIRACETAM 2000 MG: 500 SOLUTION ORAL at 20:34

## 2023-07-24 RX ADMIN — PHENOBARBITAL 60 MG: 20 LIQUID ORAL at 21:12

## 2023-07-24 RX ADMIN — SODIUM CHLORIDE 40 MG: 9 INJECTION INTRAMUSCULAR; INTRAVENOUS; SUBCUTANEOUS at 09:39

## 2023-07-24 RX ADMIN — ATORVASTATIN CALCIUM 20 MG: 20 TABLET, FILM COATED ORAL at 20:37

## 2023-07-24 RX ADMIN — VALPROIC ACID 1000 MG: 250 SOLUTION ORAL at 20:33

## 2023-07-24 RX ADMIN — LAMOTRIGINE 100 MG: 100 TABLET ORAL at 09:43

## 2023-07-24 RX ADMIN — LAMOTRIGINE 100 MG: 100 TABLET ORAL at 20:34

## 2023-07-25 ENCOUNTER — APPOINTMENT (OUTPATIENT)
Dept: GENERAL RADIOLOGY | Age: 68
DRG: 100 | End: 2023-07-25
Attending: PSYCHIATRY & NEUROLOGY
Payer: MEDICARE

## 2023-07-25 LAB
ANION GAP SERPL CALCULATED.3IONS-SCNC: 15 MMOL/L (ref 9–17)
BASOPHILS # BLD: 0 K/UL (ref 0–0.2)
BASOPHILS NFR BLD: 0 % (ref 0–2)
BUN SERPL-MCNC: 24 MG/DL (ref 8–23)
CALCIUM SERPL-MCNC: 9.3 MG/DL (ref 8.6–10.4)
CHLORIDE SERPL-SCNC: 95 MMOL/L (ref 98–107)
CO2 SERPL-SCNC: 24 MMOL/L (ref 20–31)
CREAT SERPL-MCNC: 0.4 MG/DL (ref 0.5–0.9)
EOSINOPHIL # BLD: 0 K/UL (ref 0–0.4)
EOSINOPHILS RELATIVE PERCENT: 0 % (ref 1–4)
ERYTHROCYTE [DISTWIDTH] IN BLOOD BY AUTOMATED COUNT: 13.2 % (ref 11.8–14.4)
GFR SERPL CREATININE-BSD FRML MDRD: >60 ML/MIN/1.73M2
GLUCOSE SERPL-MCNC: 108 MG/DL (ref 70–99)
HCT VFR BLD AUTO: 38.8 % (ref 36.3–47.1)
HGB BLD-MCNC: 12.3 G/DL (ref 11.9–15.1)
IMM GRANULOCYTES # BLD AUTO: 0.12 K/UL (ref 0–0.3)
IMM GRANULOCYTES NFR BLD: 1 %
LYMPHOCYTES NFR BLD: 4.37 K/UL (ref 1–4.8)
LYMPHOCYTES RELATIVE PERCENT: 38 % (ref 24–44)
MAGNESIUM SERPL-MCNC: 2.1 MG/DL (ref 1.6–2.6)
MCH RBC QN AUTO: 31.5 PG (ref 25.2–33.5)
MCHC RBC AUTO-ENTMCNC: 31.7 G/DL (ref 28.4–34.8)
MCV RBC AUTO: 99.2 FL (ref 82.6–102.9)
METER GLUCOSE: 104 MG/DL (ref 65–105)
METER GLUCOSE: 109 MG/DL (ref 65–105)
METER GLUCOSE: 99 MG/DL (ref 65–105)
MONOCYTES NFR BLD: 1.04 K/UL (ref 0.1–0.8)
MONOCYTES NFR BLD: 9 % (ref 1–7)
MORPHOLOGY: NORMAL
NEUTROPHILS NFR BLD: 52 % (ref 36–66)
NEUTS SEG NFR BLD: 5.97 K/UL (ref 1.8–7.7)
NRBC BLD-RTO: 0 PER 100 WBC
PLATELET # BLD AUTO: ABNORMAL K/UL (ref 138–453)
PLATELET, FLUORESCENCE: ABNORMAL K/UL (ref 138–453)
POTASSIUM SERPL-SCNC: 4.6 MMOL/L (ref 3.7–5.3)
RBC # BLD AUTO: 3.91 M/UL (ref 3.95–5.11)
SODIUM SERPL-SCNC: 134 MMOL/L (ref 135–144)
WBC OTHER # BLD: 11.5 K/UL (ref 3.5–11.3)

## 2023-07-25 PROCEDURE — 80048 BASIC METABOLIC PNL TOTAL CA: CPT

## 2023-07-25 PROCEDURE — 6370000000 HC RX 637 (ALT 250 FOR IP): Performed by: NURSE PRACTITIONER

## 2023-07-25 PROCEDURE — 36415 COLL VENOUS BLD VENIPUNCTURE: CPT

## 2023-07-25 PROCEDURE — 82947 ASSAY GLUCOSE BLOOD QUANT: CPT

## 2023-07-25 PROCEDURE — 99232 SBSQ HOSP IP/OBS MODERATE 35: CPT | Performed by: PSYCHIATRY & NEUROLOGY

## 2023-07-25 PROCEDURE — 6370000000 HC RX 637 (ALT 250 FOR IP): Performed by: EMERGENCY MEDICINE

## 2023-07-25 PROCEDURE — 74230 X-RAY XM SWLNG FUNCJ C+: CPT

## 2023-07-25 PROCEDURE — 83735 ASSAY OF MAGNESIUM: CPT

## 2023-07-25 PROCEDURE — 92611 MOTION FLUOROSCOPY/SWALLOW: CPT

## 2023-07-25 PROCEDURE — 97163 PT EVAL HIGH COMPLEX 45 MIN: CPT

## 2023-07-25 PROCEDURE — 6370000000 HC RX 637 (ALT 250 FOR IP): Performed by: STUDENT IN AN ORGANIZED HEALTH CARE EDUCATION/TRAINING PROGRAM

## 2023-07-25 PROCEDURE — 6370000000 HC RX 637 (ALT 250 FOR IP): Performed by: PSYCHIATRY & NEUROLOGY

## 2023-07-25 PROCEDURE — 6360000002 HC RX W HCPCS: Performed by: REGISTERED NURSE

## 2023-07-25 PROCEDURE — 2580000003 HC RX 258: Performed by: REGISTERED NURSE

## 2023-07-25 PROCEDURE — 97530 THERAPEUTIC ACTIVITIES: CPT

## 2023-07-25 PROCEDURE — 6370000000 HC RX 637 (ALT 250 FOR IP): Performed by: REGISTERED NURSE

## 2023-07-25 PROCEDURE — 85027 COMPLETE CBC AUTOMATED: CPT

## 2023-07-25 PROCEDURE — 85055 RETICULATED PLATELET ASSAY: CPT

## 2023-07-25 PROCEDURE — 99213 OFFICE O/P EST LOW 20 MIN: CPT

## 2023-07-25 PROCEDURE — 2060000000 HC ICU INTERMEDIATE R&B

## 2023-07-25 PROCEDURE — 97167 OT EVAL HIGH COMPLEX 60 MIN: CPT

## 2023-07-25 RX ADMIN — VALPROIC ACID 1000 MG: 250 SOLUTION ORAL at 14:01

## 2023-07-25 RX ADMIN — SODIUM CHLORIDE, PRESERVATIVE FREE 10 ML: 5 INJECTION INTRAVENOUS at 20:06

## 2023-07-25 RX ADMIN — ENOXAPARIN SODIUM 40 MG: 100 INJECTION SUBCUTANEOUS at 08:35

## 2023-07-25 RX ADMIN — LEVETIRACETAM 2000 MG: 500 SOLUTION ORAL at 20:05

## 2023-07-25 RX ADMIN — LAMOTRIGINE 100 MG: 100 TABLET ORAL at 08:58

## 2023-07-25 RX ADMIN — VALPROIC ACID 1000 MG: 250 SOLUTION ORAL at 06:39

## 2023-07-25 RX ADMIN — LEVETIRACETAM 2000 MG: 500 SOLUTION ORAL at 08:57

## 2023-07-25 RX ADMIN — ATORVASTATIN CALCIUM 20 MG: 20 TABLET, FILM COATED ORAL at 20:12

## 2023-07-25 RX ADMIN — LAMOTRIGINE 100 MG: 100 TABLET ORAL at 20:05

## 2023-07-25 RX ADMIN — LACOSAMIDE 200 MG: 10 SOLUTION ORAL at 20:29

## 2023-07-25 RX ADMIN — ASPIRIN 81 MG CHEWABLE TABLET 81 MG: 81 TABLET CHEWABLE at 08:36

## 2023-07-25 RX ADMIN — LACOSAMIDE 200 MG: 10 SOLUTION ORAL at 08:55

## 2023-07-25 RX ADMIN — LANSOPRAZOLE 30 MG: 30 TABLET, ORALLY DISINTEGRATING, DELAYED RELEASE ORAL at 09:10

## 2023-07-25 RX ADMIN — SERTRALINE 50 MG: 50 TABLET, FILM COATED ORAL at 09:10

## 2023-07-25 RX ADMIN — VALPROIC ACID 1000 MG: 250 SOLUTION ORAL at 20:05

## 2023-07-25 RX ADMIN — PHENOBARBITAL 60 MG: 20 LIQUID ORAL at 20:05

## 2023-07-25 RX ADMIN — PHENOBARBITAL 30 MG: 20 ELIXIR ORAL at 08:55

## 2023-07-25 NOTE — CARE COORDINATION
[address] Flow/Interdisciplinary Rounds Progress Note    Quality Flow Rounds held on July 25, 2023 at [facility] Attending:  Bedside Nurse, , , and Nursing Unit Leadership    Barriers to Discharge: Feeding plan    Anticipated Discharge Date:   TBD    Anticipated Discharge Disposition: [facility]    Readmission Risk              Risk of Unplanned Readmission:  23           Discussed patient goal for the day, patient clinical progression, and barriers to discharge. RN reports that patient failed bedside swallow. Await feeding plan from neurology. Patient still has NG for TF. Unable to return to [facility] with NG tube.        Dmitry Win RN  July 25, 2023

## 2023-07-26 LAB
ANION GAP SERPL CALCULATED.3IONS-SCNC: 9 MMOL/L (ref 9–17)
BASOPHILS # BLD: 0.04 K/UL (ref 0–0.2)
BASOPHILS NFR BLD: 1 % (ref 0–2)
BUN SERPL-MCNC: 24 MG/DL (ref 8–23)
CALCIUM SERPL-MCNC: 9.1 MG/DL (ref 8.6–10.4)
CHLORIDE SERPL-SCNC: 97 MMOL/L (ref 98–107)
CO2 SERPL-SCNC: 28 MMOL/L (ref 20–31)
CREAT SERPL-MCNC: 0.4 MG/DL (ref 0.5–0.9)
EOSINOPHIL # BLD: 0.25 K/UL (ref 0–0.44)
EOSINOPHILS RELATIVE PERCENT: 3 % (ref 1–4)
ERYTHROCYTE [DISTWIDTH] IN BLOOD BY AUTOMATED COUNT: 13.2 % (ref 11.8–14.4)
GFR SERPL CREATININE-BSD FRML MDRD: >60 ML/MIN/1.73M2
GLUCOSE SERPL-MCNC: 98 MG/DL (ref 70–99)
HCT VFR BLD AUTO: 36.7 % (ref 36.3–47.1)
HGB BLD-MCNC: 12.3 G/DL (ref 11.9–15.1)
IMM GRANULOCYTES # BLD AUTO: 0.06 K/UL (ref 0–0.3)
IMM GRANULOCYTES NFR BLD: 1 %
LYMPHOCYTES NFR BLD: 3.17 K/UL (ref 1.1–3.7)
LYMPHOCYTES RELATIVE PERCENT: 36 % (ref 24–43)
MAGNESIUM SERPL-MCNC: 2.1 MG/DL (ref 1.6–2.6)
MCH RBC QN AUTO: 32.5 PG (ref 25.2–33.5)
MCHC RBC AUTO-ENTMCNC: 33.5 G/DL (ref 28.4–34.8)
MCV RBC AUTO: 97.1 FL (ref 82.6–102.9)
METER GLUCOSE: 110 MG/DL (ref 65–105)
METER GLUCOSE: 120 MG/DL (ref 65–105)
MONOCYTES NFR BLD: 1.25 K/UL (ref 0.1–1.2)
MONOCYTES NFR BLD: 14 % (ref 3–12)
NEUTROPHILS NFR BLD: 45 % (ref 36–65)
NEUTS SEG NFR BLD: 4.08 K/UL (ref 1.5–8.1)
NRBC BLD-RTO: 0 PER 100 WBC
PLATELET # BLD AUTO: 179 K/UL (ref 138–453)
PMV BLD AUTO: 11.9 FL (ref 8.1–13.5)
POTASSIUM SERPL-SCNC: 4.5 MMOL/L (ref 3.7–5.3)
RBC # BLD AUTO: 3.78 M/UL (ref 3.95–5.11)
SODIUM SERPL-SCNC: 134 MMOL/L (ref 135–144)
WBC OTHER # BLD: 8.9 K/UL (ref 3.5–11.3)

## 2023-07-26 PROCEDURE — 6370000000 HC RX 637 (ALT 250 FOR IP): Performed by: STUDENT IN AN ORGANIZED HEALTH CARE EDUCATION/TRAINING PROGRAM

## 2023-07-26 PROCEDURE — 36415 COLL VENOUS BLD VENIPUNCTURE: CPT

## 2023-07-26 PROCEDURE — 6370000000 HC RX 637 (ALT 250 FOR IP): Performed by: REGISTERED NURSE

## 2023-07-26 PROCEDURE — 82947 ASSAY GLUCOSE BLOOD QUANT: CPT

## 2023-07-26 PROCEDURE — 6370000000 HC RX 637 (ALT 250 FOR IP): Performed by: PSYCHIATRY & NEUROLOGY

## 2023-07-26 PROCEDURE — 2060000000 HC ICU INTERMEDIATE R&B

## 2023-07-26 PROCEDURE — 6360000002 HC RX W HCPCS: Performed by: REGISTERED NURSE

## 2023-07-26 PROCEDURE — 83735 ASSAY OF MAGNESIUM: CPT

## 2023-07-26 PROCEDURE — 99232 SBSQ HOSP IP/OBS MODERATE 35: CPT | Performed by: PSYCHIATRY & NEUROLOGY

## 2023-07-26 PROCEDURE — 6370000000 HC RX 637 (ALT 250 FOR IP): Performed by: NURSE PRACTITIONER

## 2023-07-26 PROCEDURE — 92526 ORAL FUNCTION THERAPY: CPT

## 2023-07-26 PROCEDURE — 6370000000 HC RX 637 (ALT 250 FOR IP): Performed by: EMERGENCY MEDICINE

## 2023-07-26 PROCEDURE — 85027 COMPLETE CBC AUTOMATED: CPT

## 2023-07-26 PROCEDURE — 2580000003 HC RX 258: Performed by: REGISTERED NURSE

## 2023-07-26 PROCEDURE — 80048 BASIC METABOLIC PNL TOTAL CA: CPT

## 2023-07-26 PROCEDURE — 6370000000 HC RX 637 (ALT 250 FOR IP)

## 2023-07-26 RX ORDER — DIPHENHYDRAMINE HCL 25 MG
25 TABLET ORAL ONCE
Status: COMPLETED | OUTPATIENT
Start: 2023-07-26 | End: 2023-07-26

## 2023-07-26 RX ADMIN — LEVETIRACETAM 2000 MG: 500 SOLUTION ORAL at 20:27

## 2023-07-26 RX ADMIN — LACOSAMIDE 200 MG: 10 SOLUTION ORAL at 20:28

## 2023-07-26 RX ADMIN — PHENOBARBITAL 30 MG: 20 ELIXIR ORAL at 09:38

## 2023-07-26 RX ADMIN — SODIUM CHLORIDE, PRESERVATIVE FREE 10 ML: 5 INJECTION INTRAVENOUS at 20:29

## 2023-07-26 RX ADMIN — VALPROIC ACID 1000 MG: 250 SOLUTION ORAL at 14:36

## 2023-07-26 RX ADMIN — VALPROIC ACID 1000 MG: 250 SOLUTION ORAL at 06:11

## 2023-07-26 RX ADMIN — LANSOPRAZOLE 30 MG: 30 TABLET, ORALLY DISINTEGRATING, DELAYED RELEASE ORAL at 09:34

## 2023-07-26 RX ADMIN — LAMOTRIGINE 100 MG: 100 TABLET ORAL at 21:30

## 2023-07-26 RX ADMIN — LEVETIRACETAM 2000 MG: 500 SOLUTION ORAL at 09:37

## 2023-07-26 RX ADMIN — DIPHENHYDRAMINE HCL 25 MG: 25 TABLET ORAL at 22:47

## 2023-07-26 RX ADMIN — ASPIRIN 81 MG CHEWABLE TABLET 81 MG: 81 TABLET CHEWABLE at 09:37

## 2023-07-26 RX ADMIN — SERTRALINE 50 MG: 50 TABLET, FILM COATED ORAL at 09:37

## 2023-07-26 RX ADMIN — LACOSAMIDE 200 MG: 10 SOLUTION ORAL at 09:37

## 2023-07-26 RX ADMIN — ACETAMINOPHEN 650 MG: 325 TABLET ORAL at 22:48

## 2023-07-26 RX ADMIN — ENOXAPARIN SODIUM 40 MG: 100 INJECTION SUBCUTANEOUS at 09:38

## 2023-07-26 RX ADMIN — ATORVASTATIN CALCIUM 20 MG: 20 TABLET, FILM COATED ORAL at 20:26

## 2023-07-26 RX ADMIN — PHENOBARBITAL 60 MG: 20 LIQUID ORAL at 20:29

## 2023-07-26 RX ADMIN — VALPROIC ACID 1000 MG: 250 SOLUTION ORAL at 21:30

## 2023-07-26 RX ADMIN — SODIUM CHLORIDE, PRESERVATIVE FREE 10 ML: 5 INJECTION INTRAVENOUS at 09:38

## 2023-07-26 RX ADMIN — LAMOTRIGINE 100 MG: 100 TABLET ORAL at 09:37

## 2023-07-26 NOTE — CARE COORDINATION
5664  [facility] Flow/Interdisciplinary Rounds Progress Note    Quality Flow Rounds held on July 26, 2023 at [address] Attending:   and Nursing Unit Leadership, Neurology Resident    Barriers to Discharge: Unable to return to [facility] with NG and now in restraints    Anticipated Discharge Date:   7/29/23    Anticipated Discharge Disposition: Return to [facility]    Readmission Risk              Risk of Unplanned Readmission:  24           Discussed patient goal for the day, patient clinical progression, and barriers to discharge. Patient failed barium swallow yesterday. Dr. Светлана Raines reports plan to possibly resume LTME. Neurology awaits call back for patient's legal guardian to discuss possible PEG tube placement. Patient is bed hold at [facility] and is unable to return with NG tube. 1500- CM spoke with bedside RN William Barahona who states that patient has been agitated and pulling at NG tube. Patient now in wrist restraints. LTME not restarted. Await neurology to follow up with patient's [employer] legal guardian regarding PEG tube placement. Patient Guardian information listed in Epic. Patient has [employer] legal guardianDeann Challenger [phone]. After 5pm [employer] 24h on call [phone]. Telephone encounter documented in [facility] from 7/13 by family medicine Dr. Debarah Hodgkins, MD \"Patient baseline: learning disabled with somewhat difficult to understand speech. Often speaks loudly. Is able to make her needs known. Mainly wheelchair bound. \"      [phone] CM spoke with Dr. Светлана Raines regarding feeding plan. Legal guardian contacted and updated by Dr. Светлана Raines. Plan for repeat barium swallow tomorrow.

## 2023-07-26 NOTE — CARE COORDINATION
PHQ-9 depression screen deferred due to pt having legal guardian. Pt  not appropriate for assessment.

## 2023-07-27 ENCOUNTER — APPOINTMENT (OUTPATIENT)
Dept: GENERAL RADIOLOGY | Age: 68
DRG: 100 | End: 2023-07-27
Attending: PSYCHIATRY & NEUROLOGY
Payer: MEDICARE

## 2023-07-27 LAB
ANION GAP SERPL CALCULATED.3IONS-SCNC: 13 MMOL/L (ref 9–17)
BASOPHILS # BLD: 0.04 K/UL (ref 0–0.2)
BASOPHILS NFR BLD: 0 % (ref 0–2)
BUN SERPL-MCNC: 24 MG/DL (ref 8–23)
CALCIUM SERPL-MCNC: 9.1 MG/DL (ref 8.6–10.4)
CHLORIDE SERPL-SCNC: 96 MMOL/L (ref 98–107)
CO2 SERPL-SCNC: 25 MMOL/L (ref 20–31)
CREAT SERPL-MCNC: 0.4 MG/DL (ref 0.5–0.9)
EOSINOPHIL # BLD: 0.26 K/UL (ref 0–0.44)
EOSINOPHILS RELATIVE PERCENT: 3 % (ref 1–4)
ERYTHROCYTE [DISTWIDTH] IN BLOOD BY AUTOMATED COUNT: 13.2 % (ref 11.8–14.4)
GFR SERPL CREATININE-BSD FRML MDRD: >60 ML/MIN/1.73M2
GLUCOSE SERPL-MCNC: 105 MG/DL (ref 70–99)
HCT VFR BLD AUTO: 38.6 % (ref 36.3–47.1)
HGB BLD-MCNC: 12.2 G/DL (ref 11.9–15.1)
IMM GRANULOCYTES # BLD AUTO: 0.07 K/UL (ref 0–0.3)
IMM GRANULOCYTES NFR BLD: 1 %
LYMPHOCYTES NFR BLD: 3.77 K/UL (ref 1.1–3.7)
LYMPHOCYTES RELATIVE PERCENT: 40 % (ref 24–43)
MAGNESIUM SERPL-MCNC: 2.3 MG/DL (ref 1.6–2.6)
MCH RBC QN AUTO: 31.5 PG (ref 25.2–33.5)
MCHC RBC AUTO-ENTMCNC: 31.6 G/DL (ref 28.4–34.8)
MCV RBC AUTO: 99.7 FL (ref 82.6–102.9)
METER GLUCOSE: 100 MG/DL (ref 65–105)
METER GLUCOSE: 93 MG/DL (ref 65–105)
METER GLUCOSE: 99 MG/DL (ref 65–105)
MONOCYTES NFR BLD: 1.33 K/UL (ref 0.1–1.2)
MONOCYTES NFR BLD: 14 % (ref 3–12)
NEUTROPHILS NFR BLD: 42 % (ref 36–65)
NEUTS SEG NFR BLD: 4 K/UL (ref 1.5–8.1)
NRBC BLD-RTO: 0 PER 100 WBC
PLATELET # BLD AUTO: 211 K/UL (ref 138–453)
PMV BLD AUTO: 11.6 FL (ref 8.1–13.5)
POTASSIUM SERPL-SCNC: 4.3 MMOL/L (ref 3.7–5.3)
RBC # BLD AUTO: 3.87 M/UL (ref 3.95–5.11)
SODIUM SERPL-SCNC: 134 MMOL/L (ref 135–144)
WBC OTHER # BLD: 9.5 K/UL (ref 3.5–11.3)

## 2023-07-27 PROCEDURE — 6360000002 HC RX W HCPCS: Performed by: REGISTERED NURSE

## 2023-07-27 PROCEDURE — 85027 COMPLETE CBC AUTOMATED: CPT

## 2023-07-27 PROCEDURE — 6370000000 HC RX 637 (ALT 250 FOR IP): Performed by: PSYCHIATRY & NEUROLOGY

## 2023-07-27 PROCEDURE — 6370000000 HC RX 637 (ALT 250 FOR IP): Performed by: EMERGENCY MEDICINE

## 2023-07-27 PROCEDURE — 83735 ASSAY OF MAGNESIUM: CPT

## 2023-07-27 PROCEDURE — 82947 ASSAY GLUCOSE BLOOD QUANT: CPT

## 2023-07-27 PROCEDURE — 2060000000 HC ICU INTERMEDIATE R&B

## 2023-07-27 PROCEDURE — 94640 AIRWAY INHALATION TREATMENT: CPT

## 2023-07-27 PROCEDURE — 2700000000 HC OXYGEN THERAPY PER DAY

## 2023-07-27 PROCEDURE — 6370000000 HC RX 637 (ALT 250 FOR IP): Performed by: NURSE PRACTITIONER

## 2023-07-27 PROCEDURE — 97535 SELF CARE MNGMENT TRAINING: CPT

## 2023-07-27 PROCEDURE — 99221 1ST HOSP IP/OBS SF/LOW 40: CPT | Performed by: INTERNAL MEDICINE

## 2023-07-27 PROCEDURE — 97530 THERAPEUTIC ACTIVITIES: CPT

## 2023-07-27 PROCEDURE — 94761 N-INVAS EAR/PLS OXIMETRY MLT: CPT

## 2023-07-27 PROCEDURE — 36415 COLL VENOUS BLD VENIPUNCTURE: CPT

## 2023-07-27 PROCEDURE — 92611 MOTION FLUOROSCOPY/SWALLOW: CPT

## 2023-07-27 PROCEDURE — 80048 BASIC METABOLIC PNL TOTAL CA: CPT

## 2023-07-27 PROCEDURE — 2580000003 HC RX 258: Performed by: REGISTERED NURSE

## 2023-07-27 PROCEDURE — 6370000000 HC RX 637 (ALT 250 FOR IP)

## 2023-07-27 PROCEDURE — 99232 SBSQ HOSP IP/OBS MODERATE 35: CPT | Performed by: PSYCHIATRY & NEUROLOGY

## 2023-07-27 PROCEDURE — 6370000000 HC RX 637 (ALT 250 FOR IP): Performed by: REGISTERED NURSE

## 2023-07-27 PROCEDURE — 6370000000 HC RX 637 (ALT 250 FOR IP): Performed by: STUDENT IN AN ORGANIZED HEALTH CARE EDUCATION/TRAINING PROGRAM

## 2023-07-27 PROCEDURE — 74230 X-RAY XM SWLNG FUNCJ C+: CPT

## 2023-07-27 RX ADMIN — SODIUM CHLORIDE, PRESERVATIVE FREE 10 ML: 5 INJECTION INTRAVENOUS at 20:51

## 2023-07-27 RX ADMIN — ATORVASTATIN CALCIUM 20 MG: 20 TABLET, FILM COATED ORAL at 20:59

## 2023-07-27 RX ADMIN — LEVETIRACETAM 2000 MG: 500 SOLUTION ORAL at 21:00

## 2023-07-27 RX ADMIN — SERTRALINE 50 MG: 50 TABLET, FILM COATED ORAL at 09:24

## 2023-07-27 RX ADMIN — LEVETIRACETAM 2000 MG: 500 SOLUTION ORAL at 09:23

## 2023-07-27 RX ADMIN — ASPIRIN 81 MG CHEWABLE TABLET 81 MG: 81 TABLET CHEWABLE at 09:24

## 2023-07-27 RX ADMIN — IPRATROPIUM BROMIDE AND ALBUTEROL SULFATE 1 DOSE: .5; 3 SOLUTION RESPIRATORY (INHALATION) at 03:50

## 2023-07-27 RX ADMIN — PHENOBARBITAL 60 MG: 20 LIQUID ORAL at 23:24

## 2023-07-27 RX ADMIN — ONDANSETRON 4 MG: 2 INJECTION INTRAMUSCULAR; INTRAVENOUS at 03:30

## 2023-07-27 RX ADMIN — VALPROIC ACID 1000 MG: 250 SOLUTION ORAL at 09:24

## 2023-07-27 RX ADMIN — PHENOBARBITAL 30 MG: 20 ELIXIR ORAL at 14:16

## 2023-07-27 RX ADMIN — LANSOPRAZOLE 30 MG: 30 TABLET, ORALLY DISINTEGRATING, DELAYED RELEASE ORAL at 09:24

## 2023-07-27 RX ADMIN — VALPROIC ACID 1000 MG: 250 SOLUTION ORAL at 17:49

## 2023-07-27 RX ADMIN — LAMOTRIGINE 100 MG: 100 TABLET ORAL at 09:24

## 2023-07-27 RX ADMIN — ENOXAPARIN SODIUM 40 MG: 100 INJECTION SUBCUTANEOUS at 09:24

## 2023-07-27 RX ADMIN — LACOSAMIDE 200 MG: 10 SOLUTION ORAL at 09:24

## 2023-07-27 RX ADMIN — SODIUM CHLORIDE, PRESERVATIVE FREE 10 ML: 5 INJECTION INTRAVENOUS at 09:25

## 2023-07-27 RX ADMIN — LAMOTRIGINE 100 MG: 100 TABLET ORAL at 20:59

## 2023-07-27 RX ADMIN — LACOSAMIDE 200 MG: 10 SOLUTION ORAL at 20:59

## 2023-07-27 NOTE — CONSULTS
Comprehensive Nutrition Assessment    Type and Reason for Visit:  Consult (TF recommendations for vented pt)    Nutrition Recommendations/Plan:   If desired to start TF, suggest Peptide-based High Protein (Vital HP) at 45 mL/hr continuous (w/ propofol) or 55 mL/hr (if no propofol). Will monitor for start of TF. Malnutrition Assessment:  Malnutrition Status: At risk for malnutrition (Comment) (07/14/23 1239)    Context:  Acute Illness     Findings of the 6 clinical characteristics of malnutrition:  Energy Intake:  Unable to assess  Weight Loss:  Greater than 7.5% over 3 months     Body Fat Loss:  Unable to assess     Muscle Mass Loss:  Unable to assess    Fluid Accumulation:  Mild     Strength:  Not Performed    Nutrition Assessment:    74 yo F adm status epilepticus from [place]. [facility] significant for cerebral palsy, epilepsy. Pt intubated at visit, propofol off (was 12.1 mL/hr previously = 319 kcal). No family/guardian in room at visit. No BM noted. +1 pitting generalized edema noted. Nutrition Related Findings:    labs/meds reviewed Wound Type: Stage I, Pressure Injury (to coccyx)       Current Nutrition Intake & Therapies:    Average Meal Intake: NPO  Average Supplements Intake: NPO  Diet NPO    Anthropometric Measures:  Height: 5' 1\" (154.9 cm)  Ideal Body Weight (IBW): 105 lbs (48 kg)    Admission Body Weight: 207 lb 7.3 oz (94.1 kg) (7/14)  Current Body Weight: 207 lb 7.3 oz (94.1 kg), 197.6 % IBW.     Current BMI (kg/m2): 39.2  Usual Body Weight: 234 lb 9.6 oz (106.4 kg) (2/10/23)  % Weight Change (Calculated): -11.6  Weight Adjustment For: No Adjustment                 BMI Categories: Obese Class 2 (BMI 35.0 -39.9)    Estimated Daily Nutrient Needs:  Energy Requirements Based On: Kcal/kg  Weight Used for Energy Requirements: Admission  Energy (kcal/day): 1200 to 1400 kcal/day  Weight Used for Protein Requirements: Ideal  Protein (g/day): 90 to 100 g/day  Method Used for Fluid Requirements: 1
[place] MATT Munoz    GASTROENTEROLOGY CONSULT    Patient:   Maurice Ray   :    1955   Facility:   [address] [facility]   Date:    2023  Admission Dx:  Status epilepticus ([address]) [facility]  Requesting physician: Sindhu Warren MD  Reason for consult:  PEG tube placement   CC : Presented as transfer from [facility] hospital for status epilepticus    SUBJECTIVE     HISTORY OF PRESENT ILLNESS  This is a 75 y.o.  female who was admitted 2023 with Status epilepticus ([address] [facility]) [facility]. We have been asked to see the patient in consultation by Sindhu Warren MD for PEG tube placement    69-year-old female 69-year-old female with a history of ADHD, cerebral palsy, depression, and epilepsy. GI consulted for PEG tube placement by the neurology team.      Patient seen and examined. She cannot provide any history. Information obtained from her chart. Note copied from Dr Miguelito Ryan dated 2023    HPI:  Maurice Ray is a 75 y.o. female with a history of ADHD, cerebral palsy, depression, and epilepsy (home meds: Keppra 750 mg BID, Lamictal  mg daily, Depakote  mg daily), who presented as a transfer from [facility] on 2023 due to concerns for status epilepticus. Per chart review, patient was actively being weaned off of her ASM therapy while in nursing home, had developed multiple seizure events, and was presented to [facility]. Pt was subsequently intubated and transferred to [facility] for continuous monitoring. During admission, patient was started on antimicrobial therapy for UTI. Was started on Vimpat 07/15. EEG the following morning revealed frequent seizure activity. Depacon increased to 1,000 TID. On , patient was started on phenobarbital and maintenance of 100 mg BID. On , LP attempted but unsuccessful.  STAT MRI without contrast was obtained and showed a punctate area on the right side and
Nutrition Note    Start Peptide Based High protein formula at 55 mL/hr providing 1320 kcal and 115 gm protein. See full assessment 7/14.      Electronically signed by Madiha Aguirre RD on 7/15/23 at 8:55 AM EDT    Contact: [phone]
Anion Gap 10 9 - 17 mmol/L   Magnesium    Collection Time: 07/20/23  2:48 AM   Result Value Ref Range    Magnesium 1.8 1.6 - 2.6 mg/dL   POC Glucose Fingerstick    Collection Time: 07/20/23  7:48 AM   Result Value Ref Range    Meter Glucose 113 (H) 65 - 105 mg/dL   Valproic Acid Level, Total and Free    Collection Time: 07/20/23  7:59 AM   Result Value Ref Range    Valproic Acid Lvl 92 50 - 125 ug/mL    Valproic Acid, Free 20.5 7.0 - 23.0 ug/mL    Valproic Acid % Free 22.3 (H) 5.0 - 18.4 %   Lamotrigine Level    Collection Time: 07/20/23  7:59 AM   Result Value Ref Range    Lamotrigine Lvl 4.4 3.0 - 15.0 ug/mL   POC Glucose Fingerstick    Collection Time: 07/20/23  1:47 PM   Result Value Ref Range    Meter Glucose 107 (H) 65 - 105 mg/dL       Imaging/Diagonstics:  XR ABDOMEN (KUB) (SINGLE AP VIEW)    Result Date: 7/16/2023  Nonobstructive bowel gas pattern. No radiopaque metallic foreign body. Surgical clips are present in the gallbladder fossa. RECOMMENDATION: Please correlate for timing of the patient's cholecystectomy. In less cholecystectomy has been performed in the last 2-3 weeks it will be safe to proceed with MRI study. XR CHEST PORTABLE    Result Date: 7/17/2023  Interim removal of the endotracheal tube with unchanged right IJ line and OG tube. Mild pulmonary vascular congestion slightly worse compared to 07/14/2023. Possible tiny left pleural effusion. XR CHEST PORTABLE    Result Date: 7/14/2023  1. Appropriate positioning of support devices, as described. 2.  Mild vascular congestion and subsegmental atelectasis again noted. No new airspace disease identified in the interval.     XR CHEST PORTABLE    Result Date: 7/13/2023  Impression: Properly positioned endotracheal tube. Right central venous catheter with the tip terminating at the distal cavoatrial junction. This document has been electronically signed by: Wesly Carpenter.  Yolis Corona MD on 07/13/2023 09:47 PM    IR LUMBAR PUNCTURE FOR

## 2023-07-27 NOTE — CARE COORDINATION
Plan for PEG tomorrow. JAMEEL called and spoke with Aldair Holland from [facility] [phone] to give update. Aldair Holland to verify that they are able to order Vital AFT 1.2 TF. SNF is requesting both continuous and bolus TF recommendations at discharge. JAMEEL faxed updated clinical documentation to [facility] [phone].    9173- Message received from ST. MELANIE LOPEZ with [facility] requesting that TF recommendations be written for bolus feeds for Isosource 1.5. JAMEEL called and spoke with  dietitian Weyman Seip and she states that she will update recommendations.

## 2023-07-28 ENCOUNTER — ANESTHESIA EVENT (OUTPATIENT)
Dept: OPERATING ROOM | Age: 68
End: 2023-07-28
Payer: MEDICARE

## 2023-07-28 ENCOUNTER — ANESTHESIA (OUTPATIENT)
Dept: OPERATING ROOM | Age: 68
End: 2023-07-28
Payer: MEDICARE

## 2023-07-28 LAB
ANION GAP SERPL CALCULATED.3IONS-SCNC: 10 MMOL/L (ref 9–17)
BASOPHILS # BLD: <0.03 K/UL (ref 0–0.2)
BASOPHILS NFR BLD: 0 % (ref 0–2)
BUN SERPL-MCNC: 19 MG/DL (ref 8–23)
CALCIUM SERPL-MCNC: 9.1 MG/DL (ref 8.6–10.4)
CHLORIDE SERPL-SCNC: 97 MMOL/L (ref 98–107)
CO2 SERPL-SCNC: 28 MMOL/L (ref 20–31)
CREAT SERPL-MCNC: 0.4 MG/DL (ref 0.5–0.9)
EOSINOPHIL # BLD: 0.15 K/UL (ref 0–0.44)
EOSINOPHILS RELATIVE PERCENT: 2 % (ref 1–4)
ERYTHROCYTE [DISTWIDTH] IN BLOOD BY AUTOMATED COUNT: 13.1 % (ref 11.8–14.4)
GFR SERPL CREATININE-BSD FRML MDRD: >60 ML/MIN/1.73M2
GLUCOSE SERPL-MCNC: 100 MG/DL (ref 70–99)
HCT VFR BLD AUTO: 35.5 % (ref 36.3–47.1)
HGB BLD-MCNC: 11.6 G/DL (ref 11.9–15.1)
IMM GRANULOCYTES # BLD AUTO: 0.07 K/UL (ref 0–0.3)
IMM GRANULOCYTES NFR BLD: 1 %
LYMPHOCYTES NFR BLD: 2.47 K/UL (ref 1.1–3.7)
LYMPHOCYTES RELATIVE PERCENT: 34 % (ref 24–43)
MAGNESIUM SERPL-MCNC: 2.1 MG/DL (ref 1.6–2.6)
MCH RBC QN AUTO: 32 PG (ref 25.2–33.5)
MCHC RBC AUTO-ENTMCNC: 32.7 G/DL (ref 28.4–34.8)
MCV RBC AUTO: 97.8 FL (ref 82.6–102.9)
METER GLUCOSE: 100 MG/DL (ref 65–105)
METER GLUCOSE: 132 MG/DL (ref 65–105)
METER GLUCOSE: 88 MG/DL (ref 65–105)
MONOCYTES NFR BLD: 1.05 K/UL (ref 0.1–1.2)
MONOCYTES NFR BLD: 15 % (ref 3–12)
NEUTROPHILS NFR BLD: 48 % (ref 36–65)
NEUTS SEG NFR BLD: 3.43 K/UL (ref 1.5–8.1)
NRBC BLD-RTO: 0 PER 100 WBC
PLATELET # BLD AUTO: 200 K/UL (ref 138–453)
PMV BLD AUTO: 12.2 FL (ref 8.1–13.5)
POTASSIUM SERPL-SCNC: 4.2 MMOL/L (ref 3.7–5.3)
RBC # BLD AUTO: 3.63 M/UL (ref 3.95–5.11)
SODIUM SERPL-SCNC: 135 MMOL/L (ref 135–144)
WBC OTHER # BLD: 7.2 K/UL (ref 3.5–11.3)

## 2023-07-28 PROCEDURE — 6360000002 HC RX W HCPCS

## 2023-07-28 PROCEDURE — 94761 N-INVAS EAR/PLS OXIMETRY MLT: CPT

## 2023-07-28 PROCEDURE — 36415 COLL VENOUS BLD VENIPUNCTURE: CPT

## 2023-07-28 PROCEDURE — 6370000000 HC RX 637 (ALT 250 FOR IP): Performed by: INTERNAL MEDICINE

## 2023-07-28 PROCEDURE — 85027 COMPLETE CBC AUTOMATED: CPT

## 2023-07-28 PROCEDURE — 6360000002 HC RX W HCPCS: Performed by: STUDENT IN AN ORGANIZED HEALTH CARE EDUCATION/TRAINING PROGRAM

## 2023-07-28 PROCEDURE — 2580000003 HC RX 258: Performed by: INTERNAL MEDICINE

## 2023-07-28 PROCEDURE — 3609013300 HC EGD TUBE PLACEMENT: Performed by: INTERNAL MEDICINE

## 2023-07-28 PROCEDURE — 3700000001 HC ADD 15 MINUTES (ANESTHESIA): Performed by: INTERNAL MEDICINE

## 2023-07-28 PROCEDURE — 2060000000 HC ICU INTERMEDIATE R&B

## 2023-07-28 PROCEDURE — 0DH63UZ INSERTION OF FEEDING DEVICE INTO STOMACH, PERCUTANEOUS APPROACH: ICD-10-PCS | Performed by: INTERNAL MEDICINE

## 2023-07-28 PROCEDURE — 43246 EGD PLACE GASTROSTOMY TUBE: CPT | Performed by: INTERNAL MEDICINE

## 2023-07-28 PROCEDURE — 3700000000 HC ANESTHESIA ATTENDED CARE: Performed by: INTERNAL MEDICINE

## 2023-07-28 PROCEDURE — 7100000000 HC PACU RECOVERY - FIRST 15 MIN: Performed by: INTERNAL MEDICINE

## 2023-07-28 PROCEDURE — 7100000001 HC PACU RECOVERY - ADDTL 15 MIN: Performed by: INTERNAL MEDICINE

## 2023-07-28 PROCEDURE — 82947 ASSAY GLUCOSE BLOOD QUANT: CPT

## 2023-07-28 PROCEDURE — 6370000000 HC RX 637 (ALT 250 FOR IP): Performed by: NURSE PRACTITIONER

## 2023-07-28 PROCEDURE — 2720000010 HC SURG SUPPLY STERILE: Performed by: INTERNAL MEDICINE

## 2023-07-28 PROCEDURE — 99232 SBSQ HOSP IP/OBS MODERATE 35: CPT | Performed by: PSYCHIATRY & NEUROLOGY

## 2023-07-28 PROCEDURE — 2500000003 HC RX 250 WO HCPCS

## 2023-07-28 PROCEDURE — 83735 ASSAY OF MAGNESIUM: CPT

## 2023-07-28 PROCEDURE — 80048 BASIC METABOLIC PNL TOTAL CA: CPT

## 2023-07-28 PROCEDURE — 2700000000 HC OXYGEN THERAPY PER DAY

## 2023-07-28 PROCEDURE — 2709999900 HC NON-CHARGEABLE SUPPLY: Performed by: INTERNAL MEDICINE

## 2023-07-28 RX ORDER — SODIUM CHLORIDE 0.9 % (FLUSH) 0.9 %
5-40 SYRINGE (ML) INJECTION EVERY 12 HOURS SCHEDULED
Status: DISCONTINUED | OUTPATIENT
Start: 2023-07-28 | End: 2023-07-28 | Stop reason: HOSPADM

## 2023-07-28 RX ORDER — SODIUM CHLORIDE 0.9 % (FLUSH) 0.9 %
5-40 SYRINGE (ML) INJECTION PRN
Status: DISCONTINUED | OUTPATIENT
Start: 2023-07-28 | End: 2023-07-28 | Stop reason: HOSPADM

## 2023-07-28 RX ORDER — SODIUM CHLORIDE 9 MG/ML
INJECTION, SOLUTION INTRAVENOUS PRN
Status: DISCONTINUED | OUTPATIENT
Start: 2023-07-28 | End: 2023-07-28 | Stop reason: HOSPADM

## 2023-07-28 RX ORDER — LIDOCAINE HYDROCHLORIDE 10 MG/ML
INJECTION, SOLUTION INFILTRATION; PERINEURAL PRN
Status: DISCONTINUED | OUTPATIENT
Start: 2023-07-28 | End: 2023-07-28 | Stop reason: SDUPTHER

## 2023-07-28 RX ORDER — PROPOFOL 10 MG/ML
INJECTION, EMULSION INTRAVENOUS CONTINUOUS PRN
Status: DISCONTINUED | OUTPATIENT
Start: 2023-07-28 | End: 2023-07-28 | Stop reason: SDUPTHER

## 2023-07-28 RX ORDER — FENTANYL CITRATE 50 UG/ML
INJECTION, SOLUTION INTRAMUSCULAR; INTRAVENOUS PRN
Status: DISCONTINUED | OUTPATIENT
Start: 2023-07-28 | End: 2023-07-28 | Stop reason: SDUPTHER

## 2023-07-28 RX ADMIN — LIDOCAINE HYDROCHLORIDE 50 MG: 10 SOLUTION INTRAVENOUS at 10:38

## 2023-07-28 RX ADMIN — PHENOBARBITAL 60 MG: 20 LIQUID ORAL at 20:44

## 2023-07-28 RX ADMIN — LAMOTRIGINE 100 MG: 100 TABLET ORAL at 20:43

## 2023-07-28 RX ADMIN — LACOSAMIDE 200 MG: 10 SOLUTION ORAL at 20:43

## 2023-07-28 RX ADMIN — PROPOFOL 125 MCG/KG/MIN: 10 INJECTION, EMULSION INTRAVENOUS at 10:38

## 2023-07-28 RX ADMIN — SODIUM CHLORIDE, PRESERVATIVE FREE 10 ML: 5 INJECTION INTRAVENOUS at 20:48

## 2023-07-28 RX ADMIN — VALPROIC ACID 1000 MG: 250 SOLUTION ORAL at 01:36

## 2023-07-28 RX ADMIN — ATORVASTATIN CALCIUM 20 MG: 20 TABLET, FILM COATED ORAL at 20:43

## 2023-07-28 RX ADMIN — FENTANYL CITRATE 25 MCG: 50 INJECTION, SOLUTION INTRAMUSCULAR; INTRAVENOUS at 10:38

## 2023-07-28 RX ADMIN — LEVETIRACETAM 2000 MG: 500 SOLUTION ORAL at 20:43

## 2023-07-28 ASSESSMENT — PAIN SCALES - WONG BAKER: WONGBAKER_NUMERICALRESPONSE: 0

## 2023-07-28 NOTE — OP NOTE
wire introduced. This was grasped with a snare and withdrawn through the patient's mouth with withdrawal of the endoscope. A 20-Palauan gastrostomy tube was affixed to the wire and traction applied to the later. The tube was delivered through the anterior abdominal wall and a bumper placed at 5 cm. The patient tolerated the procedure well. Findings:  -Successful externally removable PEG tube placement. Recommendations: -The PEG tube may be used for feedings after 4 hours post-procedure. The head of the bed should be kept elevated to 30 degrees during tube feeds, and the tube should be flushed with 30 mL of water every 8 hours. Routine PEG tube care.       Abidfatah Thomas MD, [facility]

## 2023-07-28 NOTE — CARE COORDINATION
Transitional Planning  Patient going for peg today. Per Dr. Rodrigue Sue, able to use 4 hours after insertion. [facility] later today. Spoke with Mario, he will return call. 400 pm [facility] set up for 10 am tomorrow. [facility], spoke with Sarah. Able to accept patient tomorrow. Report number [phone], fax [phone].     412 pm Message left for Legal Guardian Ilsa [facility] [phone]. Requested call back.

## 2023-07-29 LAB
ANION GAP SERPL CALCULATED.3IONS-SCNC: 11 MMOL/L (ref 9–17)
BASOPHILS # BLD: 0 K/UL (ref 0–0.2)
BASOPHILS NFR BLD: 0 % (ref 0–2)
BUN SERPL-MCNC: 14 MG/DL (ref 8–23)
CALCIUM SERPL-MCNC: 9.1 MG/DL (ref 8.6–10.4)
CHLORIDE SERPL-SCNC: 101 MMOL/L (ref 98–107)
CO2 SERPL-SCNC: 27 MMOL/L (ref 20–31)
CREAT SERPL-MCNC: 0.4 MG/DL (ref 0.5–0.9)
EOSINOPHIL # BLD: 0 K/UL (ref 0–0.4)
EOSINOPHILS RELATIVE PERCENT: 0 % (ref 1–4)
ERYTHROCYTE [DISTWIDTH] IN BLOOD BY AUTOMATED COUNT: 13.4 % (ref 11.8–14.4)
GFR SERPL CREATININE-BSD FRML MDRD: >60 ML/MIN/1.73M2
GLUCOSE SERPL-MCNC: 114 MG/DL (ref 70–99)
HCT VFR BLD AUTO: 38.3 % (ref 36.3–47.1)
HGB BLD-MCNC: 12.4 G/DL (ref 11.9–15.1)
IMM GRANULOCYTES # BLD AUTO: 0.14 K/UL (ref 0–0.3)
IMM GRANULOCYTES NFR BLD: 1 %
LYMPHOCYTES NFR BLD: 2.72 K/UL (ref 1–4.8)
LYMPHOCYTES RELATIVE PERCENT: 19 % (ref 24–44)
MAGNESIUM SERPL-MCNC: 1.9 MG/DL (ref 1.6–2.6)
MCH RBC QN AUTO: 31.9 PG (ref 25.2–33.5)
MCHC RBC AUTO-ENTMCNC: 32.4 G/DL (ref 28.4–34.8)
MCV RBC AUTO: 98.5 FL (ref 82.6–102.9)
METER GLUCOSE: 110 MG/DL (ref 65–105)
METER GLUCOSE: 119 MG/DL (ref 65–105)
METER GLUCOSE: 77 MG/DL (ref 65–105)
MISCELLANEOUS LAB TEST RESULT: NORMAL
MONOCYTES NFR BLD: 1.29 K/UL (ref 0.1–0.8)
MONOCYTES NFR BLD: 9 % (ref 1–7)
MORPHOLOGY: NORMAL
NEUTROPHILS NFR BLD: 71 % (ref 36–66)
NEUTS SEG NFR BLD: 10.15 K/UL (ref 1.8–7.7)
NRBC BLD-RTO: 0 PER 100 WBC
PLATELET # BLD AUTO: 205 K/UL (ref 138–453)
PMV BLD AUTO: 11.8 FL (ref 8.1–13.5)
POTASSIUM SERPL-SCNC: 4.2 MMOL/L (ref 3.7–5.3)
RBC # BLD AUTO: 3.89 M/UL (ref 3.95–5.11)
SODIUM SERPL-SCNC: 139 MMOL/L (ref 135–144)
TEST NAME: NORMAL
WBC OTHER # BLD: 14.3 K/UL (ref 3.5–11.3)

## 2023-07-29 PROCEDURE — 6370000000 HC RX 637 (ALT 250 FOR IP): Performed by: INTERNAL MEDICINE

## 2023-07-29 PROCEDURE — 6360000002 HC RX W HCPCS: Performed by: INTERNAL MEDICINE

## 2023-07-29 PROCEDURE — 82947 ASSAY GLUCOSE BLOOD QUANT: CPT

## 2023-07-29 PROCEDURE — APPSS30 APP SPLIT SHARED TIME 16-30 MINUTES: Performed by: NURSE PRACTITIONER

## 2023-07-29 PROCEDURE — 80048 BASIC METABOLIC PNL TOTAL CA: CPT

## 2023-07-29 PROCEDURE — 83735 ASSAY OF MAGNESIUM: CPT

## 2023-07-29 PROCEDURE — 2580000003 HC RX 258: Performed by: INTERNAL MEDICINE

## 2023-07-29 PROCEDURE — 2060000000 HC ICU INTERMEDIATE R&B

## 2023-07-29 PROCEDURE — 36415 COLL VENOUS BLD VENIPUNCTURE: CPT

## 2023-07-29 PROCEDURE — 99232 SBSQ HOSP IP/OBS MODERATE 35: CPT | Performed by: PSYCHIATRY & NEUROLOGY

## 2023-07-29 PROCEDURE — 85027 COMPLETE CBC AUTOMATED: CPT

## 2023-07-29 RX ADMIN — SERTRALINE 50 MG: 50 TABLET, FILM COATED ORAL at 10:43

## 2023-07-29 RX ADMIN — LAMOTRIGINE 100 MG: 100 TABLET ORAL at 10:20

## 2023-07-29 RX ADMIN — PHENOBARBITAL 30 MG: 20 ELIXIR ORAL at 13:34

## 2023-07-29 RX ADMIN — LACOSAMIDE 200 MG: 10 SOLUTION ORAL at 21:26

## 2023-07-29 RX ADMIN — ATORVASTATIN CALCIUM 20 MG: 20 TABLET, FILM COATED ORAL at 20:52

## 2023-07-29 RX ADMIN — VALPROIC ACID 1000 MG: 250 SOLUTION ORAL at 01:10

## 2023-07-29 RX ADMIN — LEVETIRACETAM 2000 MG: 500 SOLUTION ORAL at 20:54

## 2023-07-29 RX ADMIN — VALPROIC ACID 1000 MG: 250 SOLUTION ORAL at 10:19

## 2023-07-29 RX ADMIN — VALPROIC ACID 1000 MG: 250 SOLUTION ORAL at 20:52

## 2023-07-29 RX ADMIN — LEVETIRACETAM 2000 MG: 500 SOLUTION ORAL at 10:44

## 2023-07-29 RX ADMIN — LACOSAMIDE 200 MG: 10 SOLUTION ORAL at 10:21

## 2023-07-29 RX ADMIN — ENOXAPARIN SODIUM 40 MG: 100 INJECTION SUBCUTANEOUS at 13:38

## 2023-07-29 RX ADMIN — LAMOTRIGINE 100 MG: 100 TABLET ORAL at 20:52

## 2023-07-29 RX ADMIN — SODIUM CHLORIDE, PRESERVATIVE FREE 10 ML: 5 INJECTION INTRAVENOUS at 13:34

## 2023-07-29 RX ADMIN — SODIUM CHLORIDE, PRESERVATIVE FREE 5 ML: 5 INJECTION INTRAVENOUS at 20:55

## 2023-07-29 RX ADMIN — ASPIRIN 81 MG CHEWABLE TABLET 81 MG: 81 TABLET CHEWABLE at 10:43

## 2023-07-29 RX ADMIN — PHENOBARBITAL 60 MG: 20 LIQUID ORAL at 20:52

## 2023-07-29 NOTE — CARE COORDINATION
Transportation cancelled as there is no discharge order, TF is not clarified on CHASE, CHASE not completed, IMM not done, and guardian not notified. 734 PS to neurology asking if patient is ready for discharge. PS shows read at 7:#4 am    [facility] report # [phone] and spoke to Dajuan. JAMEEL told her transportation cancelled for today for above reasons. JAMEEL will call her with updates.  Dajuan is patient's RN

## 2023-07-29 NOTE — DISCHARGE INSTR - DIET

## 2023-07-30 LAB
ANION GAP SERPL CALCULATED.3IONS-SCNC: 13 MMOL/L (ref 9–17)
BASOPHILS # BLD: 0 K/UL (ref 0–0.2)
BASOPHILS NFR BLD: 0 % (ref 0–2)
BUN SERPL-MCNC: 15 MG/DL (ref 8–23)
CALCIUM SERPL-MCNC: 9.2 MG/DL (ref 8.6–10.4)
CHLORIDE SERPL-SCNC: 102 MMOL/L (ref 98–107)
CO2 SERPL-SCNC: 21 MMOL/L (ref 20–31)
CREAT SERPL-MCNC: 0.4 MG/DL (ref 0.5–0.9)
EOSINOPHIL # BLD: 0 K/UL (ref 0–0.4)
EOSINOPHILS RELATIVE PERCENT: 0 % (ref 1–4)
ERYTHROCYTE [DISTWIDTH] IN BLOOD BY AUTOMATED COUNT: 13.6 % (ref 11.8–14.4)
GFR SERPL CREATININE-BSD FRML MDRD: >60 ML/MIN/1.73M2
GLUCOSE SERPL-MCNC: 111 MG/DL (ref 70–99)
HCT VFR BLD AUTO: 43.6 % (ref 36.3–47.1)
HGB BLD-MCNC: 13.9 G/DL (ref 11.9–15.1)
IMM GRANULOCYTES # BLD AUTO: 0.15 K/UL (ref 0–0.3)
IMM GRANULOCYTES NFR BLD: 1 %
LYMPHOCYTES NFR BLD: 3.23 K/UL (ref 1–4.8)
LYMPHOCYTES RELATIVE PERCENT: 22 % (ref 24–44)
MAGNESIUM SERPL-MCNC: 2.5 MG/DL (ref 1.6–2.6)
MCH RBC QN AUTO: 32 PG (ref 25.2–33.5)
MCHC RBC AUTO-ENTMCNC: 31.9 G/DL (ref 28.4–34.8)
MCV RBC AUTO: 100.5 FL (ref 82.6–102.9)
METER GLUCOSE: 103 MG/DL (ref 65–105)
METER GLUCOSE: 114 MG/DL (ref 65–105)
METER GLUCOSE: 115 MG/DL (ref 65–105)
METER GLUCOSE: 119 MG/DL (ref 65–105)
MONOCYTES NFR BLD: 1.47 K/UL (ref 0.1–0.8)
MONOCYTES NFR BLD: 10 % (ref 1–7)
MORPHOLOGY: NORMAL
NEUTROPHILS NFR BLD: 67 % (ref 36–66)
NEUTS SEG NFR BLD: 9.85 K/UL (ref 1.8–7.7)
NRBC BLD-RTO: 0 PER 100 WBC
PLATELET # BLD AUTO: ABNORMAL K/UL (ref 138–453)
PLATELET, FLUORESCENCE: ABNORMAL K/UL (ref 138–453)
POTASSIUM SERPL-SCNC: 4.5 MMOL/L (ref 3.7–5.3)
RBC # BLD AUTO: 4.34 M/UL (ref 3.95–5.11)
SODIUM SERPL-SCNC: 136 MMOL/L (ref 135–144)
WBC OTHER # BLD: 14.7 K/UL (ref 3.5–11.3)

## 2023-07-30 PROCEDURE — 6370000000 HC RX 637 (ALT 250 FOR IP): Performed by: INTERNAL MEDICINE

## 2023-07-30 PROCEDURE — 36415 COLL VENOUS BLD VENIPUNCTURE: CPT

## 2023-07-30 PROCEDURE — 2580000003 HC RX 258: Performed by: INTERNAL MEDICINE

## 2023-07-30 PROCEDURE — 83735 ASSAY OF MAGNESIUM: CPT

## 2023-07-30 PROCEDURE — 2060000000 HC ICU INTERMEDIATE R&B

## 2023-07-30 PROCEDURE — 80048 BASIC METABOLIC PNL TOTAL CA: CPT

## 2023-07-30 PROCEDURE — 94761 N-INVAS EAR/PLS OXIMETRY MLT: CPT

## 2023-07-30 PROCEDURE — 99239 HOSP IP/OBS DSCHRG MGMT >30: CPT | Performed by: PSYCHIATRY & NEUROLOGY

## 2023-07-30 PROCEDURE — 85027 COMPLETE CBC AUTOMATED: CPT

## 2023-07-30 PROCEDURE — 6360000002 HC RX W HCPCS: Performed by: INTERNAL MEDICINE

## 2023-07-30 PROCEDURE — 97530 THERAPEUTIC ACTIVITIES: CPT

## 2023-07-30 PROCEDURE — 85055 RETICULATED PLATELET ASSAY: CPT

## 2023-07-30 RX ORDER — ASPIRIN 81 MG/1
81 TABLET, CHEWABLE ORAL DAILY
Qty: 30 TABLET | Refills: 3 | Status: SHIPPED | OUTPATIENT
Start: 2023-07-31

## 2023-07-30 RX ORDER — VALPROIC ACID 250 MG/5ML
1000 SOLUTION ORAL EVERY 8 HOURS SCHEDULED
Qty: 1800 ML | Refills: 3 | Status: SHIPPED | OUTPATIENT
Start: 2023-07-30

## 2023-07-30 RX ORDER — LACOSAMIDE 10 MG/ML
200 SOLUTION ORAL 2 TIMES DAILY
Qty: 1200 ML | Refills: 3 | Status: SHIPPED | OUTPATIENT
Start: 2023-07-30 | End: 2023-07-31 | Stop reason: SDUPTHER

## 2023-07-30 RX ORDER — PHENOBARBITAL 20 MG/5ML
30 ELIXIR ORAL EVERY MORNING
Qty: 225 ML | Refills: 3 | Status: SHIPPED | OUTPATIENT
Start: 2023-07-31 | End: 2023-07-31 | Stop reason: SDUPTHER

## 2023-07-30 RX ORDER — ATORVASTATIN CALCIUM 20 MG/1
20 TABLET, FILM COATED ORAL NIGHTLY
Qty: 30 TABLET | Refills: 3 | Status: SHIPPED | OUTPATIENT
Start: 2023-07-30

## 2023-07-30 RX ORDER — LEVETIRACETAM 100 MG/ML
2000 SOLUTION ORAL 2 TIMES DAILY
Qty: 1200 ML | Refills: 3 | Status: SHIPPED | OUTPATIENT
Start: 2023-07-30

## 2023-07-30 RX ORDER — LAMOTRIGINE 100 MG/1
100 TABLET ORAL 2 TIMES DAILY
Qty: 60 TABLET | Refills: 3 | Status: SHIPPED | OUTPATIENT
Start: 2023-07-30

## 2023-07-30 RX ORDER — LANSOPRAZOLE 30 MG/1
30 TABLET, ORALLY DISINTEGRATING, DELAYED RELEASE ORAL
Qty: 30 TABLET | Refills: 3 | Status: SHIPPED | OUTPATIENT
Start: 2023-07-31 | End: 2023-08-30

## 2023-07-30 RX ORDER — PHENOBARBITAL 20 MG/5ML
60 ELIXIR ORAL NIGHTLY
Qty: 450 ML | Refills: 3 | Status: SHIPPED | OUTPATIENT
Start: 2023-07-30 | End: 2023-07-31 | Stop reason: SDUPTHER

## 2023-07-30 RX ADMIN — ONDANSETRON 4 MG: 2 INJECTION INTRAMUSCULAR; INTRAVENOUS at 00:04

## 2023-07-30 RX ADMIN — ENOXAPARIN SODIUM 40 MG: 100 INJECTION SUBCUTANEOUS at 08:59

## 2023-07-30 RX ADMIN — LAMOTRIGINE 100 MG: 100 TABLET ORAL at 20:17

## 2023-07-30 RX ADMIN — PHENOBARBITAL 60 MG: 20 LIQUID ORAL at 22:28

## 2023-07-30 RX ADMIN — ATORVASTATIN CALCIUM 20 MG: 20 TABLET, FILM COATED ORAL at 20:21

## 2023-07-30 RX ADMIN — SODIUM CHLORIDE, PRESERVATIVE FREE 10 ML: 5 INJECTION INTRAVENOUS at 20:21

## 2023-07-30 RX ADMIN — ASPIRIN 81 MG CHEWABLE TABLET 81 MG: 81 TABLET CHEWABLE at 09:00

## 2023-07-30 RX ADMIN — LACOSAMIDE 200 MG: 10 SOLUTION ORAL at 08:58

## 2023-07-30 RX ADMIN — LACOSAMIDE 200 MG: 10 SOLUTION ORAL at 20:16

## 2023-07-30 RX ADMIN — SERTRALINE 50 MG: 50 TABLET, FILM COATED ORAL at 09:00

## 2023-07-30 RX ADMIN — VALPROIC ACID 1000 MG: 250 SOLUTION ORAL at 00:05

## 2023-07-30 RX ADMIN — LEVETIRACETAM 2000 MG: 500 SOLUTION ORAL at 20:16

## 2023-07-30 RX ADMIN — VALPROIC ACID 1000 MG: 250 SOLUTION ORAL at 17:09

## 2023-07-30 RX ADMIN — VALPROIC ACID 1000 MG: 250 SOLUTION ORAL at 08:58

## 2023-07-30 RX ADMIN — LEVETIRACETAM 2000 MG: 500 SOLUTION ORAL at 08:57

## 2023-07-30 RX ADMIN — SODIUM CHLORIDE, PRESERVATIVE FREE 10 ML: 5 INJECTION INTRAVENOUS at 08:59

## 2023-07-30 RX ADMIN — LANSOPRAZOLE 30 MG: 30 TABLET, ORALLY DISINTEGRATING, DELAYED RELEASE ORAL at 08:57

## 2023-07-30 RX ADMIN — PHENOBARBITAL 30 MG: 20 ELIXIR ORAL at 10:09

## 2023-07-30 RX ADMIN — LAMOTRIGINE 100 MG: 100 TABLET ORAL at 08:58

## 2023-07-30 ASSESSMENT — PAIN SCALES - WONG BAKER
WONGBAKER_NUMERICALRESPONSE: 0

## 2023-07-30 NOTE — CARE COORDINATION
TRansitional planning    D/c order noted. Called legal guardian [employer] [employer] [phone]. Pressed #2 for non emergency and #4 to leave a message. Left message requesting return phone call for permission to discharge patient to SNF. Left # [phone] as call back #.    PS to primary team requesting CHASE be filled out and signed by attending. Noted as read. [address] to Mario with [address]. The latest he can schedule  is 11 am.  is set for 11am tomorrow 7/31 to go to [address] [place] [address] Rd. 1459 Copy of dietician note with TF recommendations put in blue envelope.     [address] [phone] and transferred to [facility] answer. [address] and spoke to Dajuan.  CM told her patient is set for  tomorrow at 11 am. Fax # for [facility] is [phone]

## 2023-07-30 NOTE — DISCHARGE SUMMARY
breakfast)  Qty: 30 tablet, Refills: 3       !! - Potential duplicate medications found. Please discuss with provider. CONTINUE these medications which have NOT CHANGED    Details   amphetamine-dextroamphetamine (ADDERALL, 5MG,) 5 MG tablet Take 1 tablet by mouth daily for 30 days. Max Daily Amount: 5 mg  Qty: 30 tablet, Refills: 0    Associated Diagnoses: Attention deficit hyperactivity disorder (ADHD), unspecified ADHD type      venlafaxine (EFFEXOR) 50 MG tablet Take 1 tablet by mouth in the morning, at noon, and at bedtime  Qty: 90 tablet, Refills: 3           STOP taking these medications       acetaminophen (TYLENOL) 325 MG tablet Comments:   Reason for Stopping:         divalproex (DEPAKOTE ER) 250 MG extended release tablet Comments:   Reason for Stopping:         lamoTRIgine (LAMICTAL XR) 100 MG TB24 extended release tablet Comments:   Reason for Stopping:         levETIRAcetam (KEPPRA) 750 MG tablet Comments:   Reason for Stopping:         divalproex (DEPAKOTE ER) 500 MG extended release tablet Comments:   Reason for Stopping:               Activity: activity as tolerated    Restrictions: Driving No, Swimming No, Operating heavy machinery No, Compromising heights No    Diet:   Per RD recs from assessment on 7/27: If more long term plans for nutrition support needed, consider transition to standard TF formula. Recommend Standard With Fiber (Jevity 1.5) goal of 40 mL/hr (ok to use Isosource 1.5 at facility if needed). If bolus feedings desired, recommend 240 mL of Jevity 1.5 (or equivalent) 4x per day. Flush 65 mL free water before and after each bolus. Follow-up:    CM STR [address]  [phone]        Outpatient follow up with neurology and GI.      Note that over 30 minutes was spent in preparing discharge papers, discussing discharge with patient, medication review, etc.      Toy Lee DO  Neurology Resident PGY-3  Department of

## 2023-07-31 VITALS
OXYGEN SATURATION: 94 % | SYSTOLIC BLOOD PRESSURE: 108 MMHG | HEART RATE: 78 BPM | WEIGHT: 215.17 LBS | BODY MASS INDEX: 40.62 KG/M2 | RESPIRATION RATE: 16 BRPM | TEMPERATURE: 99.1 F | DIASTOLIC BLOOD PRESSURE: 88 MMHG | HEIGHT: 61 IN

## 2023-07-31 LAB
ANION GAP SERPL CALCULATED.3IONS-SCNC: 12 MMOL/L (ref 9–17)
BASOPHILS # BLD: 0 K/UL (ref 0–0.2)
BASOPHILS NFR BLD: 0 % (ref 0–2)
BUN SERPL-MCNC: 16 MG/DL (ref 8–23)
CALCIUM SERPL-MCNC: 9 MG/DL (ref 8.6–10.4)
CHLORIDE SERPL-SCNC: 100 MMOL/L (ref 98–107)
CO2 SERPL-SCNC: 26 MMOL/L (ref 20–31)
CREAT SERPL-MCNC: 0.5 MG/DL (ref 0.5–0.9)
EOSINOPHIL # BLD: 0.17 K/UL (ref 0–0.44)
EOSINOPHILS RELATIVE PERCENT: 1 % (ref 1–4)
ERYTHROCYTE [DISTWIDTH] IN BLOOD BY AUTOMATED COUNT: 13.5 % (ref 11.8–14.4)
GFR SERPL CREATININE-BSD FRML MDRD: >60 ML/MIN/1.73M2
GLUCOSE SERPL-MCNC: 110 MG/DL (ref 70–99)
HCT VFR BLD AUTO: 37.7 % (ref 36.3–47.1)
HGB BLD-MCNC: 12.4 G/DL (ref 11.9–15.1)
IMM GRANULOCYTES # BLD AUTO: 0.17 K/UL (ref 0–0.3)
IMM GRANULOCYTES NFR BLD: 1 %
LYMPHOCYTES NFR BLD: 3.19 K/UL (ref 1.1–3.7)
LYMPHOCYTES RELATIVE PERCENT: 19 % (ref 24–43)
MAGNESIUM SERPL-MCNC: 2.2 MG/DL (ref 1.6–2.6)
MCH RBC QN AUTO: 32.1 PG (ref 25.2–33.5)
MCHC RBC AUTO-ENTMCNC: 32.9 G/DL (ref 28.4–34.8)
MCV RBC AUTO: 97.7 FL (ref 82.6–102.9)
METER GLUCOSE: 115 MG/DL (ref 65–105)
METER GLUCOSE: 99 MG/DL (ref 65–105)
MONOCYTES NFR BLD: 16 % (ref 3–12)
MONOCYTES NFR BLD: 2.69 K/UL (ref 0.1–1.2)
MORPHOLOGY: NORMAL
NEUTROPHILS NFR BLD: 63 % (ref 36–65)
NEUTS SEG NFR BLD: 10.58 K/UL (ref 1.5–8.1)
NRBC BLD-RTO: 0 PER 100 WBC
PLATELET # BLD AUTO: 215 K/UL (ref 138–453)
PMV BLD AUTO: 11.8 FL (ref 8.1–13.5)
POTASSIUM SERPL-SCNC: 4.3 MMOL/L (ref 3.7–5.3)
RBC # BLD AUTO: 3.86 M/UL (ref 3.95–5.11)
SODIUM SERPL-SCNC: 138 MMOL/L (ref 135–144)
WBC OTHER # BLD: 16.8 K/UL (ref 3.5–11.3)

## 2023-07-31 PROCEDURE — 6370000000 HC RX 637 (ALT 250 FOR IP): Performed by: INTERNAL MEDICINE

## 2023-07-31 PROCEDURE — 85025 COMPLETE CBC W/AUTO DIFF WBC: CPT

## 2023-07-31 PROCEDURE — 2580000003 HC RX 258: Performed by: INTERNAL MEDICINE

## 2023-07-31 PROCEDURE — 83735 ASSAY OF MAGNESIUM: CPT

## 2023-07-31 PROCEDURE — 36415 COLL VENOUS BLD VENIPUNCTURE: CPT

## 2023-07-31 PROCEDURE — 80048 BASIC METABOLIC PNL TOTAL CA: CPT

## 2023-07-31 PROCEDURE — 6360000002 HC RX W HCPCS: Performed by: INTERNAL MEDICINE

## 2023-07-31 PROCEDURE — 82947 ASSAY GLUCOSE BLOOD QUANT: CPT

## 2023-07-31 RX ORDER — LACOSAMIDE 10 MG/ML
200 SOLUTION ORAL 2 TIMES DAILY
Qty: 1200 ML | Refills: 3 | Status: SHIPPED | OUTPATIENT
Start: 2023-07-31 | End: 2023-08-30

## 2023-07-31 RX ADMIN — LAMOTRIGINE 100 MG: 100 TABLET ORAL at 09:39

## 2023-07-31 RX ADMIN — SERTRALINE 50 MG: 50 TABLET, FILM COATED ORAL at 09:39

## 2023-07-31 RX ADMIN — ASPIRIN 81 MG CHEWABLE TABLET 81 MG: 81 TABLET CHEWABLE at 09:39

## 2023-07-31 RX ADMIN — LEVETIRACETAM 2000 MG: 500 SOLUTION ORAL at 09:39

## 2023-07-31 RX ADMIN — ENOXAPARIN SODIUM 40 MG: 100 INJECTION SUBCUTANEOUS at 09:50

## 2023-07-31 RX ADMIN — VALPROIC ACID 1000 MG: 250 SOLUTION ORAL at 09:39

## 2023-07-31 RX ADMIN — LANSOPRAZOLE 30 MG: 30 TABLET, ORALLY DISINTEGRATING, DELAYED RELEASE ORAL at 09:38

## 2023-07-31 RX ADMIN — SODIUM CHLORIDE, PRESERVATIVE FREE 10 ML: 5 INJECTION INTRAVENOUS at 09:50

## 2023-07-31 RX ADMIN — PHENOBARBITAL 30 MG: 20 ELIXIR ORAL at 09:38

## 2023-07-31 RX ADMIN — VALPROIC ACID 1000 MG: 250 SOLUTION ORAL at 00:21

## 2023-07-31 RX ADMIN — LACOSAMIDE 200 MG: 10 SOLUTION ORAL at 09:39

## 2023-07-31 ASSESSMENT — PAIN SCALES - WONG BAKER
WONGBAKER_NUMERICALRESPONSE: 0

## 2023-07-31 NOTE — CARE COORDINATION
PS message sent to Dr. Ashley Dumont notifying that patient needs printed signed Vimpat prescription prior to discharging to [facility]. CM called and [facility] and she confirmed 11am transportation this morning. CM called and left message requesting call back for patient's legal guardian Calvin Ricciw [phone].    4232- CM called and spoke with [employer] [facility] Trevin Brody who is covering for Calvin Pew. Sony agrees with plan for patient to discharge back to [facility] at [facility] and CM gave verbal 2nd IMM notification. 1050- Vimpat prescription faxed to [facility] and placed in transportation folder.  CM called and left message for [facility] admissions reminding them that patient will be discharging at 11am.    RN to call report to [phone]    Discharge [address] Case Management Department  Written by: Tasha Davidson RN    Patient Name: Kanika Talbot  Attending Provider: Shakir Pimentel MD  Admit Date: 2023  2:34 AM  MRN: [id number]  Account: [de-identified]                     : 1955  Discharge Date: 23      Disposition: [facility]

## 2023-08-04 ENCOUNTER — TELEPHONE (OUTPATIENT)
Dept: NEUROLOGY | Age: 68
End: 2023-08-04

## 2023-08-04 NOTE — TELEPHONE ENCOUNTER
Niurka Carranza called to have Lu Latham review [employer] jaun's hosp records, records in epic, she has an extended stay at [facility], and was DX with status epilepticus. Nisreen Pabon has an NP appt scheduled with Lu Latham 09-05. Niurka Carranza is wondering if they need a sooner appt. Please call Niurka Carranza back at [phone] and ask for the A castillo.

## 2023-08-07 NOTE — TELEPHONE ENCOUNTER
Please see attached message.    Patient can be added to the cancellation list.   Thank you  Codi Russell

## 2023-08-07 NOTE — TELEPHONE ENCOUNTER
Ana Lilia Palomo at [facility] patient would be added to a wait list for possible cancellation. Verbalized understanding with no questions at this time.

## 2023-10-01 ENCOUNTER — APPOINTMENT (OUTPATIENT)
Dept: CT IMAGING | Age: 68
End: 2023-10-01
Payer: MEDICARE

## 2023-10-01 ENCOUNTER — HOSPITAL ENCOUNTER (EMERGENCY)
Age: 68
Discharge: HOME | End: 2023-10-01
Attending: EMERGENCY MEDICINE
Payer: MEDICARE

## 2023-10-01 VITALS
DIASTOLIC BLOOD PRESSURE: 89 MMHG | RESPIRATION RATE: 16 BRPM | OXYGEN SATURATION: 99 % | SYSTOLIC BLOOD PRESSURE: 128 MMHG | TEMPERATURE: 97.8 F | HEIGHT: 61 IN | WEIGHT: 215 LBS | HEART RATE: 78 BPM | BODY MASS INDEX: 40.59 KG/M2

## 2023-10-01 DIAGNOSIS — G40.919 BREAKTHROUGH SEIZURE (HCC): Primary | ICD-10-CM

## 2023-10-01 LAB
ANION GAP SERPL CALC-SCNC: 12 MEQ/L (ref 8–16)
BACTERIA URNS QL MICRO: ABNORMAL /HPF
BASOPHILS ABSOLUTE: 0 THOU/MM3 (ref 0–0.1)
BASOPHILS NFR BLD AUTO: 0.2 %
BILIRUB UR QL STRIP.AUTO: NEGATIVE
BUN SERPL-MCNC: 13 MG/DL (ref 7–22)
CALCIUM SERPL-MCNC: 9.1 MG/DL (ref 8.5–10.5)
CASTS #/AREA URNS LPF: ABNORMAL /LPF
CASTS 2: ABNORMAL /LPF
CHARACTER UR: CLEAR
CHLORIDE SERPL-SCNC: 100 MEQ/L (ref 98–111)
CO2 SERPL-SCNC: 27 MEQ/L (ref 23–33)
COLOR: YELLOW
CREAT SERPL-MCNC: 0.4 MG/DL (ref 0.4–1.2)
CRYSTALS URNS MICRO: ABNORMAL
DEPRECATED RDW RBC AUTO: 50.1 FL (ref 35–45)
EOSINOPHIL NFR BLD AUTO: 0.6 %
EOSINOPHILS ABSOLUTE: 0 THOU/MM3 (ref 0–0.4)
EPITHELIAL CELLS, UA: ABNORMAL /HPF
ERYTHROCYTE [DISTWIDTH] IN BLOOD BY AUTOMATED COUNT: 13.2 % (ref 11.5–14.5)
GFR SERPL CREATININE-BSD FRML MDRD: > 60 ML/MIN/1.73M2
GLUCOSE BLD STRIP.AUTO-MCNC: 87 MG/DL (ref 70–108)
GLUCOSE SERPL-MCNC: 94 MG/DL (ref 70–108)
GLUCOSE UR QL STRIP.AUTO: NEGATIVE MG/DL
HCT VFR BLD AUTO: 42.3 % (ref 37–47)
HGB BLD-MCNC: 14.1 GM/DL (ref 12–16)
HGB UR QL STRIP.AUTO: ABNORMAL
IMM GRANULOCYTES # BLD AUTO: 0.02 THOU/MM3 (ref 0–0.07)
IMM GRANULOCYTES NFR BLD AUTO: 0.3 %
KETONES UR QL STRIP.AUTO: NEGATIVE
LYMPHOCYTES ABSOLUTE: 2.5 THOU/MM3 (ref 1–4.8)
LYMPHOCYTES NFR BLD AUTO: 39 %
MAGNESIUM SERPL-MCNC: 1.7 MG/DL (ref 1.6–2.4)
MCH RBC QN AUTO: 34.1 PG (ref 26–33)
MCHC RBC AUTO-ENTMCNC: 33.3 GM/DL (ref 32.2–35.5)
MCV RBC AUTO: 102.2 FL (ref 81–99)
MISCELLANEOUS 2: ABNORMAL
MONOCYTES ABSOLUTE: 0.7 THOU/MM3 (ref 0.4–1.3)
MONOCYTES NFR BLD AUTO: 11.2 %
NEUTROPHILS NFR BLD AUTO: 48.7 %
NITRITE UR QL STRIP: NEGATIVE
NRBC BLD AUTO-RTO: 0 /100 WBC
OSMOLALITY SERPL CALC.SUM OF ELEC: 277.4 MOSMOL/KG (ref 275–300)
PH UR STRIP.AUTO: 6.5 [PH] (ref 5–9)
PHENOBARB SERPL-MCNC: 24 MCG/ML (ref 10–48)
PHOSPHATE SERPL-MCNC: 4.1 MG/DL (ref 2.4–4.7)
PLATELET # BLD AUTO: 135 THOU/MM3 (ref 130–400)
PLATELET BLD QL SMEAR: ADEQUATE
PMV BLD AUTO: 13 FL (ref 9.4–12.4)
POTASSIUM SERPL-SCNC: 4.5 MEQ/L (ref 3.5–5.2)
PROT UR STRIP.AUTO-MCNC: NEGATIVE MG/DL
RBC # BLD AUTO: 4.14 MILL/MM3 (ref 4.2–5.4)
RBC URINE: ABNORMAL /HPF
REASON FOR REJECTION: NORMAL
REJECTED TEST: NORMAL
RENAL EPI CELLS #/AREA URNS HPF: ABNORMAL /[HPF]
SCAN OF BLOOD SMEAR: NORMAL
SEGMENTED NEUTROPHILS ABSOLUTE COUNT: 3.1 THOU/MM3 (ref 1.8–7.7)
SODIUM SERPL-SCNC: 139 MEQ/L (ref 135–145)
SP GR UR REFRACT.AUTO: 1.03 (ref 1–1.03)
TSH SERPL DL<=0.005 MIU/L-ACNC: 1.52 UIU/ML (ref 0.4–4.2)
UROBILINOGEN, URINE: 1 EU/DL (ref 0–1)
VALPROATE SERPL-MCNC: 57.8 UG/ML (ref 50–100)
WBC # BLD AUTO: 6.4 THOU/MM3 (ref 4.8–10.8)
WBC #/AREA URNS HPF: ABNORMAL /HPF
WBC #/AREA URNS HPF: ABNORMAL /[HPF]
YEAST LIKE FUNGI URNS QL MICRO: ABNORMAL

## 2023-10-01 PROCEDURE — 84100 ASSAY OF PHOSPHORUS: CPT

## 2023-10-01 PROCEDURE — 70450 CT HEAD/BRAIN W/O DYE: CPT

## 2023-10-01 PROCEDURE — 81001 URINALYSIS AUTO W/SCOPE: CPT

## 2023-10-01 PROCEDURE — 80048 BASIC METABOLIC PNL TOTAL CA: CPT

## 2023-10-01 PROCEDURE — 96365 THER/PROPH/DIAG IV INF INIT: CPT

## 2023-10-01 PROCEDURE — 36415 COLL VENOUS BLD VENIPUNCTURE: CPT

## 2023-10-01 PROCEDURE — 93005 ELECTROCARDIOGRAM TRACING: CPT | Performed by: EMERGENCY MEDICINE

## 2023-10-01 PROCEDURE — 82948 REAGENT STRIP/BLOOD GLUCOSE: CPT

## 2023-10-01 PROCEDURE — 80164 ASSAY DIPROPYLACETIC ACD TOT: CPT

## 2023-10-01 PROCEDURE — 83735 ASSAY OF MAGNESIUM: CPT

## 2023-10-01 PROCEDURE — 93010 ELECTROCARDIOGRAM REPORT: CPT | Performed by: INTERNAL MEDICINE

## 2023-10-01 PROCEDURE — 80184 ASSAY OF PHENOBARBITAL: CPT

## 2023-10-01 PROCEDURE — 85025 COMPLETE CBC W/AUTO DIFF WBC: CPT

## 2023-10-01 PROCEDURE — 6360000002 HC RX W HCPCS: Performed by: STUDENT IN AN ORGANIZED HEALTH CARE EDUCATION/TRAINING PROGRAM

## 2023-10-01 PROCEDURE — 2580000003 HC RX 258: Performed by: STUDENT IN AN ORGANIZED HEALTH CARE EDUCATION/TRAINING PROGRAM

## 2023-10-01 PROCEDURE — 84443 ASSAY THYROID STIM HORMONE: CPT

## 2023-10-01 PROCEDURE — 99284 EMERGENCY DEPT VISIT MOD MDM: CPT

## 2023-10-01 PROCEDURE — 87086 URINE CULTURE/COLONY COUNT: CPT

## 2023-10-01 RX ADMIN — LEVETIRACETAM 1000 MG: 100 INJECTION, SOLUTION INTRAVENOUS at 10:43

## 2023-10-01 ASSESSMENT — PAIN - FUNCTIONAL ASSESSMENT: PAIN_FUNCTIONAL_ASSESSMENT: NONE - DENIES PAIN

## 2023-10-01 NOTE — ED TRIAGE NOTES
Presents to ED from [facility] via [facility] with complaints of altered mental status. Nursing home states pt was found not responding by staff. Pt has a history of seizures and was found in a postictal state. Upon arrival pt lying back with mouth wide open, able to follow some commands. Dr. Estelita Guzmán at bedside for evaluation.

## 2023-10-01 NOTE — DISCHARGE INSTRUCTIONS
If you take an anti-seizure medication, then take that medication as previously indicated and prescribed. Do not miss any doses. Do not drive any vehicles or operate any heavy machinery for a period of 6 months after having a seizure. If you are caught driving and have had a seizure, then you could possible go to custodial. PLEASE RETURN TO THE EMERGENCY DEPARTMENT IMMEDIATELY for worsening symptoms, any seizure lasting for more than 5 minutes,  having multiple seizures in a row,  or if you develop any concerning symptoms such as: high fever not relieved by acetaminophen (Tylenol) and/or ibuprofen (Motrin / Advil), chills, shortness of breath, chest pain, feeling of your heart fluttering or racing, persistent nausea and/or vomiting, vomiting up blood, blood in your stool, loss of consciousness, numbness, weakness or tingling in the arms or legs or change in color of the extremities, changes in mental status, persistent headache, blurry vision loss of bladder / bowel control, unable to follow up with your physician, or other any other care or concern.

## 2023-10-01 NOTE — ED NOTES
Patient resting in bed with eyes closed, respirations easy and unlabored. Lights dimmed and door closed for patient comfort. Call light in reach. Will continue to monitor.       Birgit Bradshaw RN  10/01/23 1257

## 2023-10-01 NOTE — ED NOTES
Patient resting in bed. Respirations easy and unlabored. No distress noted. Call light within reach.      Birgit Bradshaw RN  10/01/23 8344

## 2023-10-01 NOTE — ED NOTES
Lab called and notified of hemolyzed lab sample. Provider notified. Dr. Kyree Winkler at bedside for femoral stick for lab collection.      Birgit Bradshaw RN  10/01/23 9640

## 2023-10-02 LAB
BACTERIA UR CULT: ABNORMAL
EKG ATRIAL RATE: 83 BPM
EKG P AXIS: 60 DEGREES
EKG P-R INTERVAL: 166 MS
EKG Q-T INTERVAL: 374 MS
EKG QRS DURATION: 88 MS
EKG QTC CALCULATION (BAZETT): 439 MS
EKG R AXIS: 0 DEGREES
EKG T AXIS: 49 DEGREES
EKG VENTRICULAR RATE: 83 BPM
ORGANISM: ABNORMAL

## 2023-10-29 ENCOUNTER — APPOINTMENT (OUTPATIENT)
Dept: GENERAL RADIOLOGY | Age: 68
DRG: 642 | End: 2023-10-29
Payer: MEDICARE

## 2023-10-29 ENCOUNTER — HOSPITAL ENCOUNTER (INPATIENT)
Age: 68
LOS: 8 days | Discharge: HOME | DRG: 642 | End: 2023-11-06
Attending: STUDENT IN AN ORGANIZED HEALTH CARE EDUCATION/TRAINING PROGRAM | Admitting: INTERNAL MEDICINE
Payer: MEDICARE

## 2023-10-29 ENCOUNTER — APPOINTMENT (OUTPATIENT)
Dept: CT IMAGING | Age: 68
DRG: 642 | End: 2023-10-29
Payer: MEDICARE

## 2023-10-29 DIAGNOSIS — G40.911 NEW ONSET REFRACTORY STATUS EPILEPTICUS (HCC): ICD-10-CM

## 2023-10-29 DIAGNOSIS — G40.919 BREAKTHROUGH SEIZURE (HCC): ICD-10-CM

## 2023-10-29 DIAGNOSIS — N39.0 URINARY TRACT INFECTION WITHOUT HEMATURIA, SITE UNSPECIFIED: Primary | ICD-10-CM

## 2023-10-29 DIAGNOSIS — R41.82 ALTERED MENTAL STATUS, UNSPECIFIED ALTERED MENTAL STATUS TYPE: ICD-10-CM

## 2023-10-29 DIAGNOSIS — G93.40 ENCEPHALOPATHY ACUTE: ICD-10-CM

## 2023-10-29 DIAGNOSIS — U07.1 COVID-19: ICD-10-CM

## 2023-10-29 DIAGNOSIS — E72.20 HYPERAMMONEMIA (HCC): ICD-10-CM

## 2023-10-29 LAB
AMMONIA PLAS-MCNC: 101 UMOL/L (ref 11–60)
AMMONIA PLAS-MCNC: 103 UMOL/L (ref 11–60)
ANION GAP SERPL CALC-SCNC: 10 MEQ/L (ref 8–16)
BACTERIA URNS QL MICRO: ABNORMAL /HPF
BASOPHILS ABSOLUTE: 0 THOU/MM3 (ref 0–0.1)
BASOPHILS NFR BLD AUTO: 0.5 %
BILIRUB UR QL STRIP.AUTO: NEGATIVE
BUN SERPL-MCNC: 17 MG/DL (ref 7–22)
CALCIUM SERPL-MCNC: 9.2 MG/DL (ref 8.5–10.5)
CASTS #/AREA URNS LPF: ABNORMAL /LPF
CASTS 2: ABNORMAL /LPF
CHARACTER UR: ABNORMAL
CHLORIDE SERPL-SCNC: 99 MEQ/L (ref 98–111)
CO2 SERPL-SCNC: 25 MEQ/L (ref 23–33)
COLOR: YELLOW
CREAT SERPL-MCNC: 0.3 MG/DL (ref 0.4–1.2)
CRYSTALS URNS MICRO: ABNORMAL
DEPRECATED RDW RBC AUTO: 45.9 FL (ref 35–45)
EOSINOPHIL NFR BLD AUTO: 1.2 %
EOSINOPHILS ABSOLUTE: 0.1 THOU/MM3 (ref 0–0.4)
EPITHELIAL CELLS, UA: ABNORMAL /HPF
ERYTHROCYTE [DISTWIDTH] IN BLOOD BY AUTOMATED COUNT: 12.1 % (ref 11.5–14.5)
FLUAV RNA RESP QL NAA+PROBE: NOT DETECTED
FLUBV RNA RESP QL NAA+PROBE: NOT DETECTED
GFR SERPL CREATININE-BSD FRML MDRD: > 60 ML/MIN/1.73M2
GLUCOSE SERPL-MCNC: 122 MG/DL (ref 70–108)
GLUCOSE UR QL STRIP.AUTO: NEGATIVE MG/DL
HCT VFR BLD AUTO: 41.5 % (ref 37–47)
HGB BLD-MCNC: 13.7 GM/DL (ref 12–16)
HGB UR QL STRIP.AUTO: ABNORMAL
IMM GRANULOCYTES # BLD AUTO: 0.06 THOU/MM3 (ref 0–0.07)
IMM GRANULOCYTES NFR BLD AUTO: 1 %
KETONES UR QL STRIP.AUTO: NEGATIVE
LACTIC ACID, SEPSIS: 1.8 MMOL/L (ref 0.5–1.9)
LYMPHOCYTES ABSOLUTE: 2.2 THOU/MM3 (ref 1–4.8)
LYMPHOCYTES NFR BLD AUTO: 38.1 %
MAGNESIUM SERPL-MCNC: 2.1 MG/DL (ref 1.6–2.4)
MCH RBC QN AUTO: 33.8 PG (ref 26–33)
MCHC RBC AUTO-ENTMCNC: 33 GM/DL (ref 32.2–35.5)
MCV RBC AUTO: 102.5 FL (ref 81–99)
MISCELLANEOUS 2: ABNORMAL
MONOCYTES ABSOLUTE: 0.7 THOU/MM3 (ref 0.4–1.3)
MONOCYTES NFR BLD AUTO: 12.1 %
NEUTROPHILS NFR BLD AUTO: 47.1 %
NITRITE UR QL STRIP: POSITIVE
NRBC BLD AUTO-RTO: 0 /100 WBC
NT-PROBNP SERPL IA-MCNC: 44.6 PG/ML (ref 0–124)
OSMOLALITY SERPL CALC.SUM OF ELEC: 271.1 MOSMOL/KG (ref 275–300)
PH UR STRIP.AUTO: 6.5 [PH] (ref 5–9)
PLATELET # BLD AUTO: 213 THOU/MM3 (ref 130–400)
PMV BLD AUTO: 10.8 FL (ref 9.4–12.4)
POTASSIUM SERPL-SCNC: 4.3 MEQ/L (ref 3.5–5.2)
PROT UR STRIP.AUTO-MCNC: NEGATIVE MG/DL
RBC # BLD AUTO: 4.05 MILL/MM3 (ref 4.2–5.4)
RBC URINE: ABNORMAL /HPF
RENAL EPI CELLS #/AREA URNS HPF: ABNORMAL /[HPF]
SARS-COV-2 RNA RESP QL NAA+PROBE: DETECTED
SEGMENTED NEUTROPHILS ABSOLUTE COUNT: 2.7 THOU/MM3 (ref 1.8–7.7)
SODIUM SERPL-SCNC: 134 MEQ/L (ref 135–145)
SP GR UR REFRACT.AUTO: 1.02 (ref 1–1.03)
TROPONIN, HIGH SENSITIVITY: 9 NG/L (ref 0–12)
UROBILINOGEN, URINE: 0.2 EU/DL (ref 0–1)
VALPROATE SERPL-MCNC: 76.2 UG/ML (ref 50–100)
WBC # BLD AUTO: 5.8 THOU/MM3 (ref 4.8–10.8)
WBC #/AREA URNS HPF: > 200 /HPF
WBC #/AREA URNS HPF: ABNORMAL /[HPF]
YEAST LIKE FUNGI URNS QL MICRO: ABNORMAL

## 2023-10-29 PROCEDURE — 83880 ASSAY OF NATRIURETIC PEPTIDE: CPT

## 2023-10-29 PROCEDURE — 85025 COMPLETE CBC W/AUTO DIFF WBC: CPT

## 2023-10-29 PROCEDURE — 70450 CT HEAD/BRAIN W/O DYE: CPT

## 2023-10-29 PROCEDURE — 83605 ASSAY OF LACTIC ACID: CPT

## 2023-10-29 PROCEDURE — 93010 ELECTROCARDIOGRAM REPORT: CPT | Performed by: INTERNAL MEDICINE

## 2023-10-29 PROCEDURE — 6360000002 HC RX W HCPCS: Performed by: INTERNAL MEDICINE

## 2023-10-29 PROCEDURE — 87186 SC STD MICRODIL/AGAR DIL: CPT

## 2023-10-29 PROCEDURE — 87040 BLOOD CULTURE FOR BACTERIA: CPT

## 2023-10-29 PROCEDURE — 80164 ASSAY DIPROPYLACETIC ACD TOT: CPT

## 2023-10-29 PROCEDURE — 83735 ASSAY OF MAGNESIUM: CPT

## 2023-10-29 PROCEDURE — 96365 THER/PROPH/DIAG IV INF INIT: CPT

## 2023-10-29 PROCEDURE — 84484 ASSAY OF TROPONIN QUANT: CPT

## 2023-10-29 PROCEDURE — 80048 BASIC METABOLIC PNL TOTAL CA: CPT

## 2023-10-29 PROCEDURE — 80177 DRUG SCRN QUAN LEVETIRACETAM: CPT

## 2023-10-29 PROCEDURE — 99285 EMERGENCY DEPT VISIT HI MDM: CPT

## 2023-10-29 PROCEDURE — 6360000002 HC RX W HCPCS

## 2023-10-29 PROCEDURE — 87077 CULTURE AEROBIC IDENTIFY: CPT

## 2023-10-29 PROCEDURE — 71045 X-RAY EXAM CHEST 1 VIEW: CPT

## 2023-10-29 PROCEDURE — 99223 1ST HOSP IP/OBS HIGH 75: CPT | Performed by: INTERNAL MEDICINE

## 2023-10-29 PROCEDURE — 2580000003 HC RX 258

## 2023-10-29 PROCEDURE — 82140 ASSAY OF AMMONIA: CPT

## 2023-10-29 PROCEDURE — 36415 COLL VENOUS BLD VENIPUNCTURE: CPT

## 2023-10-29 PROCEDURE — 6370000000 HC RX 637 (ALT 250 FOR IP): Performed by: INTERNAL MEDICINE

## 2023-10-29 PROCEDURE — 81001 URINALYSIS AUTO W/SCOPE: CPT

## 2023-10-29 PROCEDURE — 2580000003 HC RX 258: Performed by: INTERNAL MEDICINE

## 2023-10-29 PROCEDURE — 1200000000 HC SEMI PRIVATE

## 2023-10-29 PROCEDURE — 87636 SARSCOV2 & INF A&B AMP PRB: CPT

## 2023-10-29 PROCEDURE — 93005 ELECTROCARDIOGRAM TRACING: CPT

## 2023-10-29 PROCEDURE — 87086 URINE CULTURE/COLONY COUNT: CPT

## 2023-10-29 RX ORDER — ENOXAPARIN SODIUM 100 MG/ML
30 INJECTION SUBCUTANEOUS EVERY 12 HOURS
Status: DISCONTINUED | OUTPATIENT
Start: 2023-10-29 | End: 2023-11-06 | Stop reason: HOSPADM

## 2023-10-29 RX ORDER — DEXTROAMPHETAMINE SACCHARATE, AMPHETAMINE ASPARTATE, DEXTROAMPHETAMINE SULFATE AND AMPHETAMINE SULFATE 1.25; 1.25; 1.25; 1.25 MG/1; MG/1; MG/1; MG/1
5 TABLET ORAL DAILY
Status: DISCONTINUED | OUTPATIENT
Start: 2023-10-30 | End: 2023-11-06 | Stop reason: HOSPADM

## 2023-10-29 RX ORDER — LACTULOSE 10 G/15ML
20 SOLUTION ORAL 3 TIMES DAILY
Status: DISCONTINUED | OUTPATIENT
Start: 2023-10-29 | End: 2023-11-03

## 2023-10-29 RX ORDER — 0.9 % SODIUM CHLORIDE 0.9 %
1000 INTRAVENOUS SOLUTION INTRAVENOUS ONCE
Status: COMPLETED | OUTPATIENT
Start: 2023-10-29 | End: 2023-10-29

## 2023-10-29 RX ORDER — LEVETIRACETAM 100 MG/ML
2000 SOLUTION ORAL 2 TIMES DAILY
Status: DISCONTINUED | OUTPATIENT
Start: 2023-10-29 | End: 2023-11-06 | Stop reason: HOSPADM

## 2023-10-29 RX ORDER — ACETAMINOPHEN 650 MG/1
650 SUPPOSITORY RECTAL EVERY 6 HOURS PRN
Status: DISCONTINUED | OUTPATIENT
Start: 2023-10-29 | End: 2023-11-06 | Stop reason: HOSPADM

## 2023-10-29 RX ORDER — SODIUM CHLORIDE 9 MG/ML
INJECTION, SOLUTION INTRAVENOUS PRN
Status: DISCONTINUED | OUTPATIENT
Start: 2023-10-29 | End: 2023-11-06 | Stop reason: HOSPADM

## 2023-10-29 RX ORDER — SODIUM CHLORIDE, SODIUM LACTATE, POTASSIUM CHLORIDE, CALCIUM CHLORIDE 600; 310; 30; 20 MG/100ML; MG/100ML; MG/100ML; MG/100ML
INJECTION, SOLUTION INTRAVENOUS CONTINUOUS
Status: DISCONTINUED | OUTPATIENT
Start: 2023-10-29 | End: 2023-11-01

## 2023-10-29 RX ORDER — ATORVASTATIN CALCIUM 20 MG/1
20 TABLET, FILM COATED ORAL NIGHTLY
Status: DISCONTINUED | OUTPATIENT
Start: 2023-10-29 | End: 2023-11-06 | Stop reason: HOSPADM

## 2023-10-29 RX ORDER — PHENOBARBITAL 32.4 MG/1
30 TABLET ORAL EVERY MORNING
Status: DISCONTINUED | OUTPATIENT
Start: 2023-10-30 | End: 2023-11-06 | Stop reason: HOSPADM

## 2023-10-29 RX ORDER — SODIUM CHLORIDE 0.9 % (FLUSH) 0.9 %
5-40 SYRINGE (ML) INJECTION PRN
Status: DISCONTINUED | OUTPATIENT
Start: 2023-10-29 | End: 2023-11-06 | Stop reason: HOSPADM

## 2023-10-29 RX ORDER — SODIUM CHLORIDE 0.9 % (FLUSH) 0.9 %
5-40 SYRINGE (ML) INJECTION EVERY 12 HOURS SCHEDULED
Status: DISCONTINUED | OUTPATIENT
Start: 2023-10-29 | End: 2023-11-06 | Stop reason: HOSPADM

## 2023-10-29 RX ORDER — VALPROIC ACID 250 MG/5ML
1000 SOLUTION ORAL EVERY 8 HOURS SCHEDULED
Status: DISCONTINUED | OUTPATIENT
Start: 2023-10-29 | End: 2023-11-06 | Stop reason: HOSPADM

## 2023-10-29 RX ORDER — PHENOBARBITAL 32.4 MG/1
60 TABLET ORAL NIGHTLY
Status: DISCONTINUED | OUTPATIENT
Start: 2023-10-29 | End: 2023-11-06 | Stop reason: HOSPADM

## 2023-10-29 RX ORDER — LANSOPRAZOLE 30 MG/1
30 TABLET, ORALLY DISINTEGRATING, DELAYED RELEASE ORAL
Status: DISCONTINUED | OUTPATIENT
Start: 2023-10-30 | End: 2023-11-06 | Stop reason: HOSPADM

## 2023-10-29 RX ORDER — VENLAFAXINE 50 MG/1
50 TABLET ORAL 3 TIMES DAILY
Status: DISCONTINUED | OUTPATIENT
Start: 2023-10-29 | End: 2023-11-06 | Stop reason: HOSPADM

## 2023-10-29 RX ORDER — LACOSAMIDE 200 MG/1
200 TABLET ORAL 2 TIMES DAILY
Status: DISCONTINUED | OUTPATIENT
Start: 2023-10-29 | End: 2023-11-06 | Stop reason: HOSPADM

## 2023-10-29 RX ORDER — ACETAMINOPHEN 325 MG/1
650 TABLET ORAL EVERY 6 HOURS PRN
Status: DISCONTINUED | OUTPATIENT
Start: 2023-10-29 | End: 2023-11-06 | Stop reason: HOSPADM

## 2023-10-29 RX ORDER — CEFTRIAXONE 1 G/1
1000 INJECTION, POWDER, FOR SOLUTION INTRAMUSCULAR; INTRAVENOUS ONCE
Status: DISCONTINUED | OUTPATIENT
Start: 2023-10-29 | End: 2023-10-29

## 2023-10-29 RX ORDER — LACOSAMIDE 200 MG/1
200 TABLET ORAL 2 TIMES DAILY
COMMUNITY

## 2023-10-29 RX ORDER — ASPIRIN 81 MG/1
81 TABLET, CHEWABLE ORAL DAILY
Status: DISCONTINUED | OUTPATIENT
Start: 2023-10-30 | End: 2023-11-06 | Stop reason: HOSPADM

## 2023-10-29 RX ORDER — LAMOTRIGINE 100 MG/1
100 TABLET ORAL 2 TIMES DAILY
Status: DISCONTINUED | OUTPATIENT
Start: 2023-10-29 | End: 2023-11-06 | Stop reason: HOSPADM

## 2023-10-29 RX ORDER — LIDOCAINE HCL 4% 4 G/100G
CREAM TOPICAL
COMMUNITY

## 2023-10-29 RX ADMIN — SODIUM CHLORIDE 1000 ML: 9 INJECTION, SOLUTION INTRAVENOUS at 14:45

## 2023-10-29 RX ADMIN — SODIUM CHLORIDE, PRESERVATIVE FREE 10 ML: 5 INJECTION INTRAVENOUS at 22:57

## 2023-10-29 RX ADMIN — SODIUM CHLORIDE, POTASSIUM CHLORIDE, SODIUM LACTATE AND CALCIUM CHLORIDE: 600; 310; 30; 20 INJECTION, SOLUTION INTRAVENOUS at 17:17

## 2023-10-29 RX ADMIN — CEFTRIAXONE SODIUM 1000 MG: 1 INJECTION, POWDER, FOR SOLUTION INTRAMUSCULAR; INTRAVENOUS at 14:46

## 2023-10-29 RX ADMIN — LEVETIRACETAM 2000 MG: 100 SOLUTION ORAL at 22:49

## 2023-10-29 RX ADMIN — VENLAFAXINE HYDROCHLORIDE 50 MG: 50 TABLET ORAL at 22:49

## 2023-10-29 RX ADMIN — VALPROIC ACID 1000 MG: 250 SOLUTION ORAL at 22:49

## 2023-10-29 RX ADMIN — ENOXAPARIN SODIUM 30 MG: 100 INJECTION SUBCUTANEOUS at 22:58

## 2023-10-29 RX ADMIN — LACOSAMIDE 200 MG: 200 TABLET, FILM COATED ORAL at 22:57

## 2023-10-29 RX ADMIN — LACTULOSE 20 G: 10 SOLUTION ORAL at 22:56

## 2023-10-29 RX ADMIN — PHENOBARBITAL 64.8 MG: 32.4 TABLET ORAL at 22:57

## 2023-10-29 RX ADMIN — LAMOTRIGINE 100 MG: 100 TABLET ORAL at 22:49

## 2023-10-29 RX ADMIN — ATORVASTATIN CALCIUM 20 MG: 20 TABLET, FILM COATED ORAL at 22:49

## 2023-10-29 ASSESSMENT — PAIN - FUNCTIONAL ASSESSMENT
PAIN_FUNCTIONAL_ASSESSMENT: NONE - DENIES PAIN

## 2023-10-29 NOTE — ED TRIAGE NOTES
Pt presents to the ED via EMS with complaints of decreased in level of consciousness, fatigue, and edema in both hands. Nursing home staff states that patient took home medications last night. Pt has hx of seizures.  Pt is confused, but nursing home states that normal.

## 2023-10-29 NOTE — ED PROVIDER NOTES
ED course section below for continuation and resolution of this initial assessment if applicable. PREVIOUS RECORDS  AND EXTERNAL INFORMATION REVIEWED   History obtained from: EMS report. Pertinent previous and/or external records reviewed:  Multiple recent hospitalizations especially related to seizures . Case discussed with specialties other than Emergency Medicine: Hospitalist      ED COURSE   ED Medications administered this visit (None if left blank):   Medications   sodium chloride 0.9 % bolus 1,000 mL (has no administration in time range)   sodium chloride 0.9 % bolus 1,000 mL (has no administration in time range)   cefTRIAXone (ROCEPHIN) 1,000 mg in sodium chloride 0.9 % 50 mL IVPB (mini-bag) (has no administration in time range)         ED Course as of 10/29/23 1435   Sun Oct 29, 2023   1330 SARS-CoV-2 RNA, RT PCR(!!): DETECTED [EM]   1330 Urine with Reflexed Micro(!):    Glucose, UA NEGATIVE   Bilirubin, Urine NEGATIVE   Ketones, Urine NEGATIVE   Specific Gravity, Urine 1.019   Blood, Urine TRACE(!)   pH, UA 6.5   Protein, UA NEGATIVE   Urobilinogen, Urine 0.2   Nitrite, Urine POSITIVE(!)   Leukocyte Esterase, Urine LARGE(!)   Color, UA YELLOW   Character, Urine CLOUDY(!)   RBC, UA 3-5   WBC, UA > 200   Epithelial Cells, UA 0-2   Bacteria, UA MANY   Casts UA NONE SEEN   Crystals, UA NONE SEEN   Renal Epithelial, UA NONE SEEN   Yeast, Urine NONE SEEN   CASTS 2 NONE SEEN   MISCELLANEOUS 2 NONE SEEN  uti [EM]   1330 Plan for abx [EM]   1353 Lactate, Sepsis:    Lactic Acid, Sepsis 1.8  Not septic shock [JW]   1412 CT HEAD WO CONTRAST  No acute findings [EM]   1412 Patient to be admitted with UTI, COVID, metabolic encephalopathy. No sepsis.  [EM]      ED Course User Index  [EM] Pura Nuñez DO  [JW] Rocael Henderson MD         PROCEDURES: (None if blank)  Procedures:       MEDICATION CHANGES     New Prescriptions    No medications on file         FINAL DISPOSITION   Medical Decision

## 2023-10-29 NOTE — ED NOTES
infection afterwards requiring IV abx       PAST MEDICAL HISTORY       Past Medical History:   Diagnosis Date    ADHD     Attention deficit hyperactivity disorder (ADHD)     Cerebral palsy ([address])     Depression     Essential hypertension     Learning disability     Seizure [facility])     Urinary urgency            Electronically signed by Jhonathan Onofre RN on 10/29/2023 at 3:41 PM       Latasha Bell RN  10/29/23 1 Quiana Arevalo RN  10/29/23 2738

## 2023-10-30 LAB
AMMONIA PLAS-MCNC: 42 UMOL/L (ref 11–60)
ANION GAP SERPL CALC-SCNC: 16 MEQ/L (ref 8–16)
BACTERIA UR CULT: ABNORMAL
BASOPHILS ABSOLUTE: 0 THOU/MM3 (ref 0–0.1)
BASOPHILS NFR BLD AUTO: 0.5 %
BUN SERPL-MCNC: 9 MG/DL (ref 7–22)
CALCIUM SERPL-MCNC: 9 MG/DL (ref 8.5–10.5)
CHLORIDE SERPL-SCNC: 100 MEQ/L (ref 98–111)
CO2 SERPL-SCNC: 24 MEQ/L (ref 23–33)
CREAT SERPL-MCNC: 0.4 MG/DL (ref 0.4–1.2)
DEPRECATED RDW RBC AUTO: 45.5 FL (ref 35–45)
EOSINOPHIL NFR BLD AUTO: 1.4 %
EOSINOPHILS ABSOLUTE: 0.1 THOU/MM3 (ref 0–0.4)
ERYTHROCYTE [DISTWIDTH] IN BLOOD BY AUTOMATED COUNT: 12.2 % (ref 11.5–14.5)
GFR SERPL CREATININE-BSD FRML MDRD: > 60 ML/MIN/1.73M2
GLUCOSE BLD STRIP.AUTO-MCNC: 92 MG/DL (ref 70–108)
GLUCOSE SERPL-MCNC: 93 MG/DL (ref 70–108)
HCT VFR BLD AUTO: 38.5 % (ref 37–47)
HGB BLD-MCNC: 12.9 GM/DL (ref 12–16)
IMM GRANULOCYTES # BLD AUTO: 0.09 THOU/MM3 (ref 0–0.07)
IMM GRANULOCYTES NFR BLD AUTO: 1.4 %
KEPPRA: 59 UG/ML
LYMPHOCYTES ABSOLUTE: 2.4 THOU/MM3 (ref 1–4.8)
LYMPHOCYTES NFR BLD AUTO: 37.5 %
MCH RBC QN AUTO: 33.8 PG (ref 26–33)
MCHC RBC AUTO-ENTMCNC: 33.5 GM/DL (ref 32.2–35.5)
MCV RBC AUTO: 100.8 FL (ref 81–99)
MONOCYTES ABSOLUTE: 0.7 THOU/MM3 (ref 0.4–1.3)
MONOCYTES NFR BLD AUTO: 10.7 %
NEUTROPHILS NFR BLD AUTO: 48.5 %
NRBC BLD AUTO-RTO: 0 /100 WBC
ORGANISM: ABNORMAL
PLATELET # BLD AUTO: 182 THOU/MM3 (ref 130–400)
PMV BLD AUTO: 10.9 FL (ref 9.4–12.4)
POTASSIUM SERPL-SCNC: 4.2 MEQ/L (ref 3.5–5.2)
RBC # BLD AUTO: 3.82 MILL/MM3 (ref 4.2–5.4)
SEGMENTED NEUTROPHILS ABSOLUTE COUNT: 3.1 THOU/MM3 (ref 1.8–7.7)
SODIUM SERPL-SCNC: 140 MEQ/L (ref 135–145)
WBC # BLD AUTO: 6.3 THOU/MM3 (ref 4.8–10.8)

## 2023-10-30 PROCEDURE — 80048 BASIC METABOLIC PNL TOTAL CA: CPT

## 2023-10-30 PROCEDURE — 82140 ASSAY OF AMMONIA: CPT

## 2023-10-30 PROCEDURE — 85025 COMPLETE CBC W/AUTO DIFF WBC: CPT

## 2023-10-30 PROCEDURE — 82948 REAGENT STRIP/BLOOD GLUCOSE: CPT

## 2023-10-30 PROCEDURE — 6360000002 HC RX W HCPCS: Performed by: INTERNAL MEDICINE

## 2023-10-30 PROCEDURE — 99232 SBSQ HOSP IP/OBS MODERATE 35: CPT | Performed by: INTERNAL MEDICINE

## 2023-10-30 PROCEDURE — 6370000000 HC RX 637 (ALT 250 FOR IP): Performed by: INTERNAL MEDICINE

## 2023-10-30 PROCEDURE — 36415 COLL VENOUS BLD VENIPUNCTURE: CPT

## 2023-10-30 PROCEDURE — 2580000003 HC RX 258: Performed by: INTERNAL MEDICINE

## 2023-10-30 PROCEDURE — 1200000000 HC SEMI PRIVATE

## 2023-10-30 RX ADMIN — PHENOBARBITAL 64.8 MG: 32.4 TABLET ORAL at 20:40

## 2023-10-30 RX ADMIN — DEXTROAMPHETAMINE SACCHARATE, AMPHETAMINE ASPARTATE, DEXTROAMPHETAMINE SULFATE, AMPHETAMINE SULFATE TABLETS, 5 MG,CLL 5 MG: 1.25; 1.25; 1.25; 1.25 TABLET ORAL at 09:21

## 2023-10-30 RX ADMIN — ENOXAPARIN SODIUM 30 MG: 100 INJECTION SUBCUTANEOUS at 20:39

## 2023-10-30 RX ADMIN — LAMOTRIGINE 100 MG: 100 TABLET ORAL at 09:21

## 2023-10-30 RX ADMIN — LACTULOSE 20 G: 10 SOLUTION ORAL at 14:23

## 2023-10-30 RX ADMIN — SODIUM CHLORIDE, POTASSIUM CHLORIDE, SODIUM LACTATE AND CALCIUM CHLORIDE: 600; 310; 30; 20 INJECTION, SOLUTION INTRAVENOUS at 03:40

## 2023-10-30 RX ADMIN — VALPROIC ACID 1000 MG: 250 SOLUTION ORAL at 14:23

## 2023-10-30 RX ADMIN — SODIUM CHLORIDE, POTASSIUM CHLORIDE, SODIUM LACTATE AND CALCIUM CHLORIDE: 600; 310; 30; 20 INJECTION, SOLUTION INTRAVENOUS at 14:15

## 2023-10-30 RX ADMIN — LAMOTRIGINE 100 MG: 100 TABLET ORAL at 20:40

## 2023-10-30 RX ADMIN — LACTULOSE 20 G: 10 SOLUTION ORAL at 09:20

## 2023-10-30 RX ADMIN — LEVETIRACETAM 2000 MG: 100 SOLUTION ORAL at 09:20

## 2023-10-30 RX ADMIN — ATORVASTATIN CALCIUM 20 MG: 20 TABLET, FILM COATED ORAL at 20:40

## 2023-10-30 RX ADMIN — LACOSAMIDE 200 MG: 200 TABLET, FILM COATED ORAL at 20:40

## 2023-10-30 RX ADMIN — PHENOBARBITAL 32.4 MG: 32.4 TABLET ORAL at 09:21

## 2023-10-30 RX ADMIN — LACOSAMIDE 200 MG: 200 TABLET, FILM COATED ORAL at 09:21

## 2023-10-30 RX ADMIN — CEFTRIAXONE SODIUM 1000 MG: 1 INJECTION, POWDER, FOR SOLUTION INTRAMUSCULAR; INTRAVENOUS at 14:21

## 2023-10-30 RX ADMIN — VENLAFAXINE HYDROCHLORIDE 50 MG: 50 TABLET ORAL at 20:40

## 2023-10-30 RX ADMIN — VALPROIC ACID 1000 MG: 250 SOLUTION ORAL at 20:40

## 2023-10-30 RX ADMIN — ASPIRIN 81 MG CHEWABLE TABLET 81 MG: 81 TABLET CHEWABLE at 09:21

## 2023-10-30 RX ADMIN — SERTRALINE 50 MG: 50 TABLET, FILM COATED ORAL at 09:20

## 2023-10-30 RX ADMIN — VENLAFAXINE HYDROCHLORIDE 50 MG: 50 TABLET ORAL at 14:23

## 2023-10-30 RX ADMIN — LEVETIRACETAM 2000 MG: 100 SOLUTION ORAL at 20:40

## 2023-10-30 RX ADMIN — VENLAFAXINE HYDROCHLORIDE 50 MG: 50 TABLET ORAL at 09:20

## 2023-10-30 RX ADMIN — VALPROIC ACID 1000 MG: 250 SOLUTION ORAL at 05:48

## 2023-10-30 RX ADMIN — LANSOPRAZOLE 30 MG: 30 TABLET, ORALLY DISINTEGRATING, DELAYED RELEASE ORAL at 05:50

## 2023-10-30 RX ADMIN — ENOXAPARIN SODIUM 30 MG: 100 INJECTION SUBCUTANEOUS at 09:43

## 2023-10-30 NOTE — FLOWSHEET NOTE
10/30/23 1313   Safe Environment   Safety Measures Bed in low position;Call light within reach; Side rails X 3  (Virtual Nurse Safety Round Completed)     Call placed to patients room, patient responds to audio, permitted video. Patient alert. Patient denied any concerns/complaints at this time. Patient educated on utilizing call light. Call light within reach, no signs or symptoms of distress noted.

## 2023-10-30 NOTE — CARE COORDINATION
10/30/23, 8:27 AM EDT      DISCHARGE PLANNING EVALUATION    Tommie Sauer  Admitted: 10/29/2023  Hospital Day: 1    Location: -10/010-A Reason for admit: Acute encephalopathy [G93.40]  Urinary tract infection without hematuria, site unspecified [N39.0]  COVID-19 [U07.1]    Past Medical History:   Diagnosis Date    ADHD     Attention deficit hyperactivity disorder (ADHD)     Cerebral palsy (HCC)     Depression     Essential hypertension     Learning disability     Seizure ([address])     Urinary urgency        Barriers to Discharge: Ammonia improved from 103 to 42. Covid+, UA abn.  LR infusion, IV rocephin, lactulose tid. Home meds per PEG. PCP: Dawn Henao MD    Readmission Risk Low 0-14, Mod 15-19), High > 20: Readmission Risk Score: 17.8      Advance Directives:      Code Status: Full Code   Patient's Primary Decision Maker is:        Patient Goals/Plan/Treatment Preferences: From [facility]. SW consulted. Transportation/Food Security/Housekeeping Addressed: No issues identified. If patient is discharged prior to next notation, then this note serves as note for discharge by case management.

## 2023-10-30 NOTE — FLOWSHEET NOTE
10/30/23 1036   Safe Environment   Safety Measures Bed in low position;Call light within reach; Side rails X 3  (Virtual Nurse Safety Round Completed)     Call placed to patients room, patient responds to audio, permitted video. Patient alert. Patient denied any concerns/complaints at this time. Patient educated on utilizing call light. Call light within reach, no signs or symptoms of distress noted.

## 2023-10-30 NOTE — DISCHARGE INSTR - COC
Continuity of Care Form    Patient Name: Wing Calhoun   :  1955  MRN:  [id number]    Admit date:  10/29/2023  Discharge date:  2023     Code Status Order: Full Code   Advance Directives:     Admitting Physician:  Katelyn Garcia MD  PCP: Macario Byrnes MD    Discharging Nurse: [facility] Unit/Room#: 6K-10/010-A  Discharging Unit Phone Number: [phone]     Emergency Contact:   Extended Emergency Contact Information  Primary Emergency Contact: Renato Millan)  [address] Phone: [phone]  Mobile Phone: [phone]  Relation: Other  Preferred language: English   needed? No    Past Surgical History:  Past Surgical History:   Procedure Laterality Date    GASTROSTOMY TUBE PLACEMENT  2023    EGD PEG TUBE PLACEMENT    GASTROSTOMY TUBE PLACEMENT N/A 2023    EGD PEG TUBE PLACEMENT performed by Patricia Taylor MD at [address] [place] [address] Left     ORIF    -- had hardware infection afterwards requiring IV abx       Immunization History: There is no immunization history on file for this patient. Active Problems:  Patient Active Problem List   Diagnosis Code    Osteomyelitis ([address]) M86.9    Wound infection complicating hardware, sequela T84. 7XXS    Urinary urgency R39.15    Attention deficit hyperactivity disorder (ADHD) F90.9    Depression F32. A    Seizure disorder ([address]) G40.909    Constipation K59.00    Status epilepticus (HCC) G40.901    Chronic osteomyelitis of left shoulder region [facility]) C92.839    Developmental disorder F89    Weakness of both lower extremities R29.898    Normocytic anemia D64.9    Hypokalemia E87.6    History of osteomyelitis Z87.39    History of status epilepticus Z86.69    Seizure ([facility]) R56.9    Breakthrough seizure ([facility]) G40.919    Encephalopathy acute G93.40    Cerebral palsy (HCC) G80.9    Acute encephalopathy G93.40       Isolation/Infection:   Isolation            Droplet Plus          Patient Infection Status

## 2023-10-30 NOTE — CARE COORDINATION
10/30/23, 1:31 PM EDT  Discharge Planning Evaluation  Social work consult received, patient from [facility]. Patient/Family preference is to return to [facility]. The patient's current payor source at the facility is medicaid. Medicare skilled days available: yes  Medicare does the patient have a three midnight qualifying stay? yes  Insurance precert:  no  Spoke with Keturah at the facility. Patient bed hold: yes  Anticipated transport plan: ambulance, patient dependent  Patient's Healthcare Decision Maker: Named in [address] [facility]    Readmission Risk Low 0-14, Mod 15-19), High > 20: Readmission Risk Score: 18.1    Current PCP: Mann Santos MD  PCP verified by CM? Yes    Patient Orientation: Unable to Assess    Patient Cognition: Alert  History Provided by: Other (see comment) ([employer] Guardian)    Advance Directives:      Code Status: Full Code   Patient's Primary Decision Maker is: Named in [address] [facility]       Discharge Planning:    Patient lives with: Other (Comment) (snf) Type of Home: [address]  Primary Care Giver: Other (Comment) ([facility] staff)  Patient Support Systems include: Other (Comment)   Current Financial resources: None  Current community resources: ECF/Home Care  Current services prior to admission: [address]            Current DME:              Type of Home Care services:  Skilled Therapy    ADLS  Prior functional level: Assistance with the following:, Bathing, Dressing, Toileting, Feeding, Cooking, Housework, Shopping, Mobility  Current functional level: Assistance with the following:, Bathing, Dressing, Toileting, Feeding, Cooking, Housework, Shopping, Mobility    Family can provide assistance at DC: No  Would you like Case Management to discuss the discharge plan with any other family members/significant others, and if so, who?  No  Plans to Return to Present Housing: Yes  Other Identified Issues/Barriers to RETURNING to current housing: n/a  Potential

## 2023-10-31 LAB
GLUCOSE BLD STRIP.AUTO-MCNC: 118 MG/DL (ref 70–108)
GLUCOSE BLD STRIP.AUTO-MCNC: 122 MG/DL (ref 70–108)

## 2023-10-31 PROCEDURE — 2580000003 HC RX 258: Performed by: INTERNAL MEDICINE

## 2023-10-31 PROCEDURE — 99233 SBSQ HOSP IP/OBS HIGH 50: CPT | Performed by: INTERNAL MEDICINE

## 2023-10-31 PROCEDURE — 92610 EVALUATE SWALLOWING FUNCTION: CPT

## 2023-10-31 PROCEDURE — 1200000000 HC SEMI PRIVATE

## 2023-10-31 PROCEDURE — 6370000000 HC RX 637 (ALT 250 FOR IP): Performed by: INTERNAL MEDICINE

## 2023-10-31 PROCEDURE — 6360000002 HC RX W HCPCS: Performed by: INTERNAL MEDICINE

## 2023-10-31 PROCEDURE — 92526 ORAL FUNCTION THERAPY: CPT

## 2023-10-31 PROCEDURE — 82948 REAGENT STRIP/BLOOD GLUCOSE: CPT

## 2023-10-31 RX ADMIN — ENOXAPARIN SODIUM 30 MG: 100 INJECTION SUBCUTANEOUS at 08:51

## 2023-10-31 RX ADMIN — LACTULOSE 20 G: 10 SOLUTION ORAL at 23:01

## 2023-10-31 RX ADMIN — LACOSAMIDE 200 MG: 200 TABLET, FILM COATED ORAL at 22:59

## 2023-10-31 RX ADMIN — LEVETIRACETAM 2000 MG: 100 SOLUTION ORAL at 23:00

## 2023-10-31 RX ADMIN — VENLAFAXINE HYDROCHLORIDE 50 MG: 50 TABLET ORAL at 15:15

## 2023-10-31 RX ADMIN — SODIUM CHLORIDE, PRESERVATIVE FREE 10 ML: 5 INJECTION INTRAVENOUS at 23:01

## 2023-10-31 RX ADMIN — VENLAFAXINE HYDROCHLORIDE 50 MG: 50 TABLET ORAL at 08:52

## 2023-10-31 RX ADMIN — LAMOTRIGINE 100 MG: 100 TABLET ORAL at 08:51

## 2023-10-31 RX ADMIN — VALPROIC ACID 1000 MG: 250 SOLUTION ORAL at 05:18

## 2023-10-31 RX ADMIN — SODIUM CHLORIDE, PRESERVATIVE FREE 10 ML: 5 INJECTION INTRAVENOUS at 08:52

## 2023-10-31 RX ADMIN — ASPIRIN 81 MG CHEWABLE TABLET 81 MG: 81 TABLET CHEWABLE at 08:51

## 2023-10-31 RX ADMIN — CEFTRIAXONE SODIUM 1000 MG: 1 INJECTION, POWDER, FOR SOLUTION INTRAMUSCULAR; INTRAVENOUS at 15:12

## 2023-10-31 RX ADMIN — LACOSAMIDE 200 MG: 200 TABLET, FILM COATED ORAL at 08:52

## 2023-10-31 RX ADMIN — LANSOPRAZOLE 30 MG: 30 TABLET, ORALLY DISINTEGRATING, DELAYED RELEASE ORAL at 05:18

## 2023-10-31 RX ADMIN — LAMOTRIGINE 100 MG: 100 TABLET ORAL at 23:00

## 2023-10-31 RX ADMIN — DEXTROAMPHETAMINE SACCHARATE, AMPHETAMINE ASPARTATE, DEXTROAMPHETAMINE SULFATE, AMPHETAMINE SULFATE TABLETS, 5 MG,CLL 5 MG: 1.25; 1.25; 1.25; 1.25 TABLET ORAL at 08:52

## 2023-10-31 RX ADMIN — PHENOBARBITAL 32.4 MG: 32.4 TABLET ORAL at 08:52

## 2023-10-31 RX ADMIN — PHENOBARBITAL 64.8 MG: 32.4 TABLET ORAL at 19:20

## 2023-10-31 RX ADMIN — ATORVASTATIN CALCIUM 20 MG: 20 TABLET, FILM COATED ORAL at 22:58

## 2023-10-31 RX ADMIN — VENLAFAXINE HYDROCHLORIDE 50 MG: 50 TABLET ORAL at 19:50

## 2023-10-31 RX ADMIN — ENOXAPARIN SODIUM 30 MG: 100 INJECTION SUBCUTANEOUS at 22:59

## 2023-10-31 RX ADMIN — SERTRALINE 50 MG: 50 TABLET, FILM COATED ORAL at 15:12

## 2023-10-31 RX ADMIN — LEVETIRACETAM 2000 MG: 100 SOLUTION ORAL at 08:52

## 2023-10-31 RX ADMIN — VALPROIC ACID 1000 MG: 250 SOLUTION ORAL at 15:16

## 2023-10-31 RX ADMIN — VALPROIC ACID 1000 MG: 250 SOLUTION ORAL at 21:00

## 2023-10-31 ASSESSMENT — PAIN SCALES - WONG BAKER
WONGBAKER_NUMERICALRESPONSE: 0

## 2023-10-31 NOTE — FLOWSHEET NOTE
10/31/23 1316   Safe Environment   Safety Measures Side rails X 2;Standard Safety Measures  (virtual nurse safety round complete)     VN called into patients room and introduced myself and role. Pt did not respond to voice, camera turned on for safety. Pt resting in bed with eyes closed. Chest visibly rising and falling. No apparent signs of distress.

## 2023-10-31 NOTE — CARE COORDINATION
10/31/23, 3:21 PM EDT    DISCHARGE ON GOING EVALUATION    [facility]. day: 2  Location: 6-10/010-A Reason for admit: Acute encephalopathy [G93.40]  Urinary tract infection without hematuria, site unspecified [N39.0]  COVID-19 [U07.1]   Barriers to Discharge: ST recommends NPO with TF via PEG. LR infusion, IV rocephin. PT/OT.    PCP: Gorge Trinidad MD  Readmission Risk Score: 18.3%  Patient Goals/Plan/Treatment Preferences: return to [facility]

## 2023-10-31 NOTE — FLOWSHEET NOTE
10/31/23 1023   Safe Environment   Safety Measures Side rails X 2;Standard Safety Measures  (virtual nurse safety round complete)     VN called into patients room and introduced myself and role. Pt did not respond to voice, camera turned on for safety. Pt resting in bed with eyes closed. Chest visibly rising and falling. No apparent signs of distress.

## 2023-11-01 PROBLEM — N39.0 URINARY TRACT INFECTION WITHOUT HEMATURIA: Status: ACTIVE | Noted: 2023-11-01

## 2023-11-01 PROBLEM — R41.82 ALTERED MENTAL STATUS: Status: ACTIVE | Noted: 2023-11-01

## 2023-11-01 PROBLEM — E72.20 HYPERAMMONEMIA (HCC): Status: ACTIVE | Noted: 2023-11-01

## 2023-11-01 PROBLEM — U07.1 COVID-19: Status: ACTIVE | Noted: 2023-11-01

## 2023-11-01 LAB
ALBUMIN SERPL BCG-MCNC: 3 G/DL (ref 3.5–5.1)
ALP SERPL-CCNC: 50 U/L (ref 38–126)
ALT SERPL W/O P-5'-P-CCNC: 10 U/L (ref 11–66)
AMMONIA PLAS-MCNC: 92 UMOL/L (ref 11–60)
ANION GAP SERPL CALC-SCNC: 15 MEQ/L (ref 8–16)
AST SERPL-CCNC: 26 U/L (ref 5–40)
BILIRUB SERPL-MCNC: 0.2 MG/DL (ref 0.3–1.2)
BUN SERPL-MCNC: 10 MG/DL (ref 7–22)
CALCIUM SERPL-MCNC: 9 MG/DL (ref 8.5–10.5)
CHLORIDE SERPL-SCNC: 102 MEQ/L (ref 98–111)
CO2 SERPL-SCNC: 24 MEQ/L (ref 23–33)
CREAT SERPL-MCNC: 0.3 MG/DL (ref 0.4–1.2)
CRP SERPL-MCNC: 0.45 MG/DL (ref 0–1)
DEPRECATED RDW RBC AUTO: 42.7 FL (ref 35–45)
EKG ATRIAL RATE: 83 BPM
EKG P AXIS: 47 DEGREES
EKG P-R INTERVAL: 162 MS
EKG Q-T INTERVAL: 372 MS
EKG QRS DURATION: 88 MS
EKG QTC CALCULATION (BAZETT): 437 MS
EKG R AXIS: -9 DEGREES
EKG T AXIS: 18 DEGREES
EKG VENTRICULAR RATE: 83 BPM
ERYTHROCYTE [DISTWIDTH] IN BLOOD BY AUTOMATED COUNT: 12.1 % (ref 11.5–14.5)
ERYTHROCYTE [SEDIMENTATION RATE] IN BLOOD BY WESTERGREN METHOD: 5 MM/HR (ref 0–20)
GFR SERPL CREATININE-BSD FRML MDRD: > 60 ML/MIN/1.73M2
GLUCOSE BLD STRIP.AUTO-MCNC: 111 MG/DL (ref 70–108)
GLUCOSE SERPL-MCNC: 113 MG/DL (ref 70–108)
HCT VFR BLD AUTO: 37.8 % (ref 37–47)
HGB BLD-MCNC: 13.4 GM/DL (ref 12–16)
LACTATE SERPL-SCNC: 2 MMOL/L (ref 0.5–2)
MCH RBC QN AUTO: 33.9 PG (ref 26–33)
MCHC RBC AUTO-ENTMCNC: 35.4 GM/DL (ref 32.2–35.5)
MCV RBC AUTO: 95.7 FL (ref 81–99)
PLATELET # BLD AUTO: 177 THOU/MM3 (ref 130–400)
PMV BLD AUTO: 10.7 FL (ref 9.4–12.4)
POTASSIUM SERPL-SCNC: 4.3 MEQ/L (ref 3.5–5.2)
PROT SERPL-MCNC: 5.9 G/DL (ref 6.1–8)
RBC # BLD AUTO: 3.95 MILL/MM3 (ref 4.2–5.4)
SODIUM SERPL-SCNC: 141 MEQ/L (ref 135–145)
VALPROATE SERPL-MCNC: 69.7 UG/ML (ref 50–100)
WBC # BLD AUTO: 7.1 THOU/MM3 (ref 4.8–10.8)

## 2023-11-01 PROCEDURE — 80053 COMPREHEN METABOLIC PANEL: CPT

## 2023-11-01 PROCEDURE — 82948 REAGENT STRIP/BLOOD GLUCOSE: CPT

## 2023-11-01 PROCEDURE — 82140 ASSAY OF AMMONIA: CPT

## 2023-11-01 PROCEDURE — 6370000000 HC RX 637 (ALT 250 FOR IP): Performed by: INTERNAL MEDICINE

## 2023-11-01 PROCEDURE — 80164 ASSAY DIPROPYLACETIC ACD TOT: CPT

## 2023-11-01 PROCEDURE — 85651 RBC SED RATE NONAUTOMATED: CPT

## 2023-11-01 PROCEDURE — 36415 COLL VENOUS BLD VENIPUNCTURE: CPT

## 2023-11-01 PROCEDURE — 85027 COMPLETE CBC AUTOMATED: CPT

## 2023-11-01 PROCEDURE — 83605 ASSAY OF LACTIC ACID: CPT

## 2023-11-01 PROCEDURE — 86140 C-REACTIVE PROTEIN: CPT

## 2023-11-01 PROCEDURE — 1200000000 HC SEMI PRIVATE

## 2023-11-01 PROCEDURE — 99232 SBSQ HOSP IP/OBS MODERATE 35: CPT | Performed by: STUDENT IN AN ORGANIZED HEALTH CARE EDUCATION/TRAINING PROGRAM

## 2023-11-01 PROCEDURE — 6360000002 HC RX W HCPCS: Performed by: INTERNAL MEDICINE

## 2023-11-01 PROCEDURE — 2580000003 HC RX 258: Performed by: INTERNAL MEDICINE

## 2023-11-01 RX ADMIN — LACTULOSE 20 G: 10 SOLUTION ORAL at 20:51

## 2023-11-01 RX ADMIN — PHENOBARBITAL 32.4 MG: 32.4 TABLET ORAL at 09:29

## 2023-11-01 RX ADMIN — SERTRALINE 50 MG: 50 TABLET, FILM COATED ORAL at 09:29

## 2023-11-01 RX ADMIN — ASPIRIN 81 MG CHEWABLE TABLET 81 MG: 81 TABLET CHEWABLE at 09:29

## 2023-11-01 RX ADMIN — LAMOTRIGINE 100 MG: 100 TABLET ORAL at 09:29

## 2023-11-01 RX ADMIN — LACOSAMIDE 200 MG: 200 TABLET, FILM COATED ORAL at 09:29

## 2023-11-01 RX ADMIN — SODIUM CHLORIDE, POTASSIUM CHLORIDE, SODIUM LACTATE AND CALCIUM CHLORIDE: 600; 310; 30; 20 INJECTION, SOLUTION INTRAVENOUS at 07:35

## 2023-11-01 RX ADMIN — LEVETIRACETAM 2000 MG: 100 SOLUTION ORAL at 09:29

## 2023-11-01 RX ADMIN — SODIUM CHLORIDE, PRESERVATIVE FREE 10 ML: 5 INJECTION INTRAVENOUS at 09:30

## 2023-11-01 RX ADMIN — ENOXAPARIN SODIUM 30 MG: 100 INJECTION SUBCUTANEOUS at 20:52

## 2023-11-01 RX ADMIN — SODIUM CHLORIDE, PRESERVATIVE FREE 10 ML: 5 INJECTION INTRAVENOUS at 20:52

## 2023-11-01 RX ADMIN — CEFTRIAXONE SODIUM 1000 MG: 1 INJECTION, POWDER, FOR SOLUTION INTRAMUSCULAR; INTRAVENOUS at 15:01

## 2023-11-01 RX ADMIN — ENOXAPARIN SODIUM 30 MG: 100 INJECTION SUBCUTANEOUS at 09:29

## 2023-11-01 RX ADMIN — VALPROIC ACID 1000 MG: 250 SOLUTION ORAL at 06:23

## 2023-11-01 RX ADMIN — LAMOTRIGINE 100 MG: 100 TABLET ORAL at 20:52

## 2023-11-01 RX ADMIN — VALPROIC ACID 1000 MG: 250 SOLUTION ORAL at 14:51

## 2023-11-01 RX ADMIN — VENLAFAXINE HYDROCHLORIDE 50 MG: 50 TABLET ORAL at 20:52

## 2023-11-01 RX ADMIN — PHENOBARBITAL 64.8 MG: 32.4 TABLET ORAL at 20:51

## 2023-11-01 RX ADMIN — DEXTROAMPHETAMINE SACCHARATE, AMPHETAMINE ASPARTATE, DEXTROAMPHETAMINE SULFATE, AMPHETAMINE SULFATE TABLETS, 5 MG,CLL 5 MG: 1.25; 1.25; 1.25; 1.25 TABLET ORAL at 09:29

## 2023-11-01 RX ADMIN — LEVETIRACETAM 2000 MG: 100 SOLUTION ORAL at 20:51

## 2023-11-01 RX ADMIN — LACTULOSE 20 G: 10 SOLUTION ORAL at 09:29

## 2023-11-01 RX ADMIN — VENLAFAXINE HYDROCHLORIDE 50 MG: 50 TABLET ORAL at 14:52

## 2023-11-01 RX ADMIN — VENLAFAXINE HYDROCHLORIDE 50 MG: 50 TABLET ORAL at 09:29

## 2023-11-01 RX ADMIN — LANSOPRAZOLE 30 MG: 30 TABLET, ORALLY DISINTEGRATING, DELAYED RELEASE ORAL at 09:29

## 2023-11-01 ASSESSMENT — PAIN SCALES - PAIN ASSESSMENT IN ADVANCED DEMENTIA (PAINAD)
TOTALSCORE: 0
NEGVOCALIZATION: 0
BREATHING: 0
BODYLANGUAGE: 0
BREATHING: 0
NEGVOCALIZATION: 0
BREATHING: 0
BREATHING: 0
FACIALEXPRESSION: 0
BODYLANGUAGE: 0
CONSOLABILITY: 0
FACIALEXPRESSION: 0
TOTALSCORE: 0
CONSOLABILITY: 0
FACIALEXPRESSION: 0
BODYLANGUAGE: 0
TOTALSCORE: 0
CONSOLABILITY: 0
BODYLANGUAGE: 0
TOTALSCORE: 0
FACIALEXPRESSION: 0
TOTALSCORE: 0
BODYLANGUAGE: 0
CONSOLABILITY: 0
FACIALEXPRESSION: 0
FACIALEXPRESSION: 0
NEGVOCALIZATION: 0
NEGVOCALIZATION: 0
BODYLANGUAGE: 0
NEGVOCALIZATION: 0
FACIALEXPRESSION: 0
BODYLANGUAGE: 0
NEGVOCALIZATION: 0
CONSOLABILITY: 0
BREATHING: 0
NEGVOCALIZATION: 0
CONSOLABILITY: 0
CONSOLABILITY: 0

## 2023-11-01 ASSESSMENT — PAIN SCALES - WONG BAKER
WONGBAKER_NUMERICALRESPONSE: 0

## 2023-11-01 ASSESSMENT — PAIN SCALES - GENERAL
PAINLEVEL_OUTOF10: 0

## 2023-11-01 NOTE — CARE COORDINATION
11/1/23, 9:01 AM EDT    DISCHARGE ON GOING EVALUATION    [address] day: 3  Location: -10/010-A Reason for admit: Acute encephalopathy [G93.40]  Urinary tract infection without hematuria, site unspecified [N39.0]  COVID-19 [U07.1]   Barriers to Discharge: TF per peg. LR infusion, IV rocephin, lactulose tid. ST to re-eval once patient more alert. PT/OT. PCP: Chrissie Espinoza MD  Readmission Risk Score: 18.1%  Patient Goals/Plan/Treatment Preferences: return to [facility].

## 2023-11-01 NOTE — FLOWSHEET NOTE
11/01/23 1055   Safe Environment   Safety Measures Standard Safety Measures  (virtual safety round)     Pt did not respond to voice, camera turned on for safety. Pt resting in bed with eyes closed. Chest visibly rising and falling. No apparent signs of distress.

## 2023-11-01 NOTE — FLOWSHEET NOTE
11/01/23 5615   Safe Environment   Safety Measures Standard Safety Measures;Call light within reach  (virtual safety round)     Pt did not respond to voice, camera turned on for safety. Pt resting in bed with eyes closed. Chest visibly rising and falling no apparent signs of distress.

## 2023-11-02 LAB
AMMONIA PLAS-MCNC: 50 UMOL/L (ref 11–60)
ANION GAP SERPL CALC-SCNC: 24 MEQ/L (ref 8–16)
BUN SERPL-MCNC: 10 MG/DL (ref 7–22)
CALCIUM SERPL-MCNC: 8.4 MG/DL (ref 8.5–10.5)
CHLORIDE SERPL-SCNC: 102 MEQ/L (ref 98–111)
CK SERPL-CCNC: 47 U/L (ref 30–135)
CO2 SERPL-SCNC: 18 MEQ/L (ref 23–33)
CREAT SERPL-MCNC: 0.4 MG/DL (ref 0.4–1.2)
GFR SERPL CREATININE-BSD FRML MDRD: > 60 ML/MIN/1.73M2
GLUCOSE SERPL-MCNC: 89 MG/DL (ref 70–108)
POTASSIUM SERPL-SCNC: 4.6 MEQ/L (ref 3.5–5.2)
SODIUM SERPL-SCNC: 144 MEQ/L (ref 135–145)

## 2023-11-02 PROCEDURE — 36415 COLL VENOUS BLD VENIPUNCTURE: CPT

## 2023-11-02 PROCEDURE — 99232 SBSQ HOSP IP/OBS MODERATE 35: CPT | Performed by: STUDENT IN AN ORGANIZED HEALTH CARE EDUCATION/TRAINING PROGRAM

## 2023-11-02 PROCEDURE — 97162 PT EVAL MOD COMPLEX 30 MIN: CPT

## 2023-11-02 PROCEDURE — 2580000003 HC RX 258: Performed by: INTERNAL MEDICINE

## 2023-11-02 PROCEDURE — 1200000000 HC SEMI PRIVATE

## 2023-11-02 PROCEDURE — 6360000002 HC RX W HCPCS: Performed by: STUDENT IN AN ORGANIZED HEALTH CARE EDUCATION/TRAINING PROGRAM

## 2023-11-02 PROCEDURE — 6360000002 HC RX W HCPCS: Performed by: INTERNAL MEDICINE

## 2023-11-02 PROCEDURE — 6370000000 HC RX 637 (ALT 250 FOR IP): Performed by: INTERNAL MEDICINE

## 2023-11-02 PROCEDURE — 6370000000 HC RX 637 (ALT 250 FOR IP): Performed by: STUDENT IN AN ORGANIZED HEALTH CARE EDUCATION/TRAINING PROGRAM

## 2023-11-02 PROCEDURE — 80048 BASIC METABOLIC PNL TOTAL CA: CPT

## 2023-11-02 PROCEDURE — 82550 ASSAY OF CK (CPK): CPT

## 2023-11-02 PROCEDURE — 82140 ASSAY OF AMMONIA: CPT

## 2023-11-02 PROCEDURE — 97530 THERAPEUTIC ACTIVITIES: CPT

## 2023-11-02 RX ORDER — AMLODIPINE BESYLATE 5 MG/1
5 TABLET ORAL DAILY
Status: DISCONTINUED | OUTPATIENT
Start: 2023-11-02 | End: 2023-11-06 | Stop reason: HOSPADM

## 2023-11-02 RX ORDER — LEVOCARNITINE 330 MG/1
990 TABLET ORAL 2 TIMES DAILY
Status: DISCONTINUED | OUTPATIENT
Start: 2023-11-02 | End: 2023-11-06 | Stop reason: HOSPADM

## 2023-11-02 RX ORDER — LOPERAMIDE HYDROCHLORIDE 2 MG/1
2 CAPSULE ORAL 4 TIMES DAILY PRN
Status: DISCONTINUED | OUTPATIENT
Start: 2023-11-02 | End: 2023-11-05

## 2023-11-02 RX ORDER — POTASSIUM CHLORIDE 20 MEQ/1
20 TABLET, EXTENDED RELEASE ORAL ONCE
Status: COMPLETED | OUTPATIENT
Start: 2023-11-02 | End: 2023-11-02

## 2023-11-02 RX ORDER — FUROSEMIDE 10 MG/ML
40 INJECTION INTRAMUSCULAR; INTRAVENOUS ONCE
Status: COMPLETED | OUTPATIENT
Start: 2023-11-02 | End: 2023-11-02

## 2023-11-02 RX ADMIN — LACTULOSE 20 G: 10 SOLUTION ORAL at 23:30

## 2023-11-02 RX ADMIN — PHENOBARBITAL 64.8 MG: 32.4 TABLET ORAL at 23:28

## 2023-11-02 RX ADMIN — LACOSAMIDE 200 MG: 200 TABLET, FILM COATED ORAL at 09:32

## 2023-11-02 RX ADMIN — VENLAFAXINE HYDROCHLORIDE 50 MG: 50 TABLET ORAL at 09:31

## 2023-11-02 RX ADMIN — LEVOCARNITINE 990 MG: 330 TABLET ORAL at 23:30

## 2023-11-02 RX ADMIN — ATORVASTATIN CALCIUM 20 MG: 20 TABLET, FILM COATED ORAL at 23:30

## 2023-11-02 RX ADMIN — LACTULOSE 20 G: 10 SOLUTION ORAL at 15:22

## 2023-11-02 RX ADMIN — VENLAFAXINE HYDROCHLORIDE 50 MG: 50 TABLET ORAL at 15:21

## 2023-11-02 RX ADMIN — VALPROIC ACID 1000 MG: 250 SOLUTION ORAL at 23:29

## 2023-11-02 RX ADMIN — LEVETIRACETAM 2000 MG: 100 SOLUTION ORAL at 09:32

## 2023-11-02 RX ADMIN — VENLAFAXINE HYDROCHLORIDE 50 MG: 50 TABLET ORAL at 23:30

## 2023-11-02 RX ADMIN — DEXTROAMPHETAMINE SACCHARATE, AMPHETAMINE ASPARTATE, DEXTROAMPHETAMINE SULFATE, AMPHETAMINE SULFATE TABLETS, 5 MG,CLL 5 MG: 1.25; 1.25; 1.25; 1.25 TABLET ORAL at 09:31

## 2023-11-02 RX ADMIN — FUROSEMIDE 40 MG: 10 INJECTION, SOLUTION INTRAMUSCULAR; INTRAVENOUS at 10:40

## 2023-11-02 RX ADMIN — AMLODIPINE BESYLATE 5 MG: 5 TABLET ORAL at 15:21

## 2023-11-02 RX ADMIN — SERTRALINE 50 MG: 50 TABLET, FILM COATED ORAL at 09:31

## 2023-11-02 RX ADMIN — ATORVASTATIN CALCIUM 20 MG: 20 TABLET, FILM COATED ORAL at 01:32

## 2023-11-02 RX ADMIN — ENOXAPARIN SODIUM 30 MG: 100 INJECTION SUBCUTANEOUS at 23:30

## 2023-11-02 RX ADMIN — ASPIRIN 81 MG CHEWABLE TABLET 81 MG: 81 TABLET CHEWABLE at 09:31

## 2023-11-02 RX ADMIN — LACTULOSE 20 G: 10 SOLUTION ORAL at 09:31

## 2023-11-02 RX ADMIN — SODIUM CHLORIDE, PRESERVATIVE FREE 10 ML: 5 INJECTION INTRAVENOUS at 23:49

## 2023-11-02 RX ADMIN — POTASSIUM CHLORIDE 20 MEQ: 1500 TABLET, EXTENDED RELEASE ORAL at 10:40

## 2023-11-02 RX ADMIN — CEFTRIAXONE SODIUM 1000 MG: 1 INJECTION, POWDER, FOR SOLUTION INTRAMUSCULAR; INTRAVENOUS at 15:24

## 2023-11-02 RX ADMIN — LAMOTRIGINE 100 MG: 100 TABLET ORAL at 23:30

## 2023-11-02 RX ADMIN — PHENOBARBITAL 32.4 MG: 32.4 TABLET ORAL at 09:31

## 2023-11-02 RX ADMIN — ENOXAPARIN SODIUM 30 MG: 100 INJECTION SUBCUTANEOUS at 09:31

## 2023-11-02 RX ADMIN — LACOSAMIDE 200 MG: 200 TABLET, FILM COATED ORAL at 01:32

## 2023-11-02 RX ADMIN — LEVETIRACETAM 2000 MG: 100 SOLUTION ORAL at 23:29

## 2023-11-02 RX ADMIN — LAMOTRIGINE 100 MG: 100 TABLET ORAL at 09:31

## 2023-11-02 RX ADMIN — LANSOPRAZOLE 30 MG: 30 TABLET, ORALLY DISINTEGRATING, DELAYED RELEASE ORAL at 09:31

## 2023-11-02 RX ADMIN — VALPROIC ACID 1000 MG: 250 SOLUTION ORAL at 15:21

## 2023-11-02 RX ADMIN — LACOSAMIDE 200 MG: 200 TABLET, FILM COATED ORAL at 23:30

## 2023-11-02 ASSESSMENT — PAIN SCALES - PAIN ASSESSMENT IN ADVANCED DEMENTIA (PAINAD)
TOTALSCORE: 0
BODYLANGUAGE: 0
NEGVOCALIZATION: 0
CONSOLABILITY: 0
NEGVOCALIZATION: 0
BODYLANGUAGE: 0
CONSOLABILITY: 0
BODYLANGUAGE: 0
FACIALEXPRESSION: 1
BREATHING: 0
TOTALSCORE: 0
FACIALEXPRESSION: 0
CONSOLABILITY: 0
NEGVOCALIZATION: 0
BREATHING: 0
TOTALSCORE: 0
BREATHING: 0
FACIALEXPRESSION: 0
BREATHING: 0
FACIALEXPRESSION: 0
BREATHING: 0
FACIALEXPRESSION: 1
TOTALSCORE: 0
BREATHING: 0
TOTALSCORE: 1
BODYLANGUAGE: 0
BODYLANGUAGE: 0
FACIALEXPRESSION: 1
NEGVOCALIZATION: 0
CONSOLABILITY: 0
TOTALSCORE: 1
FACIALEXPRESSION: 0
TOTALSCORE: 1
BODYLANGUAGE: 0
CONSOLABILITY: 0
NEGVOCALIZATION: 0
BREATHING: 0
NEGVOCALIZATION: 0
NEGVOCALIZATION: 0
CONSOLABILITY: 0
CONSOLABILITY: 0
BODYLANGUAGE: 0

## 2023-11-02 ASSESSMENT — PAIN SCALES - GENERAL: PAINLEVEL_OUTOF10: 0

## 2023-11-02 ASSESSMENT — PAIN SCALES - WONG BAKER
WONGBAKER_NUMERICALRESPONSE: 2
WONGBAKER_NUMERICALRESPONSE: 0

## 2023-11-02 NOTE — CARE COORDINATION
11/2/23, 11:28 AM EDT    DISCHARGE ON GOING EVALUATION    [address] day: 4  Location: [facility]10/010-A Reason for admit: Acute encephalopathy [G93.40]  Urinary tract infection without hematuria, site unspecified [N39.0]  COVID-19 [U07.1]   Procedure: n/a  Barriers to Discharge: Hospitalist following. Nurse reports she is more alert today. Ammonia 50. Lactulose. Vimpat. Lamictal. Keppra. Phenobarb. Depakene. IV rocephin. Peg. PCP: Triston Blanco MD  Readmission Risk Score: 18.7%  Patient Goals/Plan/Treatment Preferences: Return to [facility].

## 2023-11-02 NOTE — FLOWSHEET NOTE
11/02/23 1625   Safe Environment   Safety Measures Bed in low position;Call light within reach; Side rails X 3  (Virtual Nurse Safety Round Completed)     Call placed to patients room, patient did not respond to audio, video turned on for safety purposes. Patient is resting in bed with eyes closed, call light within reach, no signs or symptoms of distress noted.

## 2023-11-02 NOTE — FLOWSHEET NOTE
11/02/23 1125   Safe Environment   Safety Measures Bed in low position;Call light within reach; Side rails X 2  (Virtual Nurse Safety Round Completed)     Call placed to patients room, patient responds to audio, permitted video. Patient alert. Patient denied any concerns/complaints at this time. Patient educated on utilizing call light.  Call light within reach, no signs or symptoms of distress noted

## 2023-11-03 PROBLEM — R60.1 ANASARCA: Status: ACTIVE | Noted: 2023-11-03

## 2023-11-03 LAB
AMMONIA PLAS-MCNC: 57 UMOL/L (ref 11–60)
ANION GAP SERPL CALC-SCNC: 9 MEQ/L (ref 8–16)
BACTERIA BLD AEROBE CULT: NORMAL
BUN SERPL-MCNC: 13 MG/DL (ref 7–22)
CA-I BLD ISE-SCNC: 1.18 MMOL/L (ref 1.12–1.32)
CALCIUM SERPL-MCNC: 9.1 MG/DL (ref 8.5–10.5)
CHLORIDE SERPL-SCNC: 98 MEQ/L (ref 98–111)
CO2 SERPL-SCNC: 31 MEQ/L (ref 23–33)
CREAT SERPL-MCNC: 0.3 MG/DL (ref 0.4–1.2)
DEPRECATED RDW RBC AUTO: 48.1 FL (ref 35–45)
ERYTHROCYTE [DISTWIDTH] IN BLOOD BY AUTOMATED COUNT: 12.3 % (ref 11.5–14.5)
GFR SERPL CREATININE-BSD FRML MDRD: > 60 ML/MIN/1.73M2
GLUCOSE SERPL-MCNC: 114 MG/DL (ref 70–108)
HCT VFR BLD AUTO: 41.7 % (ref 37–47)
HGB BLD-MCNC: 13.5 GM/DL (ref 12–16)
LACTATE SERPL-SCNC: 1.6 MMOL/L (ref 0.5–2)
MCH RBC QN AUTO: 33.9 PG (ref 26–33)
MCHC RBC AUTO-ENTMCNC: 32.4 GM/DL (ref 32.2–35.5)
MCV RBC AUTO: 104.8 FL (ref 81–99)
PLATELET # BLD AUTO: 188 THOU/MM3 (ref 130–400)
PMV BLD AUTO: 10.7 FL (ref 9.4–12.4)
POTASSIUM SERPL-SCNC: 3.7 MEQ/L (ref 3.5–5.2)
RBC # BLD AUTO: 3.98 MILL/MM3 (ref 4.2–5.4)
SODIUM SERPL-SCNC: 138 MEQ/L (ref 135–145)
WBC # BLD AUTO: 7.6 THOU/MM3 (ref 4.8–10.8)

## 2023-11-03 PROCEDURE — 2580000003 HC RX 258: Performed by: INTERNAL MEDICINE

## 2023-11-03 PROCEDURE — 97167 OT EVAL HIGH COMPLEX 60 MIN: CPT

## 2023-11-03 PROCEDURE — 6360000002 HC RX W HCPCS: Performed by: INTERNAL MEDICINE

## 2023-11-03 PROCEDURE — 1200000000 HC SEMI PRIVATE

## 2023-11-03 PROCEDURE — 83605 ASSAY OF LACTIC ACID: CPT

## 2023-11-03 PROCEDURE — 82330 ASSAY OF CALCIUM: CPT

## 2023-11-03 PROCEDURE — 85027 COMPLETE CBC AUTOMATED: CPT

## 2023-11-03 PROCEDURE — 99232 SBSQ HOSP IP/OBS MODERATE 35: CPT | Performed by: STUDENT IN AN ORGANIZED HEALTH CARE EDUCATION/TRAINING PROGRAM

## 2023-11-03 PROCEDURE — 6370000000 HC RX 637 (ALT 250 FOR IP): Performed by: STUDENT IN AN ORGANIZED HEALTH CARE EDUCATION/TRAINING PROGRAM

## 2023-11-03 PROCEDURE — 82140 ASSAY OF AMMONIA: CPT

## 2023-11-03 PROCEDURE — 6370000000 HC RX 637 (ALT 250 FOR IP): Performed by: INTERNAL MEDICINE

## 2023-11-03 PROCEDURE — 97530 THERAPEUTIC ACTIVITIES: CPT

## 2023-11-03 PROCEDURE — 6360000002 HC RX W HCPCS: Performed by: STUDENT IN AN ORGANIZED HEALTH CARE EDUCATION/TRAINING PROGRAM

## 2023-11-03 PROCEDURE — 80048 BASIC METABOLIC PNL TOTAL CA: CPT

## 2023-11-03 PROCEDURE — 51798 US URINE CAPACITY MEASURE: CPT

## 2023-11-03 PROCEDURE — 36415 COLL VENOUS BLD VENIPUNCTURE: CPT

## 2023-11-03 RX ORDER — LACTULOSE 10 G/15ML
20 SOLUTION ORAL DAILY
Status: DISCONTINUED | OUTPATIENT
Start: 2023-11-04 | End: 2023-11-06 | Stop reason: HOSPADM

## 2023-11-03 RX ORDER — FUROSEMIDE 10 MG/ML
40 INJECTION INTRAMUSCULAR; INTRAVENOUS ONCE
Status: COMPLETED | OUTPATIENT
Start: 2023-11-03 | End: 2023-11-03

## 2023-11-03 RX ADMIN — POTASSIUM BICARBONATE 40 MEQ: 782 TABLET, EFFERVESCENT ORAL at 12:00

## 2023-11-03 RX ADMIN — VENLAFAXINE HYDROCHLORIDE 50 MG: 50 TABLET ORAL at 15:15

## 2023-11-03 RX ADMIN — PHENOBARBITAL 64.8 MG: 32.4 TABLET ORAL at 22:17

## 2023-11-03 RX ADMIN — VENLAFAXINE HYDROCHLORIDE 50 MG: 50 TABLET ORAL at 08:19

## 2023-11-03 RX ADMIN — LACTULOSE 20 G: 10 SOLUTION ORAL at 08:21

## 2023-11-03 RX ADMIN — DEXTROAMPHETAMINE SACCHARATE, AMPHETAMINE ASPARTATE, DEXTROAMPHETAMINE SULFATE, AMPHETAMINE SULFATE TABLETS, 5 MG,CLL 5 MG: 1.25; 1.25; 1.25; 1.25 TABLET ORAL at 08:38

## 2023-11-03 RX ADMIN — LACOSAMIDE 200 MG: 200 TABLET, FILM COATED ORAL at 22:17

## 2023-11-03 RX ADMIN — ATORVASTATIN CALCIUM 20 MG: 20 TABLET, FILM COATED ORAL at 22:16

## 2023-11-03 RX ADMIN — VENLAFAXINE HYDROCHLORIDE 50 MG: 50 TABLET ORAL at 22:19

## 2023-11-03 RX ADMIN — FUROSEMIDE 40 MG: 10 INJECTION, SOLUTION INTRAMUSCULAR; INTRAVENOUS at 10:47

## 2023-11-03 RX ADMIN — VALPROIC ACID 1000 MG: 250 SOLUTION ORAL at 15:33

## 2023-11-03 RX ADMIN — LEVOCARNITINE 990 MG: 330 TABLET ORAL at 22:17

## 2023-11-03 RX ADMIN — LEVOCARNITINE 990 MG: 330 TABLET ORAL at 08:19

## 2023-11-03 RX ADMIN — CEFTRIAXONE SODIUM 1000 MG: 1 INJECTION, POWDER, FOR SOLUTION INTRAMUSCULAR; INTRAVENOUS at 16:50

## 2023-11-03 RX ADMIN — AMLODIPINE BESYLATE 5 MG: 5 TABLET ORAL at 08:19

## 2023-11-03 RX ADMIN — ASPIRIN 81 MG CHEWABLE TABLET 81 MG: 81 TABLET CHEWABLE at 08:38

## 2023-11-03 RX ADMIN — ENOXAPARIN SODIUM 30 MG: 100 INJECTION SUBCUTANEOUS at 22:16

## 2023-11-03 RX ADMIN — LEVETIRACETAM 2000 MG: 100 SOLUTION ORAL at 22:20

## 2023-11-03 RX ADMIN — LACOSAMIDE 200 MG: 200 TABLET, FILM COATED ORAL at 08:38

## 2023-11-03 RX ADMIN — VALPROIC ACID 1000 MG: 250 SOLUTION ORAL at 22:19

## 2023-11-03 RX ADMIN — VALPROIC ACID 1000 MG: 250 SOLUTION ORAL at 06:42

## 2023-11-03 RX ADMIN — LAMOTRIGINE 100 MG: 100 TABLET ORAL at 08:19

## 2023-11-03 RX ADMIN — POTASSIUM BICARBONATE 40 MEQ: 782 TABLET, EFFERVESCENT ORAL at 22:17

## 2023-11-03 RX ADMIN — PHENOBARBITAL 32.4 MG: 32.4 TABLET ORAL at 08:38

## 2023-11-03 RX ADMIN — SODIUM CHLORIDE, PRESERVATIVE FREE 10 ML: 5 INJECTION INTRAVENOUS at 22:17

## 2023-11-03 RX ADMIN — LAMOTRIGINE 100 MG: 100 TABLET ORAL at 22:16

## 2023-11-03 RX ADMIN — ENOXAPARIN SODIUM 30 MG: 100 INJECTION SUBCUTANEOUS at 08:37

## 2023-11-03 RX ADMIN — FUROSEMIDE 40 MG: 10 INJECTION, SOLUTION INTRAMUSCULAR; INTRAVENOUS at 22:16

## 2023-11-03 RX ADMIN — LANSOPRAZOLE 30 MG: 30 TABLET, ORALLY DISINTEGRATING, DELAYED RELEASE ORAL at 06:45

## 2023-11-03 RX ADMIN — SERTRALINE 50 MG: 50 TABLET, FILM COATED ORAL at 08:19

## 2023-11-03 RX ADMIN — LACTULOSE 20 G: 10 SOLUTION ORAL at 15:15

## 2023-11-03 RX ADMIN — LEVETIRACETAM 2000 MG: 100 SOLUTION ORAL at 08:20

## 2023-11-03 ASSESSMENT — PAIN SCALES - PAIN ASSESSMENT IN ADVANCED DEMENTIA (PAINAD)
FACIALEXPRESSION: 1
BREATHING: 0
NEGVOCALIZATION: 0
TOTALSCORE: 1
CONSOLABILITY: 0
CONSOLABILITY: 0
BREATHING: 0
FACIALEXPRESSION: 2
BREATHING: 0
FACIALEXPRESSION: 1
BREATHING: 0
NEGVOCALIZATION: 0
BREATHING: 0
CONSOLABILITY: 0
CONSOLABILITY: 0
BREATHING: 0
BODYLANGUAGE: 0
FACIALEXPRESSION: 1
BODYLANGUAGE: 0
FACIALEXPRESSION: 1
BODYLANGUAGE: 0
TOTALSCORE: 2
NEGVOCALIZATION: 0
BODYLANGUAGE: 0
BODYLANGUAGE: 0
TOTALSCORE: 1
TOTALSCORE: 2
CONSOLABILITY: 0
BODYLANGUAGE: 0
NEGVOCALIZATION: 0
FACIALEXPRESSION: 1
NEGVOCALIZATION: 0
BREATHING: 0
BODYLANGUAGE: 0
NEGVOCALIZATION: 0
CONSOLABILITY: 0
CONSOLABILITY: 0
NEGVOCALIZATION: 0
TOTALSCORE: 1
BREATHING: 0
TOTALSCORE: 1
TOTALSCORE: 1
FACIALEXPRESSION: 1
BODYLANGUAGE: 0
NEGVOCALIZATION: 0
CONSOLABILITY: 0
FACIALEXPRESSION: 2
TOTALSCORE: 1

## 2023-11-03 ASSESSMENT — PAIN SCALES - WONG BAKER: WONGBAKER_NUMERICALRESPONSE: 0

## 2023-11-03 ASSESSMENT — PAIN SCALES - GENERAL: PAINLEVEL_OUTOF10: 0

## 2023-11-03 NOTE — CARE COORDINATION
11/3/23, 1:27 PM EDT    DISCHARGE ON GOING EVALUATION    [address] day: 5  Location: -10/010-A Reason for admit: Acute encephalopathy [G93.40]  Urinary tract infection without hematuria, site unspecified [N39.0]  COVID-19 [U07.1]   Procedure: none  Barriers to Discharge: IV rocephin, lactulose tid. SLP to re-eval to see if po diet can be initiated, currently TF through PEG. Patient had decreased urine output and increased edema, lasix x1 ordered. PCP: Andres Paz MD  Readmission Risk Score: 19.8%  Patient Goals/Plan/Treatment Preferences: return to [facility]. Possible weekend discharge.

## 2023-11-03 NOTE — FLOWSHEET NOTE
11/03/23 1228   Safe Environment   Safety Measures Other (comment)  (Virtual Nurse Safety Round)     VN called into patients room and introduced myself and role. Patient answered and permitted video. Video activated. Patient resting comfortably in bed. Patient voiced no needs or concerns at this time. Pt denies pain. VN educated pt on utilizing call light if condition changes. Call light within reach.

## 2023-11-03 NOTE — FLOWSHEET NOTE
11/03/23 1626   Safe Environment   Safety Measures Other (comment)  (Virtual Nurse Safety Round)     VN called into patients room and introduced myself and role. Patient did not answer. Video activated for safety check. Patient sleeping comfortably. Call light within reach.

## 2023-11-03 NOTE — CARE COORDINATION
11/3/23, 12:03 PM EDT    Patient goals/plan/ treatment preferences discussed by  and . Patient goals/plan/ treatment preferences reviewed with patient/ family. Patient/ family verbalize understanding of discharge plan and are in agreement with goal/plan/treatment preferences. Understanding was demonstrated using the teach back method. AVS provided by RN at time of discharge, which includes all necessary medical information pertaining to the patients current course of illness, treatment, post-discharge goals of care, and treatment preferences. Services At/After Discharge: [facility] [address] [facility] [address] [place] [address], Aide services, In ambulance, and Nursing service       IMM Letter  IMM Letter given to Patient/Family/Significant other/Guardian/POA/by[de-identified] America Carvajal CM  IMM Letter date given[de-identified] 11/03/23  IMM Letter time given[de-identified] 1025     Discussed during morning IDR with Dr. Francisco Javier James possible weekend discharge.  updated [place] with [facility]. Blue packet on chart with completed transportation forms and discharge instructions. RN is aware.

## 2023-11-04 ENCOUNTER — APPOINTMENT (OUTPATIENT)
Dept: GENERAL RADIOLOGY | Age: 68
DRG: 642 | End: 2023-11-04
Payer: MEDICARE

## 2023-11-04 LAB
AMMONIA PLAS-MCNC: 46 UMOL/L (ref 11–60)
ANION GAP SERPL CALC-SCNC: 10 MEQ/L (ref 8–16)
BUN SERPL-MCNC: 12 MG/DL (ref 7–22)
CALCIUM SERPL-MCNC: 9.2 MG/DL (ref 8.5–10.5)
CHLORIDE SERPL-SCNC: 92 MEQ/L (ref 98–111)
CO2 SERPL-SCNC: 31 MEQ/L (ref 23–33)
CREAT SERPL-MCNC: 0.4 MG/DL (ref 0.4–1.2)
GFR SERPL CREATININE-BSD FRML MDRD: > 60 ML/MIN/1.73M2
GLUCOSE SERPL-MCNC: 114 MG/DL (ref 70–108)
POTASSIUM SERPL-SCNC: 5 MEQ/L (ref 3.5–5.2)
SODIUM SERPL-SCNC: 133 MEQ/L (ref 135–145)

## 2023-11-04 PROCEDURE — 99232 SBSQ HOSP IP/OBS MODERATE 35: CPT | Performed by: STUDENT IN AN ORGANIZED HEALTH CARE EDUCATION/TRAINING PROGRAM

## 2023-11-04 PROCEDURE — 82140 ASSAY OF AMMONIA: CPT

## 2023-11-04 PROCEDURE — 92610 EVALUATE SWALLOWING FUNCTION: CPT

## 2023-11-04 PROCEDURE — 80048 BASIC METABOLIC PNL TOTAL CA: CPT

## 2023-11-04 PROCEDURE — 6370000000 HC RX 637 (ALT 250 FOR IP): Performed by: INTERNAL MEDICINE

## 2023-11-04 PROCEDURE — 6370000000 HC RX 637 (ALT 250 FOR IP): Performed by: STUDENT IN AN ORGANIZED HEALTH CARE EDUCATION/TRAINING PROGRAM

## 2023-11-04 PROCEDURE — 2580000003 HC RX 258: Performed by: INTERNAL MEDICINE

## 2023-11-04 PROCEDURE — 36415 COLL VENOUS BLD VENIPUNCTURE: CPT

## 2023-11-04 PROCEDURE — 6360000002 HC RX W HCPCS: Performed by: INTERNAL MEDICINE

## 2023-11-04 PROCEDURE — 1200000000 HC SEMI PRIVATE

## 2023-11-04 RX ORDER — CALCIUM POLYCARBOPHIL 625 MG 625 MG/1
625 TABLET ORAL 3 TIMES DAILY
Status: DISCONTINUED | OUTPATIENT
Start: 2023-11-04 | End: 2023-11-06 | Stop reason: HOSPADM

## 2023-11-04 RX ADMIN — ENOXAPARIN SODIUM 30 MG: 100 INJECTION SUBCUTANEOUS at 21:06

## 2023-11-04 RX ADMIN — VALPROIC ACID 1000 MG: 250 SOLUTION ORAL at 06:30

## 2023-11-04 RX ADMIN — PHENOBARBITAL 64.8 MG: 32.4 TABLET ORAL at 21:06

## 2023-11-04 RX ADMIN — LEVETIRACETAM 2000 MG: 100 SOLUTION ORAL at 21:07

## 2023-11-04 RX ADMIN — CEFTRIAXONE SODIUM 1000 MG: 1 INJECTION, POWDER, FOR SOLUTION INTRAMUSCULAR; INTRAVENOUS at 15:25

## 2023-11-04 RX ADMIN — DEXTROAMPHETAMINE SACCHARATE, AMPHETAMINE ASPARTATE, DEXTROAMPHETAMINE SULFATE, AMPHETAMINE SULFATE TABLETS, 5 MG,CLL 5 MG: 1.25; 1.25; 1.25; 1.25 TABLET ORAL at 08:47

## 2023-11-04 RX ADMIN — PHENOBARBITAL 32.4 MG: 32.4 TABLET ORAL at 08:47

## 2023-11-04 RX ADMIN — ASPIRIN 81 MG CHEWABLE TABLET 81 MG: 81 TABLET CHEWABLE at 08:47

## 2023-11-04 RX ADMIN — CALCIUM POLYCARBOPHIL 625 MG: 625 TABLET, FILM COATED ORAL at 15:29

## 2023-11-04 RX ADMIN — LACOSAMIDE 200 MG: 200 TABLET, FILM COATED ORAL at 21:06

## 2023-11-04 RX ADMIN — LANSOPRAZOLE 30 MG: 30 TABLET, ORALLY DISINTEGRATING, DELAYED RELEASE ORAL at 06:30

## 2023-11-04 RX ADMIN — LEVOCARNITINE 990 MG: 330 TABLET ORAL at 21:07

## 2023-11-04 RX ADMIN — ATORVASTATIN CALCIUM 20 MG: 20 TABLET, FILM COATED ORAL at 21:06

## 2023-11-04 RX ADMIN — LACTULOSE 20 G: 20 SOLUTION ORAL at 08:47

## 2023-11-04 RX ADMIN — SODIUM CHLORIDE, PRESERVATIVE FREE 10 ML: 5 INJECTION INTRAVENOUS at 21:07

## 2023-11-04 RX ADMIN — VENLAFAXINE HYDROCHLORIDE 50 MG: 50 TABLET ORAL at 08:47

## 2023-11-04 RX ADMIN — VALPROIC ACID 1000 MG: 250 SOLUTION ORAL at 21:07

## 2023-11-04 RX ADMIN — VENLAFAXINE HYDROCHLORIDE 50 MG: 50 TABLET ORAL at 21:07

## 2023-11-04 RX ADMIN — SODIUM CHLORIDE, PRESERVATIVE FREE 10 ML: 5 INJECTION INTRAVENOUS at 08:48

## 2023-11-04 RX ADMIN — LAMOTRIGINE 100 MG: 100 TABLET ORAL at 21:06

## 2023-11-04 RX ADMIN — LACOSAMIDE 200 MG: 200 TABLET, FILM COATED ORAL at 08:47

## 2023-11-04 RX ADMIN — VALPROIC ACID 1000 MG: 250 SOLUTION ORAL at 15:31

## 2023-11-04 RX ADMIN — SERTRALINE 50 MG: 50 TABLET, FILM COATED ORAL at 08:47

## 2023-11-04 RX ADMIN — LAMOTRIGINE 100 MG: 100 TABLET ORAL at 08:47

## 2023-11-04 RX ADMIN — ENOXAPARIN SODIUM 30 MG: 100 INJECTION SUBCUTANEOUS at 08:47

## 2023-11-04 RX ADMIN — LEVETIRACETAM 2000 MG: 100 SOLUTION ORAL at 08:48

## 2023-11-04 RX ADMIN — VENLAFAXINE HYDROCHLORIDE 50 MG: 50 TABLET ORAL at 15:29

## 2023-11-04 RX ADMIN — LEVOCARNITINE 990 MG: 330 TABLET ORAL at 08:47

## 2023-11-04 RX ADMIN — AMLODIPINE BESYLATE 5 MG: 5 TABLET ORAL at 08:47

## 2023-11-04 RX ADMIN — CALCIUM POLYCARBOPHIL 625 MG: 625 TABLET, FILM COATED ORAL at 21:06

## 2023-11-04 ASSESSMENT — PAIN SCALES - PAIN ASSESSMENT IN ADVANCED DEMENTIA (PAINAD)
NEGVOCALIZATION: 0
BREATHING: 0
CONSOLABILITY: 0
TOTALSCORE: 0
FACIALEXPRESSION: 0
BODYLANGUAGE: 0

## 2023-11-04 ASSESSMENT — PAIN SCALES - GENERAL: PAINLEVEL_OUTOF10: 0

## 2023-11-04 NOTE — FLOWSHEET NOTE
11/04/23 1034   Safe Environment   Safety Measures Call light within reach;Nurse at bedside;Standard Safety Measures     VN completed safety rounds. No answer from the pt. Camera accessed for safety. pt resting in bed with eyes closed. Rise and fall of chest noted. Respirations are easy nonlabored. Call bell and personal belongings within reach. Please see pt portal msg.

## 2023-11-05 ENCOUNTER — APPOINTMENT (OUTPATIENT)
Dept: GENERAL RADIOLOGY | Age: 68
DRG: 642 | End: 2023-11-05
Payer: MEDICARE

## 2023-11-05 LAB — Lab: NORMAL

## 2023-11-05 PROCEDURE — 74230 X-RAY XM SWLNG FUNCJ C+: CPT

## 2023-11-05 PROCEDURE — 6370000000 HC RX 637 (ALT 250 FOR IP): Performed by: INTERNAL MEDICINE

## 2023-11-05 PROCEDURE — 6360000002 HC RX W HCPCS: Performed by: INTERNAL MEDICINE

## 2023-11-05 PROCEDURE — 1200000000 HC SEMI PRIVATE

## 2023-11-05 PROCEDURE — 92611 MOTION FLUOROSCOPY/SWALLOW: CPT

## 2023-11-05 PROCEDURE — 92526 ORAL FUNCTION THERAPY: CPT

## 2023-11-05 PROCEDURE — 2500000003 HC RX 250 WO HCPCS: Performed by: STUDENT IN AN ORGANIZED HEALTH CARE EDUCATION/TRAINING PROGRAM

## 2023-11-05 PROCEDURE — 99232 SBSQ HOSP IP/OBS MODERATE 35: CPT | Performed by: STUDENT IN AN ORGANIZED HEALTH CARE EDUCATION/TRAINING PROGRAM

## 2023-11-05 PROCEDURE — 6370000000 HC RX 637 (ALT 250 FOR IP): Performed by: STUDENT IN AN ORGANIZED HEALTH CARE EDUCATION/TRAINING PROGRAM

## 2023-11-05 PROCEDURE — 2580000003 HC RX 258: Performed by: INTERNAL MEDICINE

## 2023-11-05 RX ORDER — LOPERAMIDE HYDROCHLORIDE 2 MG/1
4 CAPSULE ORAL 4 TIMES DAILY PRN
Status: DISCONTINUED | OUTPATIENT
Start: 2023-11-05 | End: 2023-11-06 | Stop reason: HOSPADM

## 2023-11-05 RX ADMIN — DEXTROAMPHETAMINE SACCHARATE, AMPHETAMINE ASPARTATE, DEXTROAMPHETAMINE SULFATE, AMPHETAMINE SULFATE TABLETS, 5 MG,CLL 5 MG: 1.25; 1.25; 1.25; 1.25 TABLET ORAL at 10:17

## 2023-11-05 RX ADMIN — LEVETIRACETAM 2000 MG: 100 SOLUTION ORAL at 20:38

## 2023-11-05 RX ADMIN — CALCIUM POLYCARBOPHIL 625 MG: 625 TABLET, FILM COATED ORAL at 20:37

## 2023-11-05 RX ADMIN — VENLAFAXINE HYDROCHLORIDE 50 MG: 50 TABLET ORAL at 10:17

## 2023-11-05 RX ADMIN — ENOXAPARIN SODIUM 30 MG: 100 INJECTION SUBCUTANEOUS at 21:01

## 2023-11-05 RX ADMIN — LOPERAMIDE HYDROCHLORIDE 4 MG: 2 CAPSULE ORAL at 10:17

## 2023-11-05 RX ADMIN — VALPROIC ACID 1000 MG: 250 SOLUTION ORAL at 06:00

## 2023-11-05 RX ADMIN — LAMOTRIGINE 100 MG: 100 TABLET ORAL at 10:18

## 2023-11-05 RX ADMIN — LAMOTRIGINE 100 MG: 100 TABLET ORAL at 20:42

## 2023-11-05 RX ADMIN — ENOXAPARIN SODIUM 30 MG: 100 INJECTION SUBCUTANEOUS at 10:17

## 2023-11-05 RX ADMIN — SERTRALINE 50 MG: 50 TABLET, FILM COATED ORAL at 10:18

## 2023-11-05 RX ADMIN — LEVOCARNITINE 990 MG: 330 TABLET ORAL at 10:19

## 2023-11-05 RX ADMIN — SODIUM CHLORIDE, PRESERVATIVE FREE 10 ML: 5 INJECTION INTRAVENOUS at 21:01

## 2023-11-05 RX ADMIN — VALPROIC ACID 1000 MG: 250 SOLUTION ORAL at 21:32

## 2023-11-05 RX ADMIN — VALPROIC ACID 1000 MG: 250 SOLUTION ORAL at 15:09

## 2023-11-05 RX ADMIN — VENLAFAXINE HYDROCHLORIDE 50 MG: 50 TABLET ORAL at 20:37

## 2023-11-05 RX ADMIN — CALCIUM POLYCARBOPHIL 625 MG: 625 TABLET, FILM COATED ORAL at 10:17

## 2023-11-05 RX ADMIN — LANSOPRAZOLE 30 MG: 30 TABLET, ORALLY DISINTEGRATING, DELAYED RELEASE ORAL at 06:00

## 2023-11-05 RX ADMIN — CEFTRIAXONE SODIUM 1000 MG: 1 INJECTION, POWDER, FOR SOLUTION INTRAMUSCULAR; INTRAVENOUS at 15:17

## 2023-11-05 RX ADMIN — PHENOBARBITAL 64.8 MG: 32.4 TABLET ORAL at 20:41

## 2023-11-05 RX ADMIN — BARIUM SULFATE 50 ML: 0.81 POWDER, FOR SUSPENSION ORAL at 09:30

## 2023-11-05 RX ADMIN — CALCIUM POLYCARBOPHIL 625 MG: 625 TABLET, FILM COATED ORAL at 15:10

## 2023-11-05 RX ADMIN — ASPIRIN 81 MG CHEWABLE TABLET 81 MG: 81 TABLET CHEWABLE at 10:17

## 2023-11-05 RX ADMIN — LEVETIRACETAM 2000 MG: 100 SOLUTION ORAL at 10:18

## 2023-11-05 RX ADMIN — PHENOBARBITAL 32.4 MG: 32.4 TABLET ORAL at 10:17

## 2023-11-05 RX ADMIN — VENLAFAXINE HYDROCHLORIDE 50 MG: 50 TABLET ORAL at 15:10

## 2023-11-05 RX ADMIN — ATORVASTATIN CALCIUM 20 MG: 20 TABLET, FILM COATED ORAL at 20:36

## 2023-11-05 RX ADMIN — LACOSAMIDE 200 MG: 200 TABLET, FILM COATED ORAL at 20:42

## 2023-11-05 RX ADMIN — LACTULOSE 20 G: 20 SOLUTION ORAL at 10:17

## 2023-11-05 RX ADMIN — LACOSAMIDE 200 MG: 200 TABLET, FILM COATED ORAL at 10:17

## 2023-11-05 RX ADMIN — BARIUM SULFATE 10 ML: 400 PASTE ORAL at 09:30

## 2023-11-05 RX ADMIN — SODIUM CHLORIDE, PRESERVATIVE FREE 10 ML: 5 INJECTION INTRAVENOUS at 10:17

## 2023-11-05 RX ADMIN — AMLODIPINE BESYLATE 5 MG: 5 TABLET ORAL at 10:17

## 2023-11-05 RX ADMIN — LEVOCARNITINE 990 MG: 330 TABLET ORAL at 20:39

## 2023-11-05 ASSESSMENT — PAIN SCALES - PAIN ASSESSMENT IN ADVANCED DEMENTIA (PAINAD)
CONSOLABILITY: 0
NEGVOCALIZATION: 0
FACIALEXPRESSION: 1
CONSOLABILITY: 0
TOTALSCORE: 1
NEGVOCALIZATION: 0
BREATHING: 0
BODYLANGUAGE: 0
BREATHING: 0
FACIALEXPRESSION: 1
NEGVOCALIZATION: 0
FACIALEXPRESSION: 1
BODYLANGUAGE: 0
FACIALEXPRESSION: 1
TOTALSCORE: 1
BODYLANGUAGE: 0
TOTALSCORE: 1
TOTALSCORE: 1
BREATHING: 0
BODYLANGUAGE: 0
FACIALEXPRESSION: 1
BREATHING: 0
BODYLANGUAGE: 0
TOTALSCORE: 1
TOTALSCORE: 1
NEGVOCALIZATION: 0
CONSOLABILITY: 0
NEGVOCALIZATION: 0
TOTALSCORE: 1
CONSOLABILITY: 0
NEGVOCALIZATION: 0
BODYLANGUAGE: 0
NEGVOCALIZATION: 0
BODYLANGUAGE: 0
CONSOLABILITY: 0
BREATHING: 0

## 2023-11-05 ASSESSMENT — PAIN SCALES - GENERAL
PAINLEVEL_OUTOF10: 0
PAINLEVEL_OUTOF10: 0

## 2023-11-06 VITALS
BODY MASS INDEX: 37.59 KG/M2 | RESPIRATION RATE: 16 BRPM | OXYGEN SATURATION: 95 % | HEIGHT: 61 IN | SYSTOLIC BLOOD PRESSURE: 140 MMHG | WEIGHT: 199.08 LBS | TEMPERATURE: 97.3 F | HEART RATE: 98 BPM | DIASTOLIC BLOOD PRESSURE: 98 MMHG

## 2023-11-06 LAB
AMMONIA PLAS-MCNC: 32 UMOL/L (ref 11–60)
ANION GAP SERPL CALC-SCNC: 11 MEQ/L (ref 8–16)
BUN SERPL-MCNC: 10 MG/DL (ref 7–22)
CALCIUM SERPL-MCNC: 9.3 MG/DL (ref 8.5–10.5)
CHLORIDE SERPL-SCNC: 94 MEQ/L (ref 98–111)
CO2 SERPL-SCNC: 31 MEQ/L (ref 23–33)
CREAT SERPL-MCNC: 0.4 MG/DL (ref 0.4–1.2)
DEPRECATED RDW RBC AUTO: 46.5 FL (ref 35–45)
ERYTHROCYTE [DISTWIDTH] IN BLOOD BY AUTOMATED COUNT: 12.4 % (ref 11.5–14.5)
GFR SERPL CREATININE-BSD FRML MDRD: > 60 ML/MIN/1.73M2
GLUCOSE SERPL-MCNC: 109 MG/DL (ref 70–108)
HCT VFR BLD AUTO: 39.3 % (ref 37–47)
HGB BLD-MCNC: 12.9 GM/DL (ref 12–16)
MCH RBC QN AUTO: 33.3 PG (ref 26–33)
MCHC RBC AUTO-ENTMCNC: 32.8 GM/DL (ref 32.2–35.5)
MCV RBC AUTO: 101.6 FL (ref 81–99)
PLATELET # BLD AUTO: 168 THOU/MM3 (ref 130–400)
PMV BLD AUTO: 11.4 FL (ref 9.4–12.4)
POTASSIUM SERPL-SCNC: 4 MEQ/L (ref 3.5–5.2)
RBC # BLD AUTO: 3.87 MILL/MM3 (ref 4.2–5.4)
REASON FOR REJECTION: NORMAL
REJECTED TEST: NORMAL
SODIUM SERPL-SCNC: 136 MEQ/L (ref 135–145)
WBC # BLD AUTO: 8.4 THOU/MM3 (ref 4.8–10.8)

## 2023-11-06 PROCEDURE — 6360000002 HC RX W HCPCS: Performed by: INTERNAL MEDICINE

## 2023-11-06 PROCEDURE — 85027 COMPLETE CBC AUTOMATED: CPT

## 2023-11-06 PROCEDURE — 6370000000 HC RX 637 (ALT 250 FOR IP): Performed by: INTERNAL MEDICINE

## 2023-11-06 PROCEDURE — 80048 BASIC METABOLIC PNL TOTAL CA: CPT

## 2023-11-06 PROCEDURE — 36415 COLL VENOUS BLD VENIPUNCTURE: CPT

## 2023-11-06 PROCEDURE — 99239 HOSP IP/OBS DSCHRG MGMT >30: CPT | Performed by: STUDENT IN AN ORGANIZED HEALTH CARE EDUCATION/TRAINING PROGRAM

## 2023-11-06 PROCEDURE — 92526 ORAL FUNCTION THERAPY: CPT

## 2023-11-06 PROCEDURE — 6370000000 HC RX 637 (ALT 250 FOR IP): Performed by: STUDENT IN AN ORGANIZED HEALTH CARE EDUCATION/TRAINING PROGRAM

## 2023-11-06 PROCEDURE — 82140 ASSAY OF AMMONIA: CPT

## 2023-11-06 RX ORDER — CALCIUM POLYCARBOPHIL 625 MG 625 MG/1
625 TABLET ORAL 3 TIMES DAILY
Refills: 4 | DISCHARGE
Start: 2023-11-06

## 2023-11-06 RX ORDER — PHENOBARBITAL 20 MG/5ML
60 ELIXIR ORAL NIGHTLY
Qty: 450 ML | Refills: 2 | Status: SHIPPED | DISCHARGE
Start: 2023-11-06 | End: 2024-02-04

## 2023-11-06 RX ORDER — PHENOBARBITAL 20 MG/5ML
30 ELIXIR ORAL EVERY MORNING
Qty: 225 ML | Refills: 2 | Status: SHIPPED | DISCHARGE
Start: 2023-11-06 | End: 2024-02-04

## 2023-11-06 RX ORDER — AMLODIPINE BESYLATE 5 MG/1
5 TABLET ORAL DAILY
Qty: 30 TABLET | Refills: 3 | DISCHARGE
Start: 2023-11-06

## 2023-11-06 RX ORDER — LOPERAMIDE HYDROCHLORIDE 2 MG/1
4 CAPSULE ORAL 4 TIMES DAILY PRN
DISCHARGE
Start: 2023-11-06 | End: 2023-11-16

## 2023-11-06 RX ORDER — LEVOCARNITINE 330 MG/1
330 TABLET ORAL 2 TIMES DAILY
Qty: 60 TABLET | Refills: 3 | DISCHARGE
Start: 2023-11-06

## 2023-11-06 RX ORDER — LACTULOSE 10 G/15ML
20 SOLUTION ORAL DAILY
Refills: 1 | DISCHARGE
Start: 2023-11-06

## 2023-11-06 RX ADMIN — LEVETIRACETAM 2000 MG: 100 SOLUTION ORAL at 09:43

## 2023-11-06 RX ADMIN — CALCIUM POLYCARBOPHIL 625 MG: 625 TABLET, FILM COATED ORAL at 09:43

## 2023-11-06 RX ADMIN — SERTRALINE 50 MG: 50 TABLET, FILM COATED ORAL at 09:42

## 2023-11-06 RX ADMIN — LANSOPRAZOLE 30 MG: 30 TABLET, ORALLY DISINTEGRATING, DELAYED RELEASE ORAL at 05:37

## 2023-11-06 RX ADMIN — LACOSAMIDE 200 MG: 200 TABLET, FILM COATED ORAL at 09:43

## 2023-11-06 RX ADMIN — DEXTROAMPHETAMINE SACCHARATE, AMPHETAMINE ASPARTATE, DEXTROAMPHETAMINE SULFATE, AMPHETAMINE SULFATE TABLETS, 5 MG,CLL 5 MG: 1.25; 1.25; 1.25; 1.25 TABLET ORAL at 09:43

## 2023-11-06 RX ADMIN — ENOXAPARIN SODIUM 30 MG: 100 INJECTION SUBCUTANEOUS at 09:43

## 2023-11-06 RX ADMIN — AMLODIPINE BESYLATE 5 MG: 5 TABLET ORAL at 09:43

## 2023-11-06 RX ADMIN — LEVOCARNITINE 990 MG: 330 TABLET ORAL at 09:43

## 2023-11-06 RX ADMIN — LAMOTRIGINE 100 MG: 100 TABLET ORAL at 09:43

## 2023-11-06 RX ADMIN — PHENOBARBITAL 32.4 MG: 32.4 TABLET ORAL at 09:43

## 2023-11-06 RX ADMIN — ASPIRIN 81 MG CHEWABLE TABLET 81 MG: 81 TABLET CHEWABLE at 09:43

## 2023-11-06 RX ADMIN — VENLAFAXINE HYDROCHLORIDE 50 MG: 50 TABLET ORAL at 09:42

## 2023-11-06 RX ADMIN — VALPROIC ACID 1000 MG: 250 SOLUTION ORAL at 05:36

## 2023-11-06 RX ADMIN — LACTULOSE 20 G: 20 SOLUTION ORAL at 09:42

## 2023-11-06 ASSESSMENT — PAIN SCALES - GENERAL: PAINLEVEL_OUTOF10: 5

## 2023-11-06 ASSESSMENT — PAIN DESCRIPTION - LOCATION: LOCATION: BUTTOCKS

## 2023-11-06 NOTE — CARE COORDINATION
11/6/23, 11:09 AM EST    Patient goals/plan/ treatment preferences discussed by  and . Patient goals/plan/ treatment preferences reviewed with patient/ family. Patient/ family verbalize understanding of discharge plan and are in agreement with goal/plan/treatment preferences. Understanding was demonstrated using the teach back method. AVS provided by RN at time of discharge, which includes all necessary medical information pertaining to the patients current course of illness, treatment, post-discharge goals of care, and treatment preferences. Services At/After Discharge: [address] [facility] [address] [place] [address], Transport, and In ambulance- [facility]       IMM Letter  IMM Letter given to Patient/Family/Significant other/Guardian/POA/by[de-identified] Sal JORGENSEN  IMM Letter date given[de-identified] 11/03/23  IMM Letter time given[de-identified] [address] will be discharged today to [facility], transport is set up for 6:15PM. MALENA did attempt to call Nataly Vogel, left a message. SW will fax AVS when filled out.

## 2023-11-06 NOTE — PROGRESS NOTES
[address]   SPEECH THERAPY  STRZ RENAL TELEMETRY 6K  Modified Barium Swallow + Dysphagia Therapy    SLP Individual Minutes  Time In: 7654  Time Out: 2720  Minutes: 22  Timed Code Treatment Minutes: 0 Minutes   MBS: 14 minutes   Dysphagia tx: 8 minutes     Date: 2023  Patient Name: Kadi Neal      CSN: [id number]   : 1955  (75 y.o.)  Gender: female   Referring Physician:  Purnima Bernstein DO  Diagnosis: Acute encephalopathy   Precautions: seizure precautions  History of Present Illness/Injury: Patient admitted with above medical diagnosis. See physician H&P for full history. Per medical chart review, \"Mariangel Almaraz is a 75 y.o. female with PMHx of ADHD, seizure disorder, CP,HTN who presents to [address] [place] [address] with confusion. Patient has been acting more confused at the nursing home. She was recently diagnosed with COVID-19 and was started on molnupiravir. She is not hypoxic today. In the ED she was found to have a urinary tract infection and was started on antibiotics. She is unable to give any meaningful history. She is currently afebrile. Of note, her ammonia was also found to be significantly elevated and she is currently on chronic valproic acid. She is being admitted for acute metabolic encephalopathy likely related to COVID-19 and UTI. Also possibly related to hyperammonemia. Patient was started on empiric therapy with Rocephin. \"     Current Diet: NPO + PEG     Pain: No pain reported. SUBJECTIVE:  [facility]. Alert, pleasant, and cooperative for duration of evaluation. Notably dysarthric within conversation, however suspect potential baseline d/t SNF documentation to support dx of cerebral palsy, however patient unable to confirm/deny verbally. Patient expressed receiving altered diet levels at SNF, however unable to state exact levels of modifications, and no documentation available to confirm.
[address]   SPEECH THERAPY COMMUNICATION NOTE  STRZ RENAL TELEMETRY 6K      Date: 2023  Patient Name: Ronan Em        MRN: [id number]    : 1955  (75 y.o.)    REASON FOR COMMUNICATION:  ST received phone call from Shanice GARCES, at [phone] with inquiry re: patient completion of instrumental evaluation (MBS) this date. ST with provision of information that radiology technician reported RN calling to report \"patient unstable with request to hold completion of controlled study\", however, Shanice GARCES, with report of no communication with radiology and/or transport this date. Patient appropriate for completion of evaluation this date, however, ST unable to complete MBS d/t inability to coordinate fluoroscopy suite with radiology at this time. Will plan to complete MBS  as clinically indicated; Shanice GARCES, in agreement with plan. Recommendations maintain for patient to remain NPO with comprehensive oral care Q2H pending completion of repeat instrumental evaluation.      Aminah Humphrey M.S., [address] [place] [address]
[facility] called and was updated on patient plan of care.
AVS completed by VRN. If patient is to discharge before morning meds please let VRN know so next dose text can be updated.
[facility]  SPEECH THERAPY MISSED TREATMENT NOTE  STRZ RENAL TELEMETRY 6K      Date: 2023  Patient Name: Karlo Paris        MRN: [id number]    : 1955  (75 y.o.)    REASON FOR MISSED TREATMENT:  ST with plans for completion of instrumental evaluation this date ([facility]), however, transport cancelled d/t RN, Dante Barrios, with report patient unstable at this time with request to hold completion of controlled study; ST in agreement with plan. ST will re-attempt completion of controlled study  as clinically indicated. Recommendations maintain for patient to remain NPO with comprehensive oral care Q2H pending completion of repeat instrumental evaluation.      Ree Schuster M.S., [address] [place] [address]
Called report to nurse at [address] [place] [address] at [phone]. Verbal and written report given. All questions answered. Transport here for patient. Report given to transport.
Comprehensive Nutrition Assessment    Type and Reason for Visit:  Initial, Consult (tubefeeding ordering and management)    Nutrition Recommendations/Plan:   Recommend start Jevity 1.5 at 10 ml/hr, increase 10 ml/hr every 4 hours as tolerated to goal of 40 ml/hr. 2.   When IVF's stopped consider 200 mls free water flush every 4 hours or per Provider. 3.   Recommend SLP dysphagia evaluation if/when mental status improves/appropriate. Prior to admit patient was on pureed diet with moderately (honey thickened) liquid diet, 1:1 feed-was eating well per ECF nursing. PEG was only used for water flushes: 200 mls every 4 hours. Malnutrition Assessment:  Malnutrition Status: At risk for malnutrition (Comment) (10/30/23 1140)    Context:  Acute Illness     Findings of the 6 clinical characteristics of malnutrition:  Energy Intake:  Mild decrease in energy intake (Comment) (only 1 day-NPO, prior to admit at [facility] eating % of meals 1:1 feed)  Weight Loss:  Unable to assess (edema present so very hard to assess)     Body Fat Loss:  No significant body fat loss     Muscle Mass Loss:  No significant muscle mass loss  hard to assess with edema  Fluid Accumulation:  Moderate to Severe Extremities   Strength:  Not Performed    Nutrition Assessment:     Pt. nutritionally compromised AEB NPO status due to encephalopathy and need for enteral nutrition support. At risk for further nutrition compromise r/t admit with AMS, encephalopathy, UTI,  and underlying medical condition (hx: dysphagia, PEG 7/28/23-only used for water flushes PTA, learning disability, seizure, ADHD, depression, cerebral palsy, essential HTN). Nutrition Related Findings:    Pt. Report/Treatments/Miscellaneous: Patient seen earlier this morning. She was sleepy but did awake, answered questions asked with one word replies, opened her eyes was asked. Called ECF, eating well prior to admit, was a 1:1 feed, meal intake %.   PEG was not
Comprehensive Nutrition Assessment    Type and Reason for Visit:  Reassess    Nutrition Recommendations/Plan:   Continue current tube feeds Jevity 1.5 at 40ml/hr goal.   Pt having  swelling in hands, on Lasix & RN reports going to check with provider re: free water flushes ( Notice pump was 30ml water flush every 4 hrs) (11/3)  Consider SLP swallow eval to insure swallowing safety as appropriate. Prior to admit pt on admit pt was on pureed diet with moderately (honey thickened liquid )diet 1:1 feed-was eating well per F nursing. PEG was only used for water flushes: 200mls every 4hrs. Malnutrition Assessment:  Malnutrition Status: At risk for malnutrition (Comment) (10/30/23 1140)    Context:  Acute Illness     Findings of the 6 clinical characteristics of malnutrition:  Energy Intake:  Mild decrease in energy intake (Comment) (only 1 day-NPO, prior to admit at [facility] eating % of meals 1:1 feed)  Weight Loss:  Unable to assess (edema present so very hard to assess)     Body Fat Loss:  No significant body fat loss     Muscle Mass Loss:  No significant muscle mass loss    Fluid Accumulation:  Moderate to Severe Extremities   Strength:  Not Performed    Nutrition Assessment:     Pt. nutritionally improving AEB tube feeds at goal.  At risk for further nutrition compromise r/t admit with AMS, encephalopathy, UTI,  and underlying medical condition (hx: dysphagia, PEG 7/28/23-only used for water flushes PTA, learning disability, seizure, ADHD, depression, cerebral palsy, essential HTN). Nutrition Related Findings:    Pt. Report/Treatments/Miscellaneous: Pt seen, appears confused, Jevity 1.5cal/ml infusing 40ml/hr via PEG.  Notice water flush per PEG PTA 200mls every 4 hrs however pt swelling in hands, on Lasix & RN reports going to check with provider re: free water flushes ( Notice pump was 30ml water flush every 4 hrs) (11/3)  GI Status: (11/2) 800ml fecal tube op , BM x1 noted  Pertinent Labs:
[facility]  OCCUPATIONAL THERAPY MISSED TREATMENT NOTE  STRZ RENAL TELEMETRY 6K  6K-10/010-A      Date: 2023  Patient Name: Bertrand Jimenez        CSN: [id number]   : 1955  (75 y.o.)  Gender: female                REASON FOR MISSED TREATMENT:  PT reports Pt did not participate well in session, question PLOF-call into ECF made (by PT) & awaiting PLOF info. Will check back 11/3 if appropriate.
Hospitalist Progress Note          Patient: Da Baig  : 1955  MRN: [id number]     Acct: [de-identified]    PCP: Preeti Johnson MD  Date of Admission: 10/29/2023  Date of Service: Pt seen/examined on 10/31/23        Hospital Problems             Last Modified POA    * (Principal) Acute encephalopathy 10/29/2023 Yes   Assessment and Plan:  Acute metabolic encephalopathy: Suspect related to COVID-19, UTI, hyperammonemia. -Recheck ammonia to make sure not lab error. Recheck was high, gave laculose. Improved. -Start empiric lactulose  -IV antibiotics for UTI  -Follow cultures  -Patient received several days of molnupiravir. Would hold off on Paxlovid given numerous drug interactions and her history of status epilepticus while being on numerous antiseizure medications    Seizure disorder: Reviewed patient's medications will resume all of her antiseizure medications. Oral dysphagia status post PEG tube: Was placed over the summer when she had status epilepticus. We will resume tube feeds. Will ask SLP to evaluate swallow while admitted for encephalopathy.        =======================================================================      Chief Complaint:  confusion    History Of Present Illness:  Da Baig is a 75 y.o. female with PMHx of ADHD, seizure disorder, CP, HTN who presents to [address] [place] [address] with confusion. Patient has been acting more confused at the nursing home. She was recently diagnosed with COVID-19 and was started on molnupiravir. She is not hypoxic today. In the ED she was found to have a urinary tract infection and was started on antibiotics. She is unable to give any meaningful history. She is currently afebrile. Of note, her ammonia was also found to be significantly elevated and she is currently on chronic valproic acid. She is being admitted for acute metabolic encephalopathy likely related to COVID-19 and UTI.   Also possibly related to
Hospitalist Progress Note          Patient: Edward Espino  : 1955  MRN: [id number]     Acct: [de-identified]    PCP: Mary Franklin MD  Date of Admission: 10/29/2023  Date of Service: Pt seen/examined on 10/30/23        Hospital Problems             Last Modified POA    * (Principal) Acute encephalopathy 10/29/2023 Yes     Assessment and Plan:  Acute metabolic encephalopathy: Suspect related to COVID-19, UTI, hyperammonemia. -Recheck ammonia to make sure not lab error. Recheck was high, gave laculose. Improved. -Start empiric lactulose  -IV antibiotics for UTI  -Follow cultures  -Patient received several days of molnupiravir. Would hold off on Paxlovid given numerous drug interactions and her history of status epilepticus while being on numerous antiseizure medications    Seizure disorder: Reviewed patient's medications will resume all of her antiseizure medications. Oral dysphagia status post PEG tube: Was placed over the summer when she had status epilepticus. We will resume tube feeds. Will ask SLP to evaluate swallow while admitted for encephalopathy.        =======================================================================      Chief Complaint:  confusion    History Of Present Illness:  Edward Espino is a 75 y.o. female with PMHx of ADHD, seizure disorder, CP, HTN who presents to [facility] with confusion. Patient has been acting more confused at the nursing home. She was recently diagnosed with COVID-19 and was started on molnupiravir. She is not hypoxic today. In the ED she was found to have a urinary tract infection and was started on antibiotics. She is unable to give any meaningful history. She is currently afebrile. Of note, her ammonia was also found to be significantly elevated and she is currently on chronic valproic acid. She is being admitted for acute metabolic encephalopathy likely related to COVID-19 and UTI.   Also possibly related
Hospitalist Progress Note      Patient:  Dorothy Hutton    Unit/Bed:6K-10/010-A  YOB: 1955  MRN: [id number]   Acct: [de-identified]     PCP: Amber Molina MD  Date of Admission: 10/29/2023      Assessment/Plan:    Acute metabolic encephalopathy: POA, improving. Leading dx UTI, hyperammonemia, polypharmacy.  -Improving w/ lactulose and ABx  -IV antibiotics for UTI  -Follow cultures  -MBS to evaluate  -PT/OT evaluated. Pt non cooperative  Oral dysphagia status post PEG tube: Was placed over the summer when she had status epilepticus. PO feeding resumed at nursing home. -SLP following  -MBS today  -de-escalate tube feeding  -advance PO diet as tolerated  -DC rectal tube pending solid stool  Loose stools: secondary to lactulose and TF. Multiple bed changes. -will attempt to de-escalate lactulose  -will de-escalate TF pending SLP eval  -Resume PO diet as tolerated  -will start metamucil as bulking agent  -rectal tube placed for developing pressure injury  -DC tube on resolution  -SLP following  Hyperammonemia: mild persistent elevation , Initial concern for Depakote/Keppra toxicity lvl WNL, Mild LFT changes, unclear etiology.   -Will continue L carnitine supplementation  -Will attempt to de-escalate lacutlose   -improving with addition of L carnitine  -depakote level WNL  UTI: Cultures from 10/29/2023 showed Rocephin sensitive E. Coli  -Continue Rocephin (10/29/2023-present)  -Follow-up with PCP as outpatient  Anasarca: Suspect 2/2 initial large volume fluid bolus for lactic acidosis in setting of diastolic dysfunction  -59HH Lasix BID  -Potassium supplementation  -IVF stopped  HAGMA: suspect 2/2 diarrhea  Seizure disorder: Reviewed patient's medications hx of status epilepticus. CK WNL, lactate WNL.  Seizure unlikely        Expected discharge date:  TBD    Disposition Plan: return to [facility]    Chief Complaint: [place] Course:    From initial HPI  Dorothy Hutton is a
Hospitalist Progress Note      Patient:  Nicole Velez    Unit/Bed:6K-10/010-A  YOB: 1955  MRN: [id number]   Acct: [de-identified]     PCP: Louis Perez MD  Date of Admission: 10/29/2023      Assessment/Plan:    Acute metabolic encephalopathy: POA, improving. Leading dx UTI, hyperammonemia, polypharmacy.  -Improving w/ lactulose and ABx  -IV antibiotics for UTI  -Follow cultures  -Patient received several days of molnupiravir. Would hold off on Paxlovid given numerous drug interactions and her history of status epilepticus while being on numerous antiseizure medications  -PT/OT evaluated. Pt non cooperative  Hyperammonemia: mild persistent elevation , Initial concern for Depakote/Keppra toxicity lvl WNL, Mild LFT changes, unclear etiology.   -continue empiric lactulose  -Consider if no response Rifaximin  -check carnitine level   -will start empiric L carnitine supplementation  -depakote level WNL  -Resumed Depakote  UTI: Cultures from 10/29/2023 showed Rocephin sensitive E. Coli  -Continue Rocephin (10/29/2023-present)  -Follow-up with PCP as outpatient  Loose stools: secondary to lactulose. Multiple bed changes. HAGMA: suspect 2/2 diarrhea  Seizure disorder: Reviewed patient's medications hx of status epilepticus. CK WNL, lactate WNL. Seizure unlikely  Oral dysphagia status post PEG tube: Was placed over the summer when she had status epilepticus. We will resume tube feeds. Will ask SLP to evaluate swallow while admitted for encephalopathy. Expected discharge date:  TBD    Disposition Plan: return to [facility]    Chief Complaint: [place] Course:    From initial HPI  Nicole Velez is a 75 y.o. female with PMHx of ADHD, seizure disorder, CP,HTN who presents to [facility] with confusion. Patient has been acting more confused at the nursing home. She was recently diagnosed with COVID-19 and was started on molnupiravir.   She is not hypoxic
[facility]  INPATIENT SPEECH THERAPY  STRZ RENAL TELEMETRY 6K  DAILY NOTE    TIME   SLP Individual Minutes  Time In: 09  Time Out: 0940  Minutes: 10  Timed Code Treatment Minutes: 0 Minutes       Date: 2023  Patient Name: Tanya Christian      CSN: [id number]   : 1955  (75 y.o.)  Gender: female   Referring Physician:  Triston Avilez DO  Diagnosis: Acute encephalopathy   Precautions: Fall risk, seizure precautions  Current Diet: Minced and moist, thin liquids   Respiratory Status: Room Air  Swallowing Strategies:   Full Upright Position, Multiple Swallow (x2 per bite/drink), Oral Care daily, Medications Whole with Thin or Puree, Direct 1:1 Supervision, Limit Distractions, and Monitor for Fatigue  Date of Last MBS/FEES:  MBS 2023    Pain:  No pain reported. Subjective:  Patient seen with RN approval. Upon arrival, sitting upright in recliner. Alert and cooperative throughout treatment session. Short-Term Goals:  SHORT TERM GOAL #1:  Goal 1: Patient will consume minced and moist solids and thin liquids with use of trained swallow strategies (2 swallows per bite/drink, upright posture, OK for straws) given min verbal cues to meet majority of nutrition and hydration needs orally. INTERVENTIONS:Patient consumed PO trials of minced and moist solids (pudding and crushed hannah crackers). Patient with reduced oral acceptance likely due to sitting with eyes closed across all PO trials. Patient with fair control of texture with consistent pharyngeal trigger achieved. Independent utilization of lingual sweep to clear residuals from oral cavity. Thin liquids via cup, no overt difficulties.      Outlined s/s aspiration:  -Immediate/delayed cough  -Throat clear  -Wet vocal quality     Instruction provided to patient re: necessity for implementation of the below identified compensatory strategies to maximize safety for PO intake:  -Full upright positioning  -Small bite/sip  -2 swallows
Meets CDC criteria for covid isolation removal.
Pt in bed resting in bed eyes closed. Alert to person disoriented to place, time, and situation. Breathing unlabored. Pt is experiencing edema in arms and legs.
Pt was asleep but was blessed.     11/01/23 1750   Encounter Summary   Encounter Overview/Reason  Initial Encounter   Service Provided For: Patient   Referral/Consult From: Yaneth   Last Encounter  11/01/23   Complexity of Encounter Low   Spiritual/Emotional needs   Type Spiritual Support
Reginaldo completed admission documents at this time with nurse from [facility]. Also, requested last dose MAR be faxed, but have not yet received.
[facility] Nurse phoned [facility] [phone]. Left a message for her to return our call to complete admission question. Notified Chinyere Stubbs, bedside RN of this update.
[facility]  INPATIENT SPEECH THERAPY  STRZ RENAL TELEMETRY 6K  DAILY NOTE    TIME   SLP Individual Minutes  Time In: 4991  Time Out: 6162  Minutes: 10  Timed Code Treatment Minutes: 0 Minutes       Date: 2023  Patient Name: Kanika Talbot      CSN: [id number]   : 1955  (75 y.o.)  Gender: female   Referring Physician:  Ghassan Pereyra MD    Diagnosis: Acute encephalopathy  Precautions: Aspiration Risk, Fall Risk  Current Diet: Full Liquid Diet; Moderately Thick Liquids with Alternative Means of Nutrition via PEG  Respiratory Status: Nasal Canula: 3L  Swallowing Strategies:  NPO with Comprehensive Oral Care  Date of Last MBS/FEES:  MBS 2023 with recommendations for NPO    Pain:  No pain reported. Subjective:  Order placed for re-evaluation by ST from Ibis Baker DO d/t patient with improvement in alertness and command following since initial evaluation; ST with completion of skilled dysphagia treatment to determine readiness for completion of repeat instrumental evaluation (MBS) given previous recommendation in 2023 for NPO. Of note, patient with initiation of puree diet with moderately thick liquids at SNF prior to hospital admission. Patient seen with RN Shara Castro permission. Patient seen resting in bed upon ST arrival; alert and cooperative throughout evaluation with ability to follow basic, one step commands and provide minimal verbal responses to inquiries. No family present. Short-Term Goals:  SHORT TERM GOAL #1:  Goal 1: Patient will consume conservative PO trials with ST only demonstrating adequate endurance and consistent pharyngeal trigger to determine readiness for instrumental evaluation. SHORT TERM GOAL #2:  Goal 2: Staff will complete comprehensive oral care to minimize potential of bacterial colonization in oral cavity.     INTERVENTIONS:  Completion of comprehensive oral care (via toothette) with oral rinse (mouthwash) to oral structures (I.e.
Spoke to Lab concerning patients 1700 lab collection. Lab stated that patient is a very hard stick and required finger stick for CBC. Stated that she can add the valproic acid level to the 1100 lab collection and then she will send someone to try and collect the carnitine again after 1800.
This RN spoke with ST to verify that they had a follow up to swallowing deficit prior to trying to feed patient.  ST stated they would be up this morning to re-evaluate
[facility]  INPATIENT PHYSICAL THERAPY  EVALUATION  STRZ RENAL TELEMETRY 6K - 6K-10/010-A    Time In: 6594  Time Out: 1205  Timed Code Treatment Minutes: 12 Minutes  Minutes: 21          Date: 2023  Patient Name: Kadi Neal,  Gender:  female        MRN: [id number]  : 1955  (75 y.o.)      Referring Practitioner: Jada Whyte MD  Diagnosis: Acute encephalopathy  Additional Pertinent Hx: 75 y.o. female with PMHx of ADHD, seizure disorder, CP,HTN who presents to [facility] with confusion. Patient has been acting more confused at the nursing home. She was recently diagnosed with COVID-19 and was started on molnupiravir. She is not hypoxic today. In the ED she was found to have a urinary tract infection and was started on antibiotics. She is unable to give any meaningful history. She is currently afebrile. Of note, her ammonia was also found to be significantly elevated and she is currently on chronic valproic acid. She is being admitted for acute metabolic encephalopathy likely related to COVID-19 and UTI. Also possibly related to hyperammonemia. Restrictions/Precautions:  Restrictions/Precautions: General Precautions, Fall Risk    Subjective:  Chart Reviewed: Yes  Patient assessed for rehabilitation services?: Yes  Subjective: RN approved session. Pt laying in bed, does not respond. pt found to be laying in urine and loose stool despite having an external cathetor and rectal tube. RN in to assist with cleanup.     General:  Overall Orientation Status: Within Functional Limits  Vision: Within Functional Limits  Hearing: Within functional limits       Pain: does not c/o pain but does grimace with any movement     Vitals: Vitals not assessed per clinical judgement, see nursing flowsheet    Social/Functional History:    Type of Home: Facility                              Additional Comments: unknown prior level as pt was unable to verbalize and facility
[facility] with confusion. Patient has been acting more confused at the nursing home. She was recently diagnosed with COVID-19 and was started on molnupiravir. She is not hypoxic today. In the ED she was found to have a urinary tract infection and was started on antibiotics. She is unable to give any meaningful history. She is currently afebrile. Of note, her ammonia was also found to be significantly elevated and she is currently on chronic valproic acid. She is being admitted for acute metabolic encephalopathy likely related to COVID-19 and UTI. Also possibly related to hyperammonemia. Patient was started on empiric therapy with Rocephin      Subjective (past 24 hours):    Patient seen and evaluated at bedside. Now responsive and alert. Improved from yesterday. Reports difficulty and weakness. PT/OT ordered but was non cooperative. Unclear patient baseline. RN reports worsening loose stools with lactulose. Flexiseal in place. We will attempt to de-escalate lactulose and advance PO diet and d/c tube feeds      ROS (12 point review of systems completed. Pertinent positives noted. Otherwise ROS is negative).     Medications:  Reviewed    Infusion Medications    sodium chloride       Scheduled Medications    potassium bicarb-citric acid  40 mEq Per G Tube Once    amLODIPine  5 mg Oral Daily    calcium replacement protocol   Other RX Placeholder    levOCARNitine  990 mg Oral BID    amphetamine-dextroamphetamine  5 mg Oral Daily    aspirin  81 mg PEG Tube Daily    atorvastatin  20 mg PEG Tube Nightly    lacosamide  200 mg PEG Tube BID    lamoTRIgine  100 mg PEG Tube BID    lansoprazole  30 mg PEG Tube QAM AC    levETIRAcetam  2,000 mg PEG Tube BID    PHENobarbital  32.4 mg Per G Tube QAM    PHENobarbital  64.8 mg Per G Tube Nightly    sertraline  50 mg PEG Tube Daily    valproic acid  1,000 mg Per G Tube 3 times per day    venlafaxine  50 mg Oral TID    sodium chloride flush  5-40 mL IntraVENous 2
and was started on antibiotics. She is unable to give any meaningful history. She is currently afebrile. Of note, her ammonia was also found to be significantly elevated and she is currently on chronic valproic acid. She is being admitted for acute metabolic encephalopathy likely related to COVID-19 and UTI. Also possibly related to hyperammonemia. Patient was started on empiric therapy with Rocephin      Subjective (past 24 hours):    Patient seen and evaluated at bedside. Responsive to verbal stimuli. Unclear patient baseline. RN reports worsening loose stools with lactulose. Patient has missed 2-3 doses of lactulose in the past 24 to 48 hours. Because of hyperammonemia and is suspected to be due to valproic acid toxicity. Valproic acid level, carnitine level and work-up pending. In the interim we will hold Depakote for suspected toxicity      ROS (12 point review of systems completed. Pertinent positives noted. Otherwise ROS is negative).     Medications:  Reviewed    Infusion Medications    sodium chloride      lactated ringers IV soln 100 mL/hr at 11/01/23 0735     Scheduled Medications    amphetamine-dextroamphetamine  5 mg Oral Daily    aspirin  81 mg PEG Tube Daily    atorvastatin  20 mg PEG Tube Nightly    lacosamide  200 mg PEG Tube BID    lamoTRIgine  100 mg PEG Tube BID    lansoprazole  30 mg PEG Tube QAM AC    levETIRAcetam  2,000 mg PEG Tube BID    PHENobarbital  32.4 mg Per G Tube QAM    PHENobarbital  64.8 mg Per G Tube Nightly    sertraline  50 mg PEG Tube Daily    valproic acid  1,000 mg Per G Tube 3 times per day    venlafaxine  50 mg Oral TID    sodium chloride flush  5-40 mL IntraVENous 2 times per day    enoxaparin  30 mg SubCUTAneous Q12H    cefTRIAXone (ROCEPHIN) IV  1,000 mg IntraVENous Q24H    lactulose  20 g Oral TID     PRN Meds: sodium chloride flush, sodium chloride, acetaminophen **OR** acetaminophen      Intake/Output Summary (Last 24 hours) at 11/1/2023 1005  Last data
arrival, patient with limited alertness levels. Per ED documentation, patient is not fully alert at baseline. Conversation exchange was limited as patient with 1-2 word phases. Intelligibility best approximates at ~10-20%. Apraxic like, oral groping movements noted. OBJECTIVE:    Pain:  No pain reported. Current Diet: NPO, TF via PEG. Respiratory Status:  Room Air    Behavioral Observation:  Lethargic and Limited Direction Following    CRANIAL NERVE ASSESSMENT   CN V (Trigeminal) Closes and Opens Mandible [facility]    Rotary Jaw Movement Not Tested      CN VII (Facial) Cheeks Hold Food out of Sulci Not Tested    Opens, Closes/Seals, Protrudes, Retracts Lips Impaired: Bilateral    General Appearance WFL    Sensation Not Tested      CN X (Vagus - Pharyngeal) Raises Back of Tongue Impaired      CN XI (Accessory) Lifts Soft Palate Impaired      CN XII (Hypoglossal) Elevates Tongue Up and Back Impaired    Protrusion   WFL    Lateralizes Tongue WFL    Sensation Not Tested      Other Observations Dentition WFL    Vocal Quality Hypernasal     Cough Impaired, dystussia     Patient Evaluated Using: Ice Chips    Oral Phase:  Impaired:  Impaired AP Movement and Impaired Mastication    Pharyngeal Phase: Impaired:  Decreased Hyolaryngeal Elevation and Suspected Decreased Airway Protection    Signs and Symptoms of Laryngeal Penetration/Aspiration: No signs/symptoms of aspiration evident in this evaluation, but cannot rule out silent aspiration. Aspiration Pneumonia Predictors:  Decreased Cognition, Limited Mobility, Chronic Medical Issues/Pertinent Co-Morbidities, Dependence for Feeding, Dependence for Oral Care, Impaired Cough Function, and Impaired Health Status (Compromised Immune System)    Functional Oral Intake Scale: Tube Dependent: 1.   No Oral Intake    Impressions: Patient presents with at least moderate oral dysphagia based on conservative clinical findings with inability to fully discern potential presence of
training, Safety education & training, Neuromuscular re-education, Patient/Caregiver education & training, Equipment evaluation, education, & procurement, Self-Care / ADL, Positioning, Pain management, Manual Therapy:  STM. See long-term goal time frame for expected duration of plan of care. If no long-term goals established, a short length of stay is anticipated. Goals:  Patient goals : none stated  Short Term Goals  Time Frame for Short Term Goals: by discharge  Short Term Goal 1: patient will tolerate gentle PROM/AAROM to (B) UEs to prevent pain and promote function for UB dressing with caregivers. Short Term Goal 2: patient will tolerate positioning of UEs and hands to prevent contracture and edema and promote ease with UB dressing. Short Term Goal 3: OTR to assess all mobility as appropriate and create goal as needed. AM-PAC Inpatient Daily Activity Raw Score: 6  AM-PAC Inpatient ADL T-Scale Score : 17.07    Following session, patient left in safe position with all fall risk precautions in place.
[] Encourage ambulation           [] Already on Anticoagulation     Code Status: Full Code    PT/OT Eval Status: unable to cooperate based on AMS    Tele:   [] yes             [x] no        Electronically signed by Madhavi Schroeder DO on 11/5/2023 at 9:14 AM

## 2023-11-06 NOTE — FLOWSHEET NOTE
11/06/23 1331   Safe Environment   Safety Measures Bed in low position;Call light within reach; Side rails X 2  (Virtual Nurse Safety Round Completed)     Call placed to patients room, patient did not respond to audio, video turned on for safety purposes. Patient is resting in bed with eyes closed, call light within reach, no signs or symtpoms of distress noted.

## 2023-11-06 NOTE — DISCHARGE SUMMARY
equal bilaterally       Labs: For convenience and continuity at follow-up the following most recent labs are provided:      CBC:    Lab Results   Component Value Date/Time    WBC 8.4 11/06/2023 06:08 AM    HGB 12.9 11/06/2023 06:08 AM    HCT 39.3 11/06/2023 06:08 AM     11/06/2023 06:08 AM       Renal:    Lab Results   Component Value Date/Time     11/06/2023 07:27 AM    K 4.0 11/06/2023 07:27 AM    K 4.2 10/30/2023 06:00 AM    CL 94 11/06/2023 07:27 AM    CO2 31 11/06/2023 07:27 AM    BUN 10 11/06/2023 07:27 AM    CREATININE 0.4 11/06/2023 07:27 AM    CALCIUM 9.3 11/06/2023 07:27 AM    PHOS 4.1 10/01/2023 12:30 PM         Significant Diagnostic Studies    Radiology:   FL MODIFIED BARIUM SWALLOW W VIDEO   Final Result   1. There was laryngeal penetration with thin liquids, puree and soft solids. There was no aspiration of barium. 2. Additional recommendations from speech therapist will follow. **This report has been created using voice recognition software. It may contain minor errors which are inherent in voice recognition technology. **      Final report electronically signed by DR Alisson Noe on 11/5/2023 10:11 AM      CT HEAD WO CONTRAST   Final Result   1. No mass effect or acute hemorrhage. 2. Chronic periventricular small vessel ischemic changes. **This report has been created using voice recognition software. It may contain minor errors which are inherent in voice recognition technology. **      Final report electronically signed by Dr. Eulogio Gardiner on 10/29/2023 2:05 PM      XR CHEST PORTABLE   Final Result      No acute intrathoracic process. **This report has been created using voice recognition software. It may contain minor errors which are inherent in voice recognition technology. **      Final report electronically signed by Dr. Eulogio Gardiner on 10/29/2023 1:25 PM             Consults:     IP CONSULT TO [address] [place] [address]

## 2023-11-06 NOTE — FLOWSHEET NOTE
11/06/23 1057   Safe Environment   Safety Measures Call light within reach; Other (comment)  (Virtual Nurse Safety Round Completed, pt up in chair)     Call placed to patients room, patient responds to audio, permitted video. Patient alert responds slowly. Patient denied any concerns/complaints at this time other than to be repositioned in chair. Staff/student entered room and aware of patient request. Patient educated on utilizing call light. Call light within reach, no signs or symptoms of distress noted.

## 2023-11-06 NOTE — DISCHARGE INSTRUCTIONS
Neurology- A&Ox1 (self)  Cardiology- NSR, edema  Respiratory- Room air, dimished lung sounds  GI- Had FMS (rectal tube) removed 11/05/2023. Still having loose stools but much improved. More soft than watery now. Patient has peg tube. Cleaned insertion site daily with soap and water. Dried thoroughly. - Incontinent with good output, CL/yellow  Skin- Bathed every day, Antifungal powder applied to all folds (Pannus, groin, underarms and breast) skin is now clear in those areas       Patient arrived with stage 2 coccyx. This is much improved. Please pull back mepilex BID, wash with soap and water, apply thin layer of zinc, Reapply mepilex. Change mepilex q3 days or when soiled. Had tube feeds until yesterday post MBS. Patient passed study for oral feedings. TF discontinued.    Patient finished IV ABT for UTI

## 2023-11-06 NOTE — CARE COORDINATION
11/6/23, 8:51 AM EST    DISCHARGE ON GOING EVALUATION    [address] day: 8  Location: -10/010-A Reason for admit: Acute encephalopathy [G93.40]  Urinary tract infection without hematuria, site unspecified [N39.0]  COVID-19 [U07.1]   Procedure: 11/5 MBS completed  Barriers to Discharge: Advanced to minced and moist diet with thin liquids. Lactulose decreased to daily, and metamucil added. Possible discharge today. PCP: Gorge Trinidad MD  Readmission Risk Score: 18.6%  Patient Goals/Plan/Treatment Preferences: return to [facility]    *Update: Discharge ordered. VM message left for legal guardian Karen Jg. Requested callback for IMM. Electronically signed by Eva Aragon RN on 11/6/2023 at 10:20 AM     11/6/23, 1:44 PM EST    Pt verbalized understanding and gave permission for possible discharge within 4 hours of receiving IMM. IMM Letter  IMM Letter given to Patient/Family/Significant other/Guardian/POA/by[de-identified] Dunia Munroe CM- VM message left for patient's guardian Terri Dillard, copy also provided in patient's room. At time of patient transport, Terri Dillard had not returned call.   IMM Letter date given[de-identified] 11/06/23  IMM Letter time given[de-identified] 2228

## 2023-11-11 PROBLEM — T84.7XXS WOUND INFECTION COMPLICATING HARDWARE, SEQUELA: Status: RESOLVED | Noted: 2023-01-16 | Resolved: 2023-11-11

## 2023-11-11 PROBLEM — M86.9 OSTEOMYELITIS (HCC): Status: RESOLVED | Noted: 2023-01-16 | Resolved: 2023-11-11

## 2023-11-14 ENCOUNTER — TRANSCRIBE ORDERS (OUTPATIENT)
Dept: SPEECH THERAPY | Age: 68
End: 2023-11-14

## 2023-11-14 DIAGNOSIS — R13.10 DYSPHAGIA, UNSPECIFIED TYPE: Primary | ICD-10-CM

## 2023-12-27 ENCOUNTER — HOSPITAL ENCOUNTER (OUTPATIENT)
Dept: GENERAL RADIOLOGY | Age: 68
Discharge: HOME | End: 2023-12-27
Payer: MEDICARE

## 2023-12-27 DIAGNOSIS — R13.10 DYSPHAGIA, UNSPECIFIED TYPE: ICD-10-CM

## 2023-12-27 PROCEDURE — 74230 X-RAY XM SWLNG FUNCJ C+: CPT

## 2023-12-27 PROCEDURE — 92611 MOTION FLUOROSCOPY/SWALLOW: CPT

## 2023-12-27 PROCEDURE — 2500000003 HC RX 250 WO HCPCS: Performed by: NURSE PRACTITIONER

## 2023-12-27 RX ADMIN — BARIUM SULFATE 20 ML: 400 PASTE ORAL at 09:23

## 2023-12-27 RX ADMIN — BARIUM SULFATE 100 ML: 0.81 POWDER, FOR SUSPENSION ORAL at 09:23

## 2023-12-27 NOTE — DISCHARGE SUMMARY
Alert    PATIENT WAS EVALUATED USING:  Barium: Thin Liquids, Puree, Soft Solids, Coarse Solids, and Mixed Consistency    ORAL PHASE FATIMAH SCORE: (Dysphagia outcome and severity scale)  4 = Mild-Moderate Dysphagia - May have one or two diet consistencies restricted - Oral residue clears with cue - Intermittent supervision or cueing    PHARYNGEAL PHASE FATIMAH SCORE: (Dysphagia outcome and severity scale)  5 = Mild Dysphagia - may need one consistency restricted - May have one or more of the following: Aspiration with thin - cough to clear, Airway penetration midway to the vocal cords with one or more consistency or to the vocal folds with one consistency, but clears spontaneously - Residue in the pharynx clears spontaneously    EVIDENCE FOR LARYNGEAL PENETRATION AND/OR ASPIRATION:  No evidence of aspiration  Laryngeal penetration evident with thin liquids by straw    PENETRATION-ASPIRATION SCALE (PAS):   Thin Liquids: 2 = Material enters the airway, remains above vocal folds, and is ejected from the airway  Puree:  1 = Material does not enter the airway  Soft Solid:  1 = Material does not enter the airway  Mixed Consistencies: 1 = Material does not enter the airway  Hard Solid: 1 = Material does not enter the airway    ESOPHAGEAL PHASE:   No significant findings and See Radiology Report for details    ATTEMPTED TECHNIQUES:  **ST performed direct 1:1 assistance with feeding  Small Bolus Size Effective    Straw Ineffective *shallow, transient penetration with immediate cough   Cup Effective *controlled, small sips   Large Drinks Not Attempted    Consecutive Drinks Not Attempted    Chin Tuck Not Attempted    Head Turn Not Attempted    Spoon Presentations Not Attempted    Volitional Cough Not Attempted    Spontaneous Cough Effective *great sensation of shallow laryngeal penetration with thin liquids via straw as patient performed a spontaneous cough cleared thin barium from underside of epiglottis          DIAGNOSTIC

## 2024-01-04 PROBLEM — Z93.1 PEG (PERCUTANEOUS ENDOSCOPIC GASTROSTOMY) STATUS (HCC): Status: ACTIVE | Noted: 2024-01-04

## 2024-01-10 ENCOUNTER — APPOINTMENT (OUTPATIENT)
Dept: CT IMAGING | Age: 69
End: 2024-01-10
Payer: MEDICARE

## 2024-01-10 ENCOUNTER — HOSPITAL ENCOUNTER (EMERGENCY)
Age: 69
Discharge: HOME | End: 2024-01-10
Attending: EMERGENCY MEDICINE
Payer: MEDICARE

## 2024-01-10 VITALS
WEIGHT: 210 LBS | OXYGEN SATURATION: 98 % | BODY MASS INDEX: 39.65 KG/M2 | HEIGHT: 61 IN | TEMPERATURE: 97.7 F | SYSTOLIC BLOOD PRESSURE: 150 MMHG | DIASTOLIC BLOOD PRESSURE: 77 MMHG | HEART RATE: 78 BPM | RESPIRATION RATE: 21 BRPM

## 2024-01-10 DIAGNOSIS — Z00.00 WELL ADULT HEALTH CHECK: Primary | ICD-10-CM

## 2024-01-10 LAB
ALBUMIN SERPL BCG-MCNC: 3.7 G/DL (ref 3.5–5.1)
ALP SERPL-CCNC: 71 U/L (ref 38–126)
ALT SERPL W/O P-5'-P-CCNC: 15 U/L (ref 11–66)
AMMONIA PLAS-MCNC: 46 UMOL/L (ref 11–60)
ANION GAP SERPL CALC-SCNC: 15 MEQ/L (ref 8–16)
AST SERPL-CCNC: 19 U/L (ref 5–40)
BASOPHILS ABSOLUTE: 0 THOU/MM3 (ref 0–0.1)
BASOPHILS NFR BLD AUTO: 0.3 %
BILIRUB CONJ SERPL-MCNC: < 0.2 MG/DL (ref 0–0.3)
BILIRUB SERPL-MCNC: 0.2 MG/DL (ref 0.3–1.2)
BILIRUB UR QL STRIP.AUTO: NEGATIVE
BUN SERPL-MCNC: 18 MG/DL (ref 7–22)
CALCIUM SERPL-MCNC: 9.5 MG/DL (ref 8.5–10.5)
CHARACTER UR: CLEAR
CHLORIDE SERPL-SCNC: 102 MEQ/L (ref 98–111)
CO2 SERPL-SCNC: 23 MEQ/L (ref 23–33)
COLOR: YELLOW
CREAT SERPL-MCNC: 0.3 MG/DL (ref 0.4–1.2)
DEPRECATED RDW RBC AUTO: 45.5 FL (ref 35–45)
EOSINOPHIL NFR BLD AUTO: 1.4 %
EOSINOPHILS ABSOLUTE: 0.1 THOU/MM3 (ref 0–0.4)
ERYTHROCYTE [DISTWIDTH] IN BLOOD BY AUTOMATED COUNT: 11.9 % (ref 11.5–14.5)
FLUAV RNA RESP QL NAA+PROBE: NOT DETECTED
FLUBV RNA RESP QL NAA+PROBE: NOT DETECTED
GFR SERPL CREATININE-BSD FRML MDRD: > 60 ML/MIN/1.73M2
GLUCOSE SERPL-MCNC: 90 MG/DL (ref 70–108)
GLUCOSE UR QL STRIP.AUTO: NEGATIVE MG/DL
HCT VFR BLD AUTO: 46.1 % (ref 37–47)
HGB BLD-MCNC: 14.9 GM/DL (ref 12–16)
HGB UR QL STRIP.AUTO: NEGATIVE
IMM GRANULOCYTES # BLD AUTO: 0.02 THOU/MM3 (ref 0–0.07)
IMM GRANULOCYTES NFR BLD AUTO: 0.3 %
KETONES UR QL STRIP.AUTO: ABNORMAL
LIPASE SERPL-CCNC: 23.6 U/L (ref 5.6–51.3)
LYMPHOCYTES ABSOLUTE: 3.5 THOU/MM3 (ref 1–4.8)
LYMPHOCYTES NFR BLD AUTO: 43.7 %
MCH RBC QN AUTO: 33.4 PG (ref 26–33)
MCHC RBC AUTO-ENTMCNC: 32.3 GM/DL (ref 32.2–35.5)
MCV RBC AUTO: 103.4 FL (ref 81–99)
MONOCYTES ABSOLUTE: 0.7 THOU/MM3 (ref 0.4–1.3)
MONOCYTES NFR BLD AUTO: 9.4 %
NEUTROPHILS NFR BLD AUTO: 44.9 %
NITRITE UR QL STRIP: NEGATIVE
NRBC BLD AUTO-RTO: 0 /100 WBC
NT-PROBNP SERPL IA-MCNC: 40 PG/ML (ref 0–124)
OSMOLALITY SERPL CALC.SUM OF ELEC: 280.8 MOSMOL/KG (ref 275–300)
PH UR STRIP.AUTO: 7 [PH] (ref 5–9)
PLATELET # BLD AUTO: 164 THOU/MM3 (ref 130–400)
PMV BLD AUTO: 12.9 FL (ref 9.4–12.4)
POTASSIUM SERPL-SCNC: 4.9 MEQ/L (ref 3.5–5.2)
PROT SERPL-MCNC: 6.9 G/DL (ref 6.1–8)
PROT UR STRIP.AUTO-MCNC: NEGATIVE MG/DL
RBC # BLD AUTO: 4.46 MILL/MM3 (ref 4.2–5.4)
SARS-COV-2 RNA RESP QL NAA+PROBE: NOT DETECTED
SEGMENTED NEUTROPHILS ABSOLUTE COUNT: 3.5 THOU/MM3 (ref 1.8–7.7)
SODIUM SERPL-SCNC: 140 MEQ/L (ref 135–145)
SP GR UR REFRACT.AUTO: 1.02 (ref 1–1.03)
TROPONIN, HIGH SENSITIVITY: 11 NG/L (ref 0–12)
UROBILINOGEN, URINE: 0.2 EU/DL (ref 0–1)
WBC # BLD AUTO: 7.9 THOU/MM3 (ref 4.8–10.8)
WBC #/AREA URNS HPF: NEGATIVE /[HPF]

## 2024-01-10 PROCEDURE — 85025 COMPLETE CBC W/AUTO DIFF WBC: CPT

## 2024-01-10 PROCEDURE — 81003 URINALYSIS AUTO W/O SCOPE: CPT

## 2024-01-10 PROCEDURE — 74176 CT ABD & PELVIS W/O CONTRAST: CPT

## 2024-01-10 PROCEDURE — 99284 EMERGENCY DEPT VISIT MOD MDM: CPT

## 2024-01-10 PROCEDURE — 80048 BASIC METABOLIC PNL TOTAL CA: CPT

## 2024-01-10 PROCEDURE — 83690 ASSAY OF LIPASE: CPT

## 2024-01-10 PROCEDURE — 93005 ELECTROCARDIOGRAM TRACING: CPT | Performed by: EMERGENCY MEDICINE

## 2024-01-10 PROCEDURE — 83880 ASSAY OF NATRIURETIC PEPTIDE: CPT

## 2024-01-10 PROCEDURE — 93010 ELECTROCARDIOGRAM REPORT: CPT | Performed by: INTERNAL MEDICINE

## 2024-01-10 PROCEDURE — 87636 SARSCOV2 & INF A&B AMP PRB: CPT

## 2024-01-10 PROCEDURE — 36415 COLL VENOUS BLD VENIPUNCTURE: CPT

## 2024-01-10 PROCEDURE — 84484 ASSAY OF TROPONIN QUANT: CPT

## 2024-01-10 PROCEDURE — 82140 ASSAY OF AMMONIA: CPT

## 2024-01-10 PROCEDURE — 80076 HEPATIC FUNCTION PANEL: CPT

## 2024-01-10 ASSESSMENT — PAIN - FUNCTIONAL ASSESSMENT: PAIN_FUNCTIONAL_ASSESSMENT: NONE - DENIES PAIN

## 2024-01-10 NOTE — ED PROVIDER NOTES
(LAMICTAL) 100 MG TABLET    1 tablet by PEG Tube route 2 times daily    LANSOPRAZOLE (PREVACID SOLUTAB) 30 MG DISINTEGRATING TABLET    1 tablet by PEG Tube route every morning (before breakfast)    LEVETIRACETAM (KEPPRA) 100 MG/ML SOLUTION    20 mLs by PEG Tube route 2 times daily    LEVOCARNITINE (CARNITOR) 330 MG TABLET    Take 1 tablet by mouth 2 times daily    LIDOCAINE HCL (ASPERCREME LIDOCAINE) 4 % CREA    Apply topically Apply to bilateral knees topically two times a day for pain.    MULTIPLE VITAMIN (MULTI-VITAMIN DAILY PO)    Take by mouth    PHENOBARBITAL (LUMINAL) 20 MG/5ML ELIXIR    7.5 mLs by Per G Tube route every morning for 90 days. Max Daily Amount: 30 mg    PHENOBARBITAL (LUMINAL) 20 MG/5ML ELIXIR    15 mLs by Per G Tube route at bedtime for 90 days. Max Daily Amount: 60 mg    POLYCARBOPHIL (FIBERCON) 625 MG TABLET    1 tablet by Per G Tube route 3 times daily    SERTRALINE (ZOLOFT) 50 MG TABLET    1 tablet by PEG Tube route daily    VALPROIC ACID (DEPAKENE) 250 MG/5ML SOLN ORAL SOLUTION    20 mLs by Per G Tube route every 8 hours    VENLAFAXINE (EFFEXOR) 50 MG TABLET    Take 1 tablet by mouth in the morning, at noon, and at bedtime       ALLERGIES     is allergic to pcn [penicillins].    FAMILY HISTORY     She indicated that her mother is . She indicated that her father is .   family history is not on file.    SOCIAL HISTORY      reports that she has never smoked. She has never used smokeless tobacco. She reports that she does not currently use alcohol. She reports that she does not currently use drugs.    PHYSICAL EXAM     INITIAL VITALS:  height is 1.549 m (5' 1\") and weight is 95.3 kg (210 lb). Her oral temperature is 97.7 °F (36.5 °C). Her blood pressure is 150/77 (abnormal) and her pulse is 78. Her respiration is 19 and oxygen saturation is 100%.    Physical Exam   Constitutional:  well-developed and well-nourished.   HENT: Head: Normocephalic, atraumatic, Bilateral external

## 2024-01-10 NOTE — ED NOTES
Patient resting in bed. Respirations easy and unlabored. No distress noted. Call light within reach.

## 2024-01-10 NOTE — ED NOTES
Presents to ED from [facility] via [facility] with complaints of worsening altered mental status. Nursing Home staff states pt has been declining for 3 days. Pt has noted to have high ammonia levels. Upon arrival pt alert to person and place.

## 2024-01-12 LAB
EKG ATRIAL RATE: 81 BPM
EKG P AXIS: 51 DEGREES
EKG P-R INTERVAL: 156 MS
EKG Q-T INTERVAL: 384 MS
EKG QRS DURATION: 82 MS
EKG QTC CALCULATION (BAZETT): 446 MS
EKG R AXIS: -22 DEGREES
EKG T AXIS: 47 DEGREES
EKG VENTRICULAR RATE: 81 BPM

## 2024-02-06 RX ORDER — SODIUM CHLORIDE 0.9 % (FLUSH) 0.9 %
5-40 SYRINGE (ML) INJECTION PRN
Status: DISCONTINUED | OUTPATIENT
Start: 2024-02-06 | End: 2024-02-09 | Stop reason: HOSPADM

## 2024-02-06 RX ORDER — SODIUM CHLORIDE 9 MG/ML
INJECTION, SOLUTION INTRAVENOUS CONTINUOUS
Status: DISCONTINUED | OUTPATIENT
Start: 2024-02-06 | End: 2024-02-09 | Stop reason: HOSPADM

## 2024-02-06 RX ORDER — SODIUM CHLORIDE 0.9 % (FLUSH) 0.9 %
5-40 SYRINGE (ML) INJECTION EVERY 12 HOURS SCHEDULED
Status: DISCONTINUED | OUTPATIENT
Start: 2024-02-06 | End: 2024-02-09 | Stop reason: HOSPADM

## 2024-02-06 RX ORDER — SODIUM CHLORIDE 9 MG/ML
25 INJECTION, SOLUTION INTRAVENOUS PRN
Status: DISCONTINUED | OUTPATIENT
Start: 2024-02-06 | End: 2024-02-09 | Stop reason: HOSPADM

## 2024-02-09 ENCOUNTER — HOSPITAL ENCOUNTER (OUTPATIENT)
Age: 69
Setting detail: OUTPATIENT SURGERY
Discharge: NURSING, REHAB OR LONG-TERM CARE | End: 2024-02-09
Attending: INTERNAL MEDICINE | Admitting: INTERNAL MEDICINE
Payer: MEDICARE

## 2024-02-09 ENCOUNTER — ANESTHESIA EVENT (OUTPATIENT)
Dept: ENDOSCOPY | Age: 69
End: 2024-02-09
Payer: MEDICARE

## 2024-02-09 ENCOUNTER — ANESTHESIA (OUTPATIENT)
Dept: ENDOSCOPY | Age: 69
End: 2024-02-09
Payer: MEDICARE

## 2024-02-09 VITALS
OXYGEN SATURATION: 96 % | HEART RATE: 71 BPM | TEMPERATURE: 97.4 F | BODY MASS INDEX: 39.84 KG/M2 | SYSTOLIC BLOOD PRESSURE: 120 MMHG | DIASTOLIC BLOOD PRESSURE: 61 MMHG | WEIGHT: 211 LBS | HEIGHT: 61 IN

## 2024-02-09 PROCEDURE — 3609012400 HC EGD TRANSORAL BIOPSY SINGLE/MULTIPLE: Performed by: INTERNAL MEDICINE

## 2024-02-09 PROCEDURE — 6360000002 HC RX W HCPCS: Performed by: NURSE ANESTHETIST, CERTIFIED REGISTERED

## 2024-02-09 PROCEDURE — 3700000001 HC ADD 15 MINUTES (ANESTHESIA): Performed by: INTERNAL MEDICINE

## 2024-02-09 PROCEDURE — 3609012900 HC EGD FOREIGN BODY REMOVAL: Performed by: INTERNAL MEDICINE

## 2024-02-09 PROCEDURE — 3700000000 HC ANESTHESIA ATTENDED CARE: Performed by: INTERNAL MEDICINE

## 2024-02-09 PROCEDURE — 88305 TISSUE EXAM BY PATHOLOGIST: CPT

## 2024-02-09 PROCEDURE — 7100000010 HC PHASE II RECOVERY - FIRST 15 MIN: Performed by: INTERNAL MEDICINE

## 2024-02-09 PROCEDURE — 2580000003 HC RX 258: Performed by: INTERNAL MEDICINE

## 2024-02-09 RX ORDER — PROPOFOL 10 MG/ML
INJECTION, EMULSION INTRAVENOUS PRN
Status: DISCONTINUED | OUTPATIENT
Start: 2024-02-09 | End: 2024-02-09 | Stop reason: SDUPTHER

## 2024-02-09 RX ADMIN — SODIUM CHLORIDE: 9 INJECTION, SOLUTION INTRAVENOUS at 13:33

## 2024-02-09 RX ADMIN — PROPOFOL 200 MG: 10 INJECTION, EMULSION INTRAVENOUS at 13:38

## 2024-02-09 ASSESSMENT — PAIN - FUNCTIONAL ASSESSMENT
PAIN_FUNCTIONAL_ASSESSMENT: 0-10
PAIN_FUNCTIONAL_ASSESSMENT: NONE - DENIES PAIN

## 2024-02-09 NOTE — ANESTHESIA PRE PROCEDURE
Encephalopathy acute G93.40    Cerebral palsy (HCC) G80.9    Acute encephalopathy G93.40    Urinary tract infection without hematuria N39.0    Hyperammonemia (HCC) E72.20    COVID-19 U07.1    Altered mental status R41.82    Anasarca R60.1    PEG (percutaneous endoscopic gastrostomy) status ([facility]) Z93.1       Past Medical History:        Diagnosis Date    ADHD     Attention deficit hyperactivity disorder (ADHD)     Cerebral palsy (HCC)     Depression     Essential hypertension     Learning disability     Seizure (HCC)     Urinary urgency        Past Surgical History:        Procedure Laterality Date    GASTROSTOMY TUBE PLACEMENT  07/28/2023    EGD PEG TUBE PLACEMENT    GASTROSTOMY TUBE PLACEMENT N/A 7/28/2023    EGD PEG TUBE PLACEMENT performed by Ge Puente MD at [facility] OR    SHOULDER SURGERY Left     ORIF    -- had hardware infection afterwards requiring IV abx       Social History:    Social History     Tobacco Use    Smoking status: Never    Smokeless tobacco: Never   Substance Use Topics    Alcohol use: Not Currently                                Counseling given: Not Answered      Vital Signs (Current): There were no vitals filed for this visit.                                           BP Readings from Last 3 Encounters:   01/23/24 133/74   01/11/24 (!) 145/62   01/10/24 (!) 150/77       NPO Status:                                                                                 BMI:   Wt Readings from Last 3 Encounters:   01/10/24 95.3 kg (210 lb)   01/04/24 93.9 kg (207 lb)   12/04/23 94.5 kg (208 lb 6.4 oz)     There is no height or weight on file to calculate BMI.    CBC:   Lab Results   Component Value Date/Time    WBC 7.9 01/10/2024 02:40 PM    RBC 4.46 01/10/2024 02:40 PM    HGB 14.9 01/10/2024 02:40 PM    HCT 46.1 01/10/2024 02:40 PM    .4 01/10/2024 02:40 PM    RDW 13.5 07/31/2023 04:26 AM     01/10/2024 02:40 PM       CMP:   Lab Results   Component Value Date/Time     01/10/2024

## 2024-02-09 NOTE — H&P
[facility]    Pre-Endoscopy H&PE      Patient: Mariangel Mas    : 1955    Acct#: [id number]  Primary Care Physician: Adriana Smalls MD     Planned Procedure:    [x]EGD    []Enteroscopy    []PEG    []PEG change    []PEG removal  []Variceal banding    []Biopsy   []Dilation      []Control of bleeding  []Destruction of lesion by APC    []Stent Placement  []Foreign Body Removal    []Snare Polypectomy  [x]Other:  PEG removal      Planned Sedation  []Conscious Sedation [x]MAC/Propofol []Anesthesia    []None    Airway:  Adequate for planned sedation    Indication:   Dysphagia, PEG removal    History:  \"Yana\" is a 68-year-old female with cerebral palsy, seizure disorder, MRDD who had a PEG placed while hospitalized in 2023.  She saw Leti Parson's CNP 11/10/2023 requesting a PEG removal.  It was unclear if it was appropriate at that time.  She had a modified barium swallow 2023 that showed no aspiration.  She returned 2024 eating an oral diet with thickened liquids.  They have not been using her PEG.    The patient is unlikely to cooperate with an unsedated PEG tube removal, so sedated removal via EGD is planned.    Physical Exam:  VITALS: BP (!) 148/72   Temp 97.2 °F (36.2 °C) (Temporal)   Ht 1.549 m (5' 1\")   Wt 95.7 kg (211 lb)   SpO2 96%   BMI 39.87 kg/m²   The patient is a 68 y.o. female in no acute distress.  HEAD: Normal cephalic/atraumatic.   NECK: No lymphadenopathy or bruits.  CHEST: Rises equally on inspiration. Clear to auscultation bilaterally.   CARDIOVASCULAR: Regular rate and rhythm without murmurs, rubs or gallops.   ABDOMEN: Soft, nontender, and nondistended with normal bowel sounds.  PEG C/D/I.  UPPER EXTREMITIES: no cyanosis, clubbing, or edema.  DERM: no rash or jaundice.  LOWER EXTREMITIES: no cyanosis, clubbing, or edema.  NEURO:  Patient is nonverbal.     ASA: ASA 3 - Patient with moderate systemic disease with functional  mouth 2 times daily 7/30/23   Jorge Escoto, DO   levETIRAcetam (KEPPRA) 100 MG/ML solution 20 mLs by PEG Tube route 2 times daily  Patient taking differently: Take 20 mLs by mouth 2 times daily 7/30/23   Jorge Escoto DO   valproic acid (DEPAKENE) 250 MG/5ML SOLN oral solution 20 mLs by Per G Tube route every 8 hours  Patient taking differently: Take 5 mLs by mouth in the morning, at noon, and at bedtime Give 1000 mg by mouth three times a day 7/30/23   Jorge Escoto, DO   sertraline (ZOLOFT) 50 MG tablet 1 tablet by PEG Tube route daily 7/31/23   Jorge Escoto, DO   atorvastatin (LIPITOR) 20 MG tablet 1 tablet by PEG Tube route nightly 7/30/23   Jorge Escoto, DO   lansoprazole (PREVACID SOLUTAB) 30 MG disintegrating tablet 1 tablet by PEG Tube route every morning (before breakfast)  Patient not taking: Reported on 2/9/2024 7/31/23 2/9/24  Jorge Escoto DO   venlafaxine (EFFEXOR) 50 MG tablet Take 1 tablet by mouth in the morning, at noon, and at bedtime 3/16/23   Ritchie Navarrete MD     Social History:   Social History     Socioeconomic History    Marital status: Single     Spouse name: Not on file    Number of children: Not on file    Years of education: Not on file    Highest education level: Not on file   Occupational History    Not on file   Tobacco Use    Smoking status: Never    Smokeless tobacco: Never   Substance and Sexual Activity    Alcohol use: Not Currently    Drug use: Not Currently    Sexual activity: Not Currently   Other Topics Concern    Not on file   Social History Narrative    Not on file     Social Determinants of Health     Financial Resource Strain: Not on file   Food Insecurity: Not on file   Transportation Needs: Not on file   Physical Activity: Not on file   Stress: Not on file   Social Connections: Not on file   Intimate Partner Violence: Not on file   Housing Stability: Not on file     Family History:   Unknown, patient is nonverbal    ROS:  Unknown, patient is nonverbal    Informed

## 2024-02-09 NOTE — PROGRESS NOTES
Recovery mode, arouses easily, denies discomfort. Report called to nurse at skilled nursing facility.

## 2024-02-09 NOTE — PROGRESS NOTES
EGD complete, biopsies taken and 1 jars sent to lab. PEG Tube successfully removed. photos taken, pt tolerated procedure well. Scope Number  used.

## 2024-02-09 NOTE — OP NOTE
[facility] Endoscopy    Patient: Mariangel Mas  : 1955  [id number]  Date of evaluation: 2024  Primary Care Physician: Adriana Smalls MD     Procedure:    [x]EGD    []Enteroscopy    [x]Biopsy   []Dilation      []PEG    []PEG change    []PEG removal  []Variceal banding    []Control of bleeding  []Destruction of lesion by APC    []Stent Placement  [x]Foreign Body Removal    []Snare Polypectomy  []Other:     []Aborted:        Indication:   Dysphagia, PEG removal    History:  \"Yana\" is a 68-year-old female with cerebral palsy, seizure disorder, MRDD who had a PEG placed while hospitalized in 2023.  She saw Leti Parson's CNP 11/10/2023 requesting a PEG removal.  It was unclear if it was appropriate at that time.  She had a modified barium swallow 2023 that showed no aspiration.  She returned 2024 eating an oral diet with thickened liquids.  They have not been using her PEG.    The patient is unlikely to cooperate with an unsedated PEG tube removal, so sedated removal via EGD is planned.    Physical Exam:  VITALS: BP (!) 148/72   Temp 97.2 °F (36.2 °C) (Temporal)   Ht 1.549 m (5' 1\")   Wt 95.7 kg (211 lb)   SpO2 96%   BMI 39.87 kg/m²   The patient is a 68 y.o. female in no acute distress.  HEAD: Normal cephalic/atraumatic.   NECK: No lymphadenopathy or bruits.  CHEST: Rises equally on inspiration. Clear to auscultation bilaterally.   CARDIOVASCULAR: Regular rate and rhythm without murmurs, rubs or gallops.   ABDOMEN: Soft, nontender, and nondistended with normal bowel sounds.  PEG C/D/I.  UPPER EXTREMITIES: no cyanosis, clubbing, or edema.  DERM: no rash or jaundice.  LOWER EXTREMITIES: no cyanosis, clubbing, or edema.  NEURO:  Patient is nonverbal.     ASA: ASA 3 - Patient with moderate systemic disease with functional limitations    MEDICATION:    MAC/Propofol/Anesthesia:  Yes     Photo:  Yes  Biopsy:  Yes     office.    Results discussed with maxwell Villatoro at [facility], [phone]    Grey Jimenes MD  2:01 PM 2/9/2024

## 2024-02-09 NOTE — ANESTHESIA POSTPROCEDURE EVALUATION
Department of Anesthesiology  Postprocedure Note    Patient: Mariangel Mas  MRN: [id number]  YOB: 1955  Date of evaluation: 2/9/2024    Procedure Summary       Date: 02/09/24 Room / Location: [place] [address] / [facility]    Anesthesia Start: 1333 Anesthesia Stop: 1357    Procedures:       EGD BIOPSY      EGD FOREIGN BODY REMOVAL Diagnosis:       Dysphagia, unspecified type      (Dysphagia, unspecified type [R13.10])    Surgeons: Grey Jimenes MD Responsible Provider: Madan Elizabeth DO    Anesthesia Type: MAC ASA Status: 3            Anesthesia Type: No value filed.    Benji Phase I:      Benji Phase II:      Anesthesia Post Evaluation    Patient location during evaluation: bedside  Patient participation: complete - patient participated  Level of consciousness: awake and alert  Airway patency: patent  Nausea & Vomiting: no nausea and no vomiting  Cardiovascular status: hemodynamically stable  Respiratory status: spontaneous ventilation, acceptable and nasal cannula  Hydration status: euvolemic  Pain management: adequate        No notable events documented.

## 2024-02-13 PROBLEM — K29.30 CHRONIC SUPERFICIAL GASTRITIS WITHOUT BLEEDING: Status: ACTIVE | Noted: 2024-02-13

## 2024-02-23 ENCOUNTER — HOSPITAL ENCOUNTER (EMERGENCY)
Age: 69
Discharge: HOME | End: 2024-02-23
Attending: STUDENT IN AN ORGANIZED HEALTH CARE EDUCATION/TRAINING PROGRAM
Payer: MEDICARE

## 2024-02-23 ENCOUNTER — APPOINTMENT (OUTPATIENT)
Dept: CT IMAGING | Age: 69
End: 2024-02-23
Payer: MEDICARE

## 2024-02-23 ENCOUNTER — APPOINTMENT (OUTPATIENT)
Dept: GENERAL RADIOLOGY | Age: 69
End: 2024-02-23
Payer: MEDICARE

## 2024-02-23 VITALS
HEIGHT: 61 IN | WEIGHT: 211 LBS | SYSTOLIC BLOOD PRESSURE: 141 MMHG | RESPIRATION RATE: 24 BRPM | OXYGEN SATURATION: 97 % | HEART RATE: 77 BPM | BODY MASS INDEX: 39.84 KG/M2 | DIASTOLIC BLOOD PRESSURE: 79 MMHG | TEMPERATURE: 97.3 F

## 2024-02-23 DIAGNOSIS — S02.2XXA CLOSED FRACTURE OF NASAL BONE, INITIAL ENCOUNTER: Primary | ICD-10-CM

## 2024-02-23 LAB
ALBUMIN SERPL BCG-MCNC: 3.3 G/DL (ref 3.5–5.1)
ALP SERPL-CCNC: 53 U/L (ref 38–126)
ALT SERPL W/O P-5'-P-CCNC: 15 U/L (ref 11–66)
ANION GAP SERPL CALC-SCNC: 12 MEQ/L (ref 8–16)
AST SERPL-CCNC: 20 U/L (ref 5–40)
BACTERIA URNS QL MICRO: ABNORMAL /HPF
BASOPHILS ABSOLUTE: 0 THOU/MM3 (ref 0–0.1)
BASOPHILS NFR BLD AUTO: 0.2 %
BILIRUB CONJ SERPL-MCNC: < 0.2 MG/DL (ref 0–0.3)
BILIRUB SERPL-MCNC: 0.2 MG/DL (ref 0.3–1.2)
BILIRUB UR QL STRIP.AUTO: NEGATIVE
BUN SERPL-MCNC: 20 MG/DL (ref 7–22)
CALCIUM SERPL-MCNC: 9.1 MG/DL (ref 8.5–10.5)
CASTS #/AREA URNS LPF: ABNORMAL /LPF
CASTS 2: ABNORMAL /LPF
CHARACTER UR: ABNORMAL
CHLORIDE SERPL-SCNC: 100 MEQ/L (ref 98–111)
CO2 SERPL-SCNC: 25 MEQ/L (ref 23–33)
COLOR: YELLOW
CREAT SERPL-MCNC: 0.4 MG/DL (ref 0.4–1.2)
CRYSTALS URNS MICRO: ABNORMAL
DEPRECATED RDW RBC AUTO: 47.1 FL (ref 35–45)
EKG ATRIAL RATE: 72 BPM
EKG P AXIS: 31 DEGREES
EKG P-R INTERVAL: 174 MS
EKG Q-T INTERVAL: 406 MS
EKG QRS DURATION: 74 MS
EKG QTC CALCULATION (BAZETT): 444 MS
EKG R AXIS: 8 DEGREES
EKG T AXIS: 5 DEGREES
EKG VENTRICULAR RATE: 72 BPM
EOSINOPHIL NFR BLD AUTO: 2.4 %
EOSINOPHILS ABSOLUTE: 0.1 THOU/MM3 (ref 0–0.4)
EPITHELIAL CELLS, UA: ABNORMAL /HPF
ERYTHROCYTE [DISTWIDTH] IN BLOOD BY AUTOMATED COUNT: 12.6 % (ref 11.5–14.5)
FLUAV RNA RESP QL NAA+PROBE: NOT DETECTED
FLUBV RNA RESP QL NAA+PROBE: NOT DETECTED
GFR SERPL CREATININE-BSD FRML MDRD: > 60 ML/MIN/1.73M2
GLUCOSE SERPL-MCNC: 90 MG/DL (ref 70–108)
GLUCOSE UR QL STRIP.AUTO: NEGATIVE MG/DL
HCT VFR BLD AUTO: 39.4 % (ref 37–47)
HGB BLD-MCNC: 13.1 GM/DL (ref 12–16)
HGB UR QL STRIP.AUTO: ABNORMAL
IMM GRANULOCYTES # BLD AUTO: 0.02 THOU/MM3 (ref 0–0.07)
IMM GRANULOCYTES NFR BLD AUTO: 0.4 %
KETONES UR QL STRIP.AUTO: ABNORMAL
LYMPHOCYTES ABSOLUTE: 3 THOU/MM3 (ref 1–4.8)
LYMPHOCYTES NFR BLD AUTO: 54.8 %
MAGNESIUM SERPL-MCNC: 1.9 MG/DL (ref 1.6–2.4)
MCH RBC QN AUTO: 33.6 PG (ref 26–33)
MCHC RBC AUTO-ENTMCNC: 33.2 GM/DL (ref 32.2–35.5)
MCV RBC AUTO: 101 FL (ref 81–99)
MISCELLANEOUS 2: ABNORMAL
MONOCYTES ABSOLUTE: 0.6 THOU/MM3 (ref 0.4–1.3)
MONOCYTES NFR BLD AUTO: 10.3 %
NEUTROPHILS NFR BLD AUTO: 31.9 %
NITRITE UR QL STRIP: NEGATIVE
NRBC BLD AUTO-RTO: 0 /100 WBC
OSMOLALITY SERPL CALC.SUM OF ELEC: 276 MOSMOL/KG (ref 275–300)
PH UR STRIP.AUTO: 6 [PH] (ref 5–9)
PLATELET # BLD AUTO: 153 THOU/MM3 (ref 130–400)
PMV BLD AUTO: 12.9 FL (ref 9.4–12.4)
POTASSIUM SERPL-SCNC: 4.6 MEQ/L (ref 3.5–5.2)
PROT SERPL-MCNC: 6.2 G/DL (ref 6.1–8)
PROT UR STRIP.AUTO-MCNC: NEGATIVE MG/DL
RBC # BLD AUTO: 3.9 MILL/MM3 (ref 4.2–5.4)
RBC URINE: ABNORMAL /HPF
RENAL EPI CELLS #/AREA URNS HPF: ABNORMAL /[HPF]
SARS-COV-2 RNA RESP QL NAA+PROBE: NOT DETECTED
SEGMENTED NEUTROPHILS ABSOLUTE COUNT: 1.8 THOU/MM3 (ref 1.8–7.7)
SODIUM SERPL-SCNC: 137 MEQ/L (ref 135–145)
SP GR UR REFRACT.AUTO: 1.02 (ref 1–1.03)
UROBILINOGEN, URINE: 0.2 EU/DL (ref 0–1)
WBC # BLD AUTO: 5.5 THOU/MM3 (ref 4.8–10.8)
WBC #/AREA URNS HPF: > 200 /HPF
WBC #/AREA URNS HPF: ABNORMAL /[HPF]
YEAST LIKE FUNGI URNS QL MICRO: ABNORMAL

## 2024-02-23 PROCEDURE — 70486 CT MAXILLOFACIAL W/O DYE: CPT

## 2024-02-23 PROCEDURE — 87086 URINE CULTURE/COLONY COUNT: CPT

## 2024-02-23 PROCEDURE — 87636 SARSCOV2 & INF A&B AMP PRB: CPT

## 2024-02-23 PROCEDURE — 87186 SC STD MICRODIL/AGAR DIL: CPT

## 2024-02-23 PROCEDURE — 96365 THER/PROPH/DIAG IV INF INIT: CPT

## 2024-02-23 PROCEDURE — 81001 URINALYSIS AUTO W/SCOPE: CPT

## 2024-02-23 PROCEDURE — 85025 COMPLETE CBC W/AUTO DIFF WBC: CPT

## 2024-02-23 PROCEDURE — 71045 X-RAY EXAM CHEST 1 VIEW: CPT

## 2024-02-23 PROCEDURE — 87077 CULTURE AEROBIC IDENTIFY: CPT

## 2024-02-23 PROCEDURE — 99285 EMERGENCY DEPT VISIT HI MDM: CPT

## 2024-02-23 PROCEDURE — 6360000002 HC RX W HCPCS

## 2024-02-23 PROCEDURE — 93005 ELECTROCARDIOGRAM TRACING: CPT

## 2024-02-23 PROCEDURE — 72125 CT NECK SPINE W/O DYE: CPT

## 2024-02-23 PROCEDURE — 80053 COMPREHEN METABOLIC PANEL: CPT

## 2024-02-23 PROCEDURE — 36415 COLL VENOUS BLD VENIPUNCTURE: CPT

## 2024-02-23 PROCEDURE — 70450 CT HEAD/BRAIN W/O DYE: CPT

## 2024-02-23 PROCEDURE — 83735 ASSAY OF MAGNESIUM: CPT

## 2024-02-23 PROCEDURE — 72170 X-RAY EXAM OF PELVIS: CPT

## 2024-02-23 PROCEDURE — 82248 BILIRUBIN DIRECT: CPT

## 2024-02-23 PROCEDURE — 2580000003 HC RX 258

## 2024-02-23 RX ORDER — CEPHALEXIN 250 MG/1
500 CAPSULE ORAL 2 TIMES DAILY
Qty: 14 CAPSULE | Refills: 0 | Status: SHIPPED | OUTPATIENT
Start: 2024-02-23 | End: 2024-03-01

## 2024-02-23 RX ADMIN — CEFTRIAXONE SODIUM 1000 MG: 1 INJECTION, POWDER, FOR SOLUTION INTRAMUSCULAR; INTRAVENOUS at 06:14

## 2024-02-23 ASSESSMENT — PAIN - FUNCTIONAL ASSESSMENT
PAIN_FUNCTIONAL_ASSESSMENT: NONE - DENIES PAIN

## 2024-02-23 NOTE — ED NOTES
Pt resting in bed with her eyes closed, appears to be sleeping. Pt is breathing regular and unlabored with no signs of distress to note at this time. Call light within reach.

## 2024-02-23 NOTE — ED PROVIDER NOTES
[facility] EMERGENCY DEPT  EMERGENCY DEPARTMENT ENCOUNTER          Pt Name: Mariangel Mas  MRN: [id number]  Birthdate 1955  Date of evaluation: 2/23/2024  Physician: Tito Olmedo MD  Supervising Attending Physician: Dajuan Mai DO       CHIEF COMPLAINT       Chief Complaint   Patient presents with    Fall    Facial Injury         HISTORY OF PRESENT ILLNESS    HPI  Mariangel Mas is a 68 y.o. female with an extensive medical history including dementia, hypertension, hyperlipidemia, on aspirin who presents to the emergency department from [facility] after an unwitnessed fall.  Patient states she fell out of bed while sleeping, and unable to recall mechanism of fall.  She however states experiencing epistaxis.  Patient denies headache, changes in vision, chest pain, shortness of breath, asymmetric weakness.  Nasal bone appears displaced to the left.  Patient denies pain with palpation.  Able to open and close mouth uneventful, no TMJ concerns.  Patient has dementia at baseline, but is understandable in speech.  Chart review demonstrates patient underwent EGD on 2/9/2024.  No recent ED visit.    The patient has no other acute complaints at this time.      PAST MEDICAL AND SURGICAL HISTORY     Past Medical History:   Diagnosis Date    ADHD     Attention deficit hyperactivity disorder (ADHD)     Cerebral palsy (HCC)     Depression     Essential hypertension     Learning disability     Seizure (HCC)     Urinary urgency      Past Surgical History:   Procedure Laterality Date    GASTROSTOMY TUBE PLACEMENT  07/28/2023    EGD PEG TUBE PLACEMENT    GASTROSTOMY TUBE PLACEMENT N/A 7/28/2023    EGD PEG TUBE PLACEMENT performed by Ge Puente MD at [facility] OR    SHOULDER SURGERY Left     ORIF    -- had hardware infection afterwards requiring IV abx    UPPER GASTROINTESTINAL ENDOSCOPY N/A 2/9/2024    EGD BIOPSY performed by Grey Jimenes MD at [facility] ENDOSCOPY    UPPER

## 2024-02-23 NOTE — DISCHARGE INSTRUCTIONS
You came to the ED after an unwitnessed fall.  ED workup remarkable for UTI and a fractured nasal bone.  You were able to open and close her mouth without symptoms.  He denied pain with nasal bone palpation.  He was discharged with instructions to follow-up outpatient with primary care physician and ENT.  Information attached.  Call and set up an appointment.  Complete antibiotic prescription for UTI.  Alternate between Tylenol and ibuprofen for pain management.  If symptoms worsen, do not hesitate, to the ED.

## 2024-02-23 NOTE — ED NOTES
Pt back from CT in stable condition. Pt resting in bed with no requests at this time. Pt is breathing regular and unlabored with no signs of distress right now. Call light within reach.

## 2024-02-23 NOTE — ED TRIAGE NOTES
Pt presents to the ED via EMS [facility] due to a fall out of bed approximately 2ft was the fall and it happened about 30 minutes ago. Pt does take Aspirin every morning, only blood thinner known that she is on. Pt has a contusion on forehead in the middle and pt has deformity to her nose. [facility] wanted to see if her nose is broke. Pt denies being in pain. Pt speech is incomprehensible and this is her baseline is what EMS stated. Pt VSS.

## 2024-02-23 NOTE — ED NOTES
Patient report called to Marc Álvarez. Spoke with Ingrid PERSAUD. Patient departs ED via [facility] in stable condition.

## 2024-02-25 LAB
BACTERIA UR CULT: ABNORMAL
ORGANISM: ABNORMAL

## 2024-03-01 ENCOUNTER — OFFICE VISIT (OUTPATIENT)
Dept: ENT CLINIC | Age: 69
End: 2024-03-01
Payer: MEDICARE

## 2024-03-01 VITALS
HEIGHT: 61 IN | RESPIRATION RATE: 18 BRPM | HEART RATE: 97 BPM | TEMPERATURE: 96.7 F | SYSTOLIC BLOOD PRESSURE: 130 MMHG | DIASTOLIC BLOOD PRESSURE: 70 MMHG | OXYGEN SATURATION: 94 % | BODY MASS INDEX: 39.89 KG/M2

## 2024-03-01 DIAGNOSIS — S02.2XXA CLOSED FRACTURE OF NASAL BONE, INITIAL ENCOUNTER: Primary | ICD-10-CM

## 2024-03-01 PROCEDURE — G8427 DOCREV CUR MEDS BY ELIG CLIN: HCPCS | Performed by: PHYSICIAN ASSISTANT

## 2024-03-01 PROCEDURE — G8484 FLU IMMUNIZE NO ADMIN: HCPCS | Performed by: PHYSICIAN ASSISTANT

## 2024-03-01 PROCEDURE — G8417 CALC BMI ABV UP PARAM F/U: HCPCS | Performed by: PHYSICIAN ASSISTANT

## 2024-03-01 PROCEDURE — 1123F ACP DISCUSS/DSCN MKR DOCD: CPT | Performed by: PHYSICIAN ASSISTANT

## 2024-03-01 PROCEDURE — G8400 PT W/DXA NO RESULTS DOC: HCPCS | Performed by: PHYSICIAN ASSISTANT

## 2024-03-01 PROCEDURE — 3017F COLORECTAL CA SCREEN DOC REV: CPT | Performed by: PHYSICIAN ASSISTANT

## 2024-03-01 PROCEDURE — 99204 OFFICE O/P NEW MOD 45 MIN: CPT | Performed by: PHYSICIAN ASSISTANT

## 2024-03-01 PROCEDURE — 1090F PRES/ABSN URINE INCON ASSESS: CPT | Performed by: PHYSICIAN ASSISTANT

## 2024-03-01 PROCEDURE — 1036F TOBACCO NON-USER: CPT | Performed by: PHYSICIAN ASSISTANT

## 2024-03-01 RX ORDER — ZINC GLUCONATE 50 MG
50 TABLET ORAL DAILY
COMMUNITY

## 2024-03-01 RX ORDER — PHENOBARBITAL 30 MG/1
30 TABLET ORAL 2 TIMES DAILY
COMMUNITY

## 2024-03-01 RX ORDER — LOPERAMIDE HYDROCHLORIDE 2 MG/1
2 CAPSULE ORAL 4 TIMES DAILY PRN
COMMUNITY

## 2024-03-01 RX ORDER — MULTIVIT-MIN/FA/LYCOPEN/LUTEIN .4-300-25
TABLET ORAL
COMMUNITY

## 2024-03-01 NOTE — PROGRESS NOTES
spine.     IMPRESSION:  1. Nasal bone fractures, comminuted on the left side, moderately   displaced.  2. No other facial fracture is appreciated.  3. The mouth is widely opened and the mandibular condyles are translated   anteriorly in relation to their respective fossae. This could represent   traumatic bilateral TMJ dislocation, alternatively could be an effect of   patient positioning for the scans. Clinical correlation on this point is   needed.        CT images reviewed individually. CT consistent with bilateral nasal bone fracture. Right nare appears restricted due to deviation of the nasal bones to the left (see above images). Septal deviation to the right, appears likely chronic. Incidental burke bullosa of both middle turbinates noted. Paranasal sinuses appear relatively normal/well aerated, no significant mucosal thickening noted. No other obvious acute fractures/pathology noted.    Assessment/Plan:      ICD-10-CM    1. Closed fracture of nasal bone, initial encounter  S02.2XXA MI CLOSED TX NASAL BONE FX W/MNPJ W/STABILIZATION           Based on the degree of deviation and deformity of the external nose with the appearance of narrowing the right nare specifically, I advised surgical correction of the nasal bone fracture.  I contacted the patient's POA[facility], to discuss these recommendations.  I reviewed the potential risk/benefits/alternatives to surgery with the POA, which were also reviewed with the patient and ECF staff that was present today.  Joya expressed understanding of these.  Joya informed me that she initially agrees with proceeding with surgery, but wants to review patient's medical history with the medical provider in her office first before making a definitive decision on surgery  Office visit was completed and patient was discharged.  Shortly after completion of the visit, but prior to completion of the note, I received a call back from Joya who confirmed that she

## 2024-03-04 ENCOUNTER — PREP FOR PROCEDURE (OUTPATIENT)
Dept: ENT CLINIC | Age: 69
End: 2024-03-04

## 2024-03-04 DIAGNOSIS — S02.2XXD CLOSED FRACTURE OF NASAL BONE WITH ROUTINE HEALING, SUBSEQUENT ENCOUNTER: Primary | ICD-10-CM

## 2024-03-05 ENCOUNTER — TELEPHONE (OUTPATIENT)
Dept: ENT CLINIC | Age: 69
End: 2024-03-05

## 2024-03-05 NOTE — TELEPHONE ENCOUNTER
Arielle from [facility] left message on clinical voicemail stating Dr Smalls has cleared the patient for her surgery on Friday with Dr Flor. Looks like Dr Smalls documented the clearance in Epic on a phone encounter dated 03/01/24.  Will update Haley, surgery scheduler.

## 2024-03-07 PROBLEM — S02.2XXA CLOSED FRACTURE OF NASAL BONES: Status: ACTIVE | Noted: 2024-03-07

## 2024-03-07 RX ORDER — SODIUM CHLORIDE 0.9 % (FLUSH) 0.9 %
5-40 SYRINGE (ML) INJECTION PRN
Status: CANCELLED | OUTPATIENT
Start: 2024-03-07

## 2024-03-07 RX ORDER — SODIUM CHLORIDE 9 MG/ML
INJECTION, SOLUTION INTRAVENOUS PRN
Status: CANCELLED | OUTPATIENT
Start: 2024-03-07

## 2024-03-07 RX ORDER — SODIUM CHLORIDE 0.9 % (FLUSH) 0.9 %
5-40 SYRINGE (ML) INJECTION EVERY 12 HOURS SCHEDULED
Status: CANCELLED | OUTPATIENT
Start: 2024-03-07

## 2024-03-11 ENCOUNTER — TELEPHONE (OUTPATIENT)
Dept: ENT CLINIC | Age: 69
End: 2024-03-11

## 2024-03-11 NOTE — TELEPHONE ENCOUNTER
Received vm from Janet at [facility] stating patient didn't come to surgery Friday due to transportation issues.  I called them back and spoke with Sloane.  Will have to check with Dr Flor to see if this can still be corrected.  Date of injury   2/23/2024.

## 2024-03-12 NOTE — TELEPHONE ENCOUNTER
[facility] She will check on [facility].  She said patient is back on ASA. Will need to check with Dr Flor

## 2024-03-12 NOTE — TELEPHONE ENCOUNTER
1220 Received a call from Sloane asking what day for surgery.  I called her back and she said Arielle must have given her time but no day.  She reached out to transport by email.  She called me back at 1500 said she hadnt heard back from them yet. I advised her to act as if going for surgery tomorrow then and give patient her meds in am w applesauce and then npo until we hear from transport.  She agreed.  Dr Flor did say the ASA was ok.

## 2024-03-13 ENCOUNTER — HOSPITAL ENCOUNTER (OUTPATIENT)
Age: 69
Setting detail: OUTPATIENT SURGERY
Discharge: HOME | End: 2024-03-13
Attending: OTOLARYNGOLOGY | Admitting: OTOLARYNGOLOGY
Payer: MEDICARE

## 2024-03-13 ENCOUNTER — ANESTHESIA (OUTPATIENT)
Dept: OPERATING ROOM | Age: 69
End: 2024-03-13
Payer: MEDICARE

## 2024-03-13 ENCOUNTER — ANESTHESIA EVENT (OUTPATIENT)
Dept: OPERATING ROOM | Age: 69
End: 2024-03-13
Payer: MEDICARE

## 2024-03-13 VITALS
RESPIRATION RATE: 16 BRPM | SYSTOLIC BLOOD PRESSURE: 127 MMHG | BODY MASS INDEX: 38.89 KG/M2 | DIASTOLIC BLOOD PRESSURE: 70 MMHG | WEIGHT: 206 LBS | TEMPERATURE: 97.1 F | HEART RATE: 80 BPM | OXYGEN SATURATION: 96 % | HEIGHT: 61 IN

## 2024-03-13 PROBLEM — S02.2XXD CLOSED DISPLACED FRACTURE OF NASAL BONE WITH ROUTINE HEALING: Status: ACTIVE | Noted: 2024-03-07

## 2024-03-13 PROCEDURE — 6360000002 HC RX W HCPCS: Performed by: NURSE ANESTHETIST, CERTIFIED REGISTERED

## 2024-03-13 PROCEDURE — 2500000003 HC RX 250 WO HCPCS: Performed by: NURSE ANESTHETIST, CERTIFIED REGISTERED

## 2024-03-13 PROCEDURE — 3700000000 HC ANESTHESIA ATTENDED CARE: Performed by: OTOLARYNGOLOGY

## 2024-03-13 PROCEDURE — 7100000010 HC PHASE II RECOVERY - FIRST 15 MIN: Performed by: OTOLARYNGOLOGY

## 2024-03-13 PROCEDURE — 2500000003 HC RX 250 WO HCPCS: Performed by: OTOLARYNGOLOGY

## 2024-03-13 PROCEDURE — 3600000002 HC SURGERY LEVEL 2 BASE: Performed by: OTOLARYNGOLOGY

## 2024-03-13 PROCEDURE — 7100000000 HC PACU RECOVERY - FIRST 15 MIN: Performed by: OTOLARYNGOLOGY

## 2024-03-13 PROCEDURE — 6370000000 HC RX 637 (ALT 250 FOR IP): Performed by: OTOLARYNGOLOGY

## 2024-03-13 PROCEDURE — 3600000012 HC SURGERY LEVEL 2 ADDTL 15MIN: Performed by: OTOLARYNGOLOGY

## 2024-03-13 PROCEDURE — 7100000011 HC PHASE II RECOVERY - ADDTL 15 MIN: Performed by: OTOLARYNGOLOGY

## 2024-03-13 PROCEDURE — 3700000001 HC ADD 15 MINUTES (ANESTHESIA): Performed by: OTOLARYNGOLOGY

## 2024-03-13 PROCEDURE — 2580000003 HC RX 258: Performed by: NURSE ANESTHETIST, CERTIFIED REGISTERED

## 2024-03-13 PROCEDURE — 21320 CLSD TX NSL FX W/MNPJ&STABLJ: CPT | Performed by: OTOLARYNGOLOGY

## 2024-03-13 PROCEDURE — 7100000001 HC PACU RECOVERY - ADDTL 15 MIN: Performed by: OTOLARYNGOLOGY

## 2024-03-13 PROCEDURE — 2709999900 HC NON-CHARGEABLE SUPPLY: Performed by: OTOLARYNGOLOGY

## 2024-03-13 RX ORDER — LIDOCAINE HYDROCHLORIDE AND EPINEPHRINE 10; 10 MG/ML; UG/ML
INJECTION, SOLUTION INFILTRATION; PERINEURAL PRN
Status: DISCONTINUED | OUTPATIENT
Start: 2024-03-13 | End: 2024-03-13 | Stop reason: ALTCHOICE

## 2024-03-13 RX ORDER — NALOXONE HYDROCHLORIDE 0.4 MG/ML
INJECTION, SOLUTION INTRAMUSCULAR; INTRAVENOUS; SUBCUTANEOUS PRN
Status: DISCONTINUED | OUTPATIENT
Start: 2024-03-13 | End: 2024-03-13 | Stop reason: HOSPADM

## 2024-03-13 RX ORDER — PHENYLEPHRINE HCL IN 0.9% NACL 1 MG/10 ML
SYRINGE (ML) INTRAVENOUS PRN
Status: DISCONTINUED | OUTPATIENT
Start: 2024-03-13 | End: 2024-03-13 | Stop reason: SDUPTHER

## 2024-03-13 RX ORDER — FENTANYL CITRATE 50 UG/ML
INJECTION, SOLUTION INTRAMUSCULAR; INTRAVENOUS PRN
Status: DISCONTINUED | OUTPATIENT
Start: 2024-03-13 | End: 2024-03-13 | Stop reason: SDUPTHER

## 2024-03-13 RX ORDER — FENTANYL CITRATE 50 UG/ML
25 INJECTION, SOLUTION INTRAMUSCULAR; INTRAVENOUS EVERY 5 MIN PRN
Status: DISCONTINUED | OUTPATIENT
Start: 2024-03-13 | End: 2024-03-13 | Stop reason: HOSPADM

## 2024-03-13 RX ORDER — SODIUM CHLORIDE 9 MG/ML
INJECTION, SOLUTION INTRAVENOUS CONTINUOUS PRN
Status: DISCONTINUED | OUTPATIENT
Start: 2024-03-13 | End: 2024-03-13 | Stop reason: SDUPTHER

## 2024-03-13 RX ORDER — SUCCINYLCHOLINE/SOD CL,ISO/PF 200MG/10ML
SYRINGE (ML) INTRAVENOUS PRN
Status: DISCONTINUED | OUTPATIENT
Start: 2024-03-13 | End: 2024-03-13 | Stop reason: SDUPTHER

## 2024-03-13 RX ORDER — SODIUM CHLORIDE 9 MG/ML
INJECTION, SOLUTION INTRAVENOUS PRN
Status: DISCONTINUED | OUTPATIENT
Start: 2024-03-13 | End: 2024-03-13 | Stop reason: HOSPADM

## 2024-03-13 RX ORDER — GLYCOPYRROLATE 0.2 MG/ML
INJECTION INTRAMUSCULAR; INTRAVENOUS PRN
Status: DISCONTINUED | OUTPATIENT
Start: 2024-03-13 | End: 2024-03-13 | Stop reason: SDUPTHER

## 2024-03-13 RX ORDER — ONDANSETRON 2 MG/ML
4 INJECTION INTRAMUSCULAR; INTRAVENOUS
Status: DISCONTINUED | OUTPATIENT
Start: 2024-03-13 | End: 2024-03-13 | Stop reason: HOSPADM

## 2024-03-13 RX ORDER — SODIUM CHLORIDE 0.9 % (FLUSH) 0.9 %
5-40 SYRINGE (ML) INJECTION PRN
Status: DISCONTINUED | OUTPATIENT
Start: 2024-03-13 | End: 2024-03-13 | Stop reason: HOSPADM

## 2024-03-13 RX ORDER — PROPOFOL 10 MG/ML
INJECTION, EMULSION INTRAVENOUS PRN
Status: DISCONTINUED | OUTPATIENT
Start: 2024-03-13 | End: 2024-03-13 | Stop reason: SDUPTHER

## 2024-03-13 RX ORDER — SODIUM CHLORIDE 0.9 % (FLUSH) 0.9 %
5-40 SYRINGE (ML) INJECTION EVERY 12 HOURS SCHEDULED
Status: DISCONTINUED | OUTPATIENT
Start: 2024-03-13 | End: 2024-03-13 | Stop reason: HOSPADM

## 2024-03-13 RX ORDER — FENTANYL CITRATE 50 UG/ML
50 INJECTION, SOLUTION INTRAMUSCULAR; INTRAVENOUS EVERY 5 MIN PRN
Status: DISCONTINUED | OUTPATIENT
Start: 2024-03-13 | End: 2024-03-13 | Stop reason: HOSPADM

## 2024-03-13 RX ORDER — MEPERIDINE HYDROCHLORIDE 25 MG/ML
12.5 INJECTION INTRAMUSCULAR; INTRAVENOUS; SUBCUTANEOUS EVERY 5 MIN PRN
Status: DISCONTINUED | OUTPATIENT
Start: 2024-03-13 | End: 2024-03-13 | Stop reason: HOSPADM

## 2024-03-13 RX ORDER — PROPOFOL 10 MG/ML
INJECTION, EMULSION INTRAVENOUS PRN
Status: DISCONTINUED | OUTPATIENT
Start: 2024-03-13 | End: 2024-03-13

## 2024-03-13 RX ORDER — LIDOCAINE HCL/PF 100 MG/5ML
SYRINGE (ML) INJECTION PRN
Status: DISCONTINUED | OUTPATIENT
Start: 2024-03-13 | End: 2024-03-13 | Stop reason: SDUPTHER

## 2024-03-13 RX ORDER — OXYMETAZOLINE HYDROCHLORIDE 0.05 G/100ML
SPRAY NASAL PRN
Status: DISCONTINUED | OUTPATIENT
Start: 2024-03-13 | End: 2024-03-13 | Stop reason: ALTCHOICE

## 2024-03-13 RX ADMIN — PROPOFOL 150 MG: 10 INJECTION, EMULSION INTRAVENOUS at 14:34

## 2024-03-13 RX ADMIN — Medication 100 MCG: at 14:59

## 2024-03-13 RX ADMIN — Medication 100 MCG: at 14:51

## 2024-03-13 RX ADMIN — FENTANYL CITRATE 25 MCG: 50 INJECTION, SOLUTION INTRAMUSCULAR; INTRAVENOUS at 15:22

## 2024-03-13 RX ADMIN — SODIUM CHLORIDE: 9 INJECTION, SOLUTION INTRAVENOUS at 13:31

## 2024-03-13 RX ADMIN — Medication 80 MG: at 14:33

## 2024-03-13 RX ADMIN — Medication 160 MG: at 14:34

## 2024-03-13 RX ADMIN — FENTANYL CITRATE 25 MCG: 50 INJECTION, SOLUTION INTRAMUSCULAR; INTRAVENOUS at 14:53

## 2024-03-13 RX ADMIN — FENTANYL CITRATE 25 MCG: 50 INJECTION, SOLUTION INTRAMUSCULAR; INTRAVENOUS at 14:29

## 2024-03-13 RX ADMIN — GLYCOPYRROLATE 0.2 MG: 0.2 INJECTION INTRAMUSCULAR; INTRAVENOUS at 14:43

## 2024-03-13 RX ADMIN — FENTANYL CITRATE 25 MCG: 50 INJECTION, SOLUTION INTRAMUSCULAR; INTRAVENOUS at 14:45

## 2024-03-13 ASSESSMENT — PAIN - FUNCTIONAL ASSESSMENT
PAIN_FUNCTIONAL_ASSESSMENT: 0-10
PAIN_FUNCTIONAL_ASSESSMENT: FACE, LEGS, ACTIVITY, CRY, AND CONSOLABILITY (FLACC)

## 2024-03-13 ASSESSMENT — PAIN SCALES - WONG BAKER: WONGBAKER_NUMERICALRESPONSE: 0

## 2024-03-13 ASSESSMENT — PAIN DESCRIPTION - DESCRIPTORS: DESCRIPTORS: ACHING

## 2024-03-13 ASSESSMENT — PAIN SCALES - GENERAL: PAINLEVEL_OUTOF10: 0

## 2024-03-13 NOTE — TELEPHONE ENCOUNTER
Call from Gudelia at [facility]. They didn't give her morning meds to her as instructed by no later than 7 am.  I called PAT and they advised to go ahead and take them now @ 0815. Npo remainder of day. Transport to  around 1215.

## 2024-03-13 NOTE — DISCHARGE INSTRUCTIONS
CLOSED REDUCTION NASAL FRACTURE  DISCHARGE INSTRUCTIONS.    Keep nasal splint dry, and avoid activities that would cause significant perspiration that could loosen the adhesive.    Change drip pad as needed. Do not obstruct nostrils. Not needed if no more drainage.    Take pain medicine prescribed as needed. Later may take just Tylenol or after three days Motrin.    Avoid wearing glasses resting on the nasal bones for 4-6 weeks.     Keep follow-up appt in approximately one week.    For any problems or questions, call the ENT Clinic at [phone]. It transfers to the physician on call after hours and on weekends.

## 2024-03-13 NOTE — ANESTHESIA PRE PROCEDURE
Department of Anesthesiology  Preprocedure Note       Name:  Mariangel Mas   Age:  68 y.o.  :  1955                                          MRN:  [id number]         Date:  3/13/2024      Surgeon: Surgeon(s):  Storm Flor MD    Procedure: Procedure(s):  Closed Fracture of Nasal Bone    Medications prior to admission:   Prior to Admission medications    Medication Sig Start Date End Date Taking? Authorizing Provider   acetaminophen (TYLENOL) 325 MG tablet Take 2 tablets by mouth every 6 hours as needed for Pain    Naila De Souza MD   ondansetron (ZOFRAN) 4 MG tablet Take 1 tablet by mouth every 4 hours as needed for Nausea or Vomiting    Naila De Souza MD   omeprazole (PRILOSEC) 20 MG delayed release capsule Take 2 capsules by mouth daily    Naila De Souza MD   lidocaine (HM LIDOCAINE PATCH) 4 % external patch Place 1 patch onto the skin in the morning and at bedtime Apply to right knee topically bid for pain   on in am off at night    Naila De Souza MD   Multiple Vitamins-Minerals (CEROVITE SENIOR) TABS Take 1 tablet by mouth daily    Naila De Souza MD   loperamide (IMODIUM) 2 MG capsule Take 1 capsule by mouth 4 times daily as needed for Diarrhea    Naila De Souza MD   PHENobarbital (LUMINAL) 30 MG tablet Take 3 tablets by mouth daily. Takes 30 mg and 60 mg    Naila De Souza MD   zinc gluconate 50 MG tablet Take 1 tablet by mouth daily    Naila De Souza MD   esomeprazole (NEXIUM) 40 MG delayed release capsule 1 po bid x 2 months (through 24), then 1 cap po daiy  Patient not taking: Reported on 3/1/2024 2/21/24   Adriana Smalls MD   amphetamine-dextroamphetamine (ADDERALL, 5MG,) 5 MG tablet Take 1 tablet by mouth daily for 30 days. Max Daily Amount: 5 mg 11/7/23 3/4/24  Janice Nava, APRN - CNP   amLODIPine (NORVASC) 5 MG tablet Take 1 tablet by mouth daily 23   Sanjay Carlos DO   lactulose (CHRONULAC) 10 GM/15ML

## 2024-03-13 NOTE — BRIEF OP NOTE
Brief Postoperative Note      Patient: Mariangel Mas  YOB: 1955  MRN: [id number]    Date of Procedure: 3/13/2024    Pre-Op Diagnosis Codes:     * Closed displaced fracture of nasal bone, subsequent encounter    Post-Op Diagnosis: Same       Procedure(s):  Closed Reduction of Nasal Bone    Surgeon(s):  Storm Flor MD    Assistant:  * No surgical staff found *    Anesthesia: General    Estimated Blood Loss (mL): Minimal    Complications: None    Specimens:   * No specimens in log *    Implants:  * No implants in log *      Drains:   Gastrostomy/Enterostomy/Jejunostomy Tube Percutaneous Endoscopic Gastrostomy (PEG)  20 fr (Active)       Findings: Nasal dorsum was fractured and deviated to the left.  Reduce well to the midline.  Adhesive metal splint applied over Steri-Strips      Electronically signed by STORM FLOR MD on 3/13/2024 at 3:28 PM

## 2024-03-13 NOTE — INTERVAL H&P NOTE
Pt Name: Mariangel Mas  MRN: [id number]  YOB: 1955  Date of evaluation: 3/13/2024    I have examined the patient and reviewed the H&P/Consult and there are no changes to the patient or plans.         Electronically signed by DINA JON MD on 3/13/2024 at 3:27 PM

## 2024-03-13 NOTE — PROGRESS NOTES
1523  - pt arrives to pacu, respirations unlabored on 8 L simple mask, pt unresponsive post anesthesia, VSS    1540 - pt resting comfortably, pt denies pain    1553 - pt meets criteria for discharge from pacu at this time, pt transported to [facility] in stable condition

## 2024-03-13 NOTE — PROGRESS NOTES
Patient oriented to Same Day department and admitted to Same Day Surgery room 16.   Patient verbalized approval for first name, last initial with physician name on unit whiteboard.     Plan of care reviewed with patient.   Patient room whiteboard filled out and discussed with patient and responsible adult.   Patient and responsible adult offered Same Day Welcome Packet to review.    Call light in reach.   Bed in lowest position, locked, with one bed rail up.   SCDs and warming blanket in place.  Appropriate arm bands on patient.   Bathroom offered.   All questions and concerns of patient addressed.        Meds to Beds:   Patient informed of . [facility] Meds to Beds program during admission. Patient is from a nursing home

## 2024-03-13 NOTE — H&P
[facility] [place] [facility]  [facility] EAR, NOSE AND THROAT  [address]  [address]  [facility] [address]  Dept: [phone]  Dept Fax: [phone]  Loc: [phone]    Mariangel Mas is a 68 y.o. female who was referred by Niecy Lopez, APR* for:  Chief Complaint   Patient presents with    Other     Closed fracture of nasal bones.  Patient reports no pain and is able to breathe normally.   Referred by Niecy Lopez, MABEL.   .    HPI:     Mariangel Mas with PMH of dementia, hyperlipidemia, and HTN who presents today for ER follow up regarding nasal bone fracture. The patient is in a SNF when she reportedly fell out of bed while sleeping. Events leading up to/causing the fall are unknown. She developed subsequent epistaxis after the fall and was brought to ER for further evaluation. She underwent CT facial bones in the ER which revealed acute nasal bone fracture. Work up in the ER otherwise negative beside for UTI. She was discharged with recommendation for ENT follow up. She presents today reporting she has been doing well since being seen in the ER. She denies any recurrence of epistaxis since the initial bleed after the fall. Nursing home staff have noted a change in the contour of her nose. Patient reports that she feels like she is breathing well through both nostrils.     Subjective:      REVIEW OF SYSTEMS:    Pertinent positives as noted in the HPI. All other systems reviewed and negative.    ALLERGIES:  Pcn [penicillins]    Past Medical History:  Past Medical History:   Diagnosis Date    ADHD     Attention deficit hyperactivity disorder (ADHD)     Cerebral palsy (HCC)     Depression     Essential hypertension     Learning disability     Seizure (HCC)     Urinary urgency        PSM:  Past Surgical History:   Procedure Laterality Date    GASTROSTOMY TUBE PLACEMENT  07/28/2023    EGD PEG TUBE PLACEMENT    GASTROSTOMY TUBE PLACEMENT N/A 7/28/2023    EGD PEG TUBE

## 2024-03-14 NOTE — ANESTHESIA POSTPROCEDURE EVALUATION
Department of Anesthesiology  Postprocedure Note    Patient: Mariangel Mas  MRN: [id number]  YOB: 1955  Date of evaluation: 3/14/2024    Procedure Summary       Date: 03/13/24 Room / Location: [facility] OR  / [facility] OR    Anesthesia Start: 1428 Anesthesia Stop: 1530    Procedure: Closed Reduction of Nasal Bone Diagnosis:       Closed fracture of nasal bone, initial encounter      (Closed fracture of nasal bone, initial encounter [S02.2XXA])    Surgeons: Storm Flor MD Responsible Provider: Yusuf Cherry DO    Anesthesia Type: general ASA Status: 3            Anesthesia Type: No value filed.    Benji Phase I: Benji Score: 10    Benji Phase II: Benji Score: 10    Anesthesia Post Evaluation    Patient location during evaluation: PACU  Patient participation: complete - patient participated  Level of consciousness: awake and alert  Pain score: 2  Airway patency: patent  Nausea & Vomiting: no nausea and no vomiting  Cardiovascular status: hemodynamically stable and blood pressure returned to baseline  Respiratory status: spontaneous ventilation, acceptable and nasal cannula  Hydration status: stable  Pain management: adequate and satisfactory to patient        No notable events documented.

## 2024-03-14 NOTE — OP NOTE
Operative Note      Patient: Mariangel Mas  YOB: 1955  MRN: [id number]    Date of Procedure: 3/13/2024      Brief Postoperative Note        Patient: Mariangel Mas  YOB: 1955  MRN: [id number]     Date of Procedure: 3/13/2024     Pre-Op Diagnosis Codes:     * Closed displaced fracture of nasal bone, subsequent encounter     Post-Op Diagnosis: Same       Procedure(s):  Closed Reduction of Nasal Bone     Surgeon(s):  Storm Flor MD     Assistant:  * No surgical staff found *     Anesthesia: General     Estimated Blood Loss (mL): Minimal     Complications: None     Specimens:   * No specimens in log *     Implants:  * No implants in log *      Drains:   Gastrostomy/Enterostomy/Jejunostomy Tube Percutaneous Endoscopic Gastrostomy (PEG)  20 fr (Active)         Findings: Nasal dorsum was fractured and deviated to the left.  Reduce well to the midline.  Adhesive metal splint applied over Steri-Strips       Detailed Description of Procedure:   CLOSED REDUCTION NASAL FRACTURE WITH APPLICATION OF SPLINT AND INTERNAL PACKING    After adequate level of general endotracheal anesthesia had been obtained, patient was draped in the usual fashion for nasal surgery.  Nose was examined and palpated with findings noted as above.  1% lidocaine with 1 100,000 epinephrine was infiltrated on the outside of the nasal bones bilaterally and also the internal periosteum.  Cottonoids impregnated with Afrin were placed in the upper nasal fossa.  This was allowed to take effect.     A Boies elevator (butter knife) was used to elevate the nasal bones.  When they did not stay in place Nasopore was trimmed to the appropriate size, impregnated with Afrin, and placed in the upper nasal fossa beneath the nasal bones..  This appeared to be sufficient support to keep the nasal bones in the correct anatomic location.    Skin of the nasal dorsum was prepped with alcohol and then the adhesive skin prep solution,

## 2024-03-19 ENCOUNTER — TELEPHONE (OUTPATIENT)
Dept: ENT CLINIC | Age: 69
End: 2024-03-19

## 2024-03-19 NOTE — TELEPHONE ENCOUNTER
We have not prescribed patients nexium; recommend reaching out to her PCP or provider at nursing home.

## 2024-03-19 NOTE — TELEPHONE ENCOUNTER
Arielle from [facility] sent a note along with patient today (on wrong appointment day) that said insurance will not cover nexium.  Can she use omeprazole instead.  Please advise.

## 2024-03-22 ENCOUNTER — OFFICE VISIT (OUTPATIENT)
Dept: ENT CLINIC | Age: 69
End: 2024-03-22

## 2024-03-22 VITALS
DIASTOLIC BLOOD PRESSURE: 82 MMHG | RESPIRATION RATE: 22 BRPM | TEMPERATURE: 96.6 F | HEART RATE: 113 BPM | SYSTOLIC BLOOD PRESSURE: 144 MMHG | OXYGEN SATURATION: 95 %

## 2024-03-22 DIAGNOSIS — S02.2XXD CLOSED FRACTURE OF NASAL BONE WITH ROUTINE HEALING, SUBSEQUENT ENCOUNTER: Primary | ICD-10-CM

## 2024-03-22 PROCEDURE — 99024 POSTOP FOLLOW-UP VISIT: CPT | Performed by: PHYSICIAN ASSISTANT

## 2024-03-22 NOTE — PROGRESS NOTES
[facility] [place] [facility]  [facility] EAR, NOSE AND THROAT  [address]  [address]  [facility] [address]  Dept: [phone]  Dept Fax: [phone]  Loc: [phone]    Mariangel Mas is a 68 y.o. female who was referred by No ref. provider found for:  Chief Complaint   Patient presents with    Other     Patient is here for post op 03/13 splint removal. Patient said she feels ok no pain.    .    HPI:   Current visit documentation 03/22/2024:    Mariangel Mas presents today for post-op exam and splint removal. She is s/p closed reduction of nasal fracture with application of splint and internal packing on 3/13/24 with Dr Flor. She reports that she has been doing well since surgery. She initially had some post-op pain but this has since abated. She hasn't been wearing her glasses since surgery and this is quite bothersome for her not being able to see. No epistaxis reported. Patient states she is breathing well through her nose. No other concerns reported.     Previous visit documentation 3/1/24: Mariangel Mas with PMH of dementia, hyperlipidemia, and HTN who presents today for ER follow up regarding nasal bone fracture. The patient is in a SNF when she reportedly fell out of bed while sleeping. Events leading up to/causing the fall are unknown. She developed subsequent epistaxis after the fall and was brought to ER for further evaluation. She underwent CT facial bones in the ER which revealed acute nasal bone fracture. Work up in the ER otherwise negative beside for UTI. She was discharged with recommendation for ENT follow up. She presents today reporting she has been doing well since being seen in the ER. She denies any recurrence of epistaxis since the initial bleed after the fall. Nursing home staff have noted a change in the contour of her nose. Patient reports that she feels like she is breathing well through both nostrils.        Subjective:      REVIEW OF SYSTEMS:

## 2024-03-29 ENCOUNTER — APPOINTMENT (OUTPATIENT)
Dept: GENERAL RADIOLOGY | Age: 69
End: 2024-03-29
Payer: MEDICARE

## 2024-03-29 ENCOUNTER — APPOINTMENT (OUTPATIENT)
Dept: CT IMAGING | Age: 69
End: 2024-03-29
Payer: MEDICARE

## 2024-03-29 ENCOUNTER — HOSPITAL ENCOUNTER (EMERGENCY)
Age: 69
Discharge: HOME | End: 2024-03-29
Attending: EMERGENCY MEDICINE
Payer: MEDICARE

## 2024-03-29 VITALS
DIASTOLIC BLOOD PRESSURE: 97 MMHG | BODY MASS INDEX: 37.79 KG/M2 | SYSTOLIC BLOOD PRESSURE: 144 MMHG | WEIGHT: 200 LBS | OXYGEN SATURATION: 100 % | TEMPERATURE: 97.5 F | HEART RATE: 84 BPM | RESPIRATION RATE: 20 BRPM

## 2024-03-29 DIAGNOSIS — N39.0 URINARY TRACT INFECTION WITHOUT HEMATURIA, SITE UNSPECIFIED: Primary | ICD-10-CM

## 2024-03-29 DIAGNOSIS — R56.9 SEIZURE-LIKE ACTIVITY (HCC): ICD-10-CM

## 2024-03-29 LAB
AMMONIA PLAS-MCNC: 74 UMOL/L (ref 11–60)
ANION GAP SERPL CALC-SCNC: 15 MEQ/L (ref 8–16)
BACTERIA: ABNORMAL
BASOPHILS ABSOLUTE: 0 THOU/MM3 (ref 0–0.1)
BASOPHILS NFR BLD AUTO: 0.2 %
BILIRUB UR QL STRIP: NEGATIVE
BUN SERPL-MCNC: 30 MG/DL (ref 7–22)
CALCIUM SERPL-MCNC: 8.9 MG/DL (ref 8.5–10.5)
CASTS #/AREA URNS LPF: ABNORMAL /LPF
CASTS #/AREA URNS LPF: ABNORMAL /LPF
CHARACTER UR: CLEAR
CHARCOAL URNS QL MICRO: ABNORMAL
CHLORIDE SERPL-SCNC: 101 MEQ/L (ref 98–111)
CO2 SERPL-SCNC: 24 MEQ/L (ref 23–33)
COLOR UR: YELLOW
CREAT SERPL-MCNC: 0.4 MG/DL (ref 0.4–1.2)
CRYSTALS URNS QL MICRO: ABNORMAL
DEPRECATED RDW RBC AUTO: 48.3 FL (ref 35–45)
EKG ATRIAL RATE: 90 BPM
EKG P AXIS: 57 DEGREES
EKG P-R INTERVAL: 162 MS
EKG Q-T INTERVAL: 370 MS
EKG QRS DURATION: 76 MS
EKG QTC CALCULATION (BAZETT): 452 MS
EKG R AXIS: -14 DEGREES
EKG T AXIS: 28 DEGREES
EKG VENTRICULAR RATE: 90 BPM
EOSINOPHIL NFR BLD AUTO: 1 %
EOSINOPHILS ABSOLUTE: 0.1 THOU/MM3 (ref 0–0.4)
EPITHELIAL CELLS, UA: ABNORMAL /HPF
ERYTHROCYTE [DISTWIDTH] IN BLOOD BY AUTOMATED COUNT: 12.5 % (ref 11.5–14.5)
FLUAV RNA RESP QL NAA+PROBE: NOT DETECTED
FLUBV RNA RESP QL NAA+PROBE: NOT DETECTED
GFR SERPL CREATININE-BSD FRML MDRD: > 90 ML/MIN/1.73M2
GLUCOSE SERPL-MCNC: 115 MG/DL (ref 70–108)
GLUCOSE UR QL STRIP.AUTO: NEGATIVE MG/DL
HCT VFR BLD AUTO: 41.8 % (ref 37–47)
HGB BLD-MCNC: 13.7 GM/DL (ref 12–16)
HGB UR QL STRIP.AUTO: NEGATIVE
IMM GRANULOCYTES # BLD AUTO: 0.02 THOU/MM3 (ref 0–0.07)
IMM GRANULOCYTES NFR BLD AUTO: 0.2 %
KETONES UR QL STRIP.AUTO: ABNORMAL
LEUKOCYTE ESTERASE UR QL STRIP.AUTO: ABNORMAL
LYMPHOCYTES ABSOLUTE: 3.1 THOU/MM3 (ref 1–4.8)
LYMPHOCYTES NFR BLD AUTO: 36.2 %
MAGNESIUM SERPL-MCNC: 1.8 MG/DL (ref 1.6–2.4)
MCH RBC QN AUTO: 34.3 PG (ref 26–33)
MCHC RBC AUTO-ENTMCNC: 32.8 GM/DL (ref 32.2–35.5)
MCV RBC AUTO: 104.5 FL (ref 81–99)
MONOCYTES ABSOLUTE: 0.8 THOU/MM3 (ref 0.4–1.3)
MONOCYTES NFR BLD AUTO: 9.3 %
NEUTROPHILS NFR BLD AUTO: 53.1 %
NITRITE UR QL STRIP.AUTO: NEGATIVE
NRBC BLD AUTO-RTO: 0 /100 WBC
OSMOLALITY SERPL CALC.SUM OF ELEC: 286.5 MOSMOL/KG (ref 275–300)
PH UR STRIP.AUTO: 6.5 [PH] (ref 5–9)
PLATELET # BLD AUTO: 162 THOU/MM3 (ref 130–400)
PMV BLD AUTO: 12.7 FL (ref 9.4–12.4)
POTASSIUM SERPL-SCNC: 4.4 MEQ/L (ref 3.5–5.2)
PROT UR STRIP.AUTO-MCNC: NEGATIVE MG/DL
RBC # BLD AUTO: 4 MILL/MM3 (ref 4.2–5.4)
RBC #/AREA URNS HPF: ABNORMAL /HPF
RENAL EPI CELLS #/AREA URNS HPF: ABNORMAL /[HPF]
SARS-COV-2 RNA RESP QL NAA+PROBE: NOT DETECTED
SEGMENTED NEUTROPHILS ABSOLUTE COUNT: 4.6 THOU/MM3 (ref 1.8–7.7)
SODIUM SERPL-SCNC: 140 MEQ/L (ref 135–145)
SPECIFIC GRAVITY UA: 1.03 (ref 1–1.03)
T4 FREE SERPL-MCNC: 0.83 NG/DL (ref 0.93–1.68)
TROPONIN, HIGH SENSITIVITY: 10 NG/L (ref 0–12)
TSH SERPL DL<=0.005 MIU/L-ACNC: 2.03 UIU/ML (ref 0.4–4.2)
UROBILINOGEN, URINE: 0.2 EU/DL (ref 0–1)
VALPROATE SERPL-MCNC: 73.5 UG/ML (ref 50–100)
WBC # BLD AUTO: 8.6 THOU/MM3 (ref 4.8–10.8)
WBC #/AREA URNS HPF: ABNORMAL /HPF
YEAST LIKE FUNGI URNS QL MICRO: ABNORMAL

## 2024-03-29 PROCEDURE — 83735 ASSAY OF MAGNESIUM: CPT

## 2024-03-29 PROCEDURE — 87636 SARSCOV2 & INF A&B AMP PRB: CPT

## 2024-03-29 PROCEDURE — 93010 ELECTROCARDIOGRAM REPORT: CPT | Performed by: INTERNAL MEDICINE

## 2024-03-29 PROCEDURE — 80164 ASSAY DIPROPYLACETIC ACD TOT: CPT

## 2024-03-29 PROCEDURE — 93005 ELECTROCARDIOGRAM TRACING: CPT

## 2024-03-29 PROCEDURE — 84484 ASSAY OF TROPONIN QUANT: CPT

## 2024-03-29 PROCEDURE — 85025 COMPLETE CBC W/AUTO DIFF WBC: CPT

## 2024-03-29 PROCEDURE — G0480 DRUG TEST DEF 1-7 CLASSES: HCPCS

## 2024-03-29 PROCEDURE — 81001 URINALYSIS AUTO W/SCOPE: CPT

## 2024-03-29 PROCEDURE — 80048 BASIC METABOLIC PNL TOTAL CA: CPT

## 2024-03-29 PROCEDURE — 87077 CULTURE AEROBIC IDENTIFY: CPT

## 2024-03-29 PROCEDURE — 80177 DRUG SCRN QUAN LEVETIRACETAM: CPT

## 2024-03-29 PROCEDURE — 6370000000 HC RX 637 (ALT 250 FOR IP)

## 2024-03-29 PROCEDURE — 80175 DRUG SCREEN QUAN LAMOTRIGINE: CPT

## 2024-03-29 PROCEDURE — 36415 COLL VENOUS BLD VENIPUNCTURE: CPT

## 2024-03-29 PROCEDURE — 99285 EMERGENCY DEPT VISIT HI MDM: CPT

## 2024-03-29 PROCEDURE — 70450 CT HEAD/BRAIN W/O DYE: CPT

## 2024-03-29 PROCEDURE — 82140 ASSAY OF AMMONIA: CPT

## 2024-03-29 PROCEDURE — 84439 ASSAY OF FREE THYROXINE: CPT

## 2024-03-29 PROCEDURE — 87086 URINE CULTURE/COLONY COUNT: CPT

## 2024-03-29 PROCEDURE — 71045 X-RAY EXAM CHEST 1 VIEW: CPT

## 2024-03-29 PROCEDURE — 84443 ASSAY THYROID STIM HORMONE: CPT

## 2024-03-29 RX ORDER — CEPHALEXIN 500 MG/1
500 CAPSULE ORAL 2 TIMES DAILY
Qty: 14 CAPSULE | Refills: 0 | Status: SHIPPED | OUTPATIENT
Start: 2024-03-29 | End: 2024-03-29

## 2024-03-29 RX ORDER — LACTULOSE 10 G/15ML
20 SOLUTION ORAL ONCE
Status: COMPLETED | OUTPATIENT
Start: 2024-03-29 | End: 2024-03-29

## 2024-03-29 RX ORDER — CEPHALEXIN 500 MG/1
500 CAPSULE ORAL 2 TIMES DAILY
Qty: 14 CAPSULE | Refills: 0 | Status: SHIPPED | OUTPATIENT
Start: 2024-03-29 | End: 2024-04-05

## 2024-03-29 RX ORDER — CEPHALEXIN 250 MG/1
500 CAPSULE ORAL ONCE
Status: COMPLETED | OUTPATIENT
Start: 2024-03-29 | End: 2024-03-29

## 2024-03-29 RX ADMIN — CEPHALEXIN 500 MG: 250 CAPSULE ORAL at 16:13

## 2024-03-29 RX ADMIN — LACTULOSE 20 G: 20 SOLUTION ORAL at 17:45

## 2024-03-29 ASSESSMENT — PAIN - FUNCTIONAL ASSESSMENT
PAIN_FUNCTIONAL_ASSESSMENT: NONE - DENIES PAIN

## 2024-03-29 NOTE — ED NOTES
Pt presents to the ED from [facility] with complaints of possible seizure. Per nursing home staff pt was eating lunch and eyes rolled behind her head and her R arm began to shake. Pt denies these symptoms and denies having a seizure. Pt respirations are even and unlabored. Pt denies any pain.

## 2024-03-29 NOTE — ED PROVIDER NOTES
Keflex to continue at nursing home for treatment of UTI.  Levels of her antiseizure medications are pending and will require follow-up.    ED COURSE   ED Medications administered this visit (None if left blank):   Medications   cephALEXin (KEFLEX) capsule 500 mg (500 mg Oral Given 3/29/24 1613)         ED Course as of 03/29/24 1641   Fri Mar 29, 2024   1532 Bacteria, UA: MANY [JW]      ED Course User Index  [JW] Dante Seo MD         PROCEDURES: (None if blank)  Procedures:     CRITICAL CARE:  None    MEDICATION CHANGES     Current Discharge Medication List        START taking these medications    Details   cephALEXin (KEFLEX) 500 MG capsule Take 1 capsule by mouth 2 times daily for 7 days  Qty: 14 capsule, Refills: 0               FINAL DISPOSITION   Shared Decision-Making was performed, disposition discussed with the patient/family and questions answered.      Outpatient follow up (If applicable):  Adriana Smalls MD  [address] [place] [id number] [phone]          The results of pertinent diagnostic studies and exam findings were discussed with the patient/surrogate. The patient’s provisional diagnosis and plan of care were discussed with the patient and present family who expressed understanding. Medications were reviewed and indications and risks of medications were discussed with the patient/family present. Strict return precautions and follow up instructions were discussed with the patient prior to discharge, with which the patient agrees.              FINAL DIAGNOSES:  Final diagnoses:   Urinary tract infection without hematuria, site unspecified   Seizure-like activity (HCC)       Condition: condition: good  Dispo: Discharge to nursing home  DISPOSITION Discharge - Pending Orders Complete 03/29/2024 03:44:02 PM      This transcription was electronically signed. It was dictated by use of voice recognition software and electronically transcribed. The transcription may contain errors not

## 2024-03-29 NOTE — DISCHARGE INSTRUCTIONS
Take antibiotic for urinary tract infection.    Continue to take antiepileptic medications at home.

## 2024-03-29 NOTE — ED NOTES
Urine sample obtained an sent to lab. Pt resting on cot with eyes closed.  RN dimmed light for pt comfort.

## 2024-03-30 LAB — KEPPRA: 49 UG/ML

## 2024-03-31 LAB
BACTERIA UR CULT: ABNORMAL
ORGANISM: ABNORMAL

## 2024-04-01 LAB
BACTERIA UR CULT: ABNORMAL
LAMOTRIGINE SERPL-MCNC: 4.9 UG/ML (ref 3–15)
ORGANISM: ABNORMAL

## 2024-04-02 LAB — LACOSAMIDE SERPL-MCNC: 7.4 UG/ML (ref 1–10)

## 2024-04-04 LAB
EKG ATRIAL RATE: 90 BPM
EKG P AXIS: 57 DEGREES
EKG P-R INTERVAL: 162 MS
EKG Q-T INTERVAL: 370 MS
EKG QRS DURATION: 76 MS
EKG QTC CALCULATION (BAZETT): 452 MS
EKG R AXIS: -14 DEGREES
EKG T AXIS: 28 DEGREES
EKG VENTRICULAR RATE: 90 BPM

## 2024-04-17 RX ORDER — SODIUM CHLORIDE 9 MG/ML
INJECTION, SOLUTION INTRAVENOUS CONTINUOUS
Status: DISCONTINUED | OUTPATIENT
Start: 2024-04-17 | End: 2024-04-23 | Stop reason: HOSPADM

## 2024-04-17 RX ORDER — SODIUM CHLORIDE 0.9 % (FLUSH) 0.9 %
5-40 SYRINGE (ML) INJECTION PRN
Status: DISCONTINUED | OUTPATIENT
Start: 2024-04-17 | End: 2024-04-23 | Stop reason: HOSPADM

## 2024-04-17 RX ORDER — SODIUM CHLORIDE 9 MG/ML
25 INJECTION, SOLUTION INTRAVENOUS PRN
Status: DISCONTINUED | OUTPATIENT
Start: 2024-04-17 | End: 2024-04-23 | Stop reason: HOSPADM

## 2024-04-17 RX ORDER — SODIUM CHLORIDE 0.9 % (FLUSH) 0.9 %
5-40 SYRINGE (ML) INJECTION EVERY 12 HOURS SCHEDULED
Status: DISCONTINUED | OUTPATIENT
Start: 2024-04-17 | End: 2024-04-23 | Stop reason: HOSPADM

## 2024-04-22 NOTE — H&P
[facility]    Pre-Endoscopy H&PE      Patient: Mariangel Mas    : 1955    Acct#: [id number]  Primary Care Physician: Adriana Smalls MD     Planned Procedure:    [x]EGD    []Enteroscopy    []PEG    []PEG change    []PEG removal  []Variceal banding    []Biopsy   []Dilation      []Control of bleeding  []Destruction of lesion by APC    []Stent Placement  []Foreign Body Removal    []Snare Polypectomy  []Other:         Planned Sedation  []Conscious Sedation [x]MAC/Propofol []Anesthesia    []None    Airway:  Adequate for planned sedation    Indication:   Gastric ulcers     History:  \"Yana\" is a 68-year-old female with cerebral palsy, seizure disorder, MRDD who had a PEG placed while hospitalized in 2023.  She saw Leti Parson's CNP 11/10/2023 requesting a PEG removal.  It was unclear if it was appropriate at that time.  She had a modified barium swallow 2023 that showed no aspiration.  She returned 2024 eating an oral diet with thickened liquids.  They have not been using her PEG.     24 - I performed an EGD with PEG removal.  She had multiple antral ulcers with biopsies negative for Hpylori.   I increased lansoprazole to 30 mg twice a day and recommended repeat EGD in 2 months to ensure healing of ulcers.    She appears to be taking omeprazole 40 mg bid.    Physical Exam:  VITALS: BP (!) 164/75   Pulse 71   Ht 1.549 m (5' 1\") Comment: called nurse from [facility] to obtain  Wt 95.2 kg (209 lb 12.8 oz) Comment: called nurse from [facility] to obtain  SpO2 95%   BMI 39.64 kg/m²   The patient is a 68 y.o. female in no acute distress.  HEAD: Normal cephalic/atraumatic. Extra-occular motions intact bilaterally.  NECK: No lymphadenopathy or bruits.  CHEST: Rises equally on inspiration. Clear to auscultation bilaterally.   CARDIOVASCULAR: Regular rate and rhythm without murmurs, rubs or gallops.   ABDOMEN: Soft, nontender, and nondistended with normal

## 2024-04-23 ENCOUNTER — HOSPITAL ENCOUNTER (OUTPATIENT)
Age: 69
Setting detail: OUTPATIENT SURGERY
Discharge: NURSING, REHAB OR LONG-TERM CARE | End: 2024-04-23
Attending: INTERNAL MEDICINE | Admitting: INTERNAL MEDICINE
Payer: MEDICARE

## 2024-04-23 ENCOUNTER — ANESTHESIA (OUTPATIENT)
Dept: ENDOSCOPY | Age: 69
End: 2024-04-23
Payer: MEDICARE

## 2024-04-23 ENCOUNTER — ANESTHESIA EVENT (OUTPATIENT)
Dept: ENDOSCOPY | Age: 69
End: 2024-04-23
Payer: MEDICARE

## 2024-04-23 VITALS
DIASTOLIC BLOOD PRESSURE: 63 MMHG | WEIGHT: 209.8 LBS | HEART RATE: 69 BPM | RESPIRATION RATE: 16 BRPM | TEMPERATURE: 96.9 F | HEIGHT: 61 IN | SYSTOLIC BLOOD PRESSURE: 131 MMHG | BODY MASS INDEX: 39.61 KG/M2 | OXYGEN SATURATION: 98 %

## 2024-04-23 PROCEDURE — 2500000003 HC RX 250 WO HCPCS: Performed by: REGISTERED NURSE

## 2024-04-23 PROCEDURE — 3609012400 HC EGD TRANSORAL BIOPSY SINGLE/MULTIPLE: Performed by: INTERNAL MEDICINE

## 2024-04-23 PROCEDURE — 2580000003 HC RX 258: Performed by: INTERNAL MEDICINE

## 2024-04-23 PROCEDURE — 3700000000 HC ANESTHESIA ATTENDED CARE: Performed by: INTERNAL MEDICINE

## 2024-04-23 PROCEDURE — 6360000002 HC RX W HCPCS: Performed by: REGISTERED NURSE

## 2024-04-23 PROCEDURE — 88342 IMHCHEM/IMCYTCHM 1ST ANTB: CPT

## 2024-04-23 PROCEDURE — 3700000001 HC ADD 15 MINUTES (ANESTHESIA): Performed by: INTERNAL MEDICINE

## 2024-04-23 PROCEDURE — 7100000010 HC PHASE II RECOVERY - FIRST 15 MIN: Performed by: INTERNAL MEDICINE

## 2024-04-23 PROCEDURE — 88305 TISSUE EXAM BY PATHOLOGIST: CPT

## 2024-04-23 RX ORDER — CALCIUM POLYCARBOPHIL 625 MG 625 MG/1
625 TABLET ORAL DAILY
COMMUNITY

## 2024-04-23 RX ORDER — LIDOCAINE HYDROCHLORIDE 20 MG/ML
INJECTION, SOLUTION EPIDURAL; INFILTRATION; INTRACAUDAL; PERINEURAL PRN
Status: DISCONTINUED | OUTPATIENT
Start: 2024-04-23 | End: 2024-04-23 | Stop reason: SDUPTHER

## 2024-04-23 RX ORDER — OMEPRAZOLE 20 MG/1
40 CAPSULE, DELAYED RELEASE ORAL 2 TIMES DAILY
COMMUNITY

## 2024-04-23 RX ADMIN — PROPOFOL 25 MG: 10 INJECTION, EMULSION INTRAVENOUS at 13:22

## 2024-04-23 RX ADMIN — PROPOFOL 25 MG: 10 INJECTION, EMULSION INTRAVENOUS at 13:19

## 2024-04-23 RX ADMIN — PROPOFOL 25 MG: 10 INJECTION, EMULSION INTRAVENOUS at 13:13

## 2024-04-23 RX ADMIN — LIDOCAINE HYDROCHLORIDE 60 MG: 20 INJECTION, SOLUTION EPIDURAL; INFILTRATION; INTRACAUDAL; PERINEURAL at 13:13

## 2024-04-23 RX ADMIN — PROPOFOL 25 MG: 10 INJECTION, EMULSION INTRAVENOUS at 13:16

## 2024-04-23 RX ADMIN — SODIUM CHLORIDE: 9 INJECTION, SOLUTION INTRAVENOUS at 13:12

## 2024-04-23 ASSESSMENT — PAIN - FUNCTIONAL ASSESSMENT
PAIN_FUNCTIONAL_ASSESSMENT: NONE - DENIES PAIN
PAIN_FUNCTIONAL_ASSESSMENT: NONE - DENIES PAIN

## 2024-04-23 NOTE — DISCHARGE INSTRUCTIONS
1.  Await pathology   2.  Continue bid ppi indefinitely as pt had ulcers on daily ppi  3.  Follow-up with GI as needed

## 2024-04-23 NOTE — PROGRESS NOTES
EGD completed.  Scope  used.  Biopsies taken with cold forceps.  Placed in 1 jar, labeled, and sent to lab.  Pictures taken.  Pt tolerated well.

## 2024-04-23 NOTE — ANESTHESIA PRE PROCEDURE
Department of Anesthesiology  Preprocedure Note       Name:  Mariangel Mas   Age:  68 y.o.  :  1955                                          MRN:  [id number]         Date:  2024      Surgeon: Surgeon(s):  Grey Jimenes MD    Procedure: Procedure(s):  EGD    Medications prior to admission:   Prior to Admission medications    Medication Sig Start Date End Date Taking? Authorizing Provider   polycarbophil (FIBERCON) 625 MG tablet Take 1 tablet by mouth daily   Yes Naila De Souza MD   acetaminophen (TYLENOL) 325 MG tablet Take 2 tablets by mouth every 6 hours as needed for Pain    Naila De Souza MD   ondansetron (ZOFRAN) 4 MG tablet Take 1 tablet by mouth every 4 hours as needed for Nausea or Vomiting    Naila De Souza MD   omeprazole (PRILOSEC) 20 MG delayed release capsule Take 2 capsules by mouth daily  Patient not taking: Reported on 2024    Naila De Souza MD   lidocaine (HM LIDOCAINE PATCH) 4 % external patch Place 1 patch onto the skin in the morning and at bedtime Apply to right knee topically bid for pain   on in am off at night    Naila De Souza MD   Multiple Vitamins-Minerals (CEROVITE SENIOR) TABS Take 1 tablet by mouth daily    Naila De Souza MD   loperamide (IMODIUM) 2 MG capsule Take 1 capsule by mouth 4 times daily as needed for Diarrhea    Naila De Souza MD   PHENobarbital (LUMINAL) 30 MG tablet Take 3 tablets by mouth daily. Takes 30 mg and 60 mg    Naila De Souza MD   zinc gluconate 50 MG tablet Take 1 tablet by mouth daily    Naila De Souza MD   esomeprazole (NEXIUM) 40 MG delayed release capsule 1 po bid x 2 months (through 24), then 1 cap po daiy  Patient not taking: Reported on 2024   Adriana Smalls MD   amphetamine-dextroamphetamine (ADDERALL, 5MG,) 5 MG tablet Take 1 tablet by mouth daily for 30 days. Max Daily Amount: 5 mg 23  Janice Nava, APRN - CNP   amLODIPine

## 2024-04-23 NOTE — ANESTHESIA POSTPROCEDURE EVALUATION
Department of Anesthesiology  Postprocedure Note    Patient: Mariangel Mas  MRN: [id number]  YOB: 1955  Date of evaluation: 4/23/2024    Procedure Summary       Date: 04/23/24 Room / Location: [place] [address] / [facility]    Anesthesia Start: 1312 Anesthesia Stop: 1325    Procedure: ESOPHAGOGASTRODUODENOSCOPY BIOPSY Diagnosis:       Gastroesophageal reflux disease without esophagitis      (Gastroesophageal reflux disease without esophagitis [K21.9])    Surgeons: Grey Jimenes MD Responsible Provider: Scott Jon DO    Anesthesia Type: MAC ASA Status: 3            Anesthesia Type: No value filed.    Benji Phase I: Benji Score: 9    Benji Phase II:      Anesthesia Post Evaluation    Patient location during evaluation: PACU  Patient participation: complete - patient participated  Level of consciousness: awake  Pain score: 0  Airway patency: patent  Nausea & Vomiting: no vomiting and no nausea  Cardiovascular status: hemodynamically stable  Respiratory status: spontaneous ventilation and room air  Hydration status: stable        No notable events documented.

## 2024-04-23 NOTE — PROGRESS NOTES
Mariangel \"Yana\" admitted to endoscopy for EGD with Dr. Jimenes. Procedural consent verified and signed with POA.

## 2024-04-23 NOTE — OP NOTE
[facility]    Patient: Mariangel Mas  : 1955  [id number]  Date of evaluation: 2024  Primary Care Physician: Adriana Smalls MD     Procedure:    [x]EGD    []Enteroscopy    [x]Biopsy   []Dilation      []PEG    []PEG change    []PEG removal  []Variceal banding    []Control of bleeding  []Destruction of lesion by APC    []Stent Placement  []Foreign Body Removal    []Snare Polypectomy  []Other:     []Aborted:        Indication:   Gastric ulcers     History:  \"Yana\" is a 68-year-old female with cerebral palsy, seizure disorder, MRDD who had a PEG placed while hospitalized in 2023.  She saw Leti Parson's CNP 11/10/2023 requesting a PEG removal.  It was unclear if it was appropriate at that time.  She had a modified barium swallow 2023 that showed no aspiration.  She returned 2024 eating an oral diet with thickened liquids.  They have not been using her PEG.     24 - I performed an EGD with PEG removal.  She had multiple antral ulcers with biopsies negative for Hpylori.   I increased lansoprazole to 30 mg twice a day and recommended repeat EGD in 2 months to ensure healing of ulcers.    She appears to be taking omeprazole 40 mg bid.    Physical Exam:  VITALS: BP (!) 164/75   Pulse 71   Ht 1.549 m (5' 1\") Comment: called nurse from [facility] to obtain  Wt 95.2 kg (209 lb 12.8 oz) Comment: called nurse from [facility] to obtain  SpO2 95%   BMI 39.64 kg/m²   The patient is a 68 y.o. female in no acute distress.  HEAD: Normal cephalic/atraumatic. Extra-occular motions intact bilaterally.  NECK: No lymphadenopathy or bruits.  CHEST: Rises equally on inspiration. Clear to auscultation bilaterally.   CARDIOVASCULAR: Regular rate and rhythm without murmurs, rubs or gallops.   ABDOMEN: Soft, nontender, and nondistended with normal bowel sounds. No Hepatosplemomegaly.  UPPER EXTREMITIES: no cyanosis, clubbing, or

## 2024-04-23 NOTE — PROGRESS NOTES
Recovery mode. Patient denies discomfort. Passing gas, taking fluids. Dr. Jimenes discussed findings with patient and care giver. Report called to Marc Álvarez and given to RN. [employer] here to escort patient back to facility. Discharge instructions provided and understanding verbalized.

## 2024-05-14 ENCOUNTER — HOSPITAL ENCOUNTER (EMERGENCY)
Age: 69
Discharge: HOME | End: 2024-05-14
Attending: EMERGENCY MEDICINE
Payer: MEDICARE

## 2024-05-14 ENCOUNTER — APPOINTMENT (OUTPATIENT)
Dept: GENERAL RADIOLOGY | Age: 69
End: 2024-05-14
Payer: MEDICARE

## 2024-05-14 VITALS
BODY MASS INDEX: 39.65 KG/M2 | RESPIRATION RATE: 14 BRPM | OXYGEN SATURATION: 100 % | DIASTOLIC BLOOD PRESSURE: 68 MMHG | SYSTOLIC BLOOD PRESSURE: 128 MMHG | HEART RATE: 70 BPM | WEIGHT: 210 LBS | HEIGHT: 61 IN | TEMPERATURE: 98.7 F

## 2024-05-14 DIAGNOSIS — G40.919 BREAKTHROUGH SEIZURE (HCC): Primary | ICD-10-CM

## 2024-05-14 DIAGNOSIS — N30.00 ACUTE CYSTITIS WITHOUT HEMATURIA: ICD-10-CM

## 2024-05-14 LAB
ALBUMIN SERPL BCG-MCNC: 3.2 G/DL (ref 3.5–5.1)
ALP SERPL-CCNC: 55 U/L (ref 38–126)
ALT SERPL W/O P-5'-P-CCNC: 17 U/L (ref 11–66)
ANION GAP SERPL CALC-SCNC: 11 MEQ/L (ref 8–16)
AST SERPL-CCNC: 21 U/L (ref 5–40)
BACTERIA URNS QL MICRO: ABNORMAL /HPF
BASOPHILS ABSOLUTE: 0 THOU/MM3 (ref 0–0.1)
BASOPHILS NFR BLD AUTO: 0.1 %
BILIRUB SERPL-MCNC: 0.2 MG/DL (ref 0.3–1.2)
BILIRUB UR QL STRIP.AUTO: NEGATIVE
BUN SERPL-MCNC: 16 MG/DL (ref 7–22)
CALCIUM SERPL-MCNC: 9 MG/DL (ref 8.5–10.5)
CASTS #/AREA URNS LPF: ABNORMAL /LPF
CASTS 2: ABNORMAL /LPF
CHARACTER UR: ABNORMAL
CHLORIDE SERPL-SCNC: 102 MEQ/L (ref 98–111)
CO2 SERPL-SCNC: 23 MEQ/L (ref 23–33)
COLOR: YELLOW
CREAT SERPL-MCNC: 0.3 MG/DL (ref 0.4–1.2)
CRYSTALS URNS MICRO: ABNORMAL
DEPRECATED RDW RBC AUTO: 48 FL (ref 35–45)
EKG ATRIAL RATE: 79 BPM
EKG P AXIS: 50 DEGREES
EKG P-R INTERVAL: 158 MS
EKG Q-T INTERVAL: 388 MS
EKG QRS DURATION: 84 MS
EKG QTC CALCULATION (BAZETT): 444 MS
EKG R AXIS: -8 DEGREES
EKG T AXIS: 44 DEGREES
EKG VENTRICULAR RATE: 79 BPM
EOSINOPHIL NFR BLD AUTO: 1.6 %
EOSINOPHILS ABSOLUTE: 0.1 THOU/MM3 (ref 0–0.4)
EPITHELIAL CELLS, UA: ABNORMAL /HPF
ERYTHROCYTE [DISTWIDTH] IN BLOOD BY AUTOMATED COUNT: 12.3 % (ref 11.5–14.5)
GFR SERPL CREATININE-BSD FRML MDRD: > 90 ML/MIN/1.73M2
GLUCOSE SERPL-MCNC: 100 MG/DL (ref 70–108)
GLUCOSE UR QL STRIP.AUTO: NEGATIVE MG/DL
HCT VFR BLD AUTO: 42 % (ref 37–47)
HGB BLD-MCNC: 13.8 GM/DL (ref 12–16)
HGB UR QL STRIP.AUTO: NEGATIVE
IMM GRANULOCYTES # BLD AUTO: 0.02 THOU/MM3 (ref 0–0.07)
IMM GRANULOCYTES NFR BLD AUTO: 0.2 %
KETONES UR QL STRIP.AUTO: ABNORMAL
LYMPHOCYTES ABSOLUTE: 2.6 THOU/MM3 (ref 1–4.8)
LYMPHOCYTES NFR BLD AUTO: 31.4 %
MCH RBC QN AUTO: 34.4 PG (ref 26–33)
MCHC RBC AUTO-ENTMCNC: 32.9 GM/DL (ref 32.2–35.5)
MCV RBC AUTO: 104.7 FL (ref 81–99)
MISCELLANEOUS 2: ABNORMAL
MONOCYTES ABSOLUTE: 0.6 THOU/MM3 (ref 0.4–1.3)
MONOCYTES NFR BLD AUTO: 7.8 %
NEUTROPHILS ABSOLUTE: 4.8 THOU/MM3 (ref 1.8–7.7)
NEUTROPHILS NFR BLD AUTO: 58.9 %
NITRITE UR QL STRIP: POSITIVE
NRBC BLD AUTO-RTO: 0 /100 WBC
OSMOLALITY SERPL CALC.SUM OF ELEC: 273.2 MOSMOL/KG (ref 275–300)
PH UR STRIP.AUTO: 7.5 [PH] (ref 5–9)
PHENOBARB SERPL-MCNC: 30.3 MCG/ML (ref 10–48)
PLATELET # BLD AUTO: 141 THOU/MM3 (ref 130–400)
PMV BLD AUTO: 12.2 FL (ref 9.4–12.4)
POTASSIUM SERPL-SCNC: 4.4 MEQ/L (ref 3.5–5.2)
PROT SERPL-MCNC: 6.2 G/DL (ref 6.1–8)
PROT UR STRIP.AUTO-MCNC: NEGATIVE MG/DL
RBC # BLD AUTO: 4.01 MILL/MM3 (ref 4.2–5.4)
RBC URINE: ABNORMAL /HPF
RENAL EPI CELLS #/AREA URNS HPF: ABNORMAL /[HPF]
SODIUM SERPL-SCNC: 136 MEQ/L (ref 135–145)
SP GR UR REFRACT.AUTO: 1.02 (ref 1–1.03)
UROBILINOGEN, URINE: 0.2 EU/DL (ref 0–1)
VALPROATE SERPL-MCNC: 66.8 UG/ML (ref 50–100)
WBC # BLD AUTO: 8.2 THOU/MM3 (ref 4.8–10.8)
WBC #/AREA URNS HPF: ABNORMAL /HPF
WBC #/AREA URNS HPF: ABNORMAL /[HPF]
YEAST LIKE FUNGI URNS QL MICRO: ABNORMAL

## 2024-05-14 PROCEDURE — 6360000002 HC RX W HCPCS

## 2024-05-14 PROCEDURE — 87077 CULTURE AEROBIC IDENTIFY: CPT

## 2024-05-14 PROCEDURE — 71045 X-RAY EXAM CHEST 1 VIEW: CPT

## 2024-05-14 PROCEDURE — 99285 EMERGENCY DEPT VISIT HI MDM: CPT

## 2024-05-14 PROCEDURE — 36415 COLL VENOUS BLD VENIPUNCTURE: CPT

## 2024-05-14 PROCEDURE — 85025 COMPLETE CBC W/AUTO DIFF WBC: CPT

## 2024-05-14 PROCEDURE — 80164 ASSAY DIPROPYLACETIC ACD TOT: CPT

## 2024-05-14 PROCEDURE — 96365 THER/PROPH/DIAG IV INF INIT: CPT

## 2024-05-14 PROCEDURE — 81001 URINALYSIS AUTO W/SCOPE: CPT

## 2024-05-14 PROCEDURE — 2580000003 HC RX 258

## 2024-05-14 PROCEDURE — 93010 ELECTROCARDIOGRAM REPORT: CPT | Performed by: INTERNAL MEDICINE

## 2024-05-14 PROCEDURE — 93005 ELECTROCARDIOGRAM TRACING: CPT | Performed by: INTERNAL MEDICINE

## 2024-05-14 PROCEDURE — 87186 SC STD MICRODIL/AGAR DIL: CPT

## 2024-05-14 PROCEDURE — 87086 URINE CULTURE/COLONY COUNT: CPT

## 2024-05-14 PROCEDURE — 80053 COMPREHEN METABOLIC PANEL: CPT

## 2024-05-14 PROCEDURE — 80184 ASSAY OF PHENOBARBITAL: CPT

## 2024-05-14 PROCEDURE — 6370000000 HC RX 637 (ALT 250 FOR IP)

## 2024-05-14 RX ORDER — GRANULES FOR ORAL 3 G/1
3 POWDER ORAL EVERY OTHER DAY
Qty: 2 EACH | Refills: 0 | Status: SHIPPED | OUTPATIENT
Start: 2024-05-16 | End: 2024-05-19

## 2024-05-14 RX ORDER — GRANULES FOR ORAL 3 G/1
3 POWDER ORAL ONCE
Status: COMPLETED | OUTPATIENT
Start: 2024-05-14 | End: 2024-05-14

## 2024-05-14 RX ADMIN — LEVETIRACETAM 1000 MG: 100 INJECTION, SOLUTION INTRAVENOUS at 12:43

## 2024-05-14 RX ADMIN — GRANULES FOR ORAL SOLUTION 1 PACKET: 3 POWDER ORAL at 15:38

## 2024-05-14 ASSESSMENT — PAIN - FUNCTIONAL ASSESSMENT
PAIN_FUNCTIONAL_ASSESSMENT: WONG-BAKER FACES
PAIN_FUNCTIONAL_ASSESSMENT: NONE - DENIES PAIN

## 2024-05-14 ASSESSMENT — PAIN SCALES - WONG BAKER
WONGBAKER_NUMERICALRESPONSE: NO HURT

## 2024-05-14 NOTE — ED TRIAGE NOTES
Pt presents to the ED by EMS from [facility] with c/c seizure. Pt has reported hx of seizures, EMS reports that pt had 5 minute seizure followed by 4 minute seizure. Pt given IN versed and seizure stopped. Unsure of dose. EKG completed on arrival.

## 2024-05-14 NOTE — ED PROVIDER NOTES
ATTENDING NOTE:    I supervised and discussed the history, physical exam and the management of this patient with the resident. I reviewed the resident's note and agree with the documented findings and plan of care.  Please see my additional note.    I personally saw and examined the patient.  I have reviewed and agree with the resident's findings, including all diagnostic interpretations and treatment plans as written.  I was present for the key portion of any procedures performed and the inclusive time noted in any critical care statement.    Electronically verified by Judy Smith MD  05/14/24 1939    
osteomyelitis Z87.39    History of status epilepticus Z86.69    Seizure ([facility]) R56.9    Breakthrough seizure ([facility]) G40.919    Encephalopathy acute G93.40    Cerebral palsy (HCC) G80.9    Acute encephalopathy G93.40    Urinary tract infection without hematuria N39.0    Hyperammonemia ([facility]) E72.20    COVID-19 U07.1    Altered mental status R41.82    Anasarca R60.1    PEG (percutaneous endoscopic gastrostomy) status ([facility]) Z93.1    Chronic superficial gastritis without bleeding K29.30    Closed displaced fracture of nasal bone with routine healing S02.2XXD     SURGICAL HISTORY       Past Surgical History:   Procedure Laterality Date    GASTROSTOMY TUBE PLACEMENT  07/28/2023    EGD PEG TUBE PLACEMENT    GASTROSTOMY TUBE PLACEMENT N/A 7/28/2023    EGD PEG TUBE PLACEMENT performed by Ge Puente MD at [facility] OR    NASAL FRACTURE SURGERY N/A 3/13/2024    Closed Reduction of Nasal Bone performed by Storm Flor MD at [facility] OR    SHOULDER SURGERY Left     ORIF    -- had hardware infection afterwards requiring IV abx    UPPER GASTROINTESTINAL ENDOSCOPY N/A 2/9/2024    EGD BIOPSY performed by Grey Jimenes MD at [facility]    UPPER GASTROINTESTINAL ENDOSCOPY  2/9/2024    EGD FOREIGN BODY REMOVAL performed by Grey Jimenes MD at [facility]    UPPER GASTROINTESTINAL ENDOSCOPY N/A 4/23/2024    ESOPHAGOGASTRODUODENOSCOPY BIOPSY performed by Grey Jimenes MD at [facility]       CURRENT MEDICATIONS       Previous Medications    ACETAMINOPHEN (TYLENOL) 325 MG TABLET    Take 2 tablets by mouth every 6 hours as needed for Pain    AMLODIPINE (NORVASC) 5 MG TABLET    Take 1 tablet by mouth daily    AMPHETAMINE-DEXTROAMPHETAMINE (ADDERALL, 5MG,) 5 MG TABLET    Take 1 tablet by mouth daily for 30 days. Max Daily Amount: 5 mg    ASPIRIN 81 MG CHEWABLE TABLET    1 tablet by PEG Tube route daily    ATORVASTATIN (LIPITOR) 20 MG TABLET    1 tablet by PEG Tube route nightly    LACOSAMIDE (VIMPAT) 200 MG TABLET

## 2024-05-14 NOTE — DISCHARGE INSTRUCTIONS
You have a urinary tract infection likely the cause of your breakthrough seizure today.  We are starting you on a 3 dose course of fosfomycin because of the resistant bacteria you have grown in the past and your penicillin allergy.  We gave you your first dose here in the emergency department and you will take another dose on the 16th and 1 on the 18th.

## 2024-05-14 NOTE — ED NOTES
Urine specimen obtained and sent to the lab. Pt vitals remain stable. Voices no new needs or concerns at this time.

## 2024-05-17 LAB
BACTERIA UR CULT: ABNORMAL
BACTERIA UR CULT: ABNORMAL
ORGANISM: ABNORMAL
ORGANISM: ABNORMAL

## 2024-07-03 ENCOUNTER — OFFICE VISIT (OUTPATIENT)
Dept: PULMONOLOGY | Age: 69
End: 2024-07-03
Payer: MEDICARE

## 2024-07-03 VITALS
DIASTOLIC BLOOD PRESSURE: 70 MMHG | BODY MASS INDEX: 39.68 KG/M2 | HEIGHT: 61 IN | TEMPERATURE: 97.5 F | SYSTOLIC BLOOD PRESSURE: 118 MMHG | OXYGEN SATURATION: 95 % | HEART RATE: 96 BPM

## 2024-07-03 DIAGNOSIS — G80.9 CEREBRAL PALSY, UNSPECIFIED TYPE (HCC): ICD-10-CM

## 2024-07-03 DIAGNOSIS — E66.01 SEVERE OBESITY (BMI 35.0-39.9) WITH COMORBIDITY (HCC): ICD-10-CM

## 2024-07-03 DIAGNOSIS — F32.A DEPRESSION, UNSPECIFIED DEPRESSION TYPE: ICD-10-CM

## 2024-07-03 DIAGNOSIS — R29.898 WEAKNESS OF BOTH LEGS: ICD-10-CM

## 2024-07-03 DIAGNOSIS — F90.9 ATTENTION DEFICIT HYPERACTIVITY DISORDER (ADHD), UNSPECIFIED ADHD TYPE: ICD-10-CM

## 2024-07-03 DIAGNOSIS — R56.9 SEIZURE (HCC): ICD-10-CM

## 2024-07-03 DIAGNOSIS — G47.10 HYPERSOMNIA: ICD-10-CM

## 2024-07-03 DIAGNOSIS — I50.32 CHRONIC DIASTOLIC (CONGESTIVE) HEART FAILURE (HCC): ICD-10-CM

## 2024-07-03 DIAGNOSIS — G47.30 SLEEP APNEA, UNSPECIFIED TYPE: ICD-10-CM

## 2024-07-03 DIAGNOSIS — Z86.69 HISTORY OF STATUS EPILEPTICUS: ICD-10-CM

## 2024-07-03 PROCEDURE — 99203 OFFICE O/P NEW LOW 30 MIN: CPT | Performed by: INTERNAL MEDICINE

## 2024-07-03 PROCEDURE — 1090F PRES/ABSN URINE INCON ASSESS: CPT | Performed by: INTERNAL MEDICINE

## 2024-07-03 PROCEDURE — 1123F ACP DISCUSS/DSCN MKR DOCD: CPT | Performed by: INTERNAL MEDICINE

## 2024-07-03 PROCEDURE — G8400 PT W/DXA NO RESULTS DOC: HCPCS | Performed by: INTERNAL MEDICINE

## 2024-07-03 PROCEDURE — G8427 DOCREV CUR MEDS BY ELIG CLIN: HCPCS | Performed by: INTERNAL MEDICINE

## 2024-07-03 PROCEDURE — 3017F COLORECTAL CA SCREEN DOC REV: CPT | Performed by: INTERNAL MEDICINE

## 2024-07-03 PROCEDURE — G8417 CALC BMI ABV UP PARAM F/U: HCPCS | Performed by: INTERNAL MEDICINE

## 2024-07-03 PROCEDURE — 1036F TOBACCO NON-USER: CPT | Performed by: INTERNAL MEDICINE

## 2024-07-03 ASSESSMENT — ENCOUNTER SYMPTOMS
RESPIRATORY NEGATIVE: 1
EYES NEGATIVE: 1
GASTROINTESTINAL NEGATIVE: 1

## 2024-07-03 NOTE — PROGRESS NOTES
Chief Complaint:    Mallampati airway Class:IV  Neck Circumference:16.25 Inches    Cleves sleepiness score 7/3/24: pt is unable to answer questionnaires..      Pt has not been on PAP therapy or had a previous sleep study.

## 2024-07-03 NOTE — PROGRESS NOTES
[place] [facility]  Sleep Medicine Clinic initial consultation note    Mariangel Mas                                                Chief complaint: Mariangel Mas is a 68 y.o.oldfemale came for further evaluation regarding her  possible sleep apnea  with referral from [facility].  She was accompanied by her care taker Ms. Ledbetter. Ms. Ledbetter is present during the entire visit. I am unable to get full history as unsure of nighttime details.     Mallampati airway Class:IV  Neck Circumference:16.25 Inches  Harrold sleepiness score 7/3/24: pt is unable to answer questionnaires..  Sleep apnea Quality of Life Questionnaire Score: Patient not able to complete/not able to obtain good answers for the questionnaire      Pt has not been on PAP therapy or had a previous sleep study.  Resighini:    Sleep/Wake schedule:  Usual time to go to bed during the work/regular day of week:20:00 PM.  Usual time to wake up during the work//regular day of week: 6:00 AM.  Over the weekends her sleep schedule: [x] Remain same.    She usually falls a sleep in less than:  Unable to say how long but says is quickly  She takes naps: Yes.  Number of naps per week:  3 times.  During each nap she spends a total of: 1 hours   The naps were reported as refreshing: No.     Sleep Hygiene:    Is the temperature and evironment in her bed room is acceptable to her: Yes.   She watches Television in her bed room: Yes.  She read books, study, pay bills etc in the bed: No.  Frequency She wake up during night/sleep: 3  Majority of nocturnal awakenings are for urination: Unable to say.   Difficulty in falling back to sleep after nocturnal awakenings: Yes  .  Do you drink coffee: No. Drinks one cup decaffeinated in the morning   Do you drink caffeinated beverages i.e sodas: No.  Do you drink tea:No.      Do you drink alcoholic beverages: No. 0 drink/s per day.       History of recreational drug use: No.

## 2024-08-12 ENCOUNTER — HOSPITAL ENCOUNTER (OUTPATIENT)
Dept: SLEEP CENTER | Age: 69
Discharge: HOME | End: 2024-08-14
Payer: MEDICARE

## 2024-08-12 DIAGNOSIS — R29.898 WEAKNESS OF BOTH LEGS: ICD-10-CM

## 2024-08-12 DIAGNOSIS — I50.32 CHRONIC DIASTOLIC (CONGESTIVE) HEART FAILURE (HCC): ICD-10-CM

## 2024-08-12 DIAGNOSIS — G80.9 CEREBRAL PALSY, UNSPECIFIED TYPE (HCC): ICD-10-CM

## 2024-08-12 DIAGNOSIS — G47.10 HYPERSOMNIA: ICD-10-CM

## 2024-08-12 DIAGNOSIS — F32.A DEPRESSION, UNSPECIFIED DEPRESSION TYPE: ICD-10-CM

## 2024-08-12 DIAGNOSIS — R56.9 SEIZURE (HCC): ICD-10-CM

## 2024-08-12 DIAGNOSIS — G47.30 SLEEP APNEA, UNSPECIFIED TYPE: ICD-10-CM

## 2024-08-12 DIAGNOSIS — E66.01 SEVERE OBESITY (BMI 35.0-39.9) WITH COMORBIDITY (HCC): ICD-10-CM

## 2024-08-12 DIAGNOSIS — Z86.69 HISTORY OF STATUS EPILEPTICUS: ICD-10-CM

## 2024-08-12 DIAGNOSIS — F90.9 ATTENTION DEFICIT HYPERACTIVITY DISORDER (ADHD), UNSPECIFIED ADHD TYPE: ICD-10-CM

## 2024-08-12 PROCEDURE — 95806 SLEEP STUDY UNATT&RESP EFFT: CPT

## 2024-08-12 NOTE — PROGRESS NOTES
will be maintained consistent with HIPPA regulations.    Sleep facility staff will be contact the patient to schedule the HSAT  and setup date and time.    Prior to patient arrival, HSAT devices will be inspected and prepared for application, ensuring all equipment is properly cleaned, free of damage, and any private health information from previous patient is deleted.   HSAT setup:   Gather Embletta MPR unit and supplies; carrying case should include the following:   Embletta MPR pouch   Thoracic effort belt and Xactrace sensor with blue connector    Embletta MPR recorder   Abdominal effort belt and Xactrace sensor with yellow connector      3 EKG wires and EKG patches   Cannula    XPOD Cord and disposable oximeter   Paper Tape o Gather paperwork:   Pre-post questionnaire   Study diary/comment form o Open [employer] program   Connect HSAT unit with USB cable   Click on “patient information” and add demographics, click OK.    You will see a checkmark by the Patient Information on the Observation tab.   Click on “device control” and follow the Recording Wizard. ? Select “start” and “stop” time for recording   Click on “Ok” on the Ambulatory Recording Summary   There will be a check chiki by the device control on the Observation tab   Disconnect the MPR from the PC   Insert two new AA batteries   The device is now ready for the patient to take home.   Patient Instruct:   A consent form should be completed prior to setup   Verbally and physically demonstrate placement of all sensors, and how to remove them when study is completed   Title: Adult Polysomnography  Page: 2 of 3    Review pre and post questionnaire with patient o Review sleep log with patient    Record lights out and lights on time    Record any time patient is out of bed i.e. restroom o Inform patient of after hours contact number should any questions arise during the study. # [phone]   All calls during HSAT testing will be documented and logged

## 2024-08-20 NOTE — PROGRESS NOTES
[place] [facility]    Mariangel Mas, 68 y.o.  [id number]    Nurses Notes   HST f/u   Study Results  Initial Study Date -  8/12/24  AHI -  10.2    Total Events - 94  (Apneas  0  Hypopneas 94  Central  0)  (Total Sleep Time - 551.0 min)  Time with Sats below 88% - 5.2 min                    Interval History       Mariangel Mas is a 68 y.o. old femalewho comes in to review the results of her recent sleep study, to answer questions and to explore options for treatment.  Patient with excessive sleepiness alseep in office today   Delayed cognition   Presents in WC  From [facility] Z[facility]IGA BMI 42    Meds  Current Outpatient Medications   Medication Sig Dispense Refill    omeprazole (PRILOSEC) 20 MG delayed release capsule Take 2 capsules by mouth in the morning and at bedtime      acetaminophen (TYLENOL) 325 MG tablet Take 2 tablets by mouth every 6 hours as needed for Pain      ondansetron (ZOFRAN) 4 MG tablet Take 1 tablet by mouth every 4 hours as needed for Nausea or Vomiting      lidocaine (HM LIDOCAINE PATCH) 4 % external patch Place 1 patch onto the skin in the morning and at bedtime Apply to right knee topically bid for pain   on in am off at night      Multiple Vitamins-Minerals (CEROVITE SENIOR) TABS Take 1 tablet by mouth daily      loperamide (IMODIUM) 2 MG capsule Take 1 capsule by mouth 4 times daily as needed for Diarrhea      PHENobarbital (LUMINAL) 30 MG tablet Take 3 tablets by mouth daily. Takes 30 mg and 60 mg      zinc gluconate 50 MG tablet Take 1 tablet by mouth daily      amLODIPine (NORVASC) 5 MG tablet Take 1 tablet by mouth daily 30 tablet 3    lactulose (CHRONULAC) 10 GM/15ML solution Take 30 mLs by mouth daily  1    polycarbophil (FIBERCON) 625 MG tablet 1 tablet by Per G Tube route 3 times daily  4    levOCARNitine (CARNITOR) 330 MG tablet Take 1 tablet by mouth 2 times daily 60 tablet 3    Lidocaine HCl (ASPERCREME LIDOCAINE) 4 % CREA Apply

## 2024-08-21 ENCOUNTER — OFFICE VISIT (OUTPATIENT)
Dept: PULMONOLOGY | Age: 69
End: 2024-08-21
Payer: MEDICARE

## 2024-08-21 VITALS
HEIGHT: 61 IN | HEART RATE: 81 BPM | SYSTOLIC BLOOD PRESSURE: 128 MMHG | DIASTOLIC BLOOD PRESSURE: 62 MMHG | OXYGEN SATURATION: 94 % | WEIGHT: 225 LBS | BODY MASS INDEX: 42.48 KG/M2 | TEMPERATURE: 98 F

## 2024-08-21 DIAGNOSIS — E66.01 SEVERE OBESITY (BMI 35.0-39.9) WITH COMORBIDITY (HCC): ICD-10-CM

## 2024-08-21 DIAGNOSIS — R41.89 IMPAIRED COGNITION: ICD-10-CM

## 2024-08-21 DIAGNOSIS — G47.19 EXCESSIVE DAYTIME SLEEPINESS: ICD-10-CM

## 2024-08-21 DIAGNOSIS — G47.33 OSA (OBSTRUCTIVE SLEEP APNEA): Primary | ICD-10-CM

## 2024-08-21 PROCEDURE — 1123F ACP DISCUSS/DSCN MKR DOCD: CPT | Performed by: NURSE PRACTITIONER

## 2024-08-21 PROCEDURE — G8400 PT W/DXA NO RESULTS DOC: HCPCS | Performed by: NURSE PRACTITIONER

## 2024-08-21 PROCEDURE — 99214 OFFICE O/P EST MOD 30 MIN: CPT | Performed by: NURSE PRACTITIONER

## 2024-08-21 PROCEDURE — G8417 CALC BMI ABV UP PARAM F/U: HCPCS | Performed by: NURSE PRACTITIONER

## 2024-08-21 PROCEDURE — 1036F TOBACCO NON-USER: CPT | Performed by: NURSE PRACTITIONER

## 2024-08-21 PROCEDURE — G8427 DOCREV CUR MEDS BY ELIG CLIN: HCPCS | Performed by: NURSE PRACTITIONER

## 2024-08-21 PROCEDURE — 1090F PRES/ABSN URINE INCON ASSESS: CPT | Performed by: NURSE PRACTITIONER

## 2024-08-21 PROCEDURE — 3017F COLORECTAL CA SCREEN DOC REV: CPT | Performed by: NURSE PRACTITIONER

## 2024-08-21 ASSESSMENT — ENCOUNTER SYMPTOMS
COUGH: 0
WHEEZING: 0
SHORTNESS OF BREATH: 0
GASTROINTESTINAL NEGATIVE: 1
RESPIRATORY NEGATIVE: 1
ALLERGIC/IMMUNOLOGIC NEGATIVE: 1
EYES NEGATIVE: 1

## 2024-08-21 NOTE — PATIENT INSTRUCTIONS
PSG consistent with WILY  Discussed treatment options including PAP, OAT, weight loss, positional therapy and surgical interventions  Mariangel Millan has decided to pursue APAP for treatment  DME order placed for APAP 5-20 cm H2O- order given to [facility] to obtain machine   - She  was advised to continue current positive airway pressure therapy with above described pressure.   - She  advised to keep good compliance with current recommended pressure to get optimal results and clinical improvement  - Recommend 7-9 hours of sleep with PAP  - She was advised to call DME company regarding supplies if needed.   -She call my office for earlier appointment if needed for worsening of sleep symptoms.   - She was instructed on weight loss  - Mariangel Millan was educated about my impression and plan. Patient verbalizesunderstanding.  We will see Mariangel Millan Kimberyler back in: 3 months with download    Information added by my medical assistant/LPN was reviewed today   Electronically signed by SUNDAY Hamm CNP on 8/21/2024 at 9:54 AM

## 2025-01-19 ENCOUNTER — APPOINTMENT (OUTPATIENT)
Dept: GENERAL RADIOLOGY | Age: 70
End: 2025-01-19
Payer: MEDICARE

## 2025-01-19 ENCOUNTER — HOSPITAL ENCOUNTER (INPATIENT)
Age: 70
LOS: 4 days | Discharge: HOME | End: 2025-01-24
Attending: EMERGENCY MEDICINE
Payer: MEDICARE

## 2025-01-19 ENCOUNTER — APPOINTMENT (OUTPATIENT)
Dept: CT IMAGING | Age: 70
End: 2025-01-19
Payer: MEDICARE

## 2025-01-19 DIAGNOSIS — G40.409 GRAND MAL SEIZURE (HCC): ICD-10-CM

## 2025-01-19 DIAGNOSIS — G40.919 BREAKTHROUGH SEIZURE (HCC): Primary | ICD-10-CM

## 2025-01-19 LAB
ALBUMIN SERPL BCG-MCNC: 3.5 G/DL (ref 3.5–5.1)
ALP SERPL-CCNC: 54 U/L (ref 38–126)
ALT SERPL W/O P-5'-P-CCNC: 20 U/L (ref 11–66)
AMMONIA PLAS-MCNC: 43 UMOL/L (ref 11–60)
ANION GAP SERPL CALC-SCNC: 11 MEQ/L (ref 8–16)
AST SERPL-CCNC: 19 U/L (ref 5–40)
BASOPHILS ABSOLUTE: 0 THOU/MM3 (ref 0–0.1)
BASOPHILS NFR BLD AUTO: 0.2 %
BILIRUB SERPL-MCNC: 0.2 MG/DL (ref 0.3–1.2)
BUN SERPL-MCNC: 25 MG/DL (ref 7–22)
CALCIUM SERPL-MCNC: 8.9 MG/DL (ref 8.5–10.5)
CHLORIDE SERPL-SCNC: 101 MEQ/L (ref 98–111)
CO2 SERPL-SCNC: 26 MEQ/L (ref 23–33)
CREAT SERPL-MCNC: 0.4 MG/DL (ref 0.4–1.2)
DEPRECATED RDW RBC AUTO: 46.4 FL (ref 35–45)
EKG ATRIAL RATE: 74 BPM
EKG P AXIS: 48 DEGREES
EKG P-R INTERVAL: 152 MS
EKG Q-T INTERVAL: 406 MS
EKG QRS DURATION: 82 MS
EKG QTC CALCULATION (BAZETT): 450 MS
EKG R AXIS: -6 DEGREES
EKG T AXIS: 22 DEGREES
EKG VENTRICULAR RATE: 74 BPM
EOSINOPHIL NFR BLD AUTO: 1.2 %
EOSINOPHILS ABSOLUTE: 0.1 THOU/MM3 (ref 0–0.4)
ERYTHROCYTE [DISTWIDTH] IN BLOOD BY AUTOMATED COUNT: 12.3 % (ref 11.5–14.5)
GFR SERPL CREATININE-BSD FRML MDRD: > 90 ML/MIN/1.73M2
GLUCOSE BLD STRIP.AUTO-MCNC: 97 MG/DL (ref 70–108)
GLUCOSE SERPL-MCNC: 106 MG/DL (ref 70–108)
HCT VFR BLD AUTO: 42 % (ref 37–47)
HGB BLD-MCNC: 13.9 GM/DL (ref 12–16)
IMM GRANULOCYTES # BLD AUTO: 0.05 THOU/MM3 (ref 0–0.07)
IMM GRANULOCYTES NFR BLD AUTO: 0.6 %
LACTIC ACID, SEPSIS: 2.6 MMOL/L (ref 0.5–1.9)
LYMPHOCYTES ABSOLUTE: 3.6 THOU/MM3 (ref 1–4.8)
LYMPHOCYTES NFR BLD AUTO: 42.9 %
MCH RBC QN AUTO: 33.7 PG (ref 26–33)
MCHC RBC AUTO-ENTMCNC: 33.1 GM/DL (ref 32.2–35.5)
MCV RBC AUTO: 101.7 FL (ref 81–99)
MONOCYTES ABSOLUTE: 0.8 THOU/MM3 (ref 0.4–1.3)
MONOCYTES NFR BLD AUTO: 9.2 %
NEUTROPHILS ABSOLUTE: 3.9 THOU/MM3 (ref 1.8–7.7)
NEUTROPHILS NFR BLD AUTO: 45.9 %
NRBC BLD AUTO-RTO: 0 /100 WBC
OSMOLALITY SERPL CALC.SUM OF ELEC: 280.5 MOSMOL/KG (ref 275–300)
PLATELET # BLD AUTO: 141 THOU/MM3 (ref 130–400)
PMV BLD AUTO: 12.7 FL (ref 9.4–12.4)
POTASSIUM SERPL-SCNC: 4.1 MEQ/L (ref 3.5–5.2)
PROT SERPL-MCNC: 6.3 G/DL (ref 6.1–8)
RBC # BLD AUTO: 4.13 MILL/MM3 (ref 4.2–5.4)
SODIUM SERPL-SCNC: 138 MEQ/L (ref 135–145)
WBC # BLD AUTO: 8.5 THOU/MM3 (ref 4.8–10.8)

## 2025-01-19 PROCEDURE — 93005 ELECTROCARDIOGRAM TRACING: CPT | Performed by: EMERGENCY MEDICINE

## 2025-01-19 PROCEDURE — 2580000003 HC RX 258: Performed by: EMERGENCY MEDICINE

## 2025-01-19 PROCEDURE — 85025 COMPLETE CBC W/AUTO DIFF WBC: CPT

## 2025-01-19 PROCEDURE — 83605 ASSAY OF LACTIC ACID: CPT

## 2025-01-19 PROCEDURE — 82550 ASSAY OF CK (CPK): CPT

## 2025-01-19 PROCEDURE — 36415 COLL VENOUS BLD VENIPUNCTURE: CPT

## 2025-01-19 PROCEDURE — 87040 BLOOD CULTURE FOR BACTERIA: CPT

## 2025-01-19 PROCEDURE — 99285 EMERGENCY DEPT VISIT HI MDM: CPT

## 2025-01-19 PROCEDURE — 93010 ELECTROCARDIOGRAM REPORT: CPT | Performed by: INTERNAL MEDICINE

## 2025-01-19 PROCEDURE — 80184 ASSAY OF PHENOBARBITAL: CPT

## 2025-01-19 PROCEDURE — 82140 ASSAY OF AMMONIA: CPT

## 2025-01-19 PROCEDURE — 80235 DRUG ASSAY LACOSAMIDE: CPT

## 2025-01-19 PROCEDURE — 71045 X-RAY EXAM CHEST 1 VIEW: CPT

## 2025-01-19 PROCEDURE — 80164 ASSAY DIPROPYLACETIC ACD TOT: CPT

## 2025-01-19 PROCEDURE — 70450 CT HEAD/BRAIN W/O DYE: CPT

## 2025-01-19 PROCEDURE — 80053 COMPREHEN METABOLIC PANEL: CPT

## 2025-01-19 PROCEDURE — 82948 REAGENT STRIP/BLOOD GLUCOSE: CPT

## 2025-01-19 RX ORDER — 0.9 % SODIUM CHLORIDE 0.9 %
1000 INTRAVENOUS SOLUTION INTRAVENOUS ONCE
Status: COMPLETED | OUTPATIENT
Start: 2025-01-19 | End: 2025-01-20

## 2025-01-19 RX ORDER — LEVETIRACETAM 500 MG/5ML
3000 INJECTION, SOLUTION, CONCENTRATE INTRAVENOUS ONCE
Status: COMPLETED | OUTPATIENT
Start: 2025-01-20 | End: 2025-01-20

## 2025-01-19 RX ADMIN — SODIUM CHLORIDE 1000 ML: 9 INJECTION, SOLUTION INTRAVENOUS at 23:47

## 2025-01-19 ASSESSMENT — PAIN - FUNCTIONAL ASSESSMENT: PAIN_FUNCTIONAL_ASSESSMENT: NONE - DENIES PAIN

## 2025-01-20 PROBLEM — G40.409 GRAND MAL SEIZURE (HCC): Status: ACTIVE | Noted: 2025-01-20

## 2025-01-20 LAB
ANION GAP SERPL CALC-SCNC: 14 MEQ/L (ref 8–16)
BACTERIA URNS QL MICRO: ABNORMAL /HPF
BASOPHILS ABSOLUTE: 0 THOU/MM3 (ref 0–0.1)
BASOPHILS NFR BLD AUTO: 0.2 %
BILIRUB UR QL STRIP.AUTO: NEGATIVE
BUN SERPL-MCNC: 17 MG/DL (ref 7–22)
CALCIUM SERPL-MCNC: 8.6 MG/DL (ref 8.5–10.5)
CASTS #/AREA URNS LPF: ABNORMAL /LPF
CASTS 2: ABNORMAL /LPF
CHARACTER UR: ABNORMAL
CHLORIDE SERPL-SCNC: 101 MEQ/L (ref 98–111)
CK SERPL-CCNC: 65 U/L (ref 30–135)
CO2 SERPL-SCNC: 21 MEQ/L (ref 23–33)
COLOR, UA: YELLOW
CREAT SERPL-MCNC: 0.4 MG/DL (ref 0.4–1.2)
CRYSTALS URNS MICRO: ABNORMAL
DEPRECATED RDW RBC AUTO: 48.1 FL (ref 35–45)
EOSINOPHIL NFR BLD AUTO: 0.8 %
EOSINOPHILS ABSOLUTE: 0.1 THOU/MM3 (ref 0–0.4)
EPITHELIAL CELLS, UA: ABNORMAL /HPF
ERYTHROCYTE [DISTWIDTH] IN BLOOD BY AUTOMATED COUNT: 12.4 % (ref 11.5–14.5)
GFR SERPL CREATININE-BSD FRML MDRD: > 90 ML/MIN/1.73M2
GLUCOSE BLD STRIP.AUTO-MCNC: 61 MG/DL (ref 70–108)
GLUCOSE BLD STRIP.AUTO-MCNC: 92 MG/DL (ref 70–108)
GLUCOSE SERPL-MCNC: 84 MG/DL (ref 70–108)
GLUCOSE UR QL STRIP.AUTO: NEGATIVE MG/DL
HCT VFR BLD AUTO: 44.1 % (ref 37–47)
HGB BLD-MCNC: 14.2 GM/DL (ref 12–16)
HGB UR QL STRIP.AUTO: ABNORMAL
IMM GRANULOCYTES # BLD AUTO: 0.04 THOU/MM3 (ref 0–0.07)
IMM GRANULOCYTES NFR BLD AUTO: 0.6 %
KETONES UR QL STRIP.AUTO: ABNORMAL
LACTATE SERPL-SCNC: 1.8 MMOL/L (ref 0.5–2)
LACTIC ACID, SEPSIS: 2.8 MMOL/L (ref 0.5–1.9)
LYMPHOCYTES ABSOLUTE: 3.1 THOU/MM3 (ref 1–4.8)
LYMPHOCYTES NFR BLD AUTO: 47.2 %
MAGNESIUM SERPL-MCNC: 2 MG/DL (ref 1.6–2.4)
MCH RBC QN AUTO: 33.7 PG (ref 26–33)
MCHC RBC AUTO-ENTMCNC: 32.2 GM/DL (ref 32.2–35.5)
MCV RBC AUTO: 104.8 FL (ref 81–99)
MISCELLANEOUS 2: ABNORMAL
MONOCYTES ABSOLUTE: 0.5 THOU/MM3 (ref 0.4–1.3)
MONOCYTES NFR BLD AUTO: 8 %
NEUTROPHILS ABSOLUTE: 2.8 THOU/MM3 (ref 1.8–7.7)
NEUTROPHILS NFR BLD AUTO: 43.2 %
NITRITE UR QL STRIP: POSITIVE
NRBC BLD AUTO-RTO: 0 /100 WBC
OSMOLALITY SERPL CALC.SUM OF ELEC: 272.7 MOSMOL/KG (ref 275–300)
PH UR STRIP.AUTO: 5.5 [PH] (ref 5–9)
PHENOBARB SERPL-MCNC: 26.2 MCG/ML (ref 10–48)
PHENOBARB SERPL-MCNC: 26.2 MCG/ML (ref 10–48)
PLATELET # BLD AUTO: 131 THOU/MM3 (ref 130–400)
PMV BLD AUTO: 12.5 FL (ref 9.4–12.4)
POTASSIUM SERPL-SCNC: 5.1 MEQ/L (ref 3.5–5.2)
PROT UR STRIP.AUTO-MCNC: NEGATIVE MG/DL
RBC # BLD AUTO: 4.21 MILL/MM3 (ref 4.2–5.4)
RBC URINE: ABNORMAL /HPF
RENAL EPI CELLS #/AREA URNS HPF: ABNORMAL /[HPF]
SODIUM SERPL-SCNC: 136 MEQ/L (ref 135–145)
SP GR UR REFRACT.AUTO: > 1.03 (ref 1–1.03)
UROBILINOGEN, URINE: 0.2 EU/DL (ref 0–1)
VALPROATE SERPL-MCNC: 54.3 UG/ML (ref 50–100)
WBC # BLD AUTO: 6.5 THOU/MM3 (ref 4.8–10.8)
WBC #/AREA URNS HPF: > 100 /HPF
WBC #/AREA URNS HPF: ABNORMAL /[HPF]
YEAST LIKE FUNGI URNS QL MICRO: ABNORMAL

## 2025-01-20 PROCEDURE — 36415 COLL VENOUS BLD VENIPUNCTURE: CPT

## 2025-01-20 PROCEDURE — 6360000002 HC RX W HCPCS

## 2025-01-20 PROCEDURE — 99223 1ST HOSP IP/OBS HIGH 75: CPT | Performed by: INTERNAL MEDICINE

## 2025-01-20 PROCEDURE — 87086 URINE CULTURE/COLONY COUNT: CPT

## 2025-01-20 PROCEDURE — 2580000003 HC RX 258

## 2025-01-20 PROCEDURE — 87186 SC STD MICRODIL/AGAR DIL: CPT

## 2025-01-20 PROCEDURE — 87077 CULTURE AEROBIC IDENTIFY: CPT

## 2025-01-20 PROCEDURE — 2500000003 HC RX 250 WO HCPCS

## 2025-01-20 PROCEDURE — 6370000000 HC RX 637 (ALT 250 FOR IP): Performed by: EMERGENCY MEDICINE

## 2025-01-20 PROCEDURE — 85025 COMPLETE CBC W/AUTO DIFF WBC: CPT

## 2025-01-20 PROCEDURE — 96361 HYDRATE IV INFUSION ADD-ON: CPT

## 2025-01-20 PROCEDURE — 6360000002 HC RX W HCPCS: Performed by: EMERGENCY MEDICINE

## 2025-01-20 PROCEDURE — 99223 1ST HOSP IP/OBS HIGH 75: CPT

## 2025-01-20 PROCEDURE — 83735 ASSAY OF MAGNESIUM: CPT

## 2025-01-20 PROCEDURE — 96374 THER/PROPH/DIAG INJ IV PUSH: CPT

## 2025-01-20 PROCEDURE — 2060000000 HC ICU INTERMEDIATE R&B

## 2025-01-20 PROCEDURE — 6360000002 HC RX W HCPCS: Performed by: INTERNAL MEDICINE

## 2025-01-20 PROCEDURE — 2500000003 HC RX 250 WO HCPCS: Performed by: INTERNAL MEDICINE

## 2025-01-20 PROCEDURE — 83605 ASSAY OF LACTIC ACID: CPT

## 2025-01-20 PROCEDURE — 80048 BASIC METABOLIC PNL TOTAL CA: CPT

## 2025-01-20 PROCEDURE — 81001 URINALYSIS AUTO W/SCOPE: CPT

## 2025-01-20 PROCEDURE — 82948 REAGENT STRIP/BLOOD GLUCOSE: CPT

## 2025-01-20 RX ORDER — LEVOCARNITINE 330 MG/1
330 TABLET ORAL 2 TIMES DAILY
Status: DISCONTINUED | OUTPATIENT
Start: 2025-01-20 | End: 2025-01-24 | Stop reason: HOSPADM

## 2025-01-20 RX ORDER — ENOXAPARIN SODIUM 100 MG/ML
30 INJECTION SUBCUTANEOUS 2 TIMES DAILY
Status: DISCONTINUED | OUTPATIENT
Start: 2025-01-20 | End: 2025-01-24 | Stop reason: HOSPADM

## 2025-01-20 RX ORDER — LAMOTRIGINE 100 MG/1
100 TABLET ORAL 2 TIMES DAILY
Status: DISCONTINUED | OUTPATIENT
Start: 2025-01-20 | End: 2025-01-22

## 2025-01-20 RX ORDER — LEVETIRACETAM 500 MG/5ML
1500 INJECTION, SOLUTION, CONCENTRATE INTRAVENOUS EVERY 12 HOURS
Status: DISCONTINUED | OUTPATIENT
Start: 2025-01-20 | End: 2025-01-22

## 2025-01-20 RX ORDER — SODIUM CHLORIDE 9 MG/ML
INJECTION, SOLUTION INTRAVENOUS PRN
Status: DISCONTINUED | OUTPATIENT
Start: 2025-01-20 | End: 2025-01-24 | Stop reason: HOSPADM

## 2025-01-20 RX ORDER — MIDAZOLAM 5 MG/.1ML
5 SPRAY NASAL EVERY 10 MIN PRN
Status: ON HOLD | COMMUNITY
Start: 2024-04-30

## 2025-01-20 RX ORDER — GLUCAGON 1 MG/ML
1 KIT INJECTION PRN
Status: DISCONTINUED | OUTPATIENT
Start: 2025-01-20 | End: 2025-01-24 | Stop reason: HOSPADM

## 2025-01-20 RX ORDER — VENLAFAXINE 50 MG/1
50 TABLET ORAL 3 TIMES DAILY
Status: CANCELLED | OUTPATIENT
Start: 2025-01-20

## 2025-01-20 RX ORDER — LACTULOSE 10 G/15ML
20 SOLUTION ORAL DAILY
Status: DISCONTINUED | OUTPATIENT
Start: 2025-01-20 | End: 2025-01-24 | Stop reason: HOSPADM

## 2025-01-20 RX ORDER — MAGNESIUM SULFATE IN WATER 40 MG/ML
2000 INJECTION, SOLUTION INTRAVENOUS PRN
Status: DISCONTINUED | OUTPATIENT
Start: 2025-01-20 | End: 2025-01-24 | Stop reason: HOSPADM

## 2025-01-20 RX ORDER — DEXTROAMPHETAMINE SACCHARATE, AMPHETAMINE ASPARTATE, DEXTROAMPHETAMINE SULFATE AND AMPHETAMINE SULFATE 1.25; 1.25; 1.25; 1.25 MG/1; MG/1; MG/1; MG/1
5 TABLET ORAL DAILY
Status: CANCELLED | OUTPATIENT
Start: 2025-01-20

## 2025-01-20 RX ORDER — DEXTROAMPHETAMINE SACCHARATE, AMPHETAMINE ASPARTATE, DEXTROAMPHETAMINE SULFATE AND AMPHETAMINE SULFATE 2.5; 2.5; 2.5; 2.5 MG/1; MG/1; MG/1; MG/1
5 TABLET ORAL DAILY
Status: DISCONTINUED | OUTPATIENT
Start: 2025-01-20 | End: 2025-01-24 | Stop reason: HOSPADM

## 2025-01-20 RX ORDER — PHENOBARBITAL SODIUM 65 MG/ML
30 INJECTION, SOLUTION INTRAMUSCULAR; INTRAVENOUS DAILY
Status: DISCONTINUED | OUTPATIENT
Start: 2025-01-20 | End: 2025-01-22

## 2025-01-20 RX ORDER — ONDANSETRON 2 MG/ML
4 INJECTION INTRAMUSCULAR; INTRAVENOUS EVERY 6 HOURS PRN
Status: DISCONTINUED | OUTPATIENT
Start: 2025-01-20 | End: 2025-01-24 | Stop reason: HOSPADM

## 2025-01-20 RX ORDER — VALPROIC ACID 250 MG/5ML
250 SOLUTION ORAL ONCE
Status: COMPLETED | OUTPATIENT
Start: 2025-01-20 | End: 2025-01-20

## 2025-01-20 RX ORDER — POLYETHYLENE GLYCOL 3350 17 G/17G
17 POWDER, FOR SOLUTION ORAL DAILY PRN
Status: DISCONTINUED | OUTPATIENT
Start: 2025-01-20 | End: 2025-01-24 | Stop reason: HOSPADM

## 2025-01-20 RX ORDER — SODIUM CHLORIDE 0.9 % (FLUSH) 0.9 %
5-40 SYRINGE (ML) INJECTION EVERY 12 HOURS SCHEDULED
Status: DISCONTINUED | OUTPATIENT
Start: 2025-01-20 | End: 2025-01-24 | Stop reason: HOSPADM

## 2025-01-20 RX ORDER — LEVOCARNITINE 330 MG/1
330 TABLET ORAL 2 TIMES DAILY
Status: CANCELLED | OUTPATIENT
Start: 2025-01-20

## 2025-01-20 RX ORDER — ATORVASTATIN CALCIUM 20 MG/1
20 TABLET, FILM COATED ORAL NIGHTLY
Status: CANCELLED | OUTPATIENT
Start: 2025-01-20

## 2025-01-20 RX ORDER — ASPIRIN 81 MG/1
81 TABLET, CHEWABLE ORAL DAILY
Status: CANCELLED | OUTPATIENT
Start: 2025-01-20

## 2025-01-20 RX ORDER — ONDANSETRON 4 MG/1
4 TABLET, ORALLY DISINTEGRATING ORAL EVERY 8 HOURS PRN
Status: DISCONTINUED | OUTPATIENT
Start: 2025-01-20 | End: 2025-01-24 | Stop reason: HOSPADM

## 2025-01-20 RX ORDER — SODIUM CHLORIDE 0.9 % (FLUSH) 0.9 %
5-40 SYRINGE (ML) INJECTION PRN
Status: DISCONTINUED | OUTPATIENT
Start: 2025-01-20 | End: 2025-01-24 | Stop reason: HOSPADM

## 2025-01-20 RX ORDER — AMLODIPINE BESYLATE 5 MG/1
5 TABLET ORAL DAILY
Status: CANCELLED | OUTPATIENT
Start: 2025-01-20

## 2025-01-20 RX ORDER — LACOSAMIDE 10 MG/ML
200 SOLUTION ORAL ONCE
Status: COMPLETED | OUTPATIENT
Start: 2025-01-20 | End: 2025-01-20

## 2025-01-20 RX ORDER — POTASSIUM CHLORIDE 1500 MG/1
40 TABLET, EXTENDED RELEASE ORAL PRN
Status: DISCONTINUED | OUTPATIENT
Start: 2025-01-20 | End: 2025-01-24 | Stop reason: HOSPADM

## 2025-01-20 RX ORDER — LACTULOSE 10 G/15ML
20 SOLUTION ORAL DAILY
Status: CANCELLED | OUTPATIENT
Start: 2025-01-20

## 2025-01-20 RX ORDER — ACETAMINOPHEN 650 MG/1
650 SUPPOSITORY RECTAL EVERY 6 HOURS PRN
Status: DISCONTINUED | OUTPATIENT
Start: 2025-01-20 | End: 2025-01-24 | Stop reason: HOSPADM

## 2025-01-20 RX ORDER — POTASSIUM CHLORIDE 7.45 MG/ML
10 INJECTION INTRAVENOUS PRN
Status: DISCONTINUED | OUTPATIENT
Start: 2025-01-20 | End: 2025-01-24 | Stop reason: HOSPADM

## 2025-01-20 RX ORDER — ASPIRIN 81 MG/1
81 TABLET, CHEWABLE ORAL DAILY
Status: DISCONTINUED | OUTPATIENT
Start: 2025-01-20 | End: 2025-01-24 | Stop reason: HOSPADM

## 2025-01-20 RX ORDER — ACETAMINOPHEN 325 MG/1
650 TABLET ORAL EVERY 6 HOURS PRN
Status: DISCONTINUED | OUTPATIENT
Start: 2025-01-20 | End: 2025-01-24 | Stop reason: HOSPADM

## 2025-01-20 RX ORDER — PHENOBARBITAL SODIUM 65 MG/ML
60 INJECTION, SOLUTION INTRAMUSCULAR; INTRAVENOUS NIGHTLY
Status: DISCONTINUED | OUTPATIENT
Start: 2025-01-20 | End: 2025-01-22

## 2025-01-20 RX ORDER — DEXTROSE MONOHYDRATE 100 MG/ML
INJECTION, SOLUTION INTRAVENOUS CONTINUOUS PRN
Status: DISCONTINUED | OUTPATIENT
Start: 2025-01-20 | End: 2025-01-24 | Stop reason: HOSPADM

## 2025-01-20 RX ADMIN — ENOXAPARIN SODIUM 30 MG: 100 INJECTION SUBCUTANEOUS at 14:45

## 2025-01-20 RX ADMIN — VALPROATE SODIUM 750 MG: 100 INJECTION, SOLUTION INTRAVENOUS at 20:07

## 2025-01-20 RX ADMIN — ENOXAPARIN SODIUM 30 MG: 100 INJECTION SUBCUTANEOUS at 19:59

## 2025-01-20 RX ADMIN — VALPROATE SODIUM 750 MG: 100 INJECTION, SOLUTION INTRAVENOUS at 03:57

## 2025-01-20 RX ADMIN — PHENOBARBITAL SODIUM 30 MG: 65 INJECTION INTRAMUSCULAR at 14:45

## 2025-01-20 RX ADMIN — VALPROIC ACID 250 MG: 250 SOLUTION ORAL at 01:16

## 2025-01-20 RX ADMIN — SODIUM CHLORIDE, PRESERVATIVE FREE 10 ML: 5 INJECTION INTRAVENOUS at 16:48

## 2025-01-20 RX ADMIN — SODIUM CHLORIDE, PRESERVATIVE FREE 10 ML: 5 INJECTION INTRAVENOUS at 11:57

## 2025-01-20 RX ADMIN — SODIUM CHLORIDE, PRESERVATIVE FREE 10 ML: 5 INJECTION INTRAVENOUS at 14:47

## 2025-01-20 RX ADMIN — DEXTROSE MONOHYDRATE 125 ML: 100 INJECTION, SOLUTION INTRAVENOUS at 16:47

## 2025-01-20 RX ADMIN — LEVETIRACETAM 3000 MG: 100 INJECTION INTRAVENOUS at 00:35

## 2025-01-20 RX ADMIN — CEFTRIAXONE SODIUM 1000 MG: 1 INJECTION, POWDER, FOR SOLUTION INTRAMUSCULAR; INTRAVENOUS at 21:26

## 2025-01-20 RX ADMIN — VALPROATE SODIUM 750 MG: 100 INJECTION, SOLUTION INTRAVENOUS at 11:56

## 2025-01-20 RX ADMIN — SODIUM CHLORIDE, PRESERVATIVE FREE 10 ML: 5 INJECTION INTRAVENOUS at 19:59

## 2025-01-20 RX ADMIN — Medication 200 MG: at 01:20

## 2025-01-20 RX ADMIN — LEVETIRACETAM 1500 MG: 100 INJECTION INTRAVENOUS at 13:05

## 2025-01-20 RX ADMIN — PHENOBARBITAL SODIUM 60 MG: 65 INJECTION INTRAMUSCULAR at 19:59

## 2025-01-20 ASSESSMENT — PAIN - FUNCTIONAL ASSESSMENT
PAIN_FUNCTIONAL_ASSESSMENT: NONE - DENIES PAIN

## 2025-01-20 NOTE — ED NOTES
Patient alert and speaking with RN at this time. VS stable. Telemetry in place. Call light in reach.

## 2025-01-20 NOTE — ED NOTES
Pt updated on care plan, verbal understanding given. Pt medicated per MAR. No needs at this time, VSS.

## 2025-01-20 NOTE — ED NOTES
ED to inpatient nurses report      Chief Complaint:  Chief Complaint   Patient presents with    Seizures     Postictal      Present to ED from: [facility]    MOA:     LOC: alert and orientated to name and place  Mobility: Fully dependent  Oxygen Baseline: room air    Current needs required:  room air     Code Status:   Prior    What abnormal results were found and what did you give/do to treat them? Keppra  Any procedures or intervention occur? NA    Mental Status:  Level of Consciousness: Alert (0)    Psych Assessment:        Vitals:  Patient Vitals for the past 24 hrs:   BP Temp Temp src Pulse Resp SpO2 Height Weight   01/20/25 0052 128/76 -- -- 65 15 95 % -- --   01/19/25 2347 (!) 154/67 -- -- 72 19 95 % -- --   01/19/25 2255 137/70 -- -- 68 22 96 % -- --   01/19/25 2130 119/78 98.3 °F (36.8 °C) Oral 73 16 95 % 1.549 m (5' 1\") 102.1 kg (225 lb)        LDAs:   Peripheral IV 01/19/25 Right Forearm (Active)   Site Assessment Clean, dry & intact 01/20/25 0054   Line Status Flushed;Infusing 01/20/25 0054   Phlebitis Assessment No symptoms 01/20/25 0054   Infiltration Assessment 0 01/20/25 0054   Dressing Status Clean, dry & intact 01/20/25 0054       Ambulatory Status:  No data recorded    Diagnosis:  DISPOSITION Decision To Admit 01/20/2025 12:23:06 AM   Final diagnoses:   Breakthrough seizure (HCC)        Consults:  None     Pain Score:  Pain Assessment  Pain Assessment: None - Denies Pain    C-SSRS:        Sepsis Screening:       [place] Fall Risk:       Swallow Screening        Preferred Language:   English      ALLERGIES     Pcn [penicillins]    SURGICAL HISTORY       Past Surgical History:   Procedure Laterality Date    GASTROSTOMY TUBE PLACEMENT  07/28/2023    EGD PEG TUBE PLACEMENT    GASTROSTOMY TUBE PLACEMENT N/A 7/28/2023    EGD PEG TUBE PLACEMENT performed by Ge Puente MD at [facility] OR    NASAL FRACTURE SURGERY N/A 3/13/2024    Closed Reduction of Nasal Bone performed by Storm Flor MD at [facility] OR

## 2025-01-20 NOTE — ED NOTES
Pt updated on care plan, verbal understanding given. Pt medicated per MAR. Call light in reach, VSS.

## 2025-01-20 NOTE — PROGRESS NOTES
Pt admitted to  4K22 from ED.     Complaints: None.      IV none infusing into the hand right, condition patent and no redness     Vital signs obtained. Assessment and data collection initiated. Two nurse skin assessment performed by Anh GARCES and Harriet Sanchez RN.    Swallow Screen completed and documented for all patients ages 45 and older.   Patient NPO speech consulted     Policies and procedures for 4K explained. All questions answered with no further questions at this time. Fall prevention and safety brochure discussed with patient.  Bed alarm on. Call light in reach. Oriented to room.

## 2025-01-20 NOTE — ED NOTES
This RN called Smitha Álvarez at this time. Manda was the nurse taking care of Yana when she witnessed he seizure. Nurse states patient seizure began at 2035 and they believed she was coming out of it but did not. Nurse states she gave intranasal Versed at 2042. Nurse states at that time the patient pupils responsive and patient responsive to painful stimuli. Nursing facility nurse states patient possibly two other seizures today prior to the start of her shift, per report she received. Nurse states the patient is developmentally delayed and has difficulty swallowing and has been aspirating lately and not able to swallow medications. Per nursing facility nurse, patient used to have a peg tube early last year but was removed.

## 2025-01-20 NOTE — ED NOTES
Spoke with Michelle on 4K to approve the transport of patient to [facility]. Patient in stable condition at this time.

## 2025-01-20 NOTE — ED PROVIDER NOTES
EMERGENCY DEPARTMENT ENCOUNTER    CHIEF COMPLAINT   Chief Complaint   Patient presents with    Seizures     Postictal       HPI     Mariangel Mas is a 69 y.o. female who presents for evaluation of a seizure..  Patient had reported to have 3 seizures today.  She has a history of epilepsy and cerebral palsy.  Dysphagia at baseline.  Nursing home has been having increased difficulty with patient tolerating oral medications.  Therefore patient has missed multiple doses.  The longest seizure was approximately 5 minutes.  Described as generalized tonic-clonic seizure.  EMS was called and patient was given 5 mg of intranasal Versed which resolved the seizure.  Patient arrived to the ED postictal.  Moving all extremities one-word answer to voice.  REVIEW OF SYSTEMS   Neurologic: No LOC or stiff neck   Cardiac: No Chest Pain, No syncope   Respiratory: No cough or difficulty breathing   GI: No Bloody Stool or Diarrhea   : No Dysuria or Hematuria   General: No Fever   All other systems reviewed and are negative.     PAST MEDICAL & SURGICAL HISTORY   Past Medical History:   Diagnosis Date    ADHD     Attention deficit hyperactivity disorder (ADHD)     Cerebral palsy (HCC)     Depression     Essential hypertension     Learning disability     Seizure (HCC)     Urinary urgency       Past Surgical History:   Procedure Laterality Date    GASTROSTOMY TUBE PLACEMENT  07/28/2023    EGD PEG TUBE PLACEMENT    GASTROSTOMY TUBE PLACEMENT N/A 7/28/2023    EGD PEG TUBE PLACEMENT performed by Ge Puente MD at [facility] OR    NASAL FRACTURE SURGERY N/A 3/13/2024    Closed Reduction of Nasal Bone performed by Storm Flor MD at [facility] OR    SHOULDER SURGERY Left     ORIF    -- had hardware infection afterwards requiring IV abx    UPPER GASTROINTESTINAL ENDOSCOPY N/A 2/9/2024    EGD BIOPSY performed by Grey Jimenes MD at [facility] ENDOSCOPY    UPPER GASTROINTESTINAL ENDOSCOPY  2/9/2024    EGD FOREIGN BODY REMOVAL performed by  daily (Patient taking differently: Take 1 tablet by mouth 2 times daily) 60 tablet 3    levETIRAcetam (KEPPRA) 100 MG/ML solution 20 mLs by PEG Tube route 2 times daily (Patient taking differently: Take 20 mLs by mouth 2 times daily Pt takes 1000 mg 2 tablet BID) 1200 mL 3    valproic acid (DEPAKENE) 250 MG/5ML SOLN oral solution 20 mLs by Per G Tube route every 8 hours (Patient taking differently: Take 5 mLs by mouth in the morning, at noon, and at bedtime Give 1000 mg by mouth three times a day; pt is now 3-4-24 taking 250 mg give 4 capsule by mouth 3 times a day) 1800 mL 3    sertraline (ZOLOFT) 50 MG tablet 1 tablet by PEG Tube route daily 30 tablet 3    atorvastatin (LIPITOR) 20 MG tablet 1 tablet by PEG Tube route nightly 30 tablet 3    venlafaxine (EFFEXOR) 50 MG tablet Take 1 tablet by mouth in the morning, at noon, and at bedtime 90 tablet 3      ALLERGIES   Allergies   Allergen Reactions    Pcn [Penicillins]       SOCIAL & FAMILY HISTORY   Social History     Socioeconomic History    Marital status: Single     Spouse name: None    Number of children: None    Years of education: None    Highest education level: None   Tobacco Use    Smoking status: Never     Passive exposure: Never    Smokeless tobacco: Never    Tobacco comments:     Never smoker    Vaping Use    Vaping status: Never Used   Substance and Sexual Activity    Alcohol use: Not Currently    Drug use: Not Currently    Sexual activity: Not Currently      History reviewed. No pertinent family history.     PHYSICAL EXAM   VITAL SIGNS: /78   Pulse 73   Temp 98.3 °F (36.8 °C) (Oral)   Resp 16   Ht 1.549 m (5' 1\")   Wt 102.1 kg (225 lb)   SpO2 95%   BMI 42.51 kg/m²      Constitutional: Well developed, well nourished, no acute distress   Eyes: Pupils equally round and react to light, extraocular movement are intact   Vascular: No temporal artery tenderness   HENT: Atraumatic, dry mucus membranes, airway patent   Neck: supple, no JVD

## 2025-01-20 NOTE — ED NOTES
Patient resting on cot alert and talking to this RN. VS stable. Telemetry in place. IV fluids infusing. Patient medicated per MAR. Call light in reach.

## 2025-01-20 NOTE — CONSULTS
decreased.   Speech: slurred   Level of consciousness: alert  Knowledge: poor.   Voice is soft,      Cranial Nerves     CN II   Right visual field deficit: none  Left visual field deficit: none     CN III, IV, VI   Pupils are equal, round, and reactive to light.  Extraocular motions are normal.   Right pupil: Shape: regular. Reactivity: brisk.   Left pupil: Shape: regular. Reactivity: brisk.     CN V   Facial sensation intact.   Right facial sensation deficit: none  Left facial sensation deficit: none    CN VII   Facial expression full, symmetric.     CN VIII   Hearing: intact  Blinks to visual threat in all quadrants bilaterally.      Motor Exam BUE: 3/5   BLE: no effort against gravity at time of exam.      Sensory Exam   Light touch normal.        Labs/Imaging/Diagnostics   Labs:  CBC:  Recent Labs     01/19/25 2135 01/20/25  0840   WBC 8.5 6.5   RBC 4.13* 4.21   HGB 13.9 14.2   HCT 42.0 44.1   .7* 104.8*    131     CHEMISTRIES:  Recent Labs     01/19/25 2135 01/20/25  0840    136   K 4.1 5.1    101   CO2 26 21*   BUN 25* 17   CREATININE 0.4 0.4   GLUCOSE 106 84   MG  --  2.0     COAGULATION STUDIES:No results for input(s): \"PROTIME\", \"INR\", \"APTT\" in the last 72 hours.  LIVER PROFILE:  Recent Labs     01/19/25 2135   AST 19   ALT 20   BILITOT 0.2*   ALKPHOS 54     CHOLESTEROL AND A1C:No results for input(s): \"HDL\", \"CHOL\", \"TRIG\", \"LABA1C\" in the last 720 hours.    Invalid input(s): \"LDLCALC\"   Imaging Last 24 Hours:  CT Head W/O Contrast    Result Date: 1/19/2025  CT head without contrast Comparison: CT/SR - CT HEAD WO CONTRAST - 3/29/24 14:17 EDT Findings: There is no acute intracranial hemorrhage. Ventricles are within normal limits in size. No mass effect or midline shift is present. The gray-white matter differentiation appears normal. There is generalized cerebral and cerebellar atrophy. There is unchanged encephalomalacia in the right temporal lobe. The visualized portions of  the orbits, paranasal sinuses, and mastoids are unremarkable. There are no acute fractures.     No acute intracranial abnormality. This document has been electronically signed by: Ramakrishna Paul MD on 01/19/2025 11:22 PM All CTs at this facility use dose modulation techniques and iterative reconstructions, and/or weight-based dosing when appropriate to reduce radiation to a low as reasonably achievable.    XR CHEST PORTABLE    Result Date: 1/19/2025  1 view chest x-ray. Comparison: CR/SR - XR CHEST PORTABLE - 5/14/24 12:23 EDT Findings: Lung volumes are small. There is mild left basilar atelectasis. Lungs appear otherwise clear. Cardiomediastinal silhouette is within normal limits. There is bilateral glenohumeral osteoarthritis.     Small lung volumes and mild left basilar atelectasis. This document has been electronically signed by: Ramakrishna Paul MD on 01/19/2025 10:15 PM    Assessment and Plan:        Breakthrough Seizure: Likely related to poor medication compliance and ongoing UTI.  CTH 1/19: No acute intracranial abnormality  Patient has known history of epilepsy and is on the following on an OP basis:   Phenobarbital 30 mg in am and 60 mg in pm   Depakote 1000 mg every 8 hours  Vimpat 200 mg twice daily  Lamictal 100 mg twice daily  Keppra 2000 mg twice daily  Patient reported to have poor medication compliance at her SNF due to dysphagia. She had a prior PEG tube, which was removed.    Patient is currently unable to have Lamictal 100 twice daily as this is a PO medication and she is currently NPO.  SLP evaluation  Depending on SLP swallow eval, recommend consideration for an alternative to PO intake, such as replacement of PEG - will defer this discussion to primary team.   UA consistent with UTI, culture pending.  Defer treatment to primary team  Seizure precautions.   Neurology to follow.     This patient was discussed with Dr. Harrison  and he is in agreement with the assessment and

## 2025-01-20 NOTE — H&P
History & Physical  Internal Medicine Resident         Patient: Mariangel Mas 69 y.o. female      : 1955  Date of Admission: 2025  Date of Service: Pt seen/examined on 25 and Admitted to Inpatient with expected LOS greater than two midnights due to medical therapy.       ASSESSMENT AND PLAN  S/p grand mal seizures with a history of cerebral palsy in the setting of medication noncompliance: Patient reportedly presented from nursing home with an hour long history of seizure.  Patient reportedly had not been taking her oral medications due to her G-tube being removed.  She has an extensive history of seizures in the past, requiring hospitalization.  Home antiepileptic regimen includes phenobarbital, lamotrigine, Keppra, and valproic acid.  Head CT negative for acute intracranial process.  Patient was loaded with Keppra in the ED.  Patient remained n.p.o.  Seizure precautions  Aspiration precautions  Patient's Lamictal will be ordered, but held as IV formulation is not available.  Transition oral Keppra to 1500 mg twice daily IV  Transition oral valproic acid to 750 mg IV every 8 hours  Transition oral phenobarbital to 30 mg IV in morning, 60 mg IV at night  Neurology following    Chronic Conditions (reviewed and stable unless otherwise stated)   ADHD: Adderall has been held due to the patient being NPO.  Malnutrition: Carnitor has been held in the setting of being NPO.      Data reviewed (unless otherwise discussed in assessment/plan)  EKG:  I have reviewed the EKG with the following interpretation: Normal sinus rhythm  Imaging: I have reviewed CT head/chest x-ray with the following interpretation: No intracranial abnormality left basilar atelectasis  Labs: Reviewed, see chart and plan above.       =======================================================================    SUBJECTIVE    Chief Complaint: Seizure    History Of Present Illness:  Mariangel Mas is a 69 y.o. female with PMHx  murmurs.  No lower extremity edema.   Abdomen: Soft, non-tender, non-distended with normal bowel sounds.  Musculoskeletal: No joint swelling or tenderness. No abnormal movements.  Decreased tone in all extremities.  Skin: Warm and dry. No rashes or lesions.  Neurologic:   Left lower facial droop.  Sluggish and weak movement in all extremities.  Psychiatric:   Alert/orientated x 3.  Developmental delay.  History of cerebral palsy.  Capillary Refill: Brisk,< 3 seconds.  Peripheral Pulses: +2 palpable, equal bilaterally.       Medications Prior to Admission:   Prior to Admission Medications   Prescriptions Last Dose Informant Patient Reported? Taking?   Lidocaine HCl (ASPERCREME LIDOCAINE) 4 % CREA   Yes No   Sig: Apply topically Apply to bilateral knees topically two times a day for pain.   Multiple Vitamins-Minerals (CEROVITE SENIOR) TABS   Yes No   Sig: Take 1 tablet by mouth daily   PHENobarbital (LUMINAL) 30 MG tablet   Yes No   Sig: Take 3 tablets by mouth daily. Takes 30 mg and 60 mg   acetaminophen (TYLENOL) 325 MG tablet   Yes No   Sig: Take 2 tablets by mouth every 6 hours as needed for Pain   amLODIPine (NORVASC) 5 MG tablet   No No   Sig: Take 1 tablet by mouth daily   amphetamine-dextroamphetamine (ADDERALL, 5MG,) 5 MG tablet   No No   Sig: Take 1 tablet by mouth daily for 30 days. Max Daily Amount: 5 mg   aspirin 81 MG chewable tablet   No No   Si tablet by PEG Tube route daily   atorvastatin (LIPITOR) 20 MG tablet   No No   Si tablet by PEG Tube route nightly   lacosamide (VIMPAT) 10 MG/ML SOLN oral solution   Yes No   Sig: Take 20 mLs by mouth in the morning and at bedtime.   lactulose (CHRONULAC) 10 GM/15ML solution   No No   Sig: Take 30 mLs by mouth daily   lamoTRIgine (LAMICTAL) 100 MG tablet   No No   Si tablet by PEG Tube route 2 times daily   Patient taking differently: Take 1 tablet by mouth 2 times daily   levETIRAcetam (KEPPRA) 100 MG/ML solution   No No   Si mLs by PEG Tube

## 2025-01-20 NOTE — PLAN OF CARE
Hospitalist Progress Note  Internal Medicine Resident      Patient: Mariangel Mas 69 y.o. female      Unit/Bed: -22/022-A    Admit Date: 1/19/2025      ASSESSMENT AND PLAN  Active Problems  Status post grand mal seizure w/history of she reports in the setting of medication noncompliance: Patient reportedly had a hour-long seizure at nursing home.  Has not been taking her medications due to her G-tube being removed.  Patient with extensive history of seizures in the past, requiring hospitalizations. Home regiment includes phenobarbital, lamotrigine, Keppra, and valporic acid. Head CT negative for acute intracranial process. Pt was loaded with Keppra in the ED. Pt NPO given risk of aspirations. Transitioned to IV medications   Continue IV Keppra 1500mg q12hrs  Continue IV phenobarb 30mg daily and 60mg nightly  Continue IV Depacon 750mg q8hrs   Speech eval ordered   Seizure precautions  Aspiration precautions  ADHD: pt on Adderall at [facility]. Currently being held due to pt NPO  Malnutrition: Pt on carnitor at SNF. Currently being held due to pt NPO   Chronic Conditions (reviewed and stable unless otherwise stated)  HTN: pt on amlodipine 5mg daily at home. Currently held as BP soft      LDA: []CVC / []PICC / []Midline / []Barber / []Drains / []Mediport / [x]None  Antibiotics: None  Steroids: None  Labs (still needed?): [x]Yes / []No  IVF (still needed?): []Yes / [x]No    Level of care: [x]Step Down / []Med-Surg  Bed Status: [x]Inpatient / []Observation  Telemetry: [x]Yes / []No  PT/OT: []Yes / [x]No    DVT Prophylaxis: [x] Lovenox / [] Heparin / [] SCDs / [] Already on Systemic Anticoagulation / [] None     Expected discharge date:  TBD  Disposition: TBD     Code status: Full Code     ===================================================================    Chief Complaint: Seizures   Subjective (past 24 hours): Pt seen and examined at bedside. Pt reports she does not remember seizure at SNF. Pt poor historian and

## 2025-01-20 NOTE — ED NOTES
Patient repositioned at this time. Patient brief changed, small smear bowel movement noted. Brief wet at this time. Patient responds slightly to voice at this time, patient able to open eyes briefly. VS stable. Telemetry in place.

## 2025-01-20 NOTE — ED TRIAGE NOTES
Patient presents to ED via EMS from [facility] with C/O seizure. Patient is postictal at this time. EMS states upon arrival to patient she was no longer seizing due to the nurse giving 5 mg Versed intranasally. Patient has history of seizures and takes medications for them but per the nursing facility nurse she has been having difficulty swallowing lately so they are unsure of how much of her medications she has been getting lately. Patient responds to pain upon arrival to ED. EMS states per facility patient has developmental disabilities and answers yes or no questions. VS stable. EKG complete. Telemetry in place. BS 97 at this time.

## 2025-01-20 NOTE — PROGRESS NOTES
Spoke with staff at [facility] regarding needing last dose MAR, current med list does not show last dose time or date. Waiting for fax.    Dr Caballero on floor. Reviewed sinus hong heart rate rhythm while patient was asleep.   Aware D10 gtt given once, new orders to recheck glucose levels q6 hrs. Patient remains NPO.

## 2025-01-21 LAB
ALBUMIN SERPL BCG-MCNC: 3.4 G/DL (ref 3.5–5.1)
ALP SERPL-CCNC: 57 U/L (ref 38–126)
ALT SERPL W/O P-5'-P-CCNC: 18 U/L (ref 11–66)
ANION GAP SERPL CALC-SCNC: 14 MEQ/L (ref 8–16)
AST SERPL-CCNC: 19 U/L (ref 5–40)
BASOPHILS ABSOLUTE: 0 THOU/MM3 (ref 0–0.1)
BASOPHILS NFR BLD AUTO: 0.3 %
BILIRUB CONJ SERPL-MCNC: < 0.1 MG/DL (ref 0.1–13.8)
BILIRUB SERPL-MCNC: 0.3 MG/DL (ref 0.3–1.2)
BUN SERPL-MCNC: 12 MG/DL (ref 7–22)
CALCIUM SERPL-MCNC: 8.8 MG/DL (ref 8.5–10.5)
CHLORIDE SERPL-SCNC: 99 MEQ/L (ref 98–111)
CO2 SERPL-SCNC: 24 MEQ/L (ref 23–33)
CREAT SERPL-MCNC: 0.4 MG/DL (ref 0.4–1.2)
DEPRECATED RDW RBC AUTO: 46 FL (ref 35–45)
EOSINOPHIL NFR BLD AUTO: 1 %
EOSINOPHILS ABSOLUTE: 0.1 THOU/MM3 (ref 0–0.4)
ERYTHROCYTE [DISTWIDTH] IN BLOOD BY AUTOMATED COUNT: 12.2 % (ref 11.5–14.5)
GFR SERPL CREATININE-BSD FRML MDRD: > 90 ML/MIN/1.73M2
GLUCOSE BLD STRIP.AUTO-MCNC: 88 MG/DL (ref 70–108)
GLUCOSE BLD STRIP.AUTO-MCNC: 91 MG/DL (ref 70–108)
GLUCOSE BLD STRIP.AUTO-MCNC: 92 MG/DL (ref 70–108)
GLUCOSE BLD STRIP.AUTO-MCNC: 94 MG/DL (ref 70–108)
GLUCOSE SERPL-MCNC: 95 MG/DL (ref 70–108)
HCT VFR BLD AUTO: 42.1 % (ref 37–47)
HGB BLD-MCNC: 13.9 GM/DL (ref 12–16)
IMM GRANULOCYTES # BLD AUTO: 0.03 THOU/MM3 (ref 0–0.07)
IMM GRANULOCYTES NFR BLD AUTO: 0.4 %
LACTATE SERPL-SCNC: 2 MMOL/L (ref 0.5–2)
LYMPHOCYTES ABSOLUTE: 3.1 THOU/MM3 (ref 1–4.8)
LYMPHOCYTES NFR BLD AUTO: 45.8 %
MCH RBC QN AUTO: 33.4 PG (ref 26–33)
MCHC RBC AUTO-ENTMCNC: 33 GM/DL (ref 32.2–35.5)
MCV RBC AUTO: 101.2 FL (ref 81–99)
MONOCYTES ABSOLUTE: 0.6 THOU/MM3 (ref 0.4–1.3)
MONOCYTES NFR BLD AUTO: 8.8 %
NEUTROPHILS ABSOLUTE: 3 THOU/MM3 (ref 1.8–7.7)
NEUTROPHILS NFR BLD AUTO: 43.7 %
NRBC BLD AUTO-RTO: 0 /100 WBC
PLATELET # BLD AUTO: 139 THOU/MM3 (ref 130–400)
PMV BLD AUTO: 12.9 FL (ref 9.4–12.4)
POTASSIUM SERPL-SCNC: 4.2 MEQ/L (ref 3.5–5.2)
PROT SERPL-MCNC: 6.1 G/DL (ref 6.1–8)
RBC # BLD AUTO: 4.16 MILL/MM3 (ref 4.2–5.4)
SODIUM SERPL-SCNC: 137 MEQ/L (ref 135–145)
WBC # BLD AUTO: 6.8 THOU/MM3 (ref 4.8–10.8)

## 2025-01-21 PROCEDURE — 6360000002 HC RX W HCPCS

## 2025-01-21 PROCEDURE — 2500000003 HC RX 250 WO HCPCS

## 2025-01-21 PROCEDURE — 2060000000 HC ICU INTERMEDIATE R&B

## 2025-01-21 PROCEDURE — 2580000003 HC RX 258

## 2025-01-21 PROCEDURE — 80048 BASIC METABOLIC PNL TOTAL CA: CPT

## 2025-01-21 PROCEDURE — 82948 REAGENT STRIP/BLOOD GLUCOSE: CPT

## 2025-01-21 PROCEDURE — 80076 HEPATIC FUNCTION PANEL: CPT

## 2025-01-21 PROCEDURE — 99232 SBSQ HOSP IP/OBS MODERATE 35: CPT | Performed by: INTERNAL MEDICINE

## 2025-01-21 PROCEDURE — 36415 COLL VENOUS BLD VENIPUNCTURE: CPT

## 2025-01-21 PROCEDURE — 83605 ASSAY OF LACTIC ACID: CPT

## 2025-01-21 PROCEDURE — 99232 SBSQ HOSP IP/OBS MODERATE 35: CPT

## 2025-01-21 PROCEDURE — 6360000002 HC RX W HCPCS: Performed by: INTERNAL MEDICINE

## 2025-01-21 PROCEDURE — 92610 EVALUATE SWALLOWING FUNCTION: CPT

## 2025-01-21 PROCEDURE — 2500000003 HC RX 250 WO HCPCS: Performed by: INTERNAL MEDICINE

## 2025-01-21 PROCEDURE — 85025 COMPLETE CBC W/AUTO DIFF WBC: CPT

## 2025-01-21 RX ADMIN — CEFTRIAXONE SODIUM 1000 MG: 1 INJECTION, POWDER, FOR SOLUTION INTRAMUSCULAR; INTRAVENOUS at 23:15

## 2025-01-21 RX ADMIN — LEVETIRACETAM 1500 MG: 100 INJECTION INTRAVENOUS at 23:31

## 2025-01-21 RX ADMIN — VALPROATE SODIUM 750 MG: 100 INJECTION, SOLUTION INTRAVENOUS at 12:01

## 2025-01-21 RX ADMIN — SODIUM CHLORIDE, PRESERVATIVE FREE 10 ML: 5 INJECTION INTRAVENOUS at 23:20

## 2025-01-21 RX ADMIN — PHENOBARBITAL SODIUM 30 MG: 65 INJECTION INTRAMUSCULAR at 09:03

## 2025-01-21 RX ADMIN — SODIUM CHLORIDE, PRESERVATIVE FREE 10 ML: 5 INJECTION INTRAVENOUS at 09:03

## 2025-01-21 RX ADMIN — LEVETIRACETAM 1500 MG: 100 INJECTION INTRAVENOUS at 11:49

## 2025-01-21 RX ADMIN — VALPROATE SODIUM 750 MG: 100 INJECTION, SOLUTION INTRAVENOUS at 04:38

## 2025-01-21 RX ADMIN — PHENOBARBITAL SODIUM 60 MG: 65 INJECTION INTRAMUSCULAR at 23:18

## 2025-01-21 RX ADMIN — ENOXAPARIN SODIUM 30 MG: 100 INJECTION SUBCUTANEOUS at 09:03

## 2025-01-21 RX ADMIN — VALPROATE SODIUM 750 MG: 100 INJECTION, SOLUTION INTRAVENOUS at 23:25

## 2025-01-21 RX ADMIN — ENOXAPARIN SODIUM 30 MG: 100 INJECTION SUBCUTANEOUS at 21:27

## 2025-01-21 RX ADMIN — LEVETIRACETAM 1500 MG: 100 INJECTION INTRAVENOUS at 00:13

## 2025-01-21 ASSESSMENT — ENCOUNTER SYMPTOMS
ABDOMINAL PAIN: 0
TROUBLE SWALLOWING: 1

## 2025-01-21 NOTE — PROGRESS NOTES
[facility]  SPEECH THERAPY  STRZ ICU STEPDOWN TELEMETRY 4K  Clinical Swallow Evaluation    Discharge Recommendations: SNF  DIET ORDER RECOMMENDATIONS AFTER EVALUATION: NPO; medications via IV source  Strategies:  Strategies pending MBS completion     SLP Individual Minutes  Time In: 1248  Time Out: 1305  Minutes: 17  Timed Code Treatment Minutes: 0 Minutes       Date: 2025  Patient Name: Mariangel Mas      CSN: [id number]   : 1955  (69 y.o.)  Gender: female   Referring Physician:    Yvon Thurston DO   Diagnosis: Grand mal seizure (HCC)    History of Present Illness/Injury: Patient admitted to [facility] with above med dx; please refer to physician H&P for full details. Per chart review, \"1 y.o. female with a history of cerebral palsy, developmental delay, ADHD, seizures who presented to [facility] ED on 25 for seizures. Majority of information obtained from chart review.  Patient has a known history of dysphagia with a prior PEG tube with subsequent tube removal on 24.  She resides at a [facility], where patient does not appear to be receiving all of her p.o. seizure medications due to swallowing difficulty.  EMS was called due to 3 separate seizures, longest lasting >5 minutes.  Per chart review she was treated with 5mg intranasal Versed per SNF nursing and remained postictal for roughly an hour. She was also loaded with 3000 mg IV keppra in the ED. Urinanalysis + for UTI. Patient is a poor historian.\"    MBS completed at this facility on 2023 with documentation for mild-moderate oral dysphagia, mild pharyngeal dysphagia, diet level of minced and moist with thin liquids via cup initiated. However, ECF paperwork yields most recent diet level of puree with moderately thick liquids via nosey cup.     Past Medical History:   Diagnosis Date    ADHD     Attention deficit hyperactivity disorder (ADHD)     Cerebral palsy (HCC)     Depression     Essential hypertension

## 2025-01-21 NOTE — PROGRESS NOTES
Hospitalist Progress Note  Internal Medicine Resident      Patient: Mariangel Mas 69 y.o. female      Unit/Bed: -22/022-A    Admit Date: 1/19/2025      ASSESSMENT AND PLAN  Active Problems  Status post grand mal seizure w/history of she reports in the setting of medication noncompliance: Patient reportedly had a hour-long seizure at nursing home.  Has not been taking her medications due to her G-tube being removed.  Patient with extensive history of seizures in the past, requiring hospitalizations. Home regiment includes phenobarbital, lamotrigine, Keppra, and valporic acid. Head CT negative for acute intracranial process. Pt was loaded with Keppra in the ED. Pt NPO given risk of aspirations. Transitioned to IV medications   Continue IV Keppra 1500mg q12hrs  Continue IV phenobarb 30mg daily and 60mg nightly  Continue IV Depacon 750mg q8hrs   Speech eval ordered, MBS pending  Seizure precautions  Aspiration precautions  UTI: UA showed moderate leukocyte Esterase, positive nitrate, many bacteria, WBC greater than 100.  Patient started on Rocephin  Continue Rocephin 1 g daily  ADHD: pt on Adderall at SNF. Currently being held due to pt NPO  Malnutrition: Pt on carnitor at SNF. Currently being held due to pt NPO   Chronic Conditions (reviewed and stable unless otherwise stated)  HTN: pt on amlodipine 5mg daily at home. Currently held as BP soft      LDA: []CVC / []PICC / []Midline / []Barber / []Drains / []Mediport / [x]None  Antibiotics: None  Steroids: None  Labs (still needed?): [x]Yes / []No  IVF (still needed?): []Yes / [x]No    Level of care: [x]Step Down / []Med-Surg  Bed Status: [x]Inpatient / []Observation  Telemetry: [x]Yes / []No  PT/OT: []Yes / [x]No    DVT Prophylaxis: [x] Lovenox / [] Heparin / [] SCDs / [] Already on Systemic Anticoagulation / [] None     Expected discharge date:  TBD  Disposition: TBD     Code status: Full Code

## 2025-01-21 NOTE — PLAN OF CARE
Problem: Discharge Planning  Goal: Discharge to home or other facility with appropriate resources  1/21/2025 1027 by Cheo Roth, COURTNEY  Outcome: Progressing  See SW note

## 2025-01-21 NOTE — DISCHARGE INSTR - COC
ventilator support    Rehab Therapies: Speech/Language Therapy  Weight Bearing Status/Restrictions: No weight bearing restrictions  Other Medical Equipment (for information only, NOT a DME order):  hospital bed  Other Treatments: N/A    Patient's personal belongings (please select all that are sent with patient):  Shirt and pants    RN SIGNATURE:  Electronically signed by Angel Birmingham RN on 1/24/25 at 1:04 PM EST    CASE MANAGEMENT/SOCIAL WORK SECTION    Inpatient Status Date: 1/20/2024    Readmission Risk Assessment Score:  [facility] RISK OF UNPLANNED READMISSION 2.0             13.8 Total Score        Discharging to Facility/ Agency   Name: [facility]  Address: [address] [place] [address] [place] [address]   Phone: [phone]  Fax: [phone]    Dialysis Facility (if applicable)   Name:  Address:  Dialysis Schedule:  Phone:  Fax:    / signature: Electronically signed by COURTNEY Prado on 1/21/25 at 10:26 AM EST    PHYSICIAN SECTION    Prognosis: Good    Condition at Discharge: Stable    Rehab Potential (if transferring to Rehab): Good    Recommended Labs or Other Treatments After Discharge: None    Physician Certification: I certify the above information and transfer of Mariangel Mas  is necessary for the continuing treatment of the diagnosis listed and that she requires Skilled Nursing Facility for greater 30 days.     Update Admission H&P: No change in H&P    PHYSICIAN SIGNATURE:  Electronically signed by Yvon Thurston DO on 1/24/25 at 9:41 AM EST

## 2025-01-21 NOTE — PLAN OF CARE
Problem: Discharge Planning  Goal: Discharge to home or other facility with appropriate resources  1/21/2025 1550 by Rozina Plata, RN  Outcome: Progressing  Flowsheets (Taken 1/21/2025 1550)  Discharge to home or other facility with appropriate resources:   Identify barriers to discharge with patient and caregiver   Identify discharge learning needs (meds, wound care, etc)   Refer to discharge planning if patient needs post-hospital services based on physician order or complex needs related to functional status, cognitive ability or social support system   Arrange for needed discharge resources and transportation as appropriate     Problem: Safety - Adult  Goal: Free from fall injury  Outcome: Progressing  Flowsheets (Taken 1/21/2025 1550)  Free From Fall Injury: Instruct family/caregiver on patient safety     Problem: Skin/Tissue Integrity  Goal: Absence of new skin breakdown  Description: 1.  Monitor for areas of redness and/or skin breakdown  2.  Assess vascular access sites hourly  3.  Every 4-6 hours minimum:  Change oxygen saturation probe site  4.  Every 4-6 hours:  If on nasal continuous positive airway pressure, respiratory therapy assess nares and determine need for appliance change or resting period.  Outcome: Progressing     Problem: Neurosensory - Adult  Goal: Absence of seizures  Outcome: Progressing  Flowsheets (Taken 1/21/2025 1550)  Absence of seizures:   Monitor for seizure activity.  If seizure occurs, document type and location of movements and any associated apnea   Administer anticonvulsants as ordered   Diagnostic studies as ordered   Support airway/breathing, administer oxygen as needed   If seizure occurs, turn head to side and suction secretions as needed  Goal: Remains free of injury related to seizures activity  Outcome: Progressing  Flowsheets (Taken 1/21/2025 1550)  Remains free of injury related to seizure activity:   Maintain airway, patient safety  and administer oxygen as

## 2025-01-21 NOTE — CARE COORDINATION
1/21/25, 10:26 AM EST  Discharge Planning Evaluation  Social work consult received, patient from [facility].    Patient/Family preference is to return to [facility].    The patient's current payor source at the facility is Medicare and Medicaid.   Medicare skilled days available: Yes  Medicare does the patient have a three midnight qualifying stay? Yes  Insurance precert:  No  Spoke with Keturah at the facility.  Patient bed hold: yes  Anticipated transport plan: ambulance  Patient's Healthcare Decision Maker: Named in Scanned ACP Document    SW called [employer] guardian and left a voicemail asking for a call back.    Readmission Risk Low 0-14, Mod 15-19), High > 20: Readmission Risk Score: 13.8    Current PCP: Analisa Ulloa MD  PCP verified by CM? Yes    Patient Orientation: Other (see comment) (has guardian)    Patient Cognition: Other (see comment) (has guardian)  History Provided by: Medical Record, Unable to Assess, Other (see comment) (has guardian)    Advance Directives:      Code Status: Full Code   Patient's Primary Decision Maker is: Named in Scanned ACP Document    Secondary Decision Maker: Donna Espinoza - Brother/Sister - [phone]     Discharge Planning:    Patient lives with:   Type of Home: Skilled Nursing Facility  Primary Care Giver: Other (Comment) (ECF)  Patient Support Systems include: Other (Comment) (guardian, ECF)   Current Financial resources: None  Current community resources: ECF/Home Care  Current services prior to admission: Skilled Nursing Facility            Current DME:              Type of Home Care services:       ADLS  Prior functional level: Assistance with the following:, Bathing, Dressing, Toileting, Cooking, Housework, Shopping, Mobility  Current functional level: Assistance with the following:, Bathing, Dressing, Toileting, Cooking, Shopping, Housework, Mobility    Family can provide assistance at DC: No  Would you like Case Management to discuss the discharge plan with any other family

## 2025-01-21 NOTE — PROGRESS NOTES
fever.   HENT:  Positive for trouble swallowing.    Cardiovascular:  Negative for chest pain.   Gastrointestinal:  Negative for abdominal pain.   Neurological:  Positive for seizures and weakness. Negative for headaches.   Psychiatric/Behavioral:  Positive for confusion and sleep disturbance.      Medications   Scheduled Meds:    sodium chloride flush  5-40 mL IntraVENous 2 times per day    enoxaparin  30 mg SubCUTAneous BID    melatonin  3 mg Oral Nightly    [Held by provider] amphetamine-dextroamphetamine  5 mg Oral Daily    aspirin  81 mg PEG Tube Daily    lactulose  20 g Oral Daily    [Held by provider] levOCARNitine  330 mg Oral BID    [Held by provider] lamoTRIgine  100 mg PEG Tube BID    PHENobarbital  30 mg IntraVENous Daily    PHENobarbital  60 mg IntraVENous Nightly    levETIRAcetam  1,500 mg IntraVENous Q12H    valproate (DEPACON) 750 mg in sodium chloride 0.9 % 100 mL IVPB  750 mg IntraVENous Q8H    cefTRIAXone (ROCEPHIN) IV  1,000 mg IntraVENous Q24H     Continuous Infusions:    sodium chloride      dextrose       PRN Meds: sodium chloride flush, sodium chloride, potassium chloride **OR** potassium alternative oral replacement **OR** potassium chloride, magnesium sulfate, ondansetron **OR** ondansetron, polyethylene glycol, acetaminophen **OR** acetaminophen, glucose, dextrose bolus **OR** dextrose bolus, glucagon (rDNA), dextrose  Medications Prior to Admission:   Current Facility-Administered Medications on File Prior to Encounter   Medication Dose Route Frequency Provider Last Rate Last Admin    morphine injection    Once PRN Robe Todd MD         Current Outpatient Medications on File Prior to Encounter   Medication Sig Dispense Refill    midazolam (NAYZILAM) 5 MG/0.1ML SOLN nasal spray 0.1 mLs by Alternating Nares route every 10 minutes as needed (Seizure) As needed for seizure, 1 spray seizure lasting > 5 min, if ineffective after 10 min. May given 2nd dose in alternating nostril. Do not  Normocephalic and atraumatic.      Right Ear: External ear normal.      Left Ear: External ear normal.      Nose: Nose normal.      Mouth/Throat:      Mouth: Mucous membranes are moist.      Pharynx: Oropharynx is clear.   Eyes:      Extraocular Movements: Extraocular movements intact and EOM normal.      Pupils: Pupils are equal, round, and reactive to light.   Pulmonary:      Effort: No respiratory distress.   Musculoskeletal:      Right lower leg: Edema present.      Left lower leg: Edema present.   Skin:     General: Skin is warm and dry.   Neurological:      Mental Status: She is alert. She is disoriented.      Cranial Nerves: Dysarthria present. No facial asymmetry.      Motor: Weakness present.   Psychiatric:         Speech: Speech is slurred.         Behavior: Behavior is slowed.       Neurologic Exam     Mental Status   Oriented to person.   Disoriented to place. Disoriented to city and area.   Disoriented to time. Disoriented to year and month.   Follows 1 step commands.   Attention: decreased. Concentration: decreased.   Speech: slurred   Level of consciousness: alert  Knowledge: poor.   Voice is soft,      Cranial Nerves     CN II   Right visual field deficit: none  Left visual field deficit: none     CN III, IV, VI   Pupils are equal, round, and reactive to light.  Extraocular motions are normal.   Right pupil: Shape: regular. Reactivity: brisk.   Left pupil: Shape: regular. Reactivity: brisk.     CN V   Facial sensation intact.   Right facial sensation deficit: none  Left facial sensation deficit: none    CN VII   Facial expression full, symmetric.     CN VIII   Hearing: intact  Blinks to visual threat in all quadrants bilaterally.      Motor Exam BUE: 3/5   BLE: no effort against gravity at time of exam.      Sensory Exam   Light touch normal.        Labs/Imaging/Diagnostics   Labs:  CBC:  Recent Labs     01/19/25  2135 01/20/25  0840   WBC 8.5 6.5   RBC 4.13* 4.21   HGB 13.9 14.2   HCT 42.0 44.1

## 2025-01-21 NOTE — CARE COORDINATION
Case Management Assessment Initial Evaluation    Date/Time of Evaluation: 2025 3:13 PM  Assessment Completed by: Judy Wise RN    If patient is discharged prior to next notation, then this note serves as note for discharge by case management.    Patient Name: Mariangel Mas                   YOB: 1955  Diagnosis: Grand mal seizure (HCC) [G40.409]  Breakthrough seizure (HCC) [G40.919]                   Date / Time: 2025  9:24 PM  Location: [address] [place] [address]     Patient Admission Status: Inpatient   Readmission Risk Low 0-14, Mod 15-19), High > 20: Readmission Risk Score: 13.7    Current PCP: Analisa Ulloa MD  Health Care Decision Makers:   Secondary Decision Maker: Donna Espinoza - Brother/Sister - [phone]    Additional Case Management Notes: Presented to the ED from SNF with reported grand mal seizure. Has been off of seizure meds for an unknown amount of time. Meds being given IV- SLP eval'd and recommends continuing NPO status until MBS can be done. Neuro recommending continuing meds as ordered PTA. No EEG for now, but neuro recommends if symptoms return. + UTI. IV Rocephin.     Procedures:    MBS ordered.     Imagin/19 CXR: Small lung volumes and mild left basilar atelectasis.     Patient Goals/Plan/Treatment Preferences: Yana is from [facility]. SW on for discharge planning back to SNF.

## 2025-01-21 NOTE — PLAN OF CARE
Problem: Discharge Planning  Goal: Discharge to home or other facility with appropriate resources  Outcome: Progressing  Flowsheets (Taken 1/21/2025 0053)  Discharge to home or other facility with appropriate resources:   Identify barriers to discharge with patient and caregiver   Arrange for needed discharge resources and transportation as appropriate   Identify discharge learning needs (meds, wound care, etc)     Problem: Safety - Adult  Goal: Free from fall injury  Outcome: Progressing  Flowsheets (Taken 1/21/2025 0053)  Free From Fall Injury: Instruct family/caregiver on patient safety     Problem: Skin/Tissue Integrity  Goal: Absence of new skin breakdown  Description: 1.  Monitor for areas of redness and/or skin breakdown  2.  Assess vascular access sites hourly  3.  Every 4-6 hours minimum:  Change oxygen saturation probe site  4.  Every 4-6 hours:  If on nasal continuous positive airway pressure, respiratory therapy assess nares and determine need for appliance change or resting period.  Outcome: Progressing     Problem: Neurosensory - Adult  Goal: Absence of seizures  Outcome: Progressing  Flowsheets (Taken 1/21/2025 0054)  Absence of seizures:   Monitor for seizure activity.  If seizure occurs, document type and location of movements and any associated apnea   If seizure occurs, turn head to side and suction secretions as needed     Care plan reviewed with patient.  Patient verbalizes understanding of the plan of care and contributes to goal setting.

## 2025-01-22 ENCOUNTER — APPOINTMENT (OUTPATIENT)
Dept: GENERAL RADIOLOGY | Age: 70
End: 2025-01-22
Payer: MEDICARE

## 2025-01-22 LAB
ANION GAP SERPL CALC-SCNC: 9 MEQ/L (ref 8–16)
BACTERIA UR CULT: ABNORMAL
BUN SERPL-MCNC: 10 MG/DL (ref 7–22)
CALCIUM SERPL-MCNC: 8.5 MG/DL (ref 8.5–10.5)
CHLORIDE SERPL-SCNC: 102 MEQ/L (ref 98–111)
CO2 SERPL-SCNC: 25 MEQ/L (ref 23–33)
CREAT SERPL-MCNC: 0.4 MG/DL (ref 0.4–1.2)
DEPRECATED RDW RBC AUTO: 43.9 FL (ref 35–45)
ERYTHROCYTE [DISTWIDTH] IN BLOOD BY AUTOMATED COUNT: 12 % (ref 11.5–14.5)
GFR SERPL CREATININE-BSD FRML MDRD: > 90 ML/MIN/1.73M2
GLUCOSE BLD STRIP.AUTO-MCNC: 101 MG/DL (ref 70–108)
GLUCOSE BLD STRIP.AUTO-MCNC: 92 MG/DL (ref 70–108)
GLUCOSE BLD STRIP.AUTO-MCNC: 97 MG/DL (ref 70–108)
GLUCOSE BLD STRIP.AUTO-MCNC: 99 MG/DL (ref 70–108)
GLUCOSE SERPL-MCNC: 91 MG/DL (ref 70–108)
HCT VFR BLD AUTO: 39.8 % (ref 37–47)
HGB BLD-MCNC: 13.5 GM/DL (ref 12–16)
LACOSAMIDE SERPL-MCNC: 4.7 UG/ML (ref 1–10)
MAGNESIUM SERPL-MCNC: 1.6 MG/DL (ref 1.6–2.4)
MCH RBC QN AUTO: 33.8 PG (ref 26–33)
MCHC RBC AUTO-ENTMCNC: 33.9 GM/DL (ref 32.2–35.5)
MCV RBC AUTO: 99.5 FL (ref 81–99)
ORGANISM: ABNORMAL
PLATELET # BLD AUTO: 124 THOU/MM3 (ref 130–400)
PMV BLD AUTO: 12.8 FL (ref 9.4–12.4)
POTASSIUM SERPL-SCNC: 4.1 MEQ/L (ref 3.5–5.2)
RBC # BLD AUTO: 4 MILL/MM3 (ref 4.2–5.4)
SODIUM SERPL-SCNC: 136 MEQ/L (ref 135–145)
WBC # BLD AUTO: 5.5 THOU/MM3 (ref 4.8–10.8)

## 2025-01-22 PROCEDURE — 36415 COLL VENOUS BLD VENIPUNCTURE: CPT

## 2025-01-22 PROCEDURE — 6370000000 HC RX 637 (ALT 250 FOR IP)

## 2025-01-22 PROCEDURE — 6360000002 HC RX W HCPCS: Performed by: INTERNAL MEDICINE

## 2025-01-22 PROCEDURE — 82948 REAGENT STRIP/BLOOD GLUCOSE: CPT

## 2025-01-22 PROCEDURE — 2500000003 HC RX 250 WO HCPCS: Performed by: INTERNAL MEDICINE

## 2025-01-22 PROCEDURE — 83735 ASSAY OF MAGNESIUM: CPT

## 2025-01-22 PROCEDURE — 85027 COMPLETE CBC AUTOMATED: CPT

## 2025-01-22 PROCEDURE — 2580000003 HC RX 258

## 2025-01-22 PROCEDURE — 74230 X-RAY XM SWLNG FUNCJ C+: CPT

## 2025-01-22 PROCEDURE — 99233 SBSQ HOSP IP/OBS HIGH 50: CPT | Performed by: STUDENT IN AN ORGANIZED HEALTH CARE EDUCATION/TRAINING PROGRAM

## 2025-01-22 PROCEDURE — 80048 BASIC METABOLIC PNL TOTAL CA: CPT

## 2025-01-22 PROCEDURE — 2500000003 HC RX 250 WO HCPCS

## 2025-01-22 PROCEDURE — 2060000000 HC ICU INTERMEDIATE R&B

## 2025-01-22 PROCEDURE — 6360000002 HC RX W HCPCS

## 2025-01-22 PROCEDURE — 92611 MOTION FLUOROSCOPY/SWALLOW: CPT

## 2025-01-22 RX ORDER — LACOSAMIDE 50 MG/1
200 TABLET ORAL 2 TIMES DAILY
Status: DISCONTINUED | OUTPATIENT
Start: 2025-01-22 | End: 2025-01-23

## 2025-01-22 RX ORDER — PHENOBARBITAL 20 MG/5ML
60 ELIXIR ORAL NIGHTLY
Status: DISCONTINUED | OUTPATIENT
Start: 2025-01-22 | End: 2025-01-23

## 2025-01-22 RX ORDER — PHENOBARBITAL 20 MG/5ML
30 ELIXIR ORAL EVERY MORNING
Status: DISCONTINUED | OUTPATIENT
Start: 2025-01-23 | End: 2025-01-23

## 2025-01-22 RX ORDER — VALPROIC ACID 250 MG/5ML
1000 SOLUTION ORAL EVERY 8 HOURS SCHEDULED
Status: DISCONTINUED | OUTPATIENT
Start: 2025-01-22 | End: 2025-01-23

## 2025-01-22 RX ORDER — LEVETIRACETAM 500 MG/1
2000 TABLET ORAL 2 TIMES DAILY
Status: DISCONTINUED | OUTPATIENT
Start: 2025-01-22 | End: 2025-01-23

## 2025-01-22 RX ORDER — LAMOTRIGINE 100 MG/1
100 TABLET ORAL 2 TIMES DAILY
Status: DISCONTINUED | OUTPATIENT
Start: 2025-01-22 | End: 2025-01-24

## 2025-01-22 RX ADMIN — ONDANSETRON 4 MG: 2 INJECTION INTRAMUSCULAR; INTRAVENOUS at 15:35

## 2025-01-22 RX ADMIN — ONDANSETRON 4 MG: 2 INJECTION INTRAMUSCULAR; INTRAVENOUS at 23:25

## 2025-01-22 RX ADMIN — SODIUM CHLORIDE, PRESERVATIVE FREE 10 ML: 5 INJECTION INTRAVENOUS at 09:10

## 2025-01-22 RX ADMIN — LAMOTRIGINE 100 MG: 100 TABLET ORAL at 20:50

## 2025-01-22 RX ADMIN — SODIUM CHLORIDE, PRESERVATIVE FREE 10 ML: 5 INJECTION INTRAVENOUS at 20:50

## 2025-01-22 RX ADMIN — CEFTRIAXONE SODIUM 1000 MG: 1 INJECTION, POWDER, FOR SOLUTION INTRAMUSCULAR; INTRAVENOUS at 20:48

## 2025-01-22 RX ADMIN — LACOSAMIDE 200 MG: 50 TABLET, FILM COATED ORAL at 20:50

## 2025-01-22 RX ADMIN — ACETAMINOPHEN 650 MG: 325 TABLET ORAL at 18:09

## 2025-01-22 RX ADMIN — VALPROATE SODIUM 750 MG: 100 INJECTION, SOLUTION INTRAVENOUS at 03:34

## 2025-01-22 RX ADMIN — Medication 3 MG: at 20:50

## 2025-01-22 RX ADMIN — BARIUM SULFATE 30 ML: 0.81 POWDER, FOR SUSPENSION ORAL at 10:56

## 2025-01-22 RX ADMIN — PHENOBARBITAL SODIUM 30 MG: 65 INJECTION INTRAMUSCULAR at 09:04

## 2025-01-22 RX ADMIN — LEVETIRACETAM 2000 MG: 500 TABLET, FILM COATED ORAL at 20:50

## 2025-01-22 RX ADMIN — LEVETIRACETAM 1500 MG: 100 INJECTION INTRAVENOUS at 11:46

## 2025-01-22 RX ADMIN — BARIUM SULFATE 10 ML: 400 PASTE ORAL at 10:55

## 2025-01-22 RX ADMIN — ENOXAPARIN SODIUM 30 MG: 100 INJECTION SUBCUTANEOUS at 09:04

## 2025-01-22 RX ADMIN — ENOXAPARIN SODIUM 30 MG: 100 INJECTION SUBCUTANEOUS at 20:50

## 2025-01-22 RX ADMIN — PHENOBARBITAL 60 MG: 20 ELIXIR ORAL at 20:51

## 2025-01-22 RX ADMIN — VALPROIC ACID 1000 MG: 250 SOLUTION ORAL at 21:55

## 2025-01-22 RX ADMIN — VALPROATE SODIUM 750 MG: 100 INJECTION, SOLUTION INTRAVENOUS at 11:52

## 2025-01-22 NOTE — PROGRESS NOTES
Hospitalist Progress Note  Internal Medicine Resident      Patient: Mariangel Mas 69 y.o. female      Unit/Bed: -22/022-A    Admit Date: 1/19/2025      ASSESSMENT AND PLAN  Active Problems  Status post grand mal seizure w/history of she reports in the setting of medication noncompliance: Patient reportedly had a hour-long seizure at nursing home.  Has not been taking her medications due to her G-tube being removed.  Patient with extensive history of seizures in the past, requiring hospitalizations. Home regiment includes phenobarbital, lamotrigine, Keppra, and valporic acid. Head CT negative for acute intracranial process. Pt was loaded with Keppra in the ED. Transitioned to IV medications.  Patient had MBS today, started on puree diet plus thin liquids  Continue IV Keppra 1500mg q12hrs  Continue IV phenobarb 30mg daily and 60mg nightly  Continue IV Depacon 750mg q8hrs   Seizure precautions  Aspiration precautions  UTI: UA showed moderate leukocyte Esterase, positive nitrate, many bacteria, WBC greater than 100.  Patient started on Rocephin  Continue Rocephin 1 g daily  ADHD: pt on Adderall at SNF. Currently being held due to pt NPO  Malnutrition: Pt on carnitor at SNF. Currently being held due to pt NPO   Chronic Conditions (reviewed and stable unless otherwise stated)  HTN: pt on amlodipine 5mg daily at home. Currently held as BP soft      LDA: []CVC / []PICC / []Midline / []Barber / []Drains / []Mediport / [x]None  Antibiotics: None  Steroids: None  Labs (still needed?): [x]Yes / []No  IVF (still needed?): []Yes / [x]No    Level of care: [x]Step Down / []Med-Surg  Bed Status: [x]Inpatient / []Observation  Telemetry: [x]Yes / []No  PT/OT: []Yes / [x]No    DVT Prophylaxis: [x] Lovenox / [] Heparin / [] SCDs / [] Already on Systemic Anticoagulation / [] None     Expected discharge date:  TBD  Disposition: TBD     Code status: Full Code      ===================================================================    Chief Complaint: Seizures  Subjective (past 24 hours): Patient seen and examined at bedside. Reports no issues overnight.  Denies any urinary symptoms, suprapubic tenderness. Pt had MBS today, started on puree diet + thin liquids, will monitor to see how she tolerates.     HPI / Hospital Course:  Per initial HPI  \"Mariangel Mas is a 69 y.o. female with PMHx of seizures, ADHD, cerebral palsy who presents to [facility] from nursing home with grand mall seizure.  Patient reportedly has extensive history of seizures, taking multiple antiepileptics at home.  She is a poor historian, and is unable to provide a complete history.  Patient reportedly experienced a grand mal seizure for approximately an hour prior to presentation.  Patient previously had G-tube, and received all medications through G-tube, but was removed for unknown reason.  Patient has not been receiving her antiepileptics for an unknown amount of time.     ED course: Patient presented from nursing home, with stable vitals, saturating well on room air.  Lactate was elevated at 2.6, repeat 2.8.  LFTs unremarkable.  Presented with blood glucose of 106.  CBC unremarkable.  Blood cultures unremarkable.  Trace ketones in urine.  CT head negative for intracranial processes.  Chest x-ray revealed left basilar atelectasis.  EKG revealed normal sinus rhythm.  Patient was loaded with Keppra in the ED and transferred to inpatient service for further management\"    Medications:    Infusion Medications    sodium chloride      dextrose      Scheduled Medications    sodium chloride flush  5-40 mL IntraVENous 2 times per day    enoxaparin  30 mg SubCUTAneous BID    melatonin  3 mg Oral Nightly    [Held by provider] amphetamine-dextroamphetamine  5 mg Oral Daily    aspirin  81 mg PEG Tube Daily    lactulose  20 g Oral Daily    [Held by provider] levOCARNitine  330 mg Oral BID

## 2025-01-22 NOTE — PROGRESS NOTES
Comprehensive Nutrition Assessment    Type and Reason for Visit:  Initial, Positive nutrition screen, Wound    Nutrition Recommendations/Plan:   Start diet per SLP/provider orders  Send Magic Cup BID.  Consider MVI as appropriate .     Malnutrition Assessment:  Malnutrition Status:  Moderate malnutrition (01/22/25 1325)    Context:  Acute Illness     Findings of the 6 clinical characteristics of malnutrition:  Energy Intake:  50% or less of estimated energy requirements for 5 or more days (less than 50% x 5 days)  Weight Loss:  Unable to assess (due to + 2 edema/fluid fluctuations)     Body Fat Loss:  No body fat loss     Muscle Mass Loss:  Mild muscle mass loss Temples (temporalis)  Fluid Accumulation:  Moderate to Severe Extremities   Strength:  Not Performed    Nutrition Assessment:     Pt. moderately malnourished AEB criteria as listed above.  At risk for further nutrition compromise r/t admit with Grand Mal Seizure, noncompliance with seizure meds ( was having difficulty swallowing seizure meds at [facility] per Naheed PERSAUD at [facility]), UTI and underlying medical condition ( Note pt hx of Dysphagia with PEG tube & was on Jevity 1.5 at goal 40ml/hr when here 11/3/23- PEG removed, unsure when, ADHD, Cerebral Palsy, Depression, HTN, Seizure).        Nutrition Related Findings:    Pt. Report/Treatments/Miscellaneous: Pt seen, has a telesitter, limited historian, CP/obese pt. Pt s/p MBS today & new diet  just ordered ( was NPO earlier). Pt from [facility], Naheed SAHUN from [facility] told me pt had been eating good there greater than 72% until ~ 2 days PTA here & po was less than 50%. She mentions pt had difficulty swallowing pills .  noted noncompliance with seizure meds. I notice pt had a PEG tube with Jevity 1.5 at goal  40ml/hr  when I last saw her 11/3/23 however PEG removed & LPN was unsure when it was removed.  Difficult to assess wt trends due to + 2 edema noted. Pt told me she is hungry & see if she wants to

## 2025-01-22 NOTE — PLAN OF CARE
Problem: Discharge Planning  Goal: Discharge to home or other facility with appropriate resources  Outcome: Progressing  Flowsheets (Taken 1/22/2025 1750)  Discharge to home or other facility with appropriate resources:   Identify barriers to discharge with patient and caregiver   Identify discharge learning needs (meds, wound care, etc)   Refer to discharge planning if patient needs post-hospital services based on physician order or complex needs related to functional status, cognitive ability or social support system   Arrange for needed discharge resources and transportation as appropriate     Problem: Safety - Adult  Goal: Free from fall injury  Outcome: Progressing  Flowsheets (Taken 1/22/2025 1750)  Free From Fall Injury: Instruct family/caregiver on patient safety     Problem: Skin/Tissue Integrity  Goal: Absence of new skin breakdown  Description: 1.  Monitor for areas of redness and/or skin breakdown  2.  Assess vascular access sites hourly  3.  Every 4-6 hours minimum:  Change oxygen saturation probe site  4.  Every 4-6 hours:  If on nasal continuous positive airway pressure, respiratory therapy assess nares and determine need for appliance change or resting period.  Outcome: Progressing     Problem: Neurosensory - Adult  Goal: Absence of seizures  Outcome: Progressing  Goal: Remains free of injury related to seizures activity  Outcome: Progressing  Flowsheets (Taken 1/22/2025 1750)  Remains free of injury related to seizure activity:   Maintain airway, patient safety  and administer oxygen as ordered   If seizure occurs, turn patient to side and suction secretions as needed   Seizure pads on all 4 side rails   Instruct patient/family to call for assistance with activity based on assessment   Monitor patient for seizure activity, document and report duration and description of seizure to Licensed Independent Practitioner   Reorient patient post seizure   Instruct patient/family to notify RN of any

## 2025-01-22 NOTE — PROGRESS NOTES
[facility]  SPEECH THERAPY  [facility] ICU STEPDOWN TELEMETRY 4K  Modified Barium Swallow    Discharge Recommendations: SNF  DIET ORDER RECOMMENDATIONS AFTER EVALUATION: Puree + thin liquids  Strategies:  Set-up with Meals, Full Upright Position, Medications Crushed with Puree, Direct 1:1 Supervision, Limit Distractions, Monitor for Fatigue, and liquids via sip top cup      SLP Individual Minutes  Time In: 1058  Time Out: 1116  Minutes: 18  Timed Code Treatment Minutes: 0 Minutes       Date: 2025  Patient Name: Mariangel Mas      CSN: [id number]   : 1955  (69 y.o.)  Gender: female   Referring Physician:  Pradeep Robert MD  Diagnosis: Grand mal seizure (HCC) [G40.409]  Breakthrough seizure (HCC) [G40.919]    Precautions: aspiration risk, fall risk   History of Present Illness/Injury: \"Mariangel Mas is a 69 y.o. female with PMHx of seizures, ADHD, cerebral palsy who presents to [facility] from nursing home with grand mall seizure.  Patient reportedly has extensive history of seizures, taking multiple antiepileptics at home.  She is a poor historian, and is unable to provide a complete history.  Patient reportedly experienced a grand mal seizure for approximately an hour prior to presentation.  Patient previously had G-tube, and received all medications through G-tube, but was removed for unknown reason.  Patient has not been receiving her antiepileptics for an unknown amount of time.     ED course: Patient presented from nursing home, with stable vitals, saturating well on room air.  Lactate was elevated at 2.6, repeat 2.8.  LFTs unremarkable.  Presented with blood glucose of 106.  CBC unremarkable.  Blood cultures unremarkable.  Trace ketones in urine.  CT head negative for intracranial processes.  Chest x-ray revealed left basilar atelectasis.  EKG revealed normal sinus rhythm.  Patient was loaded with Keppra in the ED and transferred to inpatient service for further

## 2025-01-22 NOTE — PROGRESS NOTES
Spiritual Health History and Assessment/Progress Note  [facility]    (P) Spiritual/Emotional Needs,  ,  ,      Name: Mariangel Mas MRN: [id number]    Age: 69 y.o.     Sex: female   Language: English   Mandaen: Non-Church   Grand mal seizure (HCC)     Date: 1/22/2025            Total Time Calculated: (P) 5 min              Spiritual Assessment began in [facility] ICU STEPDOWN TELEMETRY 4K        Referral/Consult From: (P) Rounding   Encounter Overview/Reason: (P) Spiritual/Emotional Needs  Service Provided For: (P) Patient    Zoila, Belief, Meaning:   Patient unable to assess at this time  Family/Friends No family/friends present      Importance and Influence:  Patient unable to assess at this time  Family/Friends No family/friends present    Community:  Patient Other: Unable to assess at this time; pt has impaired resilience and communication is difficult.  Family/Friends No family/friends present    Assessment and Plan of Care:     Patient Interventions include: Facilitated expression of thoughts and feelings and Other:  prayed for patient to end the brief visit.  Family/Friends Interventions include: No family/friends present    Patient Plan of Care: Spiritual Care available upon further referral  Family/Friends Plan of Care: No family/friends present    Electronically signed by Chaplain Leonora on 1/22/2025 at 3:21 PM

## 2025-01-22 NOTE — CARE COORDINATION
1/22/25, 8:21 AM EST    DISCHARGE ON GOING EVALUATION    aMriangel Millan [facility] day: 2  Location: -22/022-A Reason for admit: Grand mal seizure (HCC) [G40.409]  Breakthrough seizure (HCC) [G40.919]     Procedures:   1/22 MBS-passed    Imaging since last note: none    Barriers to Discharge: Neurology has signed off, diabetes management, SLP following, dysphagia pureed diet ordered. urine (+) E.Coli, telemetry, IV Rocephin, Lovenox, Lactulose, IV Keppra, Depacon, Phenobarbital, electrolyte replacement protocols.     PCP: Analisa Ulloa MD  Readmission Risk Score: 13.3    Patient Goals/Plan/Treatment Preferences: Plans return to [facility]. MALENA following. Refer to MALENA note.

## 2025-01-22 NOTE — PLAN OF CARE
Problem: Discharge Planning  Goal: Discharge to home or other facility with appropriate resources  1/21/2025 2220 by Elvira Guevara RN  Outcome: Progressing  Flowsheets (Taken 1/21/2025 2220)  Discharge to home or other facility with appropriate resources:   Identify barriers to discharge with patient and caregiver   Arrange for needed discharge resources and transportation as appropriate   Identify discharge learning needs (meds, wound care, etc)   Refer to discharge planning if patient needs post-hospital services based on physician order or complex needs related to functional status, cognitive ability or social support system     Problem: Safety - Adult  Goal: Free from fall injury  1/21/2025 2220 by Elvira Guevara RN  Outcome: Progressing  Flowsheets (Taken 1/21/2025 2220)  Free From Fall Injury:   Instruct family/caregiver on patient safety   Based on caregiver fall risk screen, instruct family/caregiver to ask for assistance with transferring infant if caregiver noted to have fall risk factors     Problem: Skin/Tissue Integrity  Goal: Absence of new skin breakdown  Description: 1.  Monitor for areas of redness and/or skin breakdown  2.  Assess vascular access sites hourly  3.  Every 4-6 hours minimum:  Change oxygen saturation probe site  4.  Every 4-6 hours:  If on nasal continuous positive airway pressure, respiratory therapy assess nares and determine need for appliance change or resting period.  1/21/2025 2220 by Elvira Guevara RN  Outcome: Progressing  Note: No new skin breakdown noted.     Problem: Neurosensory - Adult  Goal: Absence of seizures  1/21/2025 2220 by Elvira Guevara RN  Outcome: Progressing  Flowsheets (Taken 1/21/2025 2220)  Absence of seizures:   Monitor for seizure activity.  If seizure occurs, document type and location of movements and any associated apnea   If seizure occurs, turn head to side and suction secretions as needed   Administer anticonvulsants as ordered   Support

## 2025-01-23 PROBLEM — R94.01 ABNORMAL EEG: Status: ACTIVE | Noted: 2025-01-23

## 2025-01-23 LAB
AMMONIA PLAS-MCNC: 56 UMOL/L (ref 11–60)
ANION GAP SERPL CALC-SCNC: 10 MEQ/L (ref 8–16)
ARTERIAL PATENCY WRIST A: POSITIVE
BASE EXCESS BLDA CALC-SCNC: 3.8 MMOL/L (ref -2.5–2.5)
BDY SITE: ABNORMAL
BUN SERPL-MCNC: 11 MG/DL (ref 7–22)
CALCIUM SERPL-MCNC: 8.6 MG/DL (ref 8.5–10.5)
CHLORIDE SERPL-SCNC: 99 MEQ/L (ref 98–111)
CO2 SERPL-SCNC: 26 MEQ/L (ref 23–33)
COLLECTED BY:: ABNORMAL
CREAT SERPL-MCNC: 0.5 MG/DL (ref 0.4–1.2)
DEPRECATED RDW RBC AUTO: 45.9 FL (ref 35–45)
DEVICE: ABNORMAL
ERYTHROCYTE [DISTWIDTH] IN BLOOD BY AUTOMATED COUNT: 12.4 % (ref 11.5–14.5)
FIO2 ON VENT O2 ANALYZER: 21 %
GFR SERPL CREATININE-BSD FRML MDRD: > 90 ML/MIN/1.73M2
GLUCOSE BLD STRIP.AUTO-MCNC: 101 MG/DL (ref 70–108)
GLUCOSE BLD STRIP.AUTO-MCNC: 111 MG/DL (ref 70–108)
GLUCOSE BLD STRIP.AUTO-MCNC: 116 MG/DL (ref 70–108)
GLUCOSE BLD STRIP.AUTO-MCNC: 96 MG/DL (ref 70–108)
GLUCOSE SERPL-MCNC: 111 MG/DL (ref 70–108)
HCO3 BLDA-SCNC: 29 MMOL/L (ref 23–28)
HCT VFR BLD AUTO: 39.2 % (ref 37–47)
HGB BLD-MCNC: 13 GM/DL (ref 12–16)
MAGNESIUM SERPL-MCNC: 1.7 MG/DL (ref 1.6–2.4)
MCH RBC QN AUTO: 33.7 PG (ref 26–33)
MCHC RBC AUTO-ENTMCNC: 33.2 GM/DL (ref 32.2–35.5)
MCV RBC AUTO: 101.6 FL (ref 81–99)
PCO2 BLDA: 45 MMHG (ref 35–45)
PH BLDA: 7.42 [PH] (ref 7.35–7.45)
PLATELET # BLD AUTO: 131 THOU/MM3 (ref 130–400)
PMV BLD AUTO: 13 FL (ref 9.4–12.4)
PO2 BLDA: 69 MMHG (ref 71–104)
POTASSIUM SERPL-SCNC: 4.1 MEQ/L (ref 3.5–5.2)
RBC # BLD AUTO: 3.86 MILL/MM3 (ref 4.2–5.4)
SAO2 % BLDA: 94 %
SODIUM SERPL-SCNC: 135 MEQ/L (ref 135–145)
VENTILATION MODE VENT: ABNORMAL
WBC # BLD AUTO: 8.2 THOU/MM3 (ref 4.8–10.8)

## 2025-01-23 PROCEDURE — 2580000003 HC RX 258

## 2025-01-23 PROCEDURE — 95819 EEG AWAKE AND ASLEEP: CPT | Performed by: PSYCHIATRY & NEUROLOGY

## 2025-01-23 PROCEDURE — 36415 COLL VENOUS BLD VENIPUNCTURE: CPT

## 2025-01-23 PROCEDURE — 6360000002 HC RX W HCPCS

## 2025-01-23 PROCEDURE — 2500000003 HC RX 250 WO HCPCS

## 2025-01-23 PROCEDURE — 82803 BLOOD GASES ANY COMBINATION: CPT

## 2025-01-23 PROCEDURE — 6370000000 HC RX 637 (ALT 250 FOR IP)

## 2025-01-23 PROCEDURE — 82948 REAGENT STRIP/BLOOD GLUCOSE: CPT

## 2025-01-23 PROCEDURE — 36600 WITHDRAWAL OF ARTERIAL BLOOD: CPT

## 2025-01-23 PROCEDURE — 95816 EEG AWAKE AND DROWSY: CPT

## 2025-01-23 PROCEDURE — 82140 ASSAY OF AMMONIA: CPT

## 2025-01-23 PROCEDURE — 80048 BASIC METABOLIC PNL TOTAL CA: CPT

## 2025-01-23 PROCEDURE — 83735 ASSAY OF MAGNESIUM: CPT

## 2025-01-23 PROCEDURE — 85027 COMPLETE CBC AUTOMATED: CPT

## 2025-01-23 PROCEDURE — C9254 INJECTION, LACOSAMIDE: HCPCS

## 2025-01-23 PROCEDURE — 99232 SBSQ HOSP IP/OBS MODERATE 35: CPT | Performed by: STUDENT IN AN ORGANIZED HEALTH CARE EDUCATION/TRAINING PROGRAM

## 2025-01-23 PROCEDURE — 2060000000 HC ICU INTERMEDIATE R&B

## 2025-01-23 RX ORDER — LEVETIRACETAM 500 MG/5ML
2000 INJECTION, SOLUTION, CONCENTRATE INTRAVENOUS EVERY 12 HOURS
Status: DISCONTINUED | OUTPATIENT
Start: 2025-01-23 | End: 2025-01-24

## 2025-01-23 RX ORDER — DEXTROSE, SODIUM CHLORIDE, SODIUM LACTATE, POTASSIUM CHLORIDE, AND CALCIUM CHLORIDE 5; .6; .31; .03; .02 G/100ML; G/100ML; G/100ML; G/100ML; G/100ML
INJECTION, SOLUTION INTRAVENOUS CONTINUOUS
Status: ACTIVE | OUTPATIENT
Start: 2025-01-23 | End: 2025-01-24

## 2025-01-23 RX ADMIN — SODIUM CHLORIDE: 9 INJECTION, SOLUTION INTRAVENOUS at 11:58

## 2025-01-23 RX ADMIN — ENOXAPARIN SODIUM 30 MG: 100 INJECTION SUBCUTANEOUS at 21:43

## 2025-01-23 RX ADMIN — SODIUM CHLORIDE 1000 MG: 9 INJECTION, SOLUTION INTRAVENOUS at 23:00

## 2025-01-23 RX ADMIN — LACOSAMIDE 200 MG: 10 INJECTION INTRAVENOUS at 21:48

## 2025-01-23 RX ADMIN — SODIUM CHLORIDE, SODIUM LACTATE, POTASSIUM CHLORIDE, CALCIUM CHLORIDE AND DEXTROSE MONOHYDRATE: 5; 600; 310; 30; 20 INJECTION, SOLUTION INTRAVENOUS at 14:25

## 2025-01-23 RX ADMIN — LEVETIRACETAM 2000 MG: 100 INJECTION INTRAVENOUS at 11:38

## 2025-01-23 RX ADMIN — SODIUM CHLORIDE, PRESERVATIVE FREE 10 ML: 5 INJECTION INTRAVENOUS at 22:00

## 2025-01-23 RX ADMIN — VALPROIC ACID 1000 MG: 250 SOLUTION ORAL at 05:43

## 2025-01-23 RX ADMIN — SODIUM CHLORIDE: 9 INJECTION, SOLUTION INTRAVENOUS at 12:55

## 2025-01-23 RX ADMIN — LACOSAMIDE 200 MG: 10 INJECTION INTRAVENOUS at 12:00

## 2025-01-23 RX ADMIN — ENOXAPARIN SODIUM 30 MG: 100 INJECTION SUBCUTANEOUS at 09:46

## 2025-01-23 RX ADMIN — CEFTRIAXONE SODIUM 1000 MG: 1 INJECTION, POWDER, FOR SOLUTION INTRAMUSCULAR; INTRAVENOUS at 21:38

## 2025-01-23 RX ADMIN — SODIUM CHLORIDE 1000 MG: 9 INJECTION, SOLUTION INTRAVENOUS at 12:57

## 2025-01-23 NOTE — PROCEDURES
EEG REPORT       Patient: Mariangel Mas Age: 69 y.o.  MRN: [id number]      Referring Provider: No ref. provider found    History: This routine 22 minute scalp EEG was recorded with video- monitoring for a 69 y.o.. female  who presented with breakthrough seizure. This EEG was performed to evaluate for focal and epileptiform abnormalities.     Mariangel Mas   Current Facility-Administered Medications   Medication Dose Route Frequency Provider Last Rate Last Admin    lacosamide (VIMPAT) 200 mg in sodium chloride 0.9 % 70 mL IVPB  200 mg IntraVENous BID Marciano Caballero DO   Stopped at 01/23/25 1241    levETIRAcetam (KEPPRA) injection 2,000 mg  2,000 mg IntraVENous Q12H Marciano Caballero DO   2,000 mg at 01/23/25 1138    valproate (DEPACON) 1,000 mg in sodium chloride 0.9 % 100 mL IVPB  1,000 mg IntraVENous Q8H Marciano Caballero  mL/hr at 01/23/25 1257 1,000 mg at 01/23/25 1257    [Held by provider] lamoTRIgine (LAMICTAL) tablet 100 mg  100 mg Oral BID Yvon Thurston, DO   100 mg at 01/22/25 2050    sodium chloride flush 0.9 % injection 5-40 mL  5-40 mL IntraVENous 2 times per day Foraric Onofre III, DO   10 mL at 01/22/25 2050    sodium chloride flush 0.9 % injection 5-40 mL  5-40 mL IntraVENous PRN Forlenza Onofre III, DO   10 mL at 01/20/25 1648    0.9 % sodium chloride infusion   IntraVENous PRN Forlenza, Onofre III, DO 10 mL/hr at 01/23/25 1255 New Bag at 01/23/25 1255    potassium chloride (KLOR-CON M) extended release tablet 40 mEq  40 mEq Oral PRN Forlenza, Onofre III, DO        Or    potassium bicarb-citric acid (EFFER-K) effervescent tablet 40 mEq  40 mEq Oral PRN Forlenza, Onofre III, DO        Or    potassium chloride 10 mEq/100 mL IVPB (Peripheral Line)  10 mEq IntraVENous PRN Forlenza, Onofre III, DO        magnesium sulfate 2000 mg in 50 mL IVPB premix  2,000 mg IntraVENous PRN Forlenza, Onofre III,         enoxaparin Sodium (LOVENOX) injection 30 mg  30 mg SubCUTAneous BID Chaya,

## 2025-01-23 NOTE — PROGRESS NOTES
[facility]  Neurodiagnostic Laboratory Technician Worksheet  STRZ ICU STEPDOWN TELEMETRY 4K  EEG Date: 2025    Name: Mariangel Mas  : 1955  Age: 69 y.o.  Sex: female  MRN: [id number]  CSN: [id number]    Ordering Provider:  holden Caballero      EEG Number: [id number]    Time In: Time In: 1204 (25)  Time Out: Time Out: 1229 (2025)  Total Treatment Time: Minutes: 25    Clinical History: this am unable to wake pt for swallow eval,  admit for grand mal seizure, po meds non compliant,  hx developmental delay, adhd,. Receiving keppra, vimpat, and depakon,    Ct      IMPRESSION:  No acute intracranial abnormality.     Past Medical History:       Diagnosis Date    ADHD     Attention deficit hyperactivity disorder (ADHD)     Cerebral palsy (HCC)     Depression     Essential hypertension     Learning disability     Seizure (HCC)     Urinary urgency        Medications:   Prior to Admission medications    Medication Sig Start Date End Date Taking? Authorizing Provider   midazolam (NAYZILAM) 5 MG/0.1ML SOLN nasal spray 0.1 mLs by Alternating Nares route every 10 minutes as needed (Seizure) As needed for seizure, 1 spray seizure lasting > 5 min, if ineffective after 10 min. May given 2nd dose in alternating nostril. Do not repeat if pt having trouble breathing or excessive sedation.  Max Daily Amount: 720 mg 24  Yes Naila De Souza MD   lidocaine (HM LIDOCAINE PATCH) 4 % external patch Place 1 patch onto the skin in the morning and at bedtime Apply to right knee topically bid for pain   on in am off at night   Yes Naila De Souza MD   Multiple Vitamins-Minerals (CEROVITE SENIOR) TABS Take 1 tablet by mouth daily   Yes Naila De Souza MD   PHENobarbital (LUMINAL) 30 MG tablet Take 3 tablets by mouth daily. Takes 30 mg and 60 mg   Yes Naila De Souza MD   zinc gluconate 50 MG tablet Take 1 tablet by mouth daily   Yes Naila De Souza MD   amphetamine-dextroamphetamine  (ADDERALL, 5MG,) 5 MG tablet Take 1 tablet by mouth daily for 30 days. Max Daily Amount: 5 mg 11/7/23 1/20/25 Yes Janice Naav APRN - CNP   amLODIPine (NORVASC) 5 MG tablet Take 1 tablet by mouth daily 11/6/23  Yes Sanjay Carlos DO   polycarbophil (FIBERCON) 625 MG tablet 1 tablet by Per G Tube route 3 times daily 11/6/23  Yes Sanjay Carlos DO   levOCARNitine (CARNITOR) 330 MG tablet Take 1 tablet by mouth 2 times daily 11/6/23  Yes Sanjay Carlos DO   Lidocaine HCl (ASPERCREME LIDOCAINE) 4 % CREA Apply topically Apply to bilateral knees topically two times a day for pain.   Yes ProviderNaila MD   aspirin 81 MG chewable tablet 1 tablet by PEG Tube route daily 7/31/23  Yes Jorge Escoto DO   valproic acid (DEPAKENE) 250 MG/5ML SOLN oral solution 20 mLs by Per G Tube route every 8 hours  Patient taking differently: Take 5 mLs by mouth in the morning, at noon, and at bedtime Give 1000 mg by mouth three times a day; pt is now 3-4-24 taking 250 mg give 4 capsule by mouth 3 times a day 7/30/23  Yes Jorge Escoto DO   atorvastatin (LIPITOR) 20 MG tablet 1 tablet by PEG Tube route nightly 7/30/23  Yes Jorge Escoto DO   omeprazole (PRILOSEC) 20 MG delayed release capsule Take 2 capsules by mouth in the morning and at bedtime    ProviderNaila MD   acetaminophen (TYLENOL) 325 MG tablet Take 2 tablets by mouth every 6 hours as needed for Pain    Naila De Souza MD   ondansetron (ZOFRAN) 4 MG tablet Take 1 tablet by mouth every 4 hours as needed for Nausea or Vomiting    Naila De Souza MD   loperamide (IMODIUM) 2 MG capsule Take 1 capsule by mouth 4 times daily as needed for Diarrhea    ProviderNaila MD   lactulose (CHRONULAC) 10 GM/15ML solution Take 30 mLs by mouth daily 11/6/23   Sanjay Carlos DO   lacosamide (VIMPAT) 10 MG/ML SOLN oral solution Take 20 mLs by mouth in the morning and at bedtime.    ProviderNaila MD   lamoTRIgine (LAMICTAL) 100 MG

## 2025-01-23 NOTE — PLAN OF CARE
Problem: Discharge Planning  Goal: Discharge to home or other facility with appropriate resources  1/22/2025 2212 by Elvira Guevara RN  Outcome: Progressing  Flowsheets (Taken 1/22/2025 2212)  Discharge to home or other facility with appropriate resources:   Identify barriers to discharge with patient and caregiver   Arrange for needed discharge resources and transportation as appropriate   Identify discharge learning needs (meds, wound care, etc)   Refer to discharge planning if patient needs post-hospital services based on physician order or complex needs related to functional status, cognitive ability or social support system     Problem: Safety - Adult  Goal: Free from fall injury  1/22/2025 2212 by Elvira Guevara RN  Outcome: Progressing  Flowsheets (Taken 1/22/2025 2212)  Free From Fall Injury:   Instruct family/caregiver on patient safety   Based on caregiver fall risk screen, instruct family/caregiver to ask for assistance with transferring infant if caregiver noted to have fall risk factors     Problem: Skin/Tissue Integrity  Goal: Absence of new skin breakdown  Description: 1.  Monitor for areas of redness and/or skin breakdown  2.  Assess vascular access sites hourly  3.  Every 4-6 hours minimum:  Change oxygen saturation probe site  4.  Every 4-6 hours:  If on nasal continuous positive airway pressure, respiratory therapy assess nares and determine need for appliance change or resting period.  1/22/2025 2212 by Elvira Guevara RN  Outcome: Progressing  Note: No new skin breakdown noted.      Problem: Neurosensory - Adult  Goal: Absence of seizures  1/22/2025 2212 by Elvira Guevara RN  Outcome: Progressing  Flowsheets (Taken 1/22/2025 2212)  Absence of seizures:   Monitor for seizure activity.  If seizure occurs, document type and location of movements and any associated apnea   If seizure occurs, turn head to side and suction secretions as needed   Administer anticonvulsants as ordered   Support

## 2025-01-23 NOTE — PROGRESS NOTES
[facility]  SPEECH THERAPY MISSED TREATMENT NOTE  STRZ ICU STEPDOWN TELEMETRY 4K      Date: 2025  Patient Name: Mariangel Mas        MRN: [id number]    : 1955  (69 y.o.)    REASON FOR MISSED TREATMENT:  DEZ Thorne approved ST attempt reporting \"patient with difficulty consuming liquid medications.\" ST brought in breakfast tray and attempted to wake patient for dysphagia interventions with breakfast meal. ST assisted patient to upright position, call of name, sternal rub and use of cold wash cloth. ST unable to wake patient to appropriate level of alertness to safely consume PO intake. ST updated DEZ Thorne to follow MT. Will continue to follow and re-attempt next subsequent treatment date     Yareli Kirkland M.S. CCC-SLP [id number]

## 2025-01-23 NOTE — CARE COORDINATION
1/23/25, 1:52 PM EST    Patient goals/plan/ treatment preferences discussed by  and .  Patient goals/plan/ treatment preferences reviewed with patient/ family.  Patient/ family verbalize understanding of discharge plan and are in agreement with goal/plan/treatment preferences.  Understanding was demonstrated using the teach back method.  AVS provided by RN at time of discharge, which includes all necessary medical information pertaining to the patients current course of illness, treatment, post-discharge goals of care, and treatment preferences.     Services At/After Discharge: Skilled Nursing Facility (SNF), plans return [facility] when medically cleared (hold Phenobarb, adjust meds today), likely in am; collaborated w AttendingJennifer SW

## 2025-01-23 NOTE — CARE COORDINATION
1/23/25, 1:36 PM EST  DISCHARGE PLANNING EVALUATION    SW spoke with guardian Jessica and notified her of possible discharge tomorrow.

## 2025-01-23 NOTE — PROGRESS NOTES
Hospitalist Progress Note  Internal Medicine Resident      Patient: Mariangel Mas 69 y.o. female      Unit/Bed: -22/022-A    Admit Date: 1/19/2025      ASSESSMENT AND PLAN  Active Problems  Status post grand mal seizure w/history of she reports in the setting of medication noncompliance: Patient reportedly had a hour-long seizure at nursing home.  Has not been taking her medications due to her G-tube being removed.  Patient with extensive history of seizures in the past, requiring hospitalizations. Home regiment includes phenobarbital, lamotrigine, Keppra, and valporic acid. Head CT negative for acute intracranial process. Pt was loaded with Keppra in the ED. Transitioned to IV medications.  Patient had MBS today, started on puree diet plus thin liquids.  Patient was switched to oral medications, however patient became increasingly lethargic this morning and due to risk of aspiration we will switch back to IV medications. Holding phenobarbital this could be contributing to the lethargy.  EEG showed abnormalities indicating a moderate encephalopathy likely due to UTI.  Continue IV Keppra 2g q12hrs  Hold IV phenobarb 30mg daily and 60mg nightly  Continue IV Depacon 1g q8hrs   Seizure precautions  Aspiration precautions  UTI: UA showed moderate leukocyte Esterase, positive nitrate, many bacteria, WBC greater than 100.  Patient started on Rocephin.  Patient afebrile.  WBC count of 8.2 today.  Continue Rocephin 1 g daily, EOT 1/24/25  ADHD: pt on Adderall at SNF. Currently held as patient lethargic this morning  Malnutrition: Pt on carnitor at SNF. Currently held  Started on D5 in LR at 75 cc an hour  Chronic Conditions (reviewed and stable unless otherwise stated)  HTN: pt on amlodipine 5mg daily at home.  Blood pressure of 133/52 today.  Amlodipine currently held given soft blood pressures in the ED.  Will continue to monitor and add when needed      LDA: []CVC / []PICC / []Midline / []Barber / []Drains /  without rales or wheezes or rhonchi.  Cardiovascular: Normal rate, regular rhythm with normal S1/S2 without murmurs.  Abdomen: Soft, non-tender, non-distended with normal bowel sounds.  Musculoskeletal: There is no joint swelling or tenderness. Normal tone. No abnormal movements.   Skin: Warm and dry. No rashes or lesions.  Neurologic:  No focal sensory/motor deficits in the upper and lower extremities.   Psychiatric: Alert and oriented, normal insight and thought content.   Peripheral Pulses: +2 palpable, equal bilaterally.         Labs/Radiology: See chart or assessment above.     Electronically signed by Yvon Thurston DO on 1/23/2025 at 3:31 PM  Case was discussed with Attending, Dr. Robert.

## 2025-01-24 VITALS
SYSTOLIC BLOOD PRESSURE: 123 MMHG | BODY MASS INDEX: 43.54 KG/M2 | OXYGEN SATURATION: 96 % | DIASTOLIC BLOOD PRESSURE: 76 MMHG | HEIGHT: 61 IN | RESPIRATION RATE: 17 BRPM | WEIGHT: 230.6 LBS | HEART RATE: 76 BPM | TEMPERATURE: 97.5 F

## 2025-01-24 LAB
ANION GAP SERPL CALC-SCNC: 12 MEQ/L (ref 8–16)
BACTERIA BLD AEROBE CULT: NORMAL
BACTERIA BLD AEROBE CULT: NORMAL
BUN SERPL-MCNC: 8 MG/DL (ref 7–22)
CALCIUM SERPL-MCNC: 8.6 MG/DL (ref 8.5–10.5)
CHLORIDE SERPL-SCNC: 104 MEQ/L (ref 98–111)
CO2 SERPL-SCNC: 24 MEQ/L (ref 23–33)
CREAT SERPL-MCNC: 0.4 MG/DL (ref 0.4–1.2)
DEPRECATED RDW RBC AUTO: 46.2 FL (ref 35–45)
ERYTHROCYTE [DISTWIDTH] IN BLOOD BY AUTOMATED COUNT: 12.4 % (ref 11.5–14.5)
GFR SERPL CREATININE-BSD FRML MDRD: > 90 ML/MIN/1.73M2
GLUCOSE BLD STRIP.AUTO-MCNC: 101 MG/DL (ref 70–108)
GLUCOSE BLD STRIP.AUTO-MCNC: 107 MG/DL (ref 70–108)
GLUCOSE SERPL-MCNC: 96 MG/DL (ref 70–108)
HCT VFR BLD AUTO: 40.7 % (ref 37–47)
HGB BLD-MCNC: 13.5 GM/DL (ref 12–16)
MAGNESIUM SERPL-MCNC: 1.8 MG/DL (ref 1.6–2.4)
MCH RBC QN AUTO: 33.7 PG (ref 26–33)
MCHC RBC AUTO-ENTMCNC: 33.2 GM/DL (ref 32.2–35.5)
MCV RBC AUTO: 101.5 FL (ref 81–99)
PLATELET # BLD AUTO: 119 THOU/MM3 (ref 130–400)
PMV BLD AUTO: 13.1 FL (ref 9.4–12.4)
POTASSIUM SERPL-SCNC: 4.1 MEQ/L (ref 3.5–5.2)
RBC # BLD AUTO: 4.01 MILL/MM3 (ref 4.2–5.4)
SODIUM SERPL-SCNC: 140 MEQ/L (ref 135–145)
WBC # BLD AUTO: 6.2 THOU/MM3 (ref 4.8–10.8)

## 2025-01-24 PROCEDURE — 36415 COLL VENOUS BLD VENIPUNCTURE: CPT

## 2025-01-24 PROCEDURE — 82948 REAGENT STRIP/BLOOD GLUCOSE: CPT

## 2025-01-24 PROCEDURE — 6370000000 HC RX 637 (ALT 250 FOR IP)

## 2025-01-24 PROCEDURE — 85027 COMPLETE CBC AUTOMATED: CPT

## 2025-01-24 PROCEDURE — 6360000002 HC RX W HCPCS

## 2025-01-24 PROCEDURE — 99239 HOSP IP/OBS DSCHRG MGMT >30: CPT | Performed by: STUDENT IN AN ORGANIZED HEALTH CARE EDUCATION/TRAINING PROGRAM

## 2025-01-24 PROCEDURE — 2580000003 HC RX 258

## 2025-01-24 PROCEDURE — 51798 US URINE CAPACITY MEASURE: CPT

## 2025-01-24 PROCEDURE — 80048 BASIC METABOLIC PNL TOTAL CA: CPT

## 2025-01-24 PROCEDURE — 2500000003 HC RX 250 WO HCPCS

## 2025-01-24 PROCEDURE — 83735 ASSAY OF MAGNESIUM: CPT

## 2025-01-24 RX ORDER — LAMOTRIGINE 100 MG/1
150 TABLET ORAL 2 TIMES DAILY
Status: ON HOLD | DISCHARGE
Start: 2025-01-24

## 2025-01-24 RX ORDER — PHENOBARBITAL 30 MG/1
60 TABLET ORAL NIGHTLY
Status: ON HOLD | COMMUNITY

## 2025-01-24 RX ORDER — VALPROIC ACID 250 MG/5ML
750 SOLUTION ORAL EVERY 8 HOURS SCHEDULED
Status: ON HOLD | DISCHARGE
Start: 2025-01-24

## 2025-01-24 RX ORDER — LEVETIRACETAM 1000 MG/1
2000 TABLET ORAL 2 TIMES DAILY
Status: ON HOLD | DISCHARGE
Start: 2025-01-24

## 2025-01-24 RX ORDER — LEVETIRACETAM 500 MG/1
2000 TABLET ORAL 2 TIMES DAILY
Status: DISCONTINUED | OUTPATIENT
Start: 2025-01-24 | End: 2025-01-24 | Stop reason: HOSPADM

## 2025-01-24 RX ORDER — PHENOBARBITAL 32.4 MG/1
64.8 TABLET ORAL NIGHTLY
Status: DISCONTINUED | OUTPATIENT
Start: 2025-01-24 | End: 2025-01-24 | Stop reason: HOSPADM

## 2025-01-24 RX ORDER — VALPROIC ACID 250 MG/5ML
1000 SOLUTION ORAL EVERY 8 HOURS SCHEDULED
Status: DISCONTINUED | OUTPATIENT
Start: 2025-01-24 | End: 2025-01-24 | Stop reason: HOSPADM

## 2025-01-24 RX ORDER — LACTULOSE 10 G/15ML
SOLUTION ORAL
Status: ON HOLD | DISCHARGE
Start: 2025-01-24

## 2025-01-24 RX ORDER — PHENOBARBITAL 32.4 MG/1
32.4 TABLET ORAL EVERY MORNING
Status: DISCONTINUED | OUTPATIENT
Start: 2025-01-24 | End: 2025-01-24 | Stop reason: HOSPADM

## 2025-01-24 RX ORDER — VENLAFAXINE 75 MG/1
75 TABLET ORAL 2 TIMES DAILY
Status: ON HOLD | DISCHARGE
Start: 2025-01-24

## 2025-01-24 RX ORDER — LACOSAMIDE 200 MG/1
200 TABLET ORAL 2 TIMES DAILY
Refills: 0 | Status: ON HOLD | DISCHARGE
Start: 2025-01-24 | End: 2025-02-23

## 2025-01-24 RX ORDER — VALPROIC ACID 250 MG/5ML
1000 SOLUTION ORAL EVERY 8 HOURS SCHEDULED
DISCHARGE
Start: 2025-01-24 | End: 2025-01-24

## 2025-01-24 RX ORDER — LAMOTRIGINE 100 MG/1
100 TABLET ORAL 2 TIMES DAILY
DISCHARGE
Start: 2025-01-24 | End: 2025-01-24

## 2025-01-24 RX ORDER — LAMOTRIGINE 100 MG/1
100 TABLET ORAL 2 TIMES DAILY
Status: DISCONTINUED | OUTPATIENT
Start: 2025-01-24 | End: 2025-01-24 | Stop reason: HOSPADM

## 2025-01-24 RX ORDER — LACOSAMIDE 50 MG/1
200 TABLET ORAL 2 TIMES DAILY
Status: DISCONTINUED | OUTPATIENT
Start: 2025-01-24 | End: 2025-01-24 | Stop reason: HOSPADM

## 2025-01-24 RX ADMIN — LACTULOSE 20 G: 20 SOLUTION ORAL at 09:20

## 2025-01-24 RX ADMIN — PHENOBARBITAL 32.4 MG: 32.4 TABLET ORAL at 09:56

## 2025-01-24 RX ADMIN — LEVETIRACETAM 2000 MG: 100 INJECTION INTRAVENOUS at 00:21

## 2025-01-24 RX ADMIN — ASPIRIN 81 MG: 81 TABLET, CHEWABLE ORAL at 09:20

## 2025-01-24 RX ADMIN — VALPROIC ACID 1000 MG: 250 SOLUTION ORAL at 12:36

## 2025-01-24 RX ADMIN — LEVETIRACETAM 2000 MG: 500 TABLET, FILM COATED ORAL at 09:55

## 2025-01-24 RX ADMIN — WATER 1000 MG: 1 INJECTION INTRAMUSCULAR; INTRAVENOUS; SUBCUTANEOUS at 12:36

## 2025-01-24 RX ADMIN — SODIUM CHLORIDE 1000 MG: 9 INJECTION, SOLUTION INTRAVENOUS at 05:56

## 2025-01-24 RX ADMIN — LAMOTRIGINE 100 MG: 100 TABLET ORAL at 09:55

## 2025-01-24 RX ADMIN — LACOSAMIDE 200 MG: 50 TABLET, FILM COATED ORAL at 09:56

## 2025-01-24 RX ADMIN — ENOXAPARIN SODIUM 30 MG: 100 INJECTION SUBCUTANEOUS at 09:20

## 2025-01-24 ASSESSMENT — PAIN SCALES - GENERAL
PAINLEVEL_OUTOF10: 10
PAINLEVEL_OUTOF10: 8

## 2025-01-24 ASSESSMENT — PAIN - FUNCTIONAL ASSESSMENT: PAIN_FUNCTIONAL_ASSESSMENT: ACTIVITIES ARE NOT PREVENTED

## 2025-01-24 ASSESSMENT — PAIN DESCRIPTION - DESCRIPTORS: DESCRIPTORS: ACHING

## 2025-01-24 ASSESSMENT — PAIN DESCRIPTION - FREQUENCY: FREQUENCY: CONTINUOUS

## 2025-01-24 ASSESSMENT — PAIN DESCRIPTION - ORIENTATION: ORIENTATION: MID;LOWER

## 2025-01-24 ASSESSMENT — PAIN DESCRIPTION - PAIN TYPE: TYPE: ACUTE PAIN

## 2025-01-24 ASSESSMENT — PAIN DESCRIPTION - LOCATION: LOCATION: BACK;GROIN

## 2025-01-24 ASSESSMENT — PAIN SCALES - WONG BAKER: WONGBAKER_NUMERICALRESPONSE: HURTS A LITTLE BIT

## 2025-01-24 ASSESSMENT — PAIN DESCRIPTION - ONSET: ONSET: ON-GOING

## 2025-01-24 NOTE — PROGRESS NOTES
Physician Progress Note      PATIENT:               NOEMÍ DIOP  [facility] #:                  [id number]  :                       1955  ADMIT DATE:       2025 9:24 PM  DISCH DATE:  RESPONDING  PROVIDER #:        Yvon Thurston DO          QUERY TEXT:    Patient admitted with seizure. Noted to have Malnutrition Status:  Moderate   malnutrition (25 1325) by dietician. If possible, please document in   progress notes and discharge summary if you are evaluating and /or treating   any of the following:    The medical record reflects the following:  Risk Factors: Context:  Acute Illness  Findings of the 6 clinical characteristics of malnutrition:  Energy Intake:  50% or less of estimated energy requirements for 5 or more   days (less than 50% x 5 days)  Weight Loss:  Unable to assess (due to + 2 edema/fluid fluctuations)  Body Fat Loss:  No body fat loss  Muscle Mass Loss:  Mild muscle mass loss Temples (temporalis)  Fluid Accumulation:  Moderate to Severe Extremities  Clinical Indicators: Nutrition Assessment:  Pt. moderately malnourished AEB criteria as listed above.  At risk for further   nutrition compromise r/t admit with Grand Mal Seizure, noncompliance with   seizure meds ( was having difficulty swallowing seizure meds at [facility] per Naheed PERSAUD at [facility]), UTI and underlying medical condition ( Note pt hx of   Dysphagia with PEG tube & was on Jevity 1.5 at goal 40ml/hr when here 11/3/23-   PEG removed, unsure when, ADHD, Cerebral Palsy, Depression, HTN, Seizure).  Treatment: ADULT ORAL NUTRITION SUPPLEMENT; Lunch, Dinner; Frozen Oral   Supplement/ MBS 1-22, started on puree diet plus thin liquids. Started on D5   in LR at 75 cc an hour    ASPEN Criteria:    https://aspenjournals.onlinelibrary.fleming.com/doi/full/10.1177/[email or web address]  5    Thank you. Holly Finch RN, Clinical Documentation Integrity, [employer], [facility], CRCR  Options provided:  -- Moderate Protein

## 2025-01-24 NOTE — PROGRESS NOTES
CLINICAL PHARMACY: DISCHARGE MED RECONCILIATION/REVIEW    [facility] Select Patient?: Yes  Total # of Interventions Recommended: 9 - Discontinued Medication #: 3  - Updated Order #: 6  Total # Interventions Accepted: 9  Intervention Severity:   - Level 1 Intervention Present?: Yes - 2 -  AED   - Level 2 #: 7   - Level 3 #: 0   Time Spent (min): 60    Any Fish PharmD 1/24/2025 12:16 PM

## 2025-01-24 NOTE — DISCHARGE SUMMARY
Resident Discharge Summary (Hospitalist)      Patient: Mariangel Mas 69 y.o. female  : 1955  MRN: [id number]   Account: [id number]   Patient's PCP: Analisa Ulloa MD    Admit Date: 2025   Discharge Date: 2025      Admitting Physician: Madan Dutta, DO  Discharge Physician: Yvon Thurston, DO       Discharge Diagnoses:  Status post grand mal seizure w/history of she reports in the setting of medication noncompliance: Patient reportedly had a hour-long seizure at nursing home.  Has not been taking her medications due to her G-tube being removed.  Patient with extensive history of seizures in the past, requiring hospitalizations. Home regiment includes phenobarbital, lamotrigine, Keppra, and valporic acid. Head CT negative for acute intracranial process. Pt was loaded with Keppra in the ED. Transitioned to IV medications.  Patient had MBS today, started on puree diet plus thin liquids.  Patient was switched to oral medications, however patient became increasingly lethargic this morning and due to risk of aspiration we will switch back to IV medications. Holding phenobarbital this could be contributing to the lethargy.  EEG showed abnormalities indicating a moderate encephalopathy likely due to UTI. Pt discharged with oral AED's. Follow up with neurology in 1-2 weeks  UTI: UA showed moderate leukocyte Esterase, positive nitrate, many bacteria, WBC greater than 100.  Patient started on Rocephin. Patient afebrile. WBC count of 6.2 on day of discharge. Pt treated with 5 day course of CTX.    ADHD: pt on Adderall at SNF. Continue Adderall on discharge  Malnutrition: Pt on carnitor at SNF. Started on D5 in LR at 75 cc an hour for 13 hours during stay. Continue carnitor on discharge.       Hospital Course:   Mariangel Mas is a 69 y.o. female with PMHx seizures, ADHD, cerebral palsy  admitted to [facility] on 2025 for  grand mall seizure.  Patient reportedly has  to light. Conjunctivae/corneas clear.  HENT: Head normal in appearance. External nares normal.  Oral mucosa moist without lesions.  Hearing grossly intact.   Neck: Supple, with full range of motion. Trachea midline.  No gross JVD appreciated.  Respiratory:  Normal respiratory effort. Clear to auscultation, bilaterally without rales or wheezes or rhonchi.  Cardiovascular: Normal rate, regular rhythm with normal S1/S2 without murmurs.   Abdomen: Soft, non-tender, non-distended with normal bowel sounds.  Musculoskeletal: There is no joint swelling or tenderness. Normal tone. No abnormal movements.   Skin: Warm and dry. No rashes or lesions.  Neurologic:  No focal sensory/motor deficits in the upper and lower extremities.  Psychiatric: Alert and oriented  Peripheral Pulses: +2 palpable, equal bilaterally.       Labs: For convenience the most recent labs are provided:  CBC:    Lab Results   Component Value Date/Time    WBC 6.2 01/24/2025 05:45 AM    HGB 13.5 01/24/2025 05:45 AM    HCT 40.7 01/24/2025 05:45 AM     01/24/2025 05:45 AM     Renal:    Lab Results   Component Value Date/Time     01/24/2025 05:45 AM    K 4.1 01/24/2025 05:45 AM    K 4.2 01/21/2025 07:40 AM     01/24/2025 05:45 AM    CO2 24 01/24/2025 05:45 AM    BUN 8 01/24/2025 05:45 AM    CREATININE 0.4 01/24/2025 05:45 AM    CALCIUM 8.6 01/24/2025 05:45 AM    PHOS 4.1 10/01/2023 12:30 PM     Liver:   Lab Results   Component Value Date/Time    AST 19 01/21/2025 07:40 AM    ALT 18 01/21/2025 07:40 AM         Significant Diagnostic Studies    Radiology:   FL MODIFIED BARIUM SWALLOW W VIDEO   Final Result   1. Laryngeal penetration of thin barium without evidence of aspiration.   2. Additional recommendations from the speech therapist will follow.            **This report has been created using voice recognition software. It may contain   minor errors which are inherent in voice recognition technology.**            Electronically signed by

## 2025-01-24 NOTE — PROGRESS NOTES
Comprehensive Nutrition Assessment    Type and Reason for Visit:  Reassess    Nutrition Recommendations/Plan:   Continue current diet per SLP/Provider.  Continue Magic Cup BID.   Consider MVI as appropriate.     Malnutrition Assessment:  Malnutrition Status:  Moderate malnutrition (01/22/25 1325)    Context:  Acute Illness     Findings of the 6 clinical characteristics of malnutrition:  Energy Intake:  50% or less of estimated energy requirements for 5 or more days (less than 50% x 5 days)  Weight Loss:  Unable to assess (due to + 2 edema/fluid fluctuations)     Body Fat Loss:  No body fat loss     Muscle Mass Loss:  Mild muscle mass loss Temples (temporalis)  Fluid Accumulation:  Moderate to Severe Extremities   Strength:  Not Performed    Nutrition Assessment:      Pt. improving from a nutritional standpoint AEB pt mentions appetite is good & mentions she consumed a Magic Cup yesterday.   At risk for further nutrition compromise r/t admit with Grand Mal Seizure, noncompliance with seizure meds ( was having difficulty swallowing seizure meds at [facility] per Naheed PERSAUD at [facility]), moderate malnutrition, UTI and underlying medical condition ( Note pt hx of Dysphagia with PEG tube & was on Jevity 1.5 at goal 40ml/hr when here 11/3/23- PEG removed, unsure when, ADHD, Cerebral Palsy, Depression, HTN, Seizure)     Nutrition Related Findings:    Pt. Report/Treatments/Miscellaneous: Pt seen, mentions appetite is good, has a telesitter, slow to answer questions but does mentions she consumed a Magic Cup yesterday & likes them. Notice wt is up 8# suspect  possibly fluid related. Pt s/p MBS 1/22/25.   GI Status: BM x 2   Pertinent Labs: reviewed  Pertinent Meds: lactulose     Wound Type:  (stage 1 buttocks right & left, stage II coccyx)       Current Nutrition Intake & Therapies:    Average Meal Intake: 26-50%, 51-75%, 1-25%  Average Supplements Intake: 26-50% (per pt report)  ADULT DIET; Dysphagia - Pureed  ADULT ORAL

## 2025-01-24 NOTE — CARE COORDINATION
1/24/25, 10:09 AM EST    Patient goals/plan/ treatment preferences discussed by  and .  Patient goals/plan/ treatment preferences reviewed with patient/ family.  Patient/ family verbalize understanding of discharge plan and are in agreement with goal/plan/treatment preferences.  Understanding was demonstrated using the teach back method.  AVS provided by RN at time of discharge, which includes all necessary medical information pertaining to the patients current course of illness, treatment, post-discharge goals of care, and treatment preferences.     Services At/After Discharge: Skilled Nursing Facility (SNF) [facility]    Patient to discharge back to [facility]. Guardian made aware and is agreeable to discharge.     SW scheduled transportation for 2pm.     RN made aware and discharge instructions placed on chart.

## 2025-01-31 ENCOUNTER — APPOINTMENT (OUTPATIENT)
Dept: GENERAL RADIOLOGY | Age: 70
DRG: 101 | End: 2025-01-31
Payer: MEDICARE

## 2025-01-31 ENCOUNTER — APPOINTMENT (OUTPATIENT)
Dept: CT IMAGING | Age: 70
DRG: 101 | End: 2025-01-31
Payer: MEDICARE

## 2025-01-31 ENCOUNTER — HOSPITAL ENCOUNTER (INPATIENT)
Age: 70
LOS: 5 days | Discharge: HOME | DRG: 101 | End: 2025-02-05
Attending: EMERGENCY MEDICINE | Admitting: INTERNAL MEDICINE
Payer: MEDICARE

## 2025-01-31 DIAGNOSIS — R53.1 GENERAL WEAKNESS: Primary | ICD-10-CM

## 2025-01-31 DIAGNOSIS — G40.409 GRAND MAL SEIZURE (HCC): ICD-10-CM

## 2025-01-31 LAB
ALBUMIN SERPL BCG-MCNC: 3.6 G/DL (ref 3.5–5.1)
ALP SERPL-CCNC: 56 U/L (ref 38–126)
ALT SERPL W/O P-5'-P-CCNC: 12 U/L (ref 11–66)
AMORPH SED URNS QL MICRO: ABNORMAL
ANION GAP SERPL CALC-SCNC: 13 MEQ/L (ref 8–16)
AST SERPL-CCNC: 13 U/L (ref 5–40)
BACTERIA URNS QL MICRO: ABNORMAL /HPF
BASOPHILS ABSOLUTE: 0 THOU/MM3 (ref 0–0.1)
BASOPHILS NFR BLD AUTO: 0.2 %
BILIRUB SERPL-MCNC: 0.3 MG/DL (ref 0.3–1.2)
BILIRUB UR QL STRIP.AUTO: NEGATIVE
BUN SERPL-MCNC: 11 MG/DL (ref 7–22)
CALCIUM SERPL-MCNC: 9.1 MG/DL (ref 8.5–10.5)
CASTS #/AREA URNS LPF: ABNORMAL /LPF
CASTS 2: ABNORMAL /LPF
CHARACTER UR: CLEAR
CHLORIDE SERPL-SCNC: 100 MEQ/L (ref 98–111)
CO2 SERPL-SCNC: 26 MEQ/L (ref 23–33)
COLOR, UA: YELLOW
CREAT SERPL-MCNC: 0.5 MG/DL (ref 0.4–1.2)
CRYSTALS URNS MICRO: ABNORMAL
DEPRECATED RDW RBC AUTO: 46.6 FL (ref 35–45)
EKG ATRIAL RATE: 67 BPM
EKG P AXIS: 30 DEGREES
EKG P-R INTERVAL: 158 MS
EKG Q-T INTERVAL: 390 MS
EKG QRS DURATION: 78 MS
EKG QTC CALCULATION (BAZETT): 412 MS
EKG R AXIS: 8 DEGREES
EKG T AXIS: 22 DEGREES
EKG VENTRICULAR RATE: 67 BPM
EOSINOPHIL NFR BLD AUTO: 2.9 %
EOSINOPHILS ABSOLUTE: 0.2 THOU/MM3 (ref 0–0.4)
EPITHELIAL CELLS, UA: ABNORMAL /HPF
ERYTHROCYTE [DISTWIDTH] IN BLOOD BY AUTOMATED COUNT: 12.4 % (ref 11.5–14.5)
GFR SERPL CREATININE-BSD FRML MDRD: > 90 ML/MIN/1.73M2
GLUCOSE SERPL-MCNC: 99 MG/DL (ref 70–108)
GLUCOSE UR QL STRIP.AUTO: NEGATIVE MG/DL
HCT VFR BLD AUTO: 42.7 % (ref 37–47)
HGB BLD-MCNC: 14.2 GM/DL (ref 12–16)
HGB UR QL STRIP.AUTO: NEGATIVE
IMM GRANULOCYTES # BLD AUTO: 0.08 THOU/MM3 (ref 0–0.07)
IMM GRANULOCYTES NFR BLD AUTO: 1 %
KETONES UR QL STRIP.AUTO: ABNORMAL
LACTIC ACID, SEPSIS: 1.7 MMOL/L (ref 0.5–1.9)
LYMPHOCYTES ABSOLUTE: 2.7 THOU/MM3 (ref 1–4.8)
LYMPHOCYTES NFR BLD AUTO: 32.5 %
MAGNESIUM SERPL-MCNC: 1.9 MG/DL (ref 1.6–2.4)
MCH RBC QN AUTO: 33.7 PG (ref 26–33)
MCHC RBC AUTO-ENTMCNC: 33.3 GM/DL (ref 32.2–35.5)
MCV RBC AUTO: 101.4 FL (ref 81–99)
MISCELLANEOUS 2: ABNORMAL
MONOCYTES ABSOLUTE: 0.9 THOU/MM3 (ref 0.4–1.3)
MONOCYTES NFR BLD AUTO: 10.9 %
MUCOUS THREADS URNS QL MICRO: ABNORMAL
NEUTROPHILS ABSOLUTE: 4.4 THOU/MM3 (ref 1.8–7.7)
NEUTROPHILS NFR BLD AUTO: 52.5 %
NITRITE UR QL STRIP: NEGATIVE
NRBC BLD AUTO-RTO: 0 /100 WBC
OSMOLALITY SERPL CALC.SUM OF ELEC: 277 MOSMOL/KG (ref 275–300)
PH UR STRIP.AUTO: 6.5 [PH] (ref 5–9)
PLATELET # BLD AUTO: 127 THOU/MM3 (ref 130–400)
PMV BLD AUTO: 12.4 FL (ref 9.4–12.4)
POTASSIUM SERPL-SCNC: 4.2 MEQ/L (ref 3.5–5.2)
PROT SERPL-MCNC: 6.8 G/DL (ref 6.1–8)
PROT UR STRIP.AUTO-MCNC: ABNORMAL MG/DL
RBC # BLD AUTO: 4.21 MILL/MM3 (ref 4.2–5.4)
RBC URINE: ABNORMAL /HPF
RENAL EPI CELLS #/AREA URNS HPF: ABNORMAL /[HPF]
SODIUM SERPL-SCNC: 139 MEQ/L (ref 135–145)
SP GR UR REFRACT.AUTO: 1.03 (ref 1–1.03)
TROPONIN, HIGH SENSITIVITY: 17 NG/L (ref 0–12)
TSH SERPL DL<=0.005 MIU/L-ACNC: 3.21 UIU/ML (ref 0.4–4.2)
UROBILINOGEN, URINE: 0.2 EU/DL (ref 0–1)
WBC # BLD AUTO: 8.3 THOU/MM3 (ref 4.8–10.8)
WBC #/AREA URNS HPF: ABNORMAL /HPF
WBC #/AREA URNS HPF: ABNORMAL /[HPF]
YEAST LIKE FUNGI URNS QL MICRO: ABNORMAL

## 2025-01-31 PROCEDURE — 96374 THER/PROPH/DIAG INJ IV PUSH: CPT

## 2025-01-31 PROCEDURE — 87106 FUNGI IDENTIFICATION YEAST: CPT

## 2025-01-31 PROCEDURE — 87077 CULTURE AEROBIC IDENTIFY: CPT

## 2025-01-31 PROCEDURE — 84484 ASSAY OF TROPONIN QUANT: CPT

## 2025-01-31 PROCEDURE — 6360000002 HC RX W HCPCS: Performed by: NURSE PRACTITIONER

## 2025-01-31 PROCEDURE — 2580000003 HC RX 258: Performed by: EMERGENCY MEDICINE

## 2025-01-31 PROCEDURE — 80053 COMPREHEN METABOLIC PANEL: CPT

## 2025-01-31 PROCEDURE — 70450 CT HEAD/BRAIN W/O DYE: CPT

## 2025-01-31 PROCEDURE — 87186 SC STD MICRODIL/AGAR DIL: CPT

## 2025-01-31 PROCEDURE — 84443 ASSAY THYROID STIM HORMONE: CPT

## 2025-01-31 PROCEDURE — 2500000003 HC RX 250 WO HCPCS: Performed by: EMERGENCY MEDICINE

## 2025-01-31 PROCEDURE — 2500000003 HC RX 250 WO HCPCS: Performed by: NURSE PRACTITIONER

## 2025-01-31 PROCEDURE — 36415 COLL VENOUS BLD VENIPUNCTURE: CPT

## 2025-01-31 PROCEDURE — C9254 INJECTION, LACOSAMIDE: HCPCS | Performed by: NURSE PRACTITIONER

## 2025-01-31 PROCEDURE — 85025 COMPLETE CBC W/AUTO DIFF WBC: CPT

## 2025-01-31 PROCEDURE — 87086 URINE CULTURE/COLONY COUNT: CPT

## 2025-01-31 PROCEDURE — 99285 EMERGENCY DEPT VISIT HI MDM: CPT

## 2025-01-31 PROCEDURE — 99222 1ST HOSP IP/OBS MODERATE 55: CPT | Performed by: INTERNAL MEDICINE

## 2025-01-31 PROCEDURE — 83735 ASSAY OF MAGNESIUM: CPT

## 2025-01-31 PROCEDURE — 1200000003 HC TELEMETRY R&B

## 2025-01-31 PROCEDURE — 81001 URINALYSIS AUTO W/SCOPE: CPT

## 2025-01-31 PROCEDURE — 93005 ELECTROCARDIOGRAM TRACING: CPT | Performed by: EMERGENCY MEDICINE

## 2025-01-31 PROCEDURE — 6360000002 HC RX W HCPCS: Performed by: EMERGENCY MEDICINE

## 2025-01-31 PROCEDURE — 2580000003 HC RX 258: Performed by: NURSE PRACTITIONER

## 2025-01-31 PROCEDURE — 71046 X-RAY EXAM CHEST 2 VIEWS: CPT

## 2025-01-31 PROCEDURE — 83605 ASSAY OF LACTIC ACID: CPT

## 2025-01-31 RX ORDER — POTASSIUM CHLORIDE 7.45 MG/ML
10 INJECTION INTRAVENOUS PRN
Status: DISCONTINUED | OUTPATIENT
Start: 2025-01-31 | End: 2025-02-05 | Stop reason: HOSPADM

## 2025-01-31 RX ORDER — LACOSAMIDE 200 MG/1
200 TABLET ORAL 2 TIMES DAILY
Status: CANCELLED | OUTPATIENT
Start: 2025-01-31

## 2025-01-31 RX ORDER — PHENOBARBITAL SODIUM 65 MG/ML
32.5 INJECTION, SOLUTION INTRAMUSCULAR; INTRAVENOUS EVERY MORNING
Status: CANCELLED | OUTPATIENT
Start: 2025-02-01

## 2025-01-31 RX ORDER — ACETAMINOPHEN 325 MG/1
650 TABLET ORAL EVERY 6 HOURS PRN
Status: DISCONTINUED | OUTPATIENT
Start: 2025-01-31 | End: 2025-02-05 | Stop reason: HOSPADM

## 2025-01-31 RX ORDER — SODIUM CHLORIDE 0.9 % (FLUSH) 0.9 %
5-40 SYRINGE (ML) INJECTION PRN
Status: DISCONTINUED | OUTPATIENT
Start: 2025-01-31 | End: 2025-02-05 | Stop reason: HOSPADM

## 2025-01-31 RX ORDER — ACETAMINOPHEN 650 MG/1
650 SUPPOSITORY RECTAL EVERY 6 HOURS PRN
Status: DISCONTINUED | OUTPATIENT
Start: 2025-01-31 | End: 2025-02-05 | Stop reason: HOSPADM

## 2025-01-31 RX ORDER — POLYETHYLENE GLYCOL 3350 17 G/17G
17 POWDER, FOR SOLUTION ORAL DAILY PRN
Status: DISCONTINUED | OUTPATIENT
Start: 2025-01-31 | End: 2025-02-05 | Stop reason: HOSPADM

## 2025-01-31 RX ORDER — PHENOBARBITAL 32.4 MG/1
30 TABLET ORAL EVERY MORNING
Status: CANCELLED | OUTPATIENT
Start: 2025-02-01

## 2025-01-31 RX ORDER — SODIUM CHLORIDE 9 MG/ML
INJECTION, SOLUTION INTRAVENOUS CONTINUOUS
Status: ACTIVE | OUTPATIENT
Start: 2025-01-31 | End: 2025-02-01

## 2025-01-31 RX ORDER — LORAZEPAM 2 MG/ML
0.5 INJECTION INTRAMUSCULAR EVERY 4 HOURS PRN
Status: DISCONTINUED | OUTPATIENT
Start: 2025-01-31 | End: 2025-02-05 | Stop reason: HOSPADM

## 2025-01-31 RX ORDER — LACOSAMIDE 10 MG/ML
200 SOLUTION ORAL 2 TIMES DAILY
Status: CANCELLED | OUTPATIENT
Start: 2025-01-31

## 2025-01-31 RX ORDER — SODIUM CHLORIDE 9 MG/ML
INJECTION, SOLUTION INTRAVENOUS PRN
Status: DISCONTINUED | OUTPATIENT
Start: 2025-01-31 | End: 2025-02-05 | Stop reason: HOSPADM

## 2025-01-31 RX ORDER — ONDANSETRON 2 MG/ML
4 INJECTION INTRAMUSCULAR; INTRAVENOUS EVERY 6 HOURS PRN
Status: DISCONTINUED | OUTPATIENT
Start: 2025-01-31 | End: 2025-02-05 | Stop reason: HOSPADM

## 2025-01-31 RX ORDER — PHENOBARBITAL 32.4 MG/1
60 TABLET ORAL NIGHTLY
Status: CANCELLED | OUTPATIENT
Start: 2025-01-31

## 2025-01-31 RX ORDER — LEVETIRACETAM 500 MG/1
2000 TABLET ORAL 2 TIMES DAILY
Status: CANCELLED | OUTPATIENT
Start: 2025-01-31

## 2025-01-31 RX ORDER — VALPROIC ACID 250 MG/5ML
750 SOLUTION ORAL EVERY 8 HOURS SCHEDULED
Status: CANCELLED | OUTPATIENT
Start: 2025-01-31

## 2025-01-31 RX ORDER — ONDANSETRON 4 MG/1
4 TABLET, ORALLY DISINTEGRATING ORAL EVERY 8 HOURS PRN
Status: DISCONTINUED | OUTPATIENT
Start: 2025-01-31 | End: 2025-02-05 | Stop reason: HOSPADM

## 2025-01-31 RX ORDER — PHENOBARBITAL SODIUM 65 MG/ML
65 INJECTION, SOLUTION INTRAMUSCULAR; INTRAVENOUS NIGHTLY
Status: CANCELLED | OUTPATIENT
Start: 2025-01-31

## 2025-01-31 RX ORDER — LEVETIRACETAM 500 MG/5ML
1000 INJECTION, SOLUTION, CONCENTRATE INTRAVENOUS ONCE
Status: COMPLETED | OUTPATIENT
Start: 2025-01-31 | End: 2025-01-31

## 2025-01-31 RX ORDER — SODIUM CHLORIDE 0.9 % (FLUSH) 0.9 %
5-40 SYRINGE (ML) INJECTION EVERY 12 HOURS SCHEDULED
Status: DISCONTINUED | OUTPATIENT
Start: 2025-01-31 | End: 2025-02-05 | Stop reason: HOSPADM

## 2025-01-31 RX ORDER — LEVETIRACETAM 500 MG/5ML
2000 INJECTION, SOLUTION, CONCENTRATE INTRAVENOUS EVERY 12 HOURS
Status: CANCELLED | OUTPATIENT
Start: 2025-01-31

## 2025-01-31 RX ORDER — ENOXAPARIN SODIUM 100 MG/ML
40 INJECTION SUBCUTANEOUS DAILY
Status: DISCONTINUED | OUTPATIENT
Start: 2025-02-01 | End: 2025-02-04

## 2025-01-31 RX ORDER — MAGNESIUM SULFATE IN WATER 40 MG/ML
2000 INJECTION, SOLUTION INTRAVENOUS PRN
Status: DISCONTINUED | OUTPATIENT
Start: 2025-01-31 | End: 2025-02-05 | Stop reason: HOSPADM

## 2025-01-31 RX ORDER — 0.9 % SODIUM CHLORIDE 0.9 %
1000 INTRAVENOUS SOLUTION INTRAVENOUS ONCE
Status: COMPLETED | OUTPATIENT
Start: 2025-01-31 | End: 2025-01-31

## 2025-01-31 RX ORDER — POTASSIUM CHLORIDE 1500 MG/1
40 TABLET, EXTENDED RELEASE ORAL PRN
Status: DISCONTINUED | OUTPATIENT
Start: 2025-01-31 | End: 2025-02-05 | Stop reason: HOSPADM

## 2025-01-31 RX ORDER — LEVETIRACETAM 500 MG/5ML
1000 INJECTION, SOLUTION, CONCENTRATE INTRAVENOUS EVERY 12 HOURS
Status: DISCONTINUED | OUTPATIENT
Start: 2025-02-01 | End: 2025-02-01

## 2025-01-31 RX ADMIN — LEVETIRACETAM 1000 MG: 100 INJECTION INTRAVENOUS at 17:29

## 2025-01-31 RX ADMIN — VALPROATE SODIUM 750 MG: 100 INJECTION, SOLUTION INTRAVENOUS at 21:22

## 2025-01-31 RX ADMIN — LACOSAMIDE 200 MG: 10 INJECTION INTRAVENOUS at 22:24

## 2025-01-31 RX ADMIN — WATER 1000 MG: 1 INJECTION INTRAMUSCULAR; INTRAVENOUS; SUBCUTANEOUS at 14:45

## 2025-01-31 RX ADMIN — SODIUM CHLORIDE 1000 ML: 9 INJECTION, SOLUTION INTRAVENOUS at 13:55

## 2025-01-31 ASSESSMENT — PAIN - FUNCTIONAL ASSESSMENT: PAIN_FUNCTIONAL_ASSESSMENT: NONE - DENIES PAIN

## 2025-01-31 NOTE — ED NOTES
ED to inpatient nurses report      Chief Complaint:  Chief Complaint   Patient presents with    Fatigue     Present to ED from: nursing facility    MOA:     LOC: alert to only name, patient also nonverbal  Mobility: Fully dependent  Oxygen Baseline: room air    Current needs required: room air     Code Status:   Full Code    What abnormal results were found and what did you give/do to treat them? Keppra, fluids, rocephin  Any procedures or intervention occur? NA    Mental Status:  Level of Consciousness: Responds to voice (1)    Psych Assessment:        Vitals:  Patient Vitals for the past 24 hrs:   BP Temp Temp src Pulse Resp SpO2   01/31/25 1655 136/65 -- -- 59 20 100 %   01/31/25 1541 126/63 -- -- 60 21 95 %   01/31/25 1434 -- -- -- 60 19 96 %   01/31/25 1352 122/68 -- -- 62 19 100 %   01/31/25 1250 (!) 144/70 -- -- 60 20 95 %   01/31/25 1139 125/70 -- -- 67 -- --   01/31/25 1016 -- 98.4 °F (36.9 °C) Oral -- -- --   01/31/25 0945 136/70 -- -- 68 25 94 %        LDAs:   Peripheral IV 01/31/25 Proximal;Right Forearm (Active)   Site Assessment Clean, dry & intact 01/31/25 1655   Line Status Normal saline locked;Flushed 01/31/25 1655   Phlebitis Assessment No symptoms 01/31/25 1655   Infiltration Assessment 0 01/31/25 1655   Dressing Status Clean, dry & intact 01/31/25 1655       Ambulatory Status:  Presents to emergency department  because of falls (Syncope, seizure, or loss of consciousness): No, Age > 70: Yes, Altered Mental Status, Intoxication with alcohol or substance confusion (Disorientation, impaired judgment, poor safety awaremess, or inability to follow instructions): Yes, Impaired Mobility: Ambulates or transfers with assistive devices or assistance; Unable to ambulate or transer.: Yes, Nursing Judgement: Yes    Diagnosis:  DISPOSITION Admitted 01/31/2025 05:26:07 PM   Final diagnoses:   General weakness        Consults:  IP CONSULT TO CASE MANAGEMENT  IP CONSULT TO NEUROLOGY     Pain Score:  Pain

## 2025-01-31 NOTE — ED NOTES
Patient resting on cot. VS stable. Telemetry in place. IV fluids infusing. Call light in reach. Patient opens eyes when spoken to. External cath in place.

## 2025-01-31 NOTE — ED NOTES
VS stable. Telemetry in place. Patient opens eyes when spoken to. Call light in reach. External catheter in place.

## 2025-01-31 NOTE — ED NOTES
Patient resting on cot. VS stable. Telemetry in place. Patient opens eyes when spoken to. External cath in place. Call light in reach.

## 2025-01-31 NOTE — ED NOTES
Pt to ED for eval of fatigue. Facility states that pt is refusing to eat, drink, or take medications. Pt is alert and opens eyes spontaneously. Pt follows commands. Pt does not speak. Pt placed on external catheter and IV access attempted via us x 2. Another RN to attempt. Pt in stable condition

## 2025-01-31 NOTE — CARE COORDINATION
Spoke with DEZ Guzmán at [facility] who advised they have no scheduled swallow evaluation for patient. Ewelina RN DON only had PEG once, was PO for a few months prior to PEG being removed, patient pockets medications for about a month now. Sees speech at facility and guardian is willing to make her DNRCC but can not because no terminal illness. Is taking fluids and food PO.   Dr. Booker medical Director would like to speak with ED doctor.  This information was presented to Dr. Metcalf.

## 2025-01-31 NOTE — ED NOTES
From [facility] lina. Not eating or drinking and is refusing medications for about five days. Hard to respond. \"Uncontrolled pain.\" Full code, total care. Honey thick liquids.

## 2025-01-31 NOTE — ED NOTES
Patient opens eyes to voice. VS stable. Telemetry in place. External catheter in place. Call light in reach.

## 2025-02-01 LAB
ANION GAP SERPL CALC-SCNC: 14 MEQ/L (ref 8–16)
BUN SERPL-MCNC: 7 MG/DL (ref 7–22)
CALCIUM SERPL-MCNC: 8.7 MG/DL (ref 8.5–10.5)
CHLORIDE SERPL-SCNC: 103 MEQ/L (ref 98–111)
CO2 SERPL-SCNC: 20 MEQ/L (ref 23–33)
CREAT SERPL-MCNC: 0.3 MG/DL (ref 0.4–1.2)
DEPRECATED RDW RBC AUTO: 46.6 FL (ref 35–45)
ERYTHROCYTE [DISTWIDTH] IN BLOOD BY AUTOMATED COUNT: 12.3 % (ref 11.5–14.5)
GFR SERPL CREATININE-BSD FRML MDRD: > 90 ML/MIN/1.73M2
GLUCOSE SERPL-MCNC: 89 MG/DL (ref 70–108)
HCT VFR BLD AUTO: 44.6 % (ref 37–47)
HGB BLD-MCNC: 14.9 GM/DL (ref 12–16)
KEPPRA: 47 UG/ML
MCH RBC QN AUTO: 34 PG (ref 26–33)
MCHC RBC AUTO-ENTMCNC: 33.4 GM/DL (ref 32.2–35.5)
MCV RBC AUTO: 101.8 FL (ref 81–99)
NT-PROBNP SERPL IA-MCNC: 113.5 PG/ML (ref 0–124)
OSMOLALITY SERPL CALC.SUM OF ELEC: 271.3 MOSMOL/KG (ref 275–300)
PHENOBARB SERPL-MCNC: 23.1 MCG/ML (ref 10–48)
PLATELET # BLD AUTO: 117 THOU/MM3 (ref 130–400)
PMV BLD AUTO: 12.4 FL (ref 9.4–12.4)
POTASSIUM SERPL-SCNC: 4.9 MEQ/L (ref 3.5–5.2)
RBC # BLD AUTO: 4.38 MILL/MM3 (ref 4.2–5.4)
SODIUM SERPL-SCNC: 137 MEQ/L (ref 135–145)
TROPONIN, HIGH SENSITIVITY: 12 NG/L (ref 0–12)
VALPROATE SERPL-MCNC: 81.4 UG/ML (ref 50–100)
WBC # BLD AUTO: 5.4 THOU/MM3 (ref 4.8–10.8)

## 2025-02-01 PROCEDURE — 80235 DRUG ASSAY LACOSAMIDE: CPT

## 2025-02-01 PROCEDURE — C9254 INJECTION, LACOSAMIDE: HCPCS | Performed by: NURSE PRACTITIONER

## 2025-02-01 PROCEDURE — 80175 DRUG SCREEN QUAN LAMOTRIGINE: CPT

## 2025-02-01 PROCEDURE — 84484 ASSAY OF TROPONIN QUANT: CPT

## 2025-02-01 PROCEDURE — 2500000003 HC RX 250 WO HCPCS: Performed by: INTERNAL MEDICINE

## 2025-02-01 PROCEDURE — 80177 DRUG SCRN QUAN LEVETIRACETAM: CPT

## 2025-02-01 PROCEDURE — 80164 ASSAY DIPROPYLACETIC ACD TOT: CPT

## 2025-02-01 PROCEDURE — 2580000003 HC RX 258: Performed by: INTERNAL MEDICINE

## 2025-02-01 PROCEDURE — 2580000003 HC RX 258: Performed by: NURSE PRACTITIONER

## 2025-02-01 PROCEDURE — 6360000002 HC RX W HCPCS: Performed by: NURSE PRACTITIONER

## 2025-02-01 PROCEDURE — 6360000002 HC RX W HCPCS: Performed by: INTERNAL MEDICINE

## 2025-02-01 PROCEDURE — 1200000003 HC TELEMETRY R&B

## 2025-02-01 PROCEDURE — 2500000003 HC RX 250 WO HCPCS: Performed by: NURSE PRACTITIONER

## 2025-02-01 PROCEDURE — 99232 SBSQ HOSP IP/OBS MODERATE 35: CPT | Performed by: INTERNAL MEDICINE

## 2025-02-01 PROCEDURE — 85027 COMPLETE CBC AUTOMATED: CPT

## 2025-02-01 PROCEDURE — 36415 COLL VENOUS BLD VENIPUNCTURE: CPT

## 2025-02-01 PROCEDURE — 83880 ASSAY OF NATRIURETIC PEPTIDE: CPT

## 2025-02-01 PROCEDURE — 80048 BASIC METABOLIC PNL TOTAL CA: CPT

## 2025-02-01 PROCEDURE — 99232 SBSQ HOSP IP/OBS MODERATE 35: CPT | Performed by: NURSE PRACTITIONER

## 2025-02-01 PROCEDURE — 6370000000 HC RX 637 (ALT 250 FOR IP): Performed by: INTERNAL MEDICINE

## 2025-02-01 PROCEDURE — 80184 ASSAY OF PHENOBARBITAL: CPT

## 2025-02-01 RX ORDER — MULTIVITAMIN WITH IRON
1 TABLET ORAL DAILY
Status: DISCONTINUED | OUTPATIENT
Start: 2025-02-01 | End: 2025-02-05 | Stop reason: HOSPADM

## 2025-02-01 RX ORDER — ACETAMINOPHEN 325 MG/1
650 TABLET ORAL EVERY 6 HOURS PRN
Status: DISCONTINUED | OUTPATIENT
Start: 2025-02-01 | End: 2025-02-05 | Stop reason: HOSPADM

## 2025-02-01 RX ORDER — LEVETIRACETAM 500 MG/5ML
1500 INJECTION, SOLUTION, CONCENTRATE INTRAVENOUS EVERY 12 HOURS
Status: DISCONTINUED | OUTPATIENT
Start: 2025-02-01 | End: 2025-02-01

## 2025-02-01 RX ORDER — ZINC SULFATE 50(220)MG
50 CAPSULE ORAL DAILY
Status: DISCONTINUED | OUTPATIENT
Start: 2025-02-01 | End: 2025-02-05 | Stop reason: HOSPADM

## 2025-02-01 RX ORDER — AMLODIPINE BESYLATE 5 MG/1
5 TABLET ORAL DAILY
Status: DISCONTINUED | OUTPATIENT
Start: 2025-02-01 | End: 2025-02-05 | Stop reason: HOSPADM

## 2025-02-01 RX ORDER — ASPIRIN 81 MG/1
81 TABLET, CHEWABLE ORAL DAILY
Status: DISCONTINUED | OUTPATIENT
Start: 2025-02-01 | End: 2025-02-05 | Stop reason: HOSPADM

## 2025-02-01 RX ORDER — LOPERAMIDE HYDROCHLORIDE 2 MG/1
2 CAPSULE ORAL 4 TIMES DAILY PRN
Status: DISCONTINUED | OUTPATIENT
Start: 2025-02-01 | End: 2025-02-05 | Stop reason: HOSPADM

## 2025-02-01 RX ORDER — LEVETIRACETAM 500 MG/5ML
2000 INJECTION, SOLUTION, CONCENTRATE INTRAVENOUS EVERY 12 HOURS
Status: DISCONTINUED | OUTPATIENT
Start: 2025-02-01 | End: 2025-02-05 | Stop reason: HOSPADM

## 2025-02-01 RX ADMIN — LAMOTRIGINE 150 MG: 25 TABLET ORAL at 09:27

## 2025-02-01 RX ADMIN — SODIUM CHLORIDE: 9 INJECTION, SOLUTION INTRAVENOUS at 01:09

## 2025-02-01 RX ADMIN — LAMOTRIGINE 150 MG: 25 TABLET ORAL at 03:24

## 2025-02-01 RX ADMIN — AMLODIPINE BESYLATE 5 MG: 5 TABLET ORAL at 09:25

## 2025-02-01 RX ADMIN — LACOSAMIDE 200 MG: 10 INJECTION INTRAVENOUS at 09:36

## 2025-02-01 RX ADMIN — SODIUM CHLORIDE, PRESERVATIVE FREE 10 ML: 5 INJECTION INTRAVENOUS at 20:49

## 2025-02-01 RX ADMIN — Medication 1 TABLET: at 09:26

## 2025-02-01 RX ADMIN — LACOSAMIDE 200 MG: 10 INJECTION INTRAVENOUS at 22:45

## 2025-02-01 RX ADMIN — VALPROATE SODIUM 750 MG: 100 INJECTION, SOLUTION INTRAVENOUS at 03:15

## 2025-02-01 RX ADMIN — LEVETIRACETAM 2000 MG: 100 INJECTION INTRAVENOUS at 16:49

## 2025-02-01 RX ADMIN — LEVETIRACETAM 1000 MG: 100 INJECTION INTRAVENOUS at 04:59

## 2025-02-01 RX ADMIN — SODIUM CHLORIDE, PRESERVATIVE FREE 10 ML: 5 INJECTION INTRAVENOUS at 01:09

## 2025-02-01 RX ADMIN — SODIUM CHLORIDE, PRESERVATIVE FREE 10 ML: 5 INJECTION INTRAVENOUS at 09:30

## 2025-02-01 RX ADMIN — VALPROATE SODIUM 750 MG: 100 INJECTION, SOLUTION INTRAVENOUS at 16:47

## 2025-02-01 RX ADMIN — ASPIRIN 81 MG: 81 TABLET, CHEWABLE ORAL at 09:25

## 2025-02-01 RX ADMIN — ENOXAPARIN SODIUM 40 MG: 100 INJECTION SUBCUTANEOUS at 09:30

## 2025-02-01 RX ADMIN — SODIUM CHLORIDE: 9 INJECTION, SOLUTION INTRAVENOUS at 13:06

## 2025-02-01 RX ADMIN — Medication 50 MG: at 09:27

## 2025-02-01 ASSESSMENT — PAIN - FUNCTIONAL ASSESSMENT
PAIN_FUNCTIONAL_ASSESSMENT: NONE - DENIES PAIN
PAIN_FUNCTIONAL_ASSESSMENT: NONE - DENIES PAIN

## 2025-02-01 NOTE — ED NOTES
Pt resting on cot. Given warm blanket for comfort. Call light in reach. Pt denies any further needs at this time.

## 2025-02-01 NOTE — ED NOTES
[facility]. Pt in stable condition at this time. Spoke with  staff Kathy prior to transport.

## 2025-02-01 NOTE — PROGRESS NOTES
PROGRESS NOTE      Patient:  Mariangel Mas  Unit/Bed:[facility]  YOB: 1955  MRN: [id number]   Acct: [id number]    PCP: Analisa Ulloa MD    Date of Admission: 1/31/2025 LOS: 1    Date of Evaluation:  2/1/2025    Anticipated Discharge: 1-2 days    Assessment/Plan:    Fatigue likely 2/2 dehydration and suspected postictal state  History of seizures  Presented to ED 1/31 from nursing home with fatigue and refusing oral intake of 5 days; this history was further clarified from SNF staff who state patient had taken all of her medications on 1/30 but did not take them on 1/31 and was brought to the ED as high risk for seizure without her medications. Patient was alone in the ED at time of evaluation, so most history was obtained from patient's RN (who was familiar with her from her prior admissions). Recently admitted 1/19-1/24 s/p grand mal seizure. Home meds include Phenobarbital 30mg mornings + 60mg nightly, Depakote 750mg TID, Vimpat 200mg BID, Lamictal 150mg BID, and Keppra 2000mg BID. Follows with Dr. Butler. Patient has had troubles swallowing in the past and frequently aspirates food and medication at the nursing home. UA (1/31) with trace ketones, trace protein, small LE, and specific gravity of 1.028 - concerning for dehydration. Received 1L IVF bolus then started on IVF in ED as well as 1 dose of Ceftriaxone and Keppra loading dose in ED. Home meds initially converted to IV as concern for aspiration, pending swallow eval.  - Patient continues to be normotensive and afebrile with reassuring labs, so no need to continue Abx at this time - UA more suggestive of dehydration than acute infection.  - Medical levels consistent with patient having taken her medications lately - Valproic Acid 81.4, Phenobarbital 23.1. Levetiracetam, Lacosamide, and Lamotrigine levels pending.   - Consulting neurology, appreciate assistance. Recommend continuing home Lacosamide 200mg BID, Keppra 2000mg BID,  01/31/2025 01:20 PM    WBCUA 25-50 01/31/2025 01:20 PM    BACTERIA FEW 01/31/2025 01:20 PM    RBCUA 5-10 01/31/2025 01:20 PM    BLOODU NEGATIVE 01/31/2025 01:20 PM    GLUCOSEU NEGATIVE 01/31/2025 01:20 PM       All radiology images and reports reviewed and interpreted by me:  Radiology:  CT HEAD WO CONTRAST   Final Result   1. Stable CT scan of the brain, no interval change since previous study dated   1/19/2025..               **This report has been created using voice recognition software. It may contain   minor errors which are inherent in voice recognition technology.**      Electronically signed by Dr. Maia Palmer      XR CHEST (2 VW)   Final Result   1. No acute cardiopulmonary finding..               **This report has been created using voice recognition software.  It may contain   minor errors which are inherent in voice recognition technology.**      Electronically signed by Dr. Julia Gibson          Diet: Diet NPO    Microbiology: Will follow urine culture   Antibiotics: yes - Ceftriaxone (1/31)    Steroids: no    Telemetry: [x]Yes / []No  Telemetry Review of past 24 hours: NSR    LDA: []CVC / []PICC / []Midline / []Barber / []Drains / []Mediport / [x]PIV / []None    Labs (still needed?): [x]Yes / []No  IVF (still needed?): []Yes / [x]No    Level of care: []Step Down / [x]Med-Surg  Bed Status: [x]Inpatient / []Observation    DVT Prophylaxis: [x] Lovenox / [] Heparin / [] SCDs / [] Already on Systemic Anticoagulation / [] None     PT/OT: []Yes / [x]No    Disposition:    [x] Home       [] TCU       [] Rehab       [] Psych       [] SNF       [] Long Term Care Facility       [] Other-    Code Status: Full Code      An electronic signature was used to authenticate this note  - Millie Sanchez, DO PGY-2 on 2/1/2025 at 4:57 PM

## 2025-02-01 NOTE — CONSULTS
Neurology Consult Note    Date:2/1/2025       Room:[address] [place] [address]  Patient Name:Mariangel Mas     YOB: 1955     Age:69 y.o.    Requesting Physician: Veronica Troncoso MD     Reason for Consult:  Evaluate for seizures       Chief Complaint:   Chief Complaint   Patient presents with    Fatigue       Subjective     Mariangel Mas is a 69 y.o. female with a history of cerebral palsy, developmental delay, ADHD, seizures, morbid obesity who presented to [facility] on 1/31/25 from [facility]) for lethargy not eating or drinking for 5 days. She was discharged from the hospital on 1/19/25 for similar symptoms.   Majority of information obtained from chart review as the patient is alert and oriented to name and place and is a relatively poor historian. Patient has a known history of dysphagia with a prior PEG tube with subsequent tube removal on 2/9/24.  She resides at a [facility], where patient does not appear to be receiving all of her p.o. seizure medications due to swallowing difficulty or perhaps she is just refusing to take them.  In the notes it mentions there is concern over her code status and discussion of making her DNR CC because guardian does not want to make the patient get peg tube for medications   Follows with Dr. Butler (Neurology) was supposed to have an office visit on 12/4/24 but unclear if she went. On admission her Depakote level was 81, phenobarbital level was 23. Keppra and Vimpat levels pending.   The patient was seen in the emergency room and was awake and alert to name and place. She reports that she has been eating and drink and denied any difficulty swallowing. She is able to follow commands. She denied any acute complaints.     Review of Systems   Review of Systems   Neurological:  Positive for weakness.   Psychiatric/Behavioral:  Positive for confusion.      Medications   Scheduled Meds:    amLODIPine  5 mg Oral Daily    aspirin  81 mg PEG Tube Daily       .4* 101.8*   * 117*     CHEMISTRIES:  Recent Labs     01/31/25  1035      K 4.2      CO2 26   BUN 11   CREATININE 0.5   GLUCOSE 99   MG 1.9     COAGULATION STUDIES:No results for input(s): \"PROTIME\", \"INR\", \"APTT\" in the last 72 hours.  LIVER PROFILE:  Recent Labs     01/31/25  1035   AST 13   ALT 12   BILITOT 0.3   ALKPHOS 56     CHOLESTEROL AND A1C:No results for input(s): \"HDL\", \"CHOL\", \"TRIG\", \"LABA1C\" in the last 720 hours.    Invalid input(s): \"LDLCALC\"   Imaging Last 24 Hours:  CT HEAD WO CONTRAST    Result Date: 1/31/2025  PROCEDURE: CT HEAD WO CONTRAST CLINICAL INFORMATION: change in behavior. COMPARISON: CT scan of the brain dated 1/19/2025. TECHNIQUE: Noncontrast 5 mm axial images were obtained through the brain. Sagittal and coronal reconstructions were obtained. All CT scans at this facility use dose modulation, iterative reconstruction, and/or weight-based dosing when appropriate to reduce radiation dose to as low as reasonably achievable. FINDINGS: There is mild atrophy and dilatation of the lateral ventricles. There is diminished attenuation in the white matter most likely representing ischemic changes. There is no hemorrhage. There are no intra-or extra-axial collections.  There is no  midline shift or mass effect.  . The paranasal sinuses and mastoid air cells are normally aerated.  There is a possible adrian hole in the right posterior frontal calvarium..      1. Stable CT scan of the brain, no interval change since previous study dated 1/19/2025.. **This report has been created using voice recognition software. It may contain minor errors which are inherent in voice recognition technology.** Electronically signed by Dr. Maia Palmer    XR CHEST (2 VW)    Result Date: 1/31/2025  PROCEDURE: XR CHEST (2 VW) CLINICAL INFORMATION: sob COMPARISON: Chest radiograph 1/19/2025 TECHNIQUE: 2 views of the chest FINDINGS: No focal pulmonary consolidation. Cardiac silhouette is not

## 2025-02-01 NOTE — ED NOTES
Pt resting on cot. No distress noted. TV turned on per pt request. Pt denies any other needs at this time. Call light in reach.

## 2025-02-01 NOTE — ED NOTES
Nursing home called about med rec. Nurse states pt took all her medication on the 30th around 1800. Pt did not take medications on the 31st and was brought in due to that reason.

## 2025-02-01 NOTE — PLAN OF CARE
Problem: Safety - Adult  Goal: Free from fall injury  2/1/2025 1718 by Radha Troy RN  Outcome: Progressing  2/1/2025 1718 by Radha Troy RN  Outcome: Progressing  Flowsheets (Taken 2/1/2025 1718)  Free From Fall Injury: Instruct family/caregiver on patient safety     Problem: Discharge Planning  Goal: Discharge to home or other facility with appropriate resources  2/1/2025 1718 by Radha Troy RN  Outcome: Progressing  2/1/2025 1718 by Radha Troy RN  Outcome: Progressing  Flowsheets (Taken 2/1/2025 1255)  Discharge to home or other facility with appropriate resources: Identify barriers to discharge with patient and caregiver     Problem: Skin/Tissue Integrity  Goal: Skin integrity remains intact  Description: 1.  Monitor for areas of redness and/or skin breakdown  2.  Assess vascular access sites hourly  3.  Every 4-6 hours minimum:  Change oxygen saturation probe site  4.  Every 4-6 hours:  If on nasal continuous positive airway pressure, respiratory therapy assess nares and determine need for appliance change or resting period  2/1/2025 1718 by Radha Troy RN  Outcome: Progressing  2/1/2025 1718 by Radha Troy RN  Outcome: Progressing  Flowsheets (Taken 2/1/2025 1255)  Skin Integrity Remains Intact: Monitor for areas of redness and/or skin breakdown   Care plan reviewed with patient.  Patient verbalize understanding of the plan of care and contribute to goal setting.

## 2025-02-01 NOTE — ED NOTES
Patient mediated per MAR. Patient opens eyes when spoken to. VS stable. Telemetry in place. Call light In reach. IV medication infusing at this time.

## 2025-02-01 NOTE — ED NOTES
Patient less alert than before. Patient still able to open eyes to voice but with more difficulty. Patient VS stable. Telemetry in place. Call light in reach. External catheter in place.

## 2025-02-01 NOTE — ED PROVIDER NOTES
Pt Name: Mariangel Mas  MRN: [id number]  Birthdate 1955  Date of evaluation: 1/31/2025  ED Resident: Ivonne Soto MD  ED Attending: Dr. Metcalf    CHIEF COMPLAINT       Chief Complaint   Patient presents with    Fatigue     History obtained from unobtainable from patient due to mental status.    HISTORY OF PRESENT ILLNESS    HPI  Mariangel Mas is a 69 y.o. female who presents to the emergency department for evaluation of fatigue      The patient was brought from a nursing home via ems due to concern for fatigue and change in behavior. She has developmental delay and guardians, baseline not very communicative but the nursing home staff are concerned because she is not taking her medications and she has a seizure disorder. She still eats and drinks but is at risk of status epilepticus if she is not taking her medications. There is concern over her code status and discussion of making her DNR CC because guardian does not want to make the patient get peg tube for medications       The patient has no other acute complaints at this time.    PAST MEDICAL AND SURGICAL HISTORY     Past Medical History:   Diagnosis Date    ADHD     Attention deficit hyperactivity disorder (ADHD)     Cerebral palsy (HCC)     Depression     Essential hypertension     Learning disability     Seizure (HCC)     Urinary urgency      Past Surgical History:   Procedure Laterality Date    GASTROSTOMY TUBE PLACEMENT  07/28/2023    EGD PEG TUBE PLACEMENT    GASTROSTOMY TUBE PLACEMENT N/A 7/28/2023    EGD PEG TUBE PLACEMENT performed by Ge Puente MD at [facility] OR    NASAL FRACTURE SURGERY N/A 3/13/2024    Closed Reduction of Nasal Bone performed by Storm Flor MD at [facility] OR    SHOULDER SURGERY Left     ORIF    -- had hardware infection afterwards requiring IV abx    UPPER GASTROINTESTINAL ENDOSCOPY N/A 2/9/2024    EGD BIOPSY performed by Grey Jimenes MD at [facility] ENDOSCOPY    UPPER GASTROINTESTINAL ENDOSCOPY   (TYLENOL) tablet 650 mg (has no administration in time range)   amLODIPine (NORVASC) tablet 5 mg (has no administration in time range)   aspirin chewable tablet 81 mg (has no administration in time range)   lamoTRIgine (LAMICTAL) tablet 150 mg (150 mg Oral Given 2/1/25 0324)   loperamide (IMODIUM) capsule 2 mg (has no administration in time range)   multivitamin 1 tablet (has no administration in time range)   zinc sulfate (ZINCATE) 220 mg capsule - elemental zinc 50 mg (has no administration in time range)   LORazepam (ATIVAN) injection 0.5 mg (has no administration in time range)   levETIRAcetam (KEPPRA) injection 1,000 mg (1,000 mg IntraVENous Given 2/1/25 0459)   valproate (DEPACON) 750 mg in sodium chloride 0.9 % 100 mL IVPB (0 mg IntraVENous Stopped 2/1/25 0450)   lacosamide (VIMPAT) 200 mg in sodium chloride 0.9 % 70 mL IVPB (0 mg IntraVENous Stopped 1/31/25 2335)   sodium chloride 0.9 % bolus 1,000 mL (0 mLs IntraVENous Stopped 1/31/25 1655)   cefTRIAXone (ROCEPHIN) 1,000 mg in sterile water 10 mL IV syringe (1,000 mg IntraVENous Given 1/31/25 1445)   levETIRAcetam (KEPPRA) injection 1,000 mg (1,000 mg IntraVENous Given 1/31/25 1729)       MEDICAL DECISION MAKING      Available laboratory and imaging results were independently reviewed and clinically correlated.    Decision Rules/Clinical Scores utilized:   no    Code Status:  Not addressed during this ED visit    Social determinants of health impacting treatment or disposition:  N/A  Medical Commodities impacting treatment or disposition:    Past Medical History:   Diagnosis Date    ADHD     Attention deficit hyperactivity disorder (ADHD)     Cerebral palsy (HCC)     Depression     Essential hypertension     Learning disability     Seizure (HCC)     Urinary urgency        Consultants: Not Applicable.    Final Assessment and Plan:   UA consistent with UTI, which may be contributing to change in patient's behavior.   Troponin also elevated - 17 from 10 (10

## 2025-02-01 NOTE — ED NOTES
Patient more alert and able to answer yes or no questions with few other words. Patient is back at baseline per Nursing facility report. VS stable. Telemetry in place. Call light in reach.

## 2025-02-02 LAB
ANION GAP SERPL CALC-SCNC: 15 MEQ/L (ref 8–16)
BUN SERPL-MCNC: 8 MG/DL (ref 7–22)
CALCIUM SERPL-MCNC: 8.7 MG/DL (ref 8.5–10.5)
CHLORIDE SERPL-SCNC: 102 MEQ/L (ref 98–111)
CO2 SERPL-SCNC: 21 MEQ/L (ref 23–33)
CREAT SERPL-MCNC: 0.4 MG/DL (ref 0.4–1.2)
DEPRECATED RDW RBC AUTO: 45 FL (ref 35–45)
ERYTHROCYTE [DISTWIDTH] IN BLOOD BY AUTOMATED COUNT: 12.3 % (ref 11.5–14.5)
GFR SERPL CREATININE-BSD FRML MDRD: > 90 ML/MIN/1.73M2
GLUCOSE SERPL-MCNC: 92 MG/DL (ref 70–108)
HCT VFR BLD AUTO: 41.5 % (ref 37–47)
HGB BLD-MCNC: 14 GM/DL (ref 12–16)
MCH RBC QN AUTO: 33.6 PG (ref 26–33)
MCHC RBC AUTO-ENTMCNC: 33.7 GM/DL (ref 32.2–35.5)
MCV RBC AUTO: 99.5 FL (ref 81–99)
PLATELET # BLD AUTO: 128 THOU/MM3 (ref 130–400)
PMV BLD AUTO: 12.2 FL (ref 9.4–12.4)
POTASSIUM SERPL-SCNC: 4.2 MEQ/L (ref 3.5–5.2)
RBC # BLD AUTO: 4.17 MILL/MM3 (ref 4.2–5.4)
SODIUM SERPL-SCNC: 138 MEQ/L (ref 135–145)
WBC # BLD AUTO: 6.5 THOU/MM3 (ref 4.8–10.8)

## 2025-02-02 PROCEDURE — 85027 COMPLETE CBC AUTOMATED: CPT

## 2025-02-02 PROCEDURE — 99232 SBSQ HOSP IP/OBS MODERATE 35: CPT | Performed by: INTERNAL MEDICINE

## 2025-02-02 PROCEDURE — 1200000003 HC TELEMETRY R&B

## 2025-02-02 PROCEDURE — 2500000003 HC RX 250 WO HCPCS: Performed by: INTERNAL MEDICINE

## 2025-02-02 PROCEDURE — 6360000002 HC RX W HCPCS: Performed by: INTERNAL MEDICINE

## 2025-02-02 PROCEDURE — 2500000003 HC RX 250 WO HCPCS: Performed by: NURSE PRACTITIONER

## 2025-02-02 PROCEDURE — 2580000003 HC RX 258: Performed by: NURSE PRACTITIONER

## 2025-02-02 PROCEDURE — 6360000002 HC RX W HCPCS: Performed by: NURSE PRACTITIONER

## 2025-02-02 PROCEDURE — 80048 BASIC METABOLIC PNL TOTAL CA: CPT

## 2025-02-02 PROCEDURE — 36415 COLL VENOUS BLD VENIPUNCTURE: CPT

## 2025-02-02 PROCEDURE — C9254 INJECTION, LACOSAMIDE: HCPCS | Performed by: NURSE PRACTITIONER

## 2025-02-02 RX ADMIN — SODIUM CHLORIDE, PRESERVATIVE FREE 5 ML: 5 INJECTION INTRAVENOUS at 08:28

## 2025-02-02 RX ADMIN — VALPROATE SODIUM 750 MG: 100 INJECTION, SOLUTION INTRAVENOUS at 17:52

## 2025-02-02 RX ADMIN — ENOXAPARIN SODIUM 40 MG: 100 INJECTION SUBCUTANEOUS at 08:35

## 2025-02-02 RX ADMIN — VALPROATE SODIUM 750 MG: 100 INJECTION, SOLUTION INTRAVENOUS at 08:39

## 2025-02-02 RX ADMIN — SODIUM CHLORIDE, PRESERVATIVE FREE 10 ML: 5 INJECTION INTRAVENOUS at 20:01

## 2025-02-02 RX ADMIN — LEVETIRACETAM 2000 MG: 100 INJECTION INTRAVENOUS at 18:00

## 2025-02-02 RX ADMIN — LACOSAMIDE 200 MG: 10 INJECTION INTRAVENOUS at 22:07

## 2025-02-02 RX ADMIN — LEVETIRACETAM 2000 MG: 100 INJECTION INTRAVENOUS at 04:54

## 2025-02-02 RX ADMIN — VALPROATE SODIUM 750 MG: 100 INJECTION, SOLUTION INTRAVENOUS at 01:14

## 2025-02-02 RX ADMIN — LACOSAMIDE 200 MG: 10 INJECTION INTRAVENOUS at 09:45

## 2025-02-02 NOTE — PLAN OF CARE
Problem: Safety - Adult  Goal: Free from fall injury  2/2/2025 0847 by Radha Troy RN  Outcome: Progressing  Flowsheets (Taken 2/1/2025 1718)  Free From Fall Injury: Instruct family/caregiver on patient safety  2/2/2025 0016 by Niall Manzanares RN  Outcome: Progressing  Flowsheets (Taken 2/1/2025 1718 by Radha Troy RN)  Free From Fall Injury: Instruct family/caregiver on patient safety     Problem: Discharge Planning  Goal: Discharge to home or other facility with appropriate resources  2/2/2025 0847 by Radha Troy RN  Outcome: Progressing  Flowsheets (Taken 2/1/2025 1255)  Discharge to home or other facility with appropriate resources: Identify barriers to discharge with patient and caregiver  2/2/2025 0016 by Niall Manzanares RN  Outcome: Progressing  Flowsheets (Taken 2/1/2025 1255 by Radha Troy RN)  Discharge to home or other facility with appropriate resources: Identify barriers to discharge with patient and caregiver     Problem: Skin/Tissue Integrity  Goal: Skin integrity remains intact  Description: 1.  Monitor for areas of redness and/or skin breakdown  2.  Assess vascular access sites hourly  3.  Every 4-6 hours minimum:  Change oxygen saturation probe site  4.  Every 4-6 hours:  If on nasal continuous positive airway pressure, respiratory therapy assess nares and determine need for appliance change or resting period  2/2/2025 0847 by Radha Troy RN  Outcome: Progressing  Flowsheets (Taken 2/1/2025 1255)  Skin Integrity Remains Intact: Monitor for areas of redness and/or skin breakdown  2/2/2025 0016 by Niall Manzanares RN  Outcome: Progressing  Flowsheets (Taken 2/1/2025 1255 by Radha Troy RN)  Skin Integrity Remains Intact: Monitor for areas of redness and/or skin breakdown     Problem: Neurosensory - Adult  Goal: Absence of seizures  2/2/2025 0847 by Radha Troy RN  Outcome: Progressing  Flowsheets (Taken 2/1/2025 1255)  Absence of seizures: Monitor for

## 2025-02-02 NOTE — SIGNIFICANT EVENT
Was asked by ED resident physician Dr. Soto regarding admission to an ICU.  Perfect[employer] message was received from Dr. Soto.    Turns out this is sent by error or possibly to accuse me to break the HIPAA regulations.  This type of error within past 8 months of my working at [facility] happened multiple times, only by ED program residents.     Administration, and ED director Dr. Ron were made aware of this type of incident.    Again, I entered patient's EMR chart by request of ED resident physician Dr. Soto.  I did not assess the patient, I did not see the patient, I am not involved in this patient care at all.  No orders were made by me

## 2025-02-02 NOTE — PLAN OF CARE
Problem: Safety - Adult  Goal: Free from fall injury  2/2/2025 0016 by Niall Manzanares RN  Outcome: Progressing  Flowsheets (Taken 2/1/2025 1718 by Radha Troy RN)  Free From Fall Injury: Instruct family/caregiver on patient safety  2/1/2025 1718 by Radha Troy RN  Outcome: Progressing  2/1/2025 1718 by Radha Troy RN  Outcome: Progressing  Flowsheets (Taken 2/1/2025 1718)  Free From Fall Injury: Instruct family/caregiver on patient safety     Problem: Discharge Planning  Goal: Discharge to home or other facility with appropriate resources  2/2/2025 0016 by Niall Manzanares RN  Outcome: Progressing  Flowsheets (Taken 2/1/2025 1255 by Radha Troy RN)  Discharge to home or other facility with appropriate resources: Identify barriers to discharge with patient and caregiver  2/1/2025 1718 by Radha Troy RN  Outcome: Progressing  2/1/2025 1718 by Radha Troy RN  Outcome: Progressing  Flowsheets (Taken 2/1/2025 1255)  Discharge to home or other facility with appropriate resources: Identify barriers to discharge with patient and caregiver     Problem: Skin/Tissue Integrity  Goal: Skin integrity remains intact  Description: 1.  Monitor for areas of redness and/or skin breakdown  2.  Assess vascular access sites hourly  3.  Every 4-6 hours minimum:  Change oxygen saturation probe site  4.  Every 4-6 hours:  If on nasal continuous positive airway pressure, respiratory therapy assess nares and determine need for appliance change or resting period  2/2/2025 0016 by Niall Manzanares RN  Outcome: Progressing  Flowsheets (Taken 2/1/2025 1255 by Radha Troy RN)  Skin Integrity Remains Intact: Monitor for areas of redness and/or skin breakdown  2/1/2025 1718 by Radha Troy RN  Outcome: Progressing  2/1/2025 1718 by Radha Troy RN  Outcome: Progressing  Flowsheets (Taken 2/1/2025 1255)  Skin Integrity Remains Intact: Monitor for areas of redness and/or skin breakdown     Problem:

## 2025-02-02 NOTE — CARE COORDINATION
02/02/25 1448   Service Assessment   Patient Orientation Other (see comment)  (Has guardian)   Cognition Alert  (has guardian)   History Provided By Medical Record;Unable to Assess  (unable to reach guardian)   Primary Caregiver Other (Comment)  (SNF)   Accompanied By/Relationship n/a   Support Systems Other (Comment)  (Guardian)   Patient's Healthcare Decision Maker is: Named in Scanned ACP Document   PCP Verified by CM Yes   Last Visit to PCP Within last 6 months   Prior Functional Level Assistance with the following:;Bathing;Dressing;Toileting;Feeding;Cooking;Housework;Shopping;Mobility   Current Functional Level Assistance with the following:;Bathing;Dressing;Toileting;Feeding;Cooking;Housework;Shopping;Mobility   Can patient return to prior living arrangement Yes   Ability to make needs known: Unable  (guardian)   Family able to assist with home care needs: No   Would you like for me to discuss the discharge plan with any other family members/significant others, and if so, who? Yes  (Guardian- unable to reach)   Financial Resources Medicare;Medicaid   Community Resources ECF/Home Care   CM/SW Referral ADLs/IADLs   Social/Functional History   Lives With Other (Comment)  ([facility])   Type of Home Facility   Active  No   Patient's  Info Facility   Discharge Planning   Type of Residence [facility]   Living Arrangements Other (Comment)  ([facility])   Current Services Prior To Admission Skilled Nursing Facility   Potential Assistance Needed Skilled Nursing Facility   DME Ordered? No   Potential Assistance Purchasing Medications No   Type of Home Care Services None   Patient expects to be discharged to: Skilled nursing facility   Services At/After Discharge   Transition of Care Consult (CM Consult) Discharge Planning   Mode of Transport at Discharge Other (see comment)  ([facility]/Ambulance)   Condition of Participation: Discharge Planning   The Plan for Transition of

## 2025-02-02 NOTE — CARE COORDINATION
02/02/25 1138   Readmission Assessment   Number of Days since last admission? 8-30 days   Previous Disposition SNF  ([facility])   Who is being Interviewed Unable to Complete  (Unable to reach legal guardian; Reviewed Medical Record)   What was the patient's/caregiver's perception as to why they think they needed to return back to the hospital? Other (Comment)  (Fatigue; refusing PO intake)   Did you visit your Primary Care Physician after you left the hospital, before you returned this time? No   Why weren't you able to visit your PCP? Other (Comment)  (at SNF: SNF provider)   Did you see a specialist, such as Cardiac, Pulmonary, Orthopedic Physician, etc. after you left the hospital? No   Who advised the patient to return to the hospital? Skilled Unit   Does the patient report anything that got in the way of taking their medications? No   In our efforts to provide the best possible care to you and others like you, can you think of anything that we could have done to help you after you left the hospital the first time, so that you might not have needed to return so soon? Other (Comment)  (n/a; dc to [facility])

## 2025-02-02 NOTE — PROGRESS NOTES
PROGRESS NOTE      Patient:  Mariangel Mas  Unit/Bed:[facility]25/[facility]-A  YOB: 1955  MRN: [id number]   Acct: [id number]    PCP: Analisa Ulloa MD    Date of Admission: 1/31/2025 LOS: 2    Date of Evaluation:  2/2/2025    Anticipated Discharge: 1-2 days    Assessment/Plan:    #Fatigue likely 2/2 dehydration and #suspected postictal state  #History of seizures  Presented to ED 1/31 from nursing home with fatigue and refusing oral intake of 5 days; this history was further clarified from SNF staff who state patient had taken all of her medications on 1/30 but did not take them on 1/31 and was brought to the ED as high risk for seizure without her medications. Patient was alone in the ED at time of evaluation, so most history was obtained from patient's RN (who was familiar with her from her prior admissions). Recently admitted 1/19-1/24 s/p grand mal seizure. Home meds include Phenobarbital 30mg mornings + 60mg nightly, Depakote 750mg TID, Vimpat 200mg BID, Lamictal 150mg BID, and Keppra 2000mg BID. Follows with Dr. Butler. Patient has had troubles swallowing in the past and frequently aspirates food and medication at the nursing home. UA (1/31) with trace ketones, trace protein, small LE, and specific gravity of 1.028 - concerning for dehydration. Received 1L IVF bolus then started on IVF in ED as well as 1 dose of Ceftriaxone and Keppra loading dose in ED. Home meds initially converted to IV as concern for aspiration, pending swallow eval.  - Patient continues to be normotensive and afebrile with reassuring labs, so no need to continue Abx at this time - UA more suggestive of dehydration than acute infection.  - Medical levels consistent with patient having taken her medications lately - Valproic Acid 81.4, Phenobarbital 23.1. Levetiracetam, Lacosamide, and Lamotrigine levels pending.   - Consulting neurology, appreciate assistance. Recommend continuing home Lacosamide 200mg BID, Keppra 2000mg BID,

## 2025-02-03 PROBLEM — E44.0 MODERATE MALNUTRITION (HCC): Status: ACTIVE | Noted: 2025-02-03

## 2025-02-03 LAB
ANION GAP SERPL CALC-SCNC: 14 MEQ/L (ref 8–16)
BUN SERPL-MCNC: 5 MG/DL (ref 7–22)
CALCIUM SERPL-MCNC: 8.9 MG/DL (ref 8.5–10.5)
CHLORIDE SERPL-SCNC: 100 MEQ/L (ref 98–111)
CO2 SERPL-SCNC: 24 MEQ/L (ref 23–33)
CREAT SERPL-MCNC: 0.3 MG/DL (ref 0.4–1.2)
DEPRECATED RDW RBC AUTO: 43.4 FL (ref 35–45)
ERYTHROCYTE [DISTWIDTH] IN BLOOD BY AUTOMATED COUNT: 12.1 % (ref 11.5–14.5)
GFR SERPL CREATININE-BSD FRML MDRD: > 90 ML/MIN/1.73M2
GLUCOSE SERPL-MCNC: 85 MG/DL (ref 70–108)
HCT VFR BLD AUTO: 38.9 % (ref 37–47)
HGB BLD-MCNC: 13.6 GM/DL (ref 12–16)
LACOSAMIDE SERPL-MCNC: 4 UG/ML (ref 1–10)
LAMOTRIGINE SERPL-MCNC: 10.7 UG/ML (ref 3–15)
MCH RBC QN AUTO: 34.1 PG (ref 26–33)
MCHC RBC AUTO-ENTMCNC: 35 GM/DL (ref 32.2–35.5)
MCV RBC AUTO: 97.5 FL (ref 81–99)
PLATELET # BLD AUTO: 178 THOU/MM3 (ref 130–400)
PMV BLD AUTO: 12.2 FL (ref 9.4–12.4)
POTASSIUM SERPL-SCNC: 4.9 MEQ/L (ref 3.5–5.2)
RBC # BLD AUTO: 3.99 MILL/MM3 (ref 4.2–5.4)
SODIUM SERPL-SCNC: 138 MEQ/L (ref 135–145)
WBC # BLD AUTO: 5.9 THOU/MM3 (ref 4.8–10.8)

## 2025-02-03 PROCEDURE — 6370000000 HC RX 637 (ALT 250 FOR IP): Performed by: INTERNAL MEDICINE

## 2025-02-03 PROCEDURE — 95816 EEG AWAKE AND DROWSY: CPT

## 2025-02-03 PROCEDURE — 6360000002 HC RX W HCPCS: Performed by: INTERNAL MEDICINE

## 2025-02-03 PROCEDURE — 6360000002 HC RX W HCPCS: Performed by: NURSE PRACTITIONER

## 2025-02-03 PROCEDURE — 36415 COLL VENOUS BLD VENIPUNCTURE: CPT

## 2025-02-03 PROCEDURE — 2500000003 HC RX 250 WO HCPCS: Performed by: INTERNAL MEDICINE

## 2025-02-03 PROCEDURE — 99232 SBSQ HOSP IP/OBS MODERATE 35: CPT | Performed by: INTERNAL MEDICINE

## 2025-02-03 PROCEDURE — 95819 EEG AWAKE AND ASLEEP: CPT | Performed by: PSYCHIATRY & NEUROLOGY

## 2025-02-03 PROCEDURE — 92610 EVALUATE SWALLOWING FUNCTION: CPT

## 2025-02-03 PROCEDURE — 80048 BASIC METABOLIC PNL TOTAL CA: CPT

## 2025-02-03 PROCEDURE — 1200000003 HC TELEMETRY R&B

## 2025-02-03 PROCEDURE — C9254 INJECTION, LACOSAMIDE: HCPCS | Performed by: NURSE PRACTITIONER

## 2025-02-03 PROCEDURE — 2580000003 HC RX 258: Performed by: NURSE PRACTITIONER

## 2025-02-03 PROCEDURE — 2500000003 HC RX 250 WO HCPCS: Performed by: NURSE PRACTITIONER

## 2025-02-03 PROCEDURE — 85027 COMPLETE CBC AUTOMATED: CPT

## 2025-02-03 RX ADMIN — LEVETIRACETAM 2000 MG: 100 INJECTION INTRAVENOUS at 05:42

## 2025-02-03 RX ADMIN — VALPROATE SODIUM 750 MG: 100 INJECTION, SOLUTION INTRAVENOUS at 09:58

## 2025-02-03 RX ADMIN — LAMOTRIGINE 150 MG: 25 TABLET ORAL at 09:54

## 2025-02-03 RX ADMIN — LACOSAMIDE 200 MG: 10 INJECTION INTRAVENOUS at 22:17

## 2025-02-03 RX ADMIN — SODIUM CHLORIDE, PRESERVATIVE FREE 10 ML: 5 INJECTION INTRAVENOUS at 19:39

## 2025-02-03 RX ADMIN — Medication 1 TABLET: at 09:54

## 2025-02-03 RX ADMIN — VALPROATE SODIUM 750 MG: 100 INJECTION, SOLUTION INTRAVENOUS at 18:18

## 2025-02-03 RX ADMIN — LAMOTRIGINE 150 MG: 25 TABLET ORAL at 19:39

## 2025-02-03 RX ADMIN — LEVETIRACETAM 2000 MG: 100 INJECTION INTRAVENOUS at 16:53

## 2025-02-03 RX ADMIN — ASPIRIN 81 MG: 81 TABLET, CHEWABLE ORAL at 09:54

## 2025-02-03 RX ADMIN — ACETAMINOPHEN 650 MG: 650 SUPPOSITORY RECTAL at 23:42

## 2025-02-03 RX ADMIN — Medication 50 MG: at 09:54

## 2025-02-03 RX ADMIN — VALPROATE SODIUM 750 MG: 100 INJECTION, SOLUTION INTRAVENOUS at 01:21

## 2025-02-03 RX ADMIN — ENOXAPARIN SODIUM 40 MG: 100 INJECTION SUBCUTANEOUS at 09:54

## 2025-02-03 RX ADMIN — AMLODIPINE BESYLATE 5 MG: 5 TABLET ORAL at 09:54

## 2025-02-03 RX ADMIN — LACOSAMIDE 200 MG: 10 INJECTION INTRAVENOUS at 10:01

## 2025-02-03 NOTE — PLAN OF CARE
Problem: Safety - Adult  Goal: Free from fall injury  Outcome: Progressing     Problem: Discharge Planning  Goal: Discharge to home or other facility with appropriate resources  Outcome: Progressing     Problem: Skin/Tissue Integrity  Goal: Skin integrity remains intact  Description: 1.  Monitor for areas of redness and/or skin breakdown  2.  Assess vascular access sites hourly  3.  Every 4-6 hours minimum:  Change oxygen saturation probe site  4.  Every 4-6 hours:  If on nasal continuous positive airway pressure, respiratory therapy assess nares and determine need for appliance change or resting period  Outcome: Progressing     Problem: Neurosensory - Adult  Goal: Absence of seizures  Outcome: Progressing  Flowsheets (Taken 2/2/2025 0815 by Radha Troy, RN)  Absence of seizures: Monitor for seizure activity.  If seizure occurs, document type and location of movements and any associated apnea  Goal: Remains free of injury related to seizures activity  Outcome: Progressing  Flowsheets (Taken 2/2/2025 0815 by Radha Troy, RN)  Remains free of injury related to seizure activity: Maintain airway, patient safety  and administer oxygen as ordered     Problem: Chronic Conditions and Co-morbidities  Goal: Patient's chronic conditions and co-morbidity symptoms are monitored and maintained or improved  Outcome: Progressing  Flowsheets (Taken 2/2/2025 0016)  Care Plan - Patient's Chronic Conditions and Co-Morbidity Symptoms are Monitored and Maintained or Improved: Monitor and assess patient's chronic conditions and comorbid symptoms for stability, deterioration, or improvement     Problem: ABCDS Injury Assessment  Goal: Absence of physical injury  Outcome: Progressing  Flowsheets (Taken 2/3/2025 0033)  Absence of Physical Injury: Implement safety measures based on patient assessment

## 2025-02-03 NOTE — CARE COORDINATION
2/3/25, 2:46 PM EST    DISCHARGE ON GOING EVALUATION    [facility]       [facility] day: 3  Location: -25/025-A Reason for admit: General weakness [R53.1]  Breakthrough seizure (HCC) [G40.919]     Procedures: 2/3 EEG     Imaging since last note: na    Barriers to Discharge: IV depacon, IV keppra, IV vimpat.     PCP: Analisa Ulloa MD  Readmission Risk Score: 17.3    Patient Goals/Plan/Treatment Preferences: Return to [facility].

## 2025-02-03 NOTE — DISCHARGE INSTR - COC
Continuity of Care Form    Patient Name: Mariangel Mas   :  1955  MRN:  [id number]    Admit date:  2025  Discharge date:  25    Code Status Order: Full Code   Advance Directives:   Advance Care Flowsheet Documentation             Admitting Physician:  Veronica Troncoso MD  PCP: Analisa Ulloa MD    Discharging Nurse: Marce GARCES  Discharging Hospital Unit/Room#: 6K-25/025-A  Discharging Unit Phone Number: [phone]    Emergency Contact:   Extended Emergency Contact Information  Primary Emergency Contact: (APSI) Joya Godfrey  Home Phone: [phone]  Mobile Phone: [phone]  Relation: Legal Guardian  Preferred language: English   needed? No  Secondary Emergency Contact: Donna Espinoza  Mobile Phone: [phone]  Relation: Brother/Sister  Preferred language: English   needed? No    Past Surgical History:  Past Surgical History:   Procedure Laterality Date    GASTROSTOMY TUBE PLACEMENT  2023    EGD PEG TUBE PLACEMENT    GASTROSTOMY TUBE PLACEMENT N/A 2023    EGD PEG TUBE PLACEMENT performed by Ge Puente MD at [facility] OR    NASAL FRACTURE SURGERY N/A 3/13/2024    Closed Reduction of Nasal Bone performed by Storm Flor MD at [facility] OR    SHOULDER SURGERY Left     ORIF    -- had hardware infection afterwards requiring IV abx    UPPER GASTROINTESTINAL ENDOSCOPY N/A 2024    EGD BIOPSY performed by Grey Jimenes MD at [facility] ENDOSCOPY    UPPER GASTROINTESTINAL ENDOSCOPY  2024    EGD FOREIGN BODY REMOVAL performed by Grey Jimenes MD at [facility] ENDOSCOPY    UPPER GASTROINTESTINAL ENDOSCOPY N/A 2024    ESOPHAGOGASTRODUODENOSCOPY BIOPSY performed by Grey Jimenes MD at [facility] ENDOSCOPY       Immunization History:   There is no immunization history for the selected administration types on file for this patient.    Active Problems:  Patient Active Problem List   Diagnosis Code    Urinary urgency R39.15    Attention deficit hyperactivity

## 2025-02-03 NOTE — PLAN OF CARE
Problem: Discharge Planning  Goal: Discharge to home or other facility with appropriate resources  Outcome: Progressing       Consult received. Please see SW note dated 2/03.

## 2025-02-03 NOTE — PROGRESS NOTES
PROGRESS NOTE      Patient:  Mariangel Mas  Unit/Bed:[facility] hospital25/025-A  YOB: 1955  MRN: [id number]   Acct: [id number]    PCP: Analisa Ulloa MD    Date of Admission: 1/31/2025 LOS: 3    Date of Evaluation:  2/3/2025    Anticipated Discharge: 1 day    Assessment/Plan:    Fatigue likely 2/2 dehydration and suspected postictal state  History of seizures  Presented to ED 1/31 from nursing home with fatigue and refusing oral intake; this history was further clarified from SNF staff who state patient had taken all of her medications on 1/30 but did not take them on 1/31 and was brought to the ED as high risk for seizure without her medications. Recently admitted 1/19-1/24 s/p grand mal seizure. Home meds include Phenobarbital 30mg mornings + 60mg nightly, Depakote 750mg TID, Vimpat 200mg BID, Lamictal 150mg BID, and Keppra 2000mg BID. Follows with Dr. Butler (neurologist). Patient has had troubles swallowing in the past and frequently aspirates food and medication at the nursing home. UA (1/31) with trace ketones, trace protein, small LE, and specific gravity of 1.028 - concerning for dehydration. Received 1L IVF bolus then started on IVF in ED as well as 1 dose of Ceftriaxone and Keppra loading dose in ED. Home meds initially converted to IV as concern for aspiration, pending swallow eval.  - Patient continues to be normotensive and afebrile with reassuring labs - UA (1/31) more suggestive of dehydration than acute infection however Urine Culture (collected 1/31 but updated 2/3) did have positive results. Report reads >100,000 CFU of organism ! and <100,000 CFU of organism Klebsiella pneumoniae. Called lab to clarify what ! means but micro staff is out for the afternoon and other tech unable to give more information per lab policy. Patient continues to be asymptomatic, thus suspect asymptomatic bacteriuria / colonization which does not necessarily need to be treated with ABx  - Medication levels  []Drains / []Mediport / [x]PIV / []None    Labs (still needed?): []Yes / []No  IVF (still needed?): []Yes / []No    Level of care: []Step Down / [x]Med-Surg  Bed Status: [x]Inpatient / []Observation    DVT Prophylaxis: [x] Lovenox / [] Heparin / [] SCDs / [] Already on Systemic Anticoagulation / [] None     PT/OT: []Yes / [x]No    Disposition:    [] Home       [] TCU       [] Rehab       [] Psych       [x] SNF       [] Long Term Care Facility       [] Other-    Code Status: Full Code      An electronic signature was used to authenticate this note  - Millie Sanchez DO PGY-2 on 2/3/2025 at 4:29 PM

## 2025-02-03 NOTE — CARE COORDINATION
2/3/25, 10:42 AM EST    DISCHARGE PLANNING EVALUATION    This SW did speak with Keturah from [facility] and confirmed patient is long-term at [facility] and does not require a precert for return.       3:14 PM- Left a message for legal guardilibrado Hinson to confirm return to [facility]. Asked for a call back.     3:17 PM- Received call back, completed assessment with legal guardian.    2/3/25, 3:18 PM EST  Discharge Planning Evaluation  Social work consult received, patient from [facility].    Patient/Family preference is to return to [facility], per guardian.    The patient's current payor source at the facility is Medicaid.   Medicare skilled days available: Yes   Medicare does the patient have a three midnight qualifying stay? Yes  Insurance precert:  No  Spoke with Keturah at the facility.  Patient bed hold: Yes- Medicaid  Anticipated transport plan: ambulette?  Patient's Healthcare Decision Maker: Named in Scanned ACP Document- Legal guardilibrado Hinson

## 2025-02-03 NOTE — PROGRESS NOTES
Comprehensive Nutrition Assessment    Type and Reason for Visit:  Initial, Positive nutrition screen, Wound    Nutrition Recommendations/Plan:   Continue MVI.  ONS initiated: Mighytshake at breakfast. Magic Cup BID (had on recent admit-~ 2 weeks ago and liked).   Diet per SLP. Encouraged oral intake and ONS use.      Malnutrition Assessment:  Malnutrition Status:  Moderate malnutrition (02/03/25 1550)    Context:  Acute Illness     Findings of the 6 clinical characteristics of malnutrition:  Energy Intake:  75% or less of estimated energy requirements for 7 or more days (recent admit 1/19/25-most intake 75% or less since).  50% or less for 5 days PTA per ECF (refusing oral intake)  Weight Loss:  Unable to assess (edema present)     Body Fat Loss:  No body fat loss     Muscle Mass Loss:  Mild muscle mass loss Temples (temporalis)  Fluid Accumulation:  Moderate to Severe Generalized   Strength:  Not Performed    Nutrition Assessment:     Pt. moderately malnourished AEB criteria as listed above.  At risk for further nutrition compromise r/t dysphagia, admit with fatigue likely secondary to dehydration suspected postictal state, refusing medication and oral intake PTA, increased nutrient needs to support wound healing, elevated troponin,  and underlying medical condition (hx:learning disability, CP, ADHD, seizure, previous PEG-removed 2/2024, depression, HTN).        Nutrition Related Findings:    Pt. Report/Treatments/Miscellaneous: Patient seen, RN present. Lunch tray at bedside, RN getting ready to feed patient. Patient had been NPO since admit until lunch today until cleared today.  Recent admit 1/19/25-meal intake 75% or less, per ECF refusing oral intake 5 days PTA.  Liked Magic cup last admit and willing to trial Mightyshake. Refused medication 1/31/25 at ECF.   GI Status: BM 2/2/25  Pertinent Labs: 2/3/25 BMP wnls  Pertinent Meds: vimpat, keppra, lamictal, depakon, zincate     Wound Type:  (stage I buttocks,

## 2025-02-03 NOTE — PROGRESS NOTES
[facility]  SPEECH THERAPY  STRZ RENAL TELEMETRY 6K  Clinical Swallow Evaluation    Discharge Recommendations: SNF  DIET ORDER RECOMMENDATIONS AFTER EVALUATION: Puree diet and thin liquids  Strategies:  Full Upright Position, Small Bite/Sip, Pulmonary Monitoring, Direct 1:1 Supervision, Limit Distractions, and Monitor for Fatigue     SLP Individual Minutes  Time In: 1323  Time Out: 1332  Minutes: 9  Timed Code Treatment Minutes: 0 Minutes       Date: 2/3/2025  Patient Name: Mariangel Mas      CSN: [id number]   : 1955  (69 y.o.)  Gender: female   Referring Physician:  Veronica Troncoso MD     Diagnosis: Breakthrough seizure (HCC)    History of Present Illness/Injury: Patient admitted to [facility] with above diagnosis: see physician H&P for further information. Per chart review, \"Patient with medical history of ADHD, cerebral palsy, depression, hypertension, and seizures presented to the ED 25 from nursing home with fatigue.  Of note, patient was recently admitted from - status post grand mal seizure.  At time of evaluation, patient was alone in the room and minimally responsive.  She does grimace with pain.  Patient's ED RN was familiar with patient as she had cared for her last time she was in the ED.  RN states that patient's baseline is yes and no answers to questions; other than that, she does not speak.  Per RN, patient has had issues taking oral medications due to aspiration.  RN suspects that she was likely fearful to eat due to aspirating, prompting this evaluation in the ER.  Concern for postictal state due to significant history of seizures and recent noncompliance with medications due to fear of aspiration.  She did have a PEG tube in the past which was removed 24.  Patient is a [facility] which has made it difficult to pursue replacing this per RN. Exam significant for diffuse bruising on the right arm and left arm.  There was no reported fall from the nursing  initiation for other PO offerings. Appropriate control and good oral clearance. Concerns for decreased airway protection or pharyngeal residue given presence of x1 delayed cough. Suspect delayed swallow as patient often requiring cue to trigger swallow. Of course, pharyngeal dysfunction and totality of airway invasion events cannot be discerned at bedside alone, however, instrumental evaluation not clinically indicated at this time.     Patient completed MBS on 1/22 with results of laryngeal penetration with thin liquids and no tracheal aspiration. Recommendations from MBS for puree diet and thin liquids.     Recommend patient downgrade to puree diet and thin liquids with direct 1:1 assistance. ST to follow for dysphagia management.     *post evaluation, patient without respiratory distress upon leaving room; RN Rozina notified re: clinical findings and recommendations from the assessment; verbal receptiveness noted       RECOMMENDATIONS/ASSESSMENT:  Instrumental Evaluation: Instrumental evaluation not indicated at this time.  Rehabilitation Potential: fair    EDUCATION:  Learner: Patient  Education:  Reviewed results and recommendations of this evaluation, Reviewed diet and strategies, Reviewed signs, symptoms and risks of aspiration, Reviewed ST goals and Plan of Care, and Reviewed recommendations for follow-up  Evaluation of Education: Needs further instruction, No evidence of learning, and Family not present    PLAN:  Skilled SLP intervention on acute care 1-3 x per week or until goals met and or patient plateaus in function.  Specific interventions for next session may include: PO trials    PATIENT GOAL:    Return to least restrictive diet.    SHORT TERM GOALS:  Short Term Goals  Time Frame for Short Term Goals: 2 weeks  Goal 1: Patient will consume puree diet and thin liquids with direct 1:1 assistance (advanced PO trials with ST only) with stable pulmonary status and incorporation of trained compensatory

## 2025-02-03 NOTE — PROGRESS NOTES
[facility]  Neurodiagnostic Laboratory Technician Worksheet  STRZ RENAL TELEMETRY 6K  EEG Date: 2/3/2025    Name: Mariangel Mas  : 1955  Age: 69 y.o.  Sex: female  MRN: [id number]  CSN: [id number]    Ordering Provider:  LENORE Troncoso      EEG Number: [id number]    Time In: Time In: 856 (2/3/25)  Time Out: Time Out: 919 (2/3/2025)  Total Treatment Time: Minutes: 23    Clinical History: not taking oral meds, hx seizure, ADHD, Cerebral Palsy, baseline nonverbal, admit fatigue,  patient was loaded with Keppra , today, follow command, compalins of thirsty, pain in legs and arms,  cold,  patient can be easily redirected,   give born on Chirst[birth year],  year was hard to understand.  Ct    MPRESSION:  1. Stable CT scan of the brain, no interval change since previous study dated  2025..     Past Medical History:       Diagnosis Date    ADHD     Attention deficit hyperactivity disorder (ADHD)     Cerebral palsy (HCC)     Depression     Essential hypertension     Learning disability     Seizure (HCC)     Urinary urgency        Medications:   Prior to Admission medications    Medication Sig Start Date End Date Taking? Authorizing Provider   lacosamide (VIMPAT) 200 MG tablet Take 1 tablet by mouth 2 times daily for 30 days. Max Daily Amount: 400 mg 25 Yes Yvon Thurston DO   levETIRAcetam (KEPPRA) 1000 MG tablet Take 2 tablets by mouth 2 times daily 25  Yes Yvon Thurston DO   PHENobarbital (LUMINAL) 30 MG tablet Take 2 tablets by mouth at bedtime.   Yes Provider, MD Naila   lamoTRIgine (LAMICTAL) 100 MG tablet Take 1.5 tablets by mouth 2 times daily 25  Yes Pradeep Robert MD   valproic acid (DEPAKENE) 250 MG/5ML SOLN oral solution Take 15 mLs by mouth every 8 hours 25  Yes Pradeep Robert MD   venlafaxine (EFFEXOR) 75 MG tablet Take 1 tablet by mouth 2 times daily 25  Yes Pradeep Robert MD   lactulose (CHRONULAC) 10 GM/15ML solution 45 ml morning  (ADDERALL, 5MG,) 5 MG tablet Take 1 tablet by mouth daily for 30 days. Max Daily Amount: 5 mg 11/7/23 1/20/25  Janice Nava, APRN - CNP       Hand Dominance: Right   Sedation: No   H.V. Completed: No, age protocol   Photic: Yes   Sleep: No   Drowsy: Yes   Sleep Deprived: No   Seizures Observed: No   Mentality: alert     Technician: Ana Cristina Otero 2/3/2025

## 2025-02-04 ENCOUNTER — APPOINTMENT (OUTPATIENT)
Dept: GENERAL RADIOLOGY | Age: 70
DRG: 101 | End: 2025-02-04
Payer: MEDICARE

## 2025-02-04 LAB
ANION GAP SERPL CALC-SCNC: 12 MEQ/L (ref 8–16)
BACTERIA UR CULT: ABNORMAL
BACTERIA UR CULT: ABNORMAL
BUN SERPL-MCNC: 9 MG/DL (ref 7–22)
CALCIUM SERPL-MCNC: 9 MG/DL (ref 8.5–10.5)
CHLORIDE SERPL-SCNC: 101 MEQ/L (ref 98–111)
CO2 SERPL-SCNC: 24 MEQ/L (ref 23–33)
CREAT SERPL-MCNC: 0.4 MG/DL (ref 0.4–1.2)
DEPRECATED RDW RBC AUTO: 46.5 FL (ref 35–45)
ERYTHROCYTE [DISTWIDTH] IN BLOOD BY AUTOMATED COUNT: 12.3 % (ref 11.5–14.5)
GFR SERPL CREATININE-BSD FRML MDRD: > 90 ML/MIN/1.73M2
GLUCOSE SERPL-MCNC: 90 MG/DL (ref 70–108)
HCT VFR BLD AUTO: 44.6 % (ref 37–47)
HGB BLD-MCNC: 14.9 GM/DL (ref 12–16)
MCH RBC QN AUTO: 34 PG (ref 26–33)
MCHC RBC AUTO-ENTMCNC: 33.4 GM/DL (ref 32.2–35.5)
MCV RBC AUTO: 101.8 FL (ref 81–99)
ORGANISM: ABNORMAL
ORGANISM: ABNORMAL
PLATELET # BLD AUTO: 154 THOU/MM3 (ref 130–400)
PMV BLD AUTO: 11.2 FL (ref 9.4–12.4)
POTASSIUM SERPL-SCNC: 4.1 MEQ/L (ref 3.5–5.2)
RBC # BLD AUTO: 4.38 MILL/MM3 (ref 4.2–5.4)
SODIUM SERPL-SCNC: 137 MEQ/L (ref 135–145)
WBC # BLD AUTO: 6 THOU/MM3 (ref 4.8–10.8)

## 2025-02-04 PROCEDURE — 51798 US URINE CAPACITY MEASURE: CPT

## 2025-02-04 PROCEDURE — 6370000000 HC RX 637 (ALT 250 FOR IP)

## 2025-02-04 PROCEDURE — C9254 INJECTION, LACOSAMIDE: HCPCS | Performed by: NURSE PRACTITIONER

## 2025-02-04 PROCEDURE — 99232 SBSQ HOSP IP/OBS MODERATE 35: CPT | Performed by: INTERNAL MEDICINE

## 2025-02-04 PROCEDURE — 85027 COMPLETE CBC AUTOMATED: CPT

## 2025-02-04 PROCEDURE — 1200000003 HC TELEMETRY R&B

## 2025-02-04 PROCEDURE — 2580000003 HC RX 258: Performed by: NURSE PRACTITIONER

## 2025-02-04 PROCEDURE — 71045 X-RAY EXAM CHEST 1 VIEW: CPT

## 2025-02-04 PROCEDURE — 36415 COLL VENOUS BLD VENIPUNCTURE: CPT

## 2025-02-04 PROCEDURE — 6360000002 HC RX W HCPCS: Performed by: NURSE PRACTITIONER

## 2025-02-04 PROCEDURE — 6370000000 HC RX 637 (ALT 250 FOR IP): Performed by: INTERNAL MEDICINE

## 2025-02-04 PROCEDURE — 80048 BASIC METABOLIC PNL TOTAL CA: CPT

## 2025-02-04 PROCEDURE — 6360000002 HC RX W HCPCS: Performed by: INTERNAL MEDICINE

## 2025-02-04 PROCEDURE — 2500000003 HC RX 250 WO HCPCS: Performed by: INTERNAL MEDICINE

## 2025-02-04 PROCEDURE — 2500000003 HC RX 250 WO HCPCS: Performed by: NURSE PRACTITIONER

## 2025-02-04 RX ORDER — NITROFURANTOIN 25; 75 MG/1; MG/1
100 CAPSULE ORAL EVERY 12 HOURS SCHEDULED
Status: DISCONTINUED | OUTPATIENT
Start: 2025-02-04 | End: 2025-02-05 | Stop reason: HOSPADM

## 2025-02-04 RX ORDER — ENOXAPARIN SODIUM 100 MG/ML
30 INJECTION SUBCUTANEOUS 2 TIMES DAILY
Status: DISCONTINUED | OUTPATIENT
Start: 2025-02-05 | End: 2025-02-05 | Stop reason: HOSPADM

## 2025-02-04 RX ADMIN — AMLODIPINE BESYLATE 5 MG: 5 TABLET ORAL at 09:32

## 2025-02-04 RX ADMIN — LACOSAMIDE 200 MG: 10 INJECTION INTRAVENOUS at 22:08

## 2025-02-04 RX ADMIN — VALPROATE SODIUM 750 MG: 100 INJECTION, SOLUTION INTRAVENOUS at 09:38

## 2025-02-04 RX ADMIN — ASPIRIN 81 MG: 81 TABLET, CHEWABLE ORAL at 09:31

## 2025-02-04 RX ADMIN — VALPROATE SODIUM 750 MG: 100 INJECTION, SOLUTION INTRAVENOUS at 01:05

## 2025-02-04 RX ADMIN — NITROFURANTOIN (MONOHYDRATE/MACROCRYSTALS) 100 MG: 25; 75 CAPSULE ORAL at 11:41

## 2025-02-04 RX ADMIN — LAMOTRIGINE 150 MG: 25 TABLET ORAL at 20:12

## 2025-02-04 RX ADMIN — LEVETIRACETAM 2000 MG: 100 INJECTION INTRAVENOUS at 05:05

## 2025-02-04 RX ADMIN — SODIUM CHLORIDE, PRESERVATIVE FREE 10 ML: 5 INJECTION INTRAVENOUS at 09:35

## 2025-02-04 RX ADMIN — LACOSAMIDE 200 MG: 10 INJECTION INTRAVENOUS at 11:40

## 2025-02-04 RX ADMIN — LAMOTRIGINE 150 MG: 25 TABLET ORAL at 09:31

## 2025-02-04 RX ADMIN — NITROFURANTOIN (MONOHYDRATE/MACROCRYSTALS) 100 MG: 25; 75 CAPSULE ORAL at 20:12

## 2025-02-04 RX ADMIN — SODIUM CHLORIDE, PRESERVATIVE FREE 10 ML: 5 INJECTION INTRAVENOUS at 20:12

## 2025-02-04 RX ADMIN — LEVETIRACETAM 2000 MG: 100 INJECTION INTRAVENOUS at 18:50

## 2025-02-04 RX ADMIN — ENOXAPARIN SODIUM 40 MG: 100 INJECTION SUBCUTANEOUS at 09:32

## 2025-02-04 RX ADMIN — Medication 50 MG: at 09:31

## 2025-02-04 RX ADMIN — Medication 1 TABLET: at 09:31

## 2025-02-04 RX ADMIN — VALPROATE SODIUM 750 MG: 100 INJECTION, SOLUTION INTRAVENOUS at 17:02

## 2025-02-04 NOTE — CONSULTS
CONSULTATION NOTE :ID       Patient - Mariangel Mas,  Age - 69 y.o.    - 1955      Room Number - 6K-25/025-A   N -  [id number]   [facility] # - [id number]  Date of Admission -  2025  9:39 AM  Patient's PCP: Analisa Ulloa MD     Requesting Physician: Veronica Troncoso MD    REASON FOR CONSULTATION   Abnromal urine cx  CHIEF COMPLAINT   Weakness with change in mentation    HISTORY OF PRESENT ILLNESS       This is a very pleasant 69 y.o. female who was admitted to the hospital with a chief complaints of change in mentation. She is from [facility]. Report of not taking her medicaitons including her seizure medications. There was concern of a seizure . She has ADHD cerebral palsy. Urine was sent and showed KPC. There was no report of fever or chills. Started on nitrofurantoin.her medical record was reviewed as she is not able to give much hx    PAST MEDICAL  HISTORY       Past Medical History:   Diagnosis Date    ADHD     Attention deficit hyperactivity disorder (ADHD)     Cerebral palsy (HCC)     Depression     Essential hypertension     Learning disability     Seizure (HCC)     Urinary urgency        PAST SURGICAL HISTORY     Past Surgical History:   Procedure Laterality Date    GASTROSTOMY TUBE PLACEMENT  2023    EGD PEG TUBE PLACEMENT    GASTROSTOMY TUBE PLACEMENT N/A 2023    EGD PEG TUBE PLACEMENT performed by Ge Puente MD at [facility] OR    NASAL FRACTURE SURGERY N/A 3/13/2024    Closed Reduction of Nasal Bone performed by Storm Flor MD at [facility] OR    SHOULDER SURGERY Left     ORIF    -- had hardware infection afterwards requiring IV abx    UPPER GASTROINTESTINAL ENDOSCOPY N/A 2024    EGD BIOPSY performed by Grey Jimenes MD at [facility] ENDOSCOPY    UPPER GASTROINTESTINAL ENDOSCOPY  2024    EGD FOREIGN BODY REMOVAL performed by Grey Jimenes MD at [facility] ENDOSCOPY    UPPER GASTROINTESTINAL ENDOSCOPY N/A 2024    ESOPHAGOGASTRODUODENOSCOPY

## 2025-02-04 NOTE — PLAN OF CARE
Problem: Safety - Adult  Goal: Free from fall injury  Outcome: Progressing  Flowsheets (Taken 2/4/2025 0231)  Free From Fall Injury:   Instruct family/caregiver on patient safety   Based on caregiver fall risk screen, instruct family/caregiver to ask for assistance with transferring infant if caregiver noted to have fall risk factors     Problem: Discharge Planning  Goal: Discharge to home or other facility with appropriate resources  2/4/2025 0231 by Aileen Gay RN  Outcome: Progressing  Flowsheets (Taken 2/4/2025 0231)  Discharge to home or other facility with appropriate resources:   Identify barriers to discharge with patient and caregiver   Arrange for needed discharge resources and transportation as appropriate   Identify discharge learning needs (meds, wound care, etc)    Problem: Skin/Tissue Integrity  Goal: Skin integrity remains intact  Description: 1.  Monitor for areas of redness and/or skin breakdown  2.  Assess vascular access sites hourly  3.  Every 4-6 hours minimum:  Change oxygen saturation probe site  4.  Every 4-6 hours:  If on nasal continuous positive airway pressure, respiratory therapy assess nares and determine need for appliance change or resting period  Outcome: Progressing  Flowsheets (Taken 2/4/2025 0231)  Skin Integrity Remains Intact: Monitor for areas of redness and/or skin breakdown     Problem: Neurosensory - Adult  Goal: Absence of seizures  Outcome: Progressing  Flowsheets (Taken 2/4/2025 0231)  Absence of seizures:   Monitor for seizure activity.  If seizure occurs, document type and location of movements and any associated apnea   If seizure occurs, turn head to side and suction secretions as needed   Administer anticonvulsants as ordered   Support airway/breathing, administer oxygen as needed  Goal: Remains free of injury related to seizures activity  Outcome: Progressing  Flowsheets (Taken 2/4/2025 0231)  Remains free of injury related to seizure activity:   Maintain  function  Outcome: Progressing  Flowsheets (Taken 2/4/2025 0231)  Return ADL Status to a Safe Level of Function:   Assess activities of daily living deficits and provide assistive devices as needed   Obtain physical therapy/occupational therapy consults as needed     Problem: Gastrointestinal - Adult  Goal: Minimal or absence of nausea and vomiting  Outcome: Progressing  Flowsheets (Taken 2/4/2025 0231)  Minimal or absence of nausea and vomiting:   Administer ordered antiemetic medications as needed   Provide nonpharmacologic comfort measures as appropriate  Goal: Maintains or returns to baseline bowel function  Outcome: Progressing  Flowsheets (Taken 2/4/2025 0231)  Maintains or returns to baseline bowel function:   Assess bowel function   Encourage oral fluids to ensure adequate hydration   Administer ordered medications as needed  Goal: Maintains adequate nutritional intake  Outcome: Progressing  Flowsheets (Taken 2/4/2025 0231)  Maintains adequate nutritional intake:   Monitor percentage of each meal consumed   Monitor intake and output, weight and lab values   Assist with meals as needed     Problem: Genitourinary - Adult  Goal: Absence of urinary retention  Outcome: Progressing  Flowsheets (Taken 2/4/2025 0231)  Absence of urinary retention:   Assess patient’s ability to void and empty bladder   Monitor intake/output and perform bladder scan as needed     Problem: Infection - Adult  Goal: Absence of infection at discharge  Outcome: Progressing  Flowsheets (Taken 2/4/2025 0231)  Absence of infection at discharge:   Assess and monitor for signs and symptoms of infection   Monitor lab/diagnostic results   Monitor all insertion sites i.e., indwelling lines, tubes and drains   Administer medications as ordered   Identify and instruct in appropriate isolation precautions for identified infection/condition   Instruct and encourage patient and family to use good hand hygiene technique  Goal: Absence of infection

## 2025-02-04 NOTE — PROGRESS NOTES
Pt was in bed and alone at the time of the visit. She was dealing with breakthrough seizure. She could not talk much but was blessed.    02/04/25 1534   Encounter Summary   Encounter Overview/Reason Initial Encounter   Service Provided For Patient   Referral/Consult From [facility]   Support System Family members   Last Encounter  02/04/25   Complexity of Encounter Low   Begin Time 1300   End Time  1306   Total Time Calculated 6 min   Spiritual/Emotional needs   Type Spiritual Support   Assessment/Intervention/Outcome   Assessment Calm   Intervention Active listening

## 2025-02-04 NOTE — PROGRESS NOTES
PROGRESS NOTE      Patient:  Mariangel Mas  Unit/Bed:[facility]25/[facility]  YOB: 1955  MRN: [id number]   Acct: [id number]    PCP: Analisa Ulloa MD    Date of Admission: 1/31/2025 LOS: 4    Date of Evaluation:  2/4/2025    Anticipated Discharge: 1-2 days    Assessment/Plan:    Fatigue likely 2/2 dehydration and suspected postictal state  History of seizures  Presented to ED 1/31 from nursing home with fatigue and refusing oral intake; this history was further clarified from SNF staff who state patient had taken all of her medications on 1/30 but did not take them on 1/31 and was brought to the ED as high risk for seizure without her medications. Recently admitted 1/19-1/24 s/p grand mal seizure. Follows with Dr. Butler (neurologist). Patient has had troubles with dysphagia in the past however SLP evaluation recommending pureed diet. UA (1/31) with trace ketones, trace protein, small LE, and specific gravity of 1.028 - concerning for dehydration. Received 1L IVF bolus then started on IVF in ED as well as 1 dose of Ceftriaxone and Keppra loading dose in ED. Urine Culture (1/31) with >100,000 CFU Candida krusei and <10,000 Klebsiella pneumoniae. EEG (2/3/25) with mild non-specific encephalopathy and R temporal sharps suggesting increased risk for focal seizures (may represent an underlying focal lesion)  - Remains normotensive and saturating well on room air however did have low-grade temperature of 100.2 overnight and appears more lethargic today. Suspected Klebsiella and candida on urine culture likely asymptomatic bacteriuria - however resistant to cephalosporins, penicillins, and carbapenems and with these new symptoms, consulting ID  - CXR (2/4) with suboptimal infiltrate of the lungs and no effusion  - Medication levels consistent with patient having taken her medications lately - Valproic Acid 81.4, Phenobarbital 23.1, Levetiracetam 47. Lacosamide 4, and Lamotrigine 10.7  - Consulted neurology who  ----- Message from CHICHI Shell - CNS sent at 2/22/2024 12:22 PM EST -----  His fluid level was up on optival and on his blood work too  I asked him to take more lasix  Make sure he set up his home device monitor  He must behave  I want blood work again in 1 month  Thanks    Spoke to patient he verbalized understanding.

## 2025-02-04 NOTE — PROGRESS NOTES
Pharmacist Review and Automatic Dose Adjustment of Prophylactic Enoxaparin or Heparin    Reviewed reason for admission/hospital problem list    The reviewing pharmacist has made an adjustment to the ordered enoxaparin or heparin dose or converted to heparin per the approved [facility] protocol and table as identified below.      Recent Labs     02/02/25  0705 02/03/25  0940 02/04/25  0758   CREATININE 0.4 0.3* 0.4     Estimated Creatinine Clearance: 144 mL/min (based on SCr of 0.4 mg/dL).    Recent Labs     02/03/25  0800 02/04/25  0758   HGB 13.6 14.9   HCT 38.9 44.6    154     No results for input(s): \"INR\" in the last 72 hours.    Height:   Ht Readings from Last 1 Encounters:   02/01/25 1.549 m (5' 1\")     Weight:  Wt Readings from Last 1 Encounters:   02/04/25 102.8 kg (226 lb 10.1 oz)       *Do not exceed enoxaparin 40mg daily or UFH 5000 units SUBQ TID in patients with epidurals,  lumbar drains, or external ventricular drains.    Plan:   Changed enoxaparin 40 mg subq daily to enoxaparin 30 mg subq BID.     Thank you,  Marti Camarillo, PharmD, BCPS, BCCCP  2/4/2025 3:34 PM

## 2025-02-04 NOTE — PALLIATIVE CARE
Initial Evaluation        Patient:   Mariangel Mas  YOB: 1955  Age:  69 y.o.  Room:  [facility]  MRN:  [id number]   Acct: [id number]    Date of Admission:  1/31/2025  9:39 AM  Date of Service:  2/4/2025  Completed By:  Mich Aguirre RN        Reason for Palliative Care Evaluation:-   Goals of Care and code status     Current Concerns   Unable to assess     Palliative Performance Scale   50%  Mainly sit/lie; Extensive disease; Can't do any work; Considerable assist; intake normal or reduced; LOC full/confusion     History    Patient admitted 1/31 from nursing home with fatigue and dehydration and suspected postictal state. Patient has CP, ADHD and has [employer] as named guardian for medical decisions. Patient has history of dysphagia with PEG tube removed in 2/2024.      Goals of Care Discussions and Plan             Plan/Follow-Up:-   Case reviewed and discussed with primary RN. [employer] as acting guardian. Paperwork from [employer] for changing code status needs completed by physician, with two physician signatures. Documents placed on paper chart and primary RN aware should they wish to complete them.     Treatment Limitations: They would want to attempt to sustain life by all medically effective means. This includes providing appropriate medical and surgical treatments as indicated to attempt to prolong life, including intensive care, mechanical ventilation and CPR. This is consistent with a code status of full code.    Code Status:Full Code No additional code details    ACP documents on file:  -Legal Guardianship Document    Healthcare Power of /Healthcare Surrogate Decision Makers:  [employer] (Guardianship)            Electronically signed by Mich Aguirre RN on 2/4/2025 at 11:37 AM           Palliative Care Office: [phone]

## 2025-02-05 VITALS
TEMPERATURE: 97.9 F | DIASTOLIC BLOOD PRESSURE: 68 MMHG | HEART RATE: 78 BPM | BODY MASS INDEX: 42.79 KG/M2 | SYSTOLIC BLOOD PRESSURE: 135 MMHG | HEIGHT: 61 IN | OXYGEN SATURATION: 100 % | RESPIRATION RATE: 18 BRPM | WEIGHT: 226.63 LBS

## 2025-02-05 LAB
ANION GAP SERPL CALC-SCNC: 10 MEQ/L (ref 8–16)
BUN SERPL-MCNC: 9 MG/DL (ref 7–22)
CALCIUM SERPL-MCNC: 8.8 MG/DL (ref 8.5–10.5)
CHLORIDE SERPL-SCNC: 100 MEQ/L (ref 98–111)
CO2 SERPL-SCNC: 28 MEQ/L (ref 23–33)
CREAT SERPL-MCNC: 0.3 MG/DL (ref 0.4–1.2)
DEPRECATED RDW RBC AUTO: 43.7 FL (ref 35–45)
ERYTHROCYTE [DISTWIDTH] IN BLOOD BY AUTOMATED COUNT: 12 % (ref 11.5–14.5)
GFR SERPL CREATININE-BSD FRML MDRD: > 90 ML/MIN/1.73M2
GLUCOSE SERPL-MCNC: 81 MG/DL (ref 70–108)
HCT VFR BLD AUTO: 40.6 % (ref 37–47)
HGB BLD-MCNC: 14 GM/DL (ref 12–16)
MCH RBC QN AUTO: 34 PG (ref 26–33)
MCHC RBC AUTO-ENTMCNC: 34.5 GM/DL (ref 32.2–35.5)
MCV RBC AUTO: 98.5 FL (ref 81–99)
PLATELET # BLD AUTO: 145 THOU/MM3 (ref 130–400)
PMV BLD AUTO: 11.1 FL (ref 9.4–12.4)
POTASSIUM SERPL-SCNC: 4.5 MEQ/L (ref 3.5–5.2)
RBC # BLD AUTO: 4.12 MILL/MM3 (ref 4.2–5.4)
SODIUM SERPL-SCNC: 138 MEQ/L (ref 135–145)
WBC # BLD AUTO: 5.7 THOU/MM3 (ref 4.8–10.8)

## 2025-02-05 PROCEDURE — 2500000003 HC RX 250 WO HCPCS: Performed by: NURSE PRACTITIONER

## 2025-02-05 PROCEDURE — C9254 INJECTION, LACOSAMIDE: HCPCS | Performed by: NURSE PRACTITIONER

## 2025-02-05 PROCEDURE — 6360000002 HC RX W HCPCS: Performed by: INTERNAL MEDICINE

## 2025-02-05 PROCEDURE — 85027 COMPLETE CBC AUTOMATED: CPT

## 2025-02-05 PROCEDURE — 2500000003 HC RX 250 WO HCPCS: Performed by: INTERNAL MEDICINE

## 2025-02-05 PROCEDURE — 80048 BASIC METABOLIC PNL TOTAL CA: CPT

## 2025-02-05 PROCEDURE — 36415 COLL VENOUS BLD VENIPUNCTURE: CPT

## 2025-02-05 PROCEDURE — 6360000002 HC RX W HCPCS: Performed by: NURSE PRACTITIONER

## 2025-02-05 PROCEDURE — 6370000000 HC RX 637 (ALT 250 FOR IP)

## 2025-02-05 PROCEDURE — 2580000003 HC RX 258: Performed by: NURSE PRACTITIONER

## 2025-02-05 PROCEDURE — 6370000000 HC RX 637 (ALT 250 FOR IP): Performed by: INTERNAL MEDICINE

## 2025-02-05 PROCEDURE — 99239 HOSP IP/OBS DSCHRG MGMT >30: CPT | Performed by: STUDENT IN AN ORGANIZED HEALTH CARE EDUCATION/TRAINING PROGRAM

## 2025-02-05 RX ORDER — NITROFURANTOIN 25; 75 MG/1; MG/1
100 CAPSULE ORAL EVERY 12 HOURS SCHEDULED
DISCHARGE
Start: 2025-02-05 | End: 2025-02-09

## 2025-02-05 RX ADMIN — VALPROATE SODIUM 750 MG: 100 INJECTION, SOLUTION INTRAVENOUS at 16:33

## 2025-02-05 RX ADMIN — Medication 50 MG: at 08:51

## 2025-02-05 RX ADMIN — VALPROATE SODIUM 750 MG: 100 INJECTION, SOLUTION INTRAVENOUS at 01:22

## 2025-02-05 RX ADMIN — NITROFURANTOIN (MONOHYDRATE/MACROCRYSTALS) 100 MG: 25; 75 CAPSULE ORAL at 08:52

## 2025-02-05 RX ADMIN — ENOXAPARIN SODIUM 30 MG: 100 INJECTION SUBCUTANEOUS at 08:51

## 2025-02-05 RX ADMIN — SODIUM CHLORIDE, PRESERVATIVE FREE 10 ML: 5 INJECTION INTRAVENOUS at 08:59

## 2025-02-05 RX ADMIN — VALPROATE SODIUM 750 MG: 100 INJECTION, SOLUTION INTRAVENOUS at 09:08

## 2025-02-05 RX ADMIN — ASPIRIN 81 MG: 81 TABLET, CHEWABLE ORAL at 08:51

## 2025-02-05 RX ADMIN — Medication 1 TABLET: at 08:52

## 2025-02-05 RX ADMIN — AMLODIPINE BESYLATE 5 MG: 5 TABLET ORAL at 08:53

## 2025-02-05 RX ADMIN — LACOSAMIDE 200 MG: 10 INJECTION INTRAVENOUS at 10:24

## 2025-02-05 RX ADMIN — LEVETIRACETAM 2000 MG: 100 INJECTION INTRAVENOUS at 05:29

## 2025-02-05 RX ADMIN — LAMOTRIGINE 150 MG: 25 TABLET ORAL at 08:52

## 2025-02-05 NOTE — PLAN OF CARE
Problem: Safety - Adult  Goal: Free from fall injury  Outcome: Progressing  Flowsheets (Taken 2/4/2025 0231 by Aileen Gay, RN)  Free From Fall Injury:   Instruct family/caregiver on patient safety   Based on caregiver fall risk screen, instruct family/caregiver to ask for assistance with transferring infant if caregiver noted to have fall risk factors     Problem: Discharge Planning  Goal: Discharge to home or other facility with appropriate resources  Outcome: Progressing  Flowsheets (Taken 2/4/2025 0231 by Aileen Gay, RN)  Discharge to home or other facility with appropriate resources:   Identify barriers to discharge with patient and caregiver   Arrange for needed discharge resources and transportation as appropriate   Identify discharge learning needs (meds, wound care, etc)     Problem: Skin/Tissue Integrity  Goal: Skin integrity remains intact  Description: 1.  Monitor for areas of redness and/or skin breakdown  2.  Assess vascular access sites hourly  3.  Every 4-6 hours minimum:  Change oxygen saturation probe site  4.  Every 4-6 hours:  If on nasal continuous positive airway pressure, respiratory therapy assess nares and determine need for appliance change or resting period  Outcome: Progressing  Flowsheets (Taken 2/4/2025 0231 by Aileen Gay, RN)  Skin Integrity Remains Intact: Monitor for areas of redness and/or skin breakdown     Problem: Neurosensory - Adult  Goal: Absence of seizures  Outcome: Progressing  Flowsheets (Taken 2/4/2025 0231 by Aileen Gay, RN)  Absence of seizures:   Monitor for seizure activity.  If seizure occurs, document type and location of movements and any associated apnea   If seizure occurs, turn head to side and suction secretions as needed   Administer anticonvulsants as ordered   Support airway/breathing, administer oxygen as needed  Goal: Remains free of injury related to seizures activity  Outcome: Progressing  Flowsheets (Taken 2/4/2025 0231 by Deidra  RN)  Maintains optimal cardiac output and hemodynamic stability:   Monitor blood pressure and heart rate   Monitor urine output and notify Licensed Independent Practitioner for values outside of normal range   Assess for signs of decreased cardiac output  Goal: Absence of cardiac dysrhythmias or at baseline  Outcome: Progressing  Flowsheets (Taken 2/4/2025 0231 by Aileen Gay RN)  Absence of cardiac dysrhythmias or at baseline:   Monitor cardiac rate and rhythm   Assess for signs of decreased cardiac output   Administer antiarrhythmia medication and electrolyte replacement as ordered     Problem: Skin/Tissue Integrity - Adult  Goal: Skin integrity remains intact  Description: 1.  Monitor for areas of redness and/or skin breakdown  2.  Assess vascular access sites hourly  3.  Every 4-6 hours minimum:  Change oxygen saturation probe site  4.  Every 4-6 hours:  If on nasal continuous positive airway pressure, respiratory therapy assess nares and determine need for appliance change or resting period  Outcome: Progressing  Flowsheets (Taken 2/4/2025 0231 by Aileen Gay RN)  Skin Integrity Remains Intact: Monitor for areas of redness and/or skin breakdown  Goal: Incisions, wounds, or drain sites healing without S/S of infection  Outcome: Progressing  Flowsheets (Taken 2/4/2025 0231 by Aileen Gay RN)  Incisions, Wounds, or Drain Sites Healing Without Sign and Symptoms of Infection:   ADMISSION and DAILY: Assess and document risk factors for pressure ulcer development   TWICE DAILY: Assess and document skin integrity  Goal: Oral mucous membranes remain intact  Outcome: Progressing  Flowsheets (Taken 2/4/2025 0231 by Aileen Gay RN)  Oral Mucous Membranes Remain Intact: Assess oral mucosa and hygiene practices     Problem: Musculoskeletal - Adult  Goal: Return mobility to safest level of function  Outcome: Progressing  Flowsheets (Taken 2/4/2025 0231 by Aileen Gay RN)  Return Mobility to Safest Level of

## 2025-02-05 NOTE — PROGRESS NOTES
AVS, last dose MAR, and CHASE faxed to to [facility]. This RN called report to Naheed GARCES at [facility] and answered all questions. [facility].

## 2025-02-05 NOTE — PROGRESS NOTES
Progress note: Infectious diseases    Patient - Mariangel Mas,  Age - 69 y.o.    - 1955      Room Number - 6K-25/025-A   MRN -  [id number]   [facility] # - [id number]  Date of Admission -  2025  9:39 AM    SUBJECTIVE:   She is back to her base line  OBJECTIVE   VITALS    height is 1.549 m (5' 1\") and weight is 102.8 kg (226 lb 10.1 oz). Her oral temperature is 97.9 °F (36.6 °C). Her blood pressure is 135/68 and her pulse is 78. Her respiration is 18 and oxygen saturation is 100%.       Wt Readings from Last 3 Encounters:   25 102.8 kg (226 lb 10.1 oz)   25 104.6 kg (230 lb 9.6 oz)   24 102.1 kg (225 lb)       I/O (24 Hours)    Intake/Output Summary (Last 24 hours) at 2025 1530  Last data filed at 2025 2019  Gross per 24 hour   Intake --   Output 500 ml   Net -500 ml       General Appearance  Awake, alert, oriented,     HEENT - normocephalic, atraumatic, slightly pale  conjunctiva,  anicteric sclera  Neck - Supple, no mass  Lungs -  Bilateral   air entry, diminished breath sound  Cardiovascular - Heart sounds are normal.    Abdomen - soft, not distended, nontender,   Neurologic -orietned  Skin - No bruising or bleeding  Extremities - +edema, no cyanosis, clubbing     MEDICATIONS:      nitrofurantoin (macrocrystal-monohydrate)  100 mg Oral 2 times per day    enoxaparin  30 mg SubCUTAneous BID    amLODIPine  5 mg Oral Daily    aspirin  81 mg PEG Tube Daily    lamoTRIgine  150 mg Oral BID    multivitamin  1 tablet Oral Daily    zinc sulfate  50 mg Oral Daily    levETIRAcetam  2,000 mg IntraVENous Q12H    sodium chloride flush  5-40 mL IntraVENous 2 times per day    valproate (DEPACON) 750 mg in sodium chloride 0.9 % 100 mL IVPB  750 mg IntraVENous Q8H    lacosamide (VIMPAT) 200 mg in sodium chloride 0.9 % 70 mL IVPB  200 mg IntraVENous Q12H      sodium chloride       acetaminophen,  loperamide, sodium chloride flush, sodium chloride, potassium chloride **OR** potassium alternative oral replacement **OR** potassium chloride, magnesium sulfate, ondansetron **OR** ondansetron, polyethylene glycol, acetaminophen **OR** acetaminophen, LORazepam      LABS:     CBC:   Recent Labs     02/03/25  0800 02/04/25  0758 02/05/25  0649   WBC 5.9 6.0 5.7   HGB 13.6 14.9 14.0    154 145     BMP:    Recent Labs     02/03/25  0940 02/04/25  0758 02/05/25  0649    137 138   K 4.9 4.1 4.5    101 100   CO2 24 24 28   BUN 5* 9 9   CREATININE 0.3* 0.4 0.3*   GLUCOSE 85 90 81     Calcium:  Recent Labs     02/05/25  0649   CALCIUM 8.8        CULTURES:   UA: No results for input(s): \"SPECGRAV\", \"PHUR\", \"COLORU\", \"CLARITYU\", \"MUCUS\", \"PROTEINU\", \"BLOODU\", \"RBCUA\", \"WBCUA\", \"BACTERIA\", \"NITRU\", \"GLUCOSEU\", \"BILIRUBINUR\", \"UROBILINOGEN\", \"KETUA\", \"LABCAST\", \"LABCASTTY\", \"AMORPHOS\" in the last 72 hours.    Invalid input(s): \"CRYSTALS\"  Micro:   Lab Results   Component Value Date/Time    BC  01/19/2025 10:11 PM     No growth 24 hours. No growth 48 hours. No growth at 5 days    BC  01/19/2025 10:11 PM     No growth 24 hours. No growth 48 hours. No growth at 5 days          Problem list of patient:     Patient Active Problem List   Diagnosis Code    Urinary urgency R39.15    Attention deficit hyperactivity disorder (ADHD) F90.9    Depression F32.A    Seizure disorder ([facility]) G40.909    Constipation K59.00    Status epilepticus ([facility]) G40.901    Chronic osteomyelitis of left shoulder region M86.612    Developmental disorder F89    Weakness of both lower extremities R29.898    Normocytic anemia D64.9    Hypokalemia E87.6    History of osteomyelitis Z87.39    History of status epilepticus Z86.69    Seizure (HCC) R56.9    Breakthrough seizure (HCC) G40.919    Encephalopathy acute G93.40    Cerebral palsy (HCC) G80.9    Acute encephalopathy G93.40    Urinary tract infection without hematuria N39.0    Hyperammonemia

## 2025-02-05 NOTE — CARE COORDINATION
2/5/25, 2:32 PM EST    DISCHARGE ON GOING EVALUATION    [facility]       [facility] day: 5  Location: -25/025-A Reason for admit: General weakness [R53.1]  Breakthrough seizure (HCC) [G40.919]     Procedures:   2/3 EEG    Imaging since last note: n/a    Barriers to Discharge: Await transport availability. LACP to reach out if able to go today vs tomorrow.     PCP: Analisa Ulloa MD  Readmission Risk Score: 17    Patient Goals/Plan/Treatment Preferences: Precert for approved for Marc Álvarez.

## 2025-02-05 NOTE — CARE COORDINATION
2/5/25, 3:15 PM EST    Patient goals/plan/ treatment preferences discussed by  and .  Patient goals/plan/ treatment preferences reviewed with patient/ family.  Patient/ family verbalize understanding of discharge plan and are in agreement with goal/plan/treatment preferences.  Understanding was demonstrated using the teach back method.  AVS provided by RN at time of discharge, which includes all necessary medical information pertaining to the patients current course of illness, treatment, post-discharge goals of care, and treatment preferences.     Services At/After Discharge: Long Term Care, Aide services, In ambulance, and Nursing service    MALENA was advised during 1 pm IDR of possible discharge today back to [facility].  LACP scheduled for transport at 6 pm.  MALENA notified Keturah at [facility].    [employer] on chart.  RN Marce to fax AVS and notify guardian if pt is discharged today.

## 2025-02-05 NOTE — DISCHARGE SUMMARY
DISCHARGE SUMMARY      Patient Identification:   Mariangel Mas   : 1955  MRN: [id number]   Account: [id number]      Patient's PCP: Analisa Ulloa MD    Admit Date: 2025     Discharge Date:   24    Admitting Physician: Veronica Troncoso MD     Discharge Physician: Millie Sanchez DO     Discharge Diagnoses:  Fatigue likely 2/2 dehydration and suspected postictal state  History of seizures  UTI 2/2 Klebsiella pneumoniae - POA  Candiduria  Mild Orolaryngeal Dysphagia  Moderate Malnutrition  Elevated Troponin  Lower Extremity Edema  Cerebral Palsy  ADHD  Depression      The patient was seen and examined on day of discharge and this discharge summary is in conjunction with any daily progress note from day of discharge.    Hospital Course:   Presented to ED  from nursing home with fatigue and refusing oral intake; this history was further clarified from SNF staff who state patient had taken all of her medications on  but did not take them on  and was brought to the ED as high risk for seizure without her medications. Recently admitted - s/p grand mal seizure. Follows with Dr. Butler (neurologist).     Workup in the ED with overall reassuring BMP. Platelets minimally low at 127, and troponin slightly elevated at 17 (though also seen in history). CT head unremarkable for any acute pathology as well as chest x-ray.  Patient was loaded with Keppra in the ED.    Patient has had troubles with dysphagia in the past however SLP evaluation recommending pureed diet. Neurology saw the patient and recommended continuing home medications. EEG (2/3/25) with mild non-specific encephalopathy and R temporal sharps suggesting increased risk for focal seizures (may represent an underlying focal lesion). Medication levels were checked in the hospital and were consistent with patient having taken her home medications recently. She never required any PRN Ativan during hospitalization.     UA collected

## 2025-02-11 NOTE — PROGRESS NOTES
Physician Progress Note      PATIENT:               NOEMÍ DIOP  [facility] #:                  [id number]  :                       1955  ADMIT DATE:       2025 9:39 AM  DISCH DATE:        2025 5:54 PM  RESPONDING  PROVIDER #:        Mely Palmer DO          QUERY TEXT:    Patient was admitted with seizure, dehydration. Patient was noted to have   troponin levels, 17, 12.  Patient was treated with Keppra, IVF bolus.  If   possible, please document in the progress notes and discharge summary if you   are evaluating and/or treating any of the following:    The medical record reflects the following:  Risk Factors: seizure, dehydration  Clinical Indicators: troponin levels, 17, 12.  Treatment: Keppra, IVF bolus.  Options provided:  -- Elevated Trop with no underlying condition  -- Demand ischemia due to underlying condition  -- Type 2 MI due to underlying condition  -- Other - I will add my own diagnosis  -- Disagree - Not applicable / Not valid  -- Disagree - Clinically unable to determine / Unknown  -- Refer to Clinical Documentation Reviewer    PROVIDER RESPONSE TEXT:    This patient has Demand ischemia due to underlying condition    Query created by: Holly Finch on 2025 8:11 AM      Electronically signed by:  Mely Palmer DO 2025 8:56 AM

## 2025-02-13 LAB
BACTERIA UR CULT: ABNORMAL
BACTERIA UR CULT: ABNORMAL
ORGANISM: ABNORMAL
ORGANISM: ABNORMAL

## 2025-03-17 ENCOUNTER — APPOINTMENT (OUTPATIENT)
Dept: ULTRASOUND IMAGING | Age: 70
End: 2025-03-17
Payer: MEDICARE

## 2025-03-17 ENCOUNTER — TELEPHONE (OUTPATIENT)
Dept: UROLOGY | Age: 70
End: 2025-03-17

## 2025-03-17 ENCOUNTER — HOSPITAL ENCOUNTER (INPATIENT)
Age: 70
LOS: 12 days | Discharge: NURSING, REHAB OR LONG-TERM CARE | End: 2025-03-29
Attending: NURSE PRACTITIONER | Admitting: INTERNAL MEDICINE
Payer: MEDICARE

## 2025-03-17 ENCOUNTER — APPOINTMENT (OUTPATIENT)
Dept: CT IMAGING | Age: 70
End: 2025-03-17
Payer: MEDICARE

## 2025-03-17 DIAGNOSIS — N39.0 URINARY TRACT INFECTION WITHOUT HEMATURIA, SITE UNSPECIFIED: Primary | ICD-10-CM

## 2025-03-17 DIAGNOSIS — G40.409 GRAND MAL SEIZURE (HCC): ICD-10-CM

## 2025-03-17 DIAGNOSIS — R41.82 ALTERED MENTAL STATUS, UNSPECIFIED ALTERED MENTAL STATUS TYPE: ICD-10-CM

## 2025-03-17 PROBLEM — R65.20 SEPSIS WITH ORGAN DYSFUNCTION (HCC): Status: ACTIVE | Noted: 2025-03-17

## 2025-03-17 PROBLEM — A41.9 SEPSIS WITH ORGAN DYSFUNCTION (HCC): Status: ACTIVE | Noted: 2025-03-17

## 2025-03-17 LAB
ALBUMIN SERPL BCG-MCNC: 3.8 G/DL (ref 3.4–4.9)
ALP SERPL-CCNC: 53 U/L (ref 35–104)
ALT SERPL W/O P-5'-P-CCNC: 14 U/L (ref 10–35)
ANION GAP SERPL CALC-SCNC: 11 MEQ/L (ref 8–16)
AST SERPL-CCNC: 21 U/L (ref 10–35)
BACTERIA URNS QL MICRO: ABNORMAL /HPF
BASOPHILS ABSOLUTE: 0 THOU/MM3 (ref 0–0.1)
BASOPHILS NFR BLD AUTO: 0.2 %
BILIRUB CONJ SERPL-MCNC: < 0.1 MG/DL (ref 0–0.2)
BILIRUB SERPL-MCNC: 0.3 MG/DL (ref 0.3–1.2)
BILIRUB UR QL STRIP.AUTO: NEGATIVE
BUN SERPL-MCNC: 24 MG/DL (ref 8–23)
CALCIUM SERPL-MCNC: 9.6 MG/DL (ref 8.8–10.2)
CASTS #/AREA URNS LPF: ABNORMAL /LPF
CASTS 2: ABNORMAL /LPF
CHARACTER UR: ABNORMAL
CHLORIDE SERPL-SCNC: 102 MEQ/L (ref 98–111)
CO2 SERPL-SCNC: 26 MEQ/L (ref 22–29)
COLOR, UA: YELLOW
CREAT SERPL-MCNC: 0.5 MG/DL (ref 0.5–0.9)
CRYSTALS URNS MICRO: ABNORMAL
DEPRECATED RDW RBC AUTO: 46.5 FL (ref 35–45)
EKG ATRIAL RATE: 90 BPM
EKG P AXIS: 41 DEGREES
EKG P-R INTERVAL: 168 MS
EKG Q-T INTERVAL: 360 MS
EKG QRS DURATION: 82 MS
EKG QTC CALCULATION (BAZETT): 440 MS
EKG R AXIS: 6 DEGREES
EKG T AXIS: 21 DEGREES
EKG VENTRICULAR RATE: 90 BPM
EOSINOPHIL NFR BLD AUTO: 0.9 %
EOSINOPHILS ABSOLUTE: 0.1 THOU/MM3 (ref 0–0.4)
EPITHELIAL CELLS, UA: ABNORMAL /HPF
ERYTHROCYTE [DISTWIDTH] IN BLOOD BY AUTOMATED COUNT: 12.4 % (ref 11.5–14.5)
GFR SERPL CREATININE-BSD FRML MDRD: > 90 ML/MIN/1.73M2
GLUCOSE SERPL-MCNC: 108 MG/DL (ref 74–109)
GLUCOSE UR QL STRIP.AUTO: NEGATIVE MG/DL
HCT VFR BLD AUTO: 41.3 % (ref 37–47)
HGB BLD-MCNC: 13.9 GM/DL (ref 12–16)
HGB UR QL STRIP.AUTO: ABNORMAL
IMM GRANULOCYTES # BLD AUTO: 0.07 THOU/MM3 (ref 0–0.07)
IMM GRANULOCYTES NFR BLD AUTO: 0.4 %
KETONES UR QL STRIP.AUTO: ABNORMAL
LACTATE SERPL-SCNC: 2.1 MMOL/L (ref 0.5–2)
LIPASE SERPL-CCNC: 19 U/L (ref 13–60)
LYMPHOCYTES ABSOLUTE: 2.9 THOU/MM3 (ref 1–4.8)
LYMPHOCYTES NFR BLD AUTO: 18.2 %
MCH RBC QN AUTO: 33.9 PG (ref 26–33)
MCHC RBC AUTO-ENTMCNC: 33.7 GM/DL (ref 32.2–35.5)
MCV RBC AUTO: 100.7 FL (ref 81–99)
MISCELLANEOUS 2: ABNORMAL
MONOCYTES ABSOLUTE: 1.1 THOU/MM3 (ref 0.4–1.3)
MONOCYTES NFR BLD AUTO: 7.2 %
MUCOUS THREADS URNS QL MICRO: ABNORMAL
NEUTROPHILS ABSOLUTE: 11.6 THOU/MM3 (ref 1.8–7.7)
NEUTROPHILS NFR BLD AUTO: 73.1 %
NITRITE UR QL STRIP: POSITIVE
NRBC BLD AUTO-RTO: 0 /100 WBC
OSMOLALITY SERPL CALC.SUM OF ELEC: 282.1 MOSMOL/KG (ref 275–300)
PH UR STRIP.AUTO: 5.5 [PH] (ref 5–9)
PLATELET # BLD AUTO: 159 THOU/MM3 (ref 130–400)
PMV BLD AUTO: 12.2 FL (ref 9.4–12.4)
POTASSIUM SERPL-SCNC: 4.1 MEQ/L (ref 3.5–5.2)
PROCALCITONIN SERPL IA-MCNC: < 0.02 NG/ML (ref 0.01–0.09)
PROT SERPL-MCNC: 6.7 G/DL (ref 6.4–8.3)
PROT UR STRIP.AUTO-MCNC: ABNORMAL MG/DL
RBC # BLD AUTO: 4.1 MILL/MM3 (ref 4.2–5.4)
RBC URINE: ABNORMAL /HPF
RENAL EPI CELLS #/AREA URNS HPF: ABNORMAL /[HPF]
SODIUM SERPL-SCNC: 139 MEQ/L (ref 135–145)
SP GR UR REFRACT.AUTO: > 1.03 (ref 1–1.03)
TROPONIN, HIGH SENSITIVITY: 15 NG/L (ref 0–12)
UROBILINOGEN, URINE: 1 EU/DL (ref 0–1)
WBC # BLD AUTO: 15.9 THOU/MM3 (ref 4.8–10.8)
WBC #/AREA URNS HPF: > 100 /HPF
WBC #/AREA URNS HPF: ABNORMAL /[HPF]
YEAST LIKE FUNGI URNS QL MICRO: ABNORMAL

## 2025-03-17 PROCEDURE — 6360000002 HC RX W HCPCS: Performed by: NURSE PRACTITIONER

## 2025-03-17 PROCEDURE — 82248 BILIRUBIN DIRECT: CPT

## 2025-03-17 PROCEDURE — 6360000004 HC RX CONTRAST MEDICATION: Performed by: NURSE PRACTITIONER

## 2025-03-17 PROCEDURE — 84484 ASSAY OF TROPONIN QUANT: CPT

## 2025-03-17 PROCEDURE — 93010 ELECTROCARDIOGRAM REPORT: CPT | Performed by: INTERNAL MEDICINE

## 2025-03-17 PROCEDURE — 84145 PROCALCITONIN (PCT): CPT

## 2025-03-17 PROCEDURE — 92610 EVALUATE SWALLOWING FUNCTION: CPT

## 2025-03-17 PROCEDURE — 80053 COMPREHEN METABOLIC PANEL: CPT

## 2025-03-17 PROCEDURE — 93005 ELECTROCARDIOGRAM TRACING: CPT | Performed by: NURSE PRACTITIONER

## 2025-03-17 PROCEDURE — 87086 URINE CULTURE/COLONY COUNT: CPT

## 2025-03-17 PROCEDURE — 2500000003 HC RX 250 WO HCPCS: Performed by: NURSE PRACTITIONER

## 2025-03-17 PROCEDURE — 99223 1ST HOSP IP/OBS HIGH 75: CPT | Performed by: NURSE PRACTITIONER

## 2025-03-17 PROCEDURE — 83605 ASSAY OF LACTIC ACID: CPT

## 2025-03-17 PROCEDURE — 81001 URINALYSIS AUTO W/SCOPE: CPT

## 2025-03-17 PROCEDURE — 36415 COLL VENOUS BLD VENIPUNCTURE: CPT

## 2025-03-17 PROCEDURE — 99285 EMERGENCY DEPT VISIT HI MDM: CPT

## 2025-03-17 PROCEDURE — C9254 INJECTION, LACOSAMIDE: HCPCS | Performed by: NURSE PRACTITIONER

## 2025-03-17 PROCEDURE — 87186 SC STD MICRODIL/AGAR DIL: CPT

## 2025-03-17 PROCEDURE — 87077 CULTURE AEROBIC IDENTIFY: CPT

## 2025-03-17 PROCEDURE — 87040 BLOOD CULTURE FOR BACTERIA: CPT

## 2025-03-17 PROCEDURE — 70450 CT HEAD/BRAIN W/O DYE: CPT

## 2025-03-17 PROCEDURE — 74177 CT ABD & PELVIS W/CONTRAST: CPT

## 2025-03-17 PROCEDURE — 1200000003 HC TELEMETRY R&B

## 2025-03-17 PROCEDURE — 51798 US URINE CAPACITY MEASURE: CPT

## 2025-03-17 PROCEDURE — 76770 US EXAM ABDO BACK WALL COMP: CPT

## 2025-03-17 PROCEDURE — 6370000000 HC RX 637 (ALT 250 FOR IP): Performed by: NURSE PRACTITIONER

## 2025-03-17 PROCEDURE — 83690 ASSAY OF LIPASE: CPT

## 2025-03-17 PROCEDURE — 85025 COMPLETE CBC W/AUTO DIFF WBC: CPT

## 2025-03-17 PROCEDURE — 2580000003 HC RX 258: Performed by: NURSE PRACTITIONER

## 2025-03-17 PROCEDURE — 71260 CT THORAX DX C+: CPT

## 2025-03-17 RX ORDER — LEVETIRACETAM 500 MG/1
2000 TABLET ORAL 2 TIMES DAILY
Status: DISCONTINUED | OUTPATIENT
Start: 2025-03-17 | End: 2025-03-24

## 2025-03-17 RX ORDER — SODIUM CHLORIDE 9 MG/ML
INJECTION, SOLUTION INTRAVENOUS PRN
Status: DISCONTINUED | OUTPATIENT
Start: 2025-03-17 | End: 2025-03-29 | Stop reason: HOSPADM

## 2025-03-17 RX ORDER — ACETAMINOPHEN 650 MG/1
650 SUPPOSITORY RECTAL EVERY 6 HOURS PRN
Status: DISCONTINUED | OUTPATIENT
Start: 2025-03-17 | End: 2025-03-29 | Stop reason: HOSPADM

## 2025-03-17 RX ORDER — LEVOFLOXACIN 5 MG/ML
750 INJECTION, SOLUTION INTRAVENOUS EVERY 24 HOURS
Status: DISCONTINUED | OUTPATIENT
Start: 2025-03-18 | End: 2025-03-19 | Stop reason: ALTCHOICE

## 2025-03-17 RX ORDER — MAGNESIUM SULFATE IN WATER 40 MG/ML
2000 INJECTION, SOLUTION INTRAVENOUS PRN
Status: DISCONTINUED | OUTPATIENT
Start: 2025-03-17 | End: 2025-03-20 | Stop reason: SDUPTHER

## 2025-03-17 RX ORDER — AMLODIPINE BESYLATE 5 MG/1
5 TABLET ORAL DAILY
Status: DISCONTINUED | OUTPATIENT
Start: 2025-03-17 | End: 2025-03-24

## 2025-03-17 RX ORDER — PHENOBARBITAL 32.4 MG/1
30 TABLET ORAL EVERY MORNING
Status: DISCONTINUED | OUTPATIENT
Start: 2025-03-17 | End: 2025-03-24

## 2025-03-17 RX ORDER — LACOSAMIDE 10 MG/ML
200 SOLUTION ORAL 2 TIMES DAILY
Status: ON HOLD | COMMUNITY
End: 2025-03-29 | Stop reason: HOSPADM

## 2025-03-17 RX ORDER — LIDOCAINE 4 G/G
1 PATCH TOPICAL 2 TIMES DAILY
Status: DISCONTINUED | OUTPATIENT
Start: 2025-03-17 | End: 2025-03-17

## 2025-03-17 RX ORDER — ASPIRIN 81 MG/1
81 TABLET, CHEWABLE ORAL DAILY
Status: DISCONTINUED | OUTPATIENT
Start: 2025-03-17 | End: 2025-03-29 | Stop reason: HOSPADM

## 2025-03-17 RX ORDER — LEVETIRACETAM 500 MG/5ML
1000 INJECTION, SOLUTION, CONCENTRATE INTRAVENOUS EVERY 12 HOURS
Status: DISCONTINUED | OUTPATIENT
Start: 2025-03-17 | End: 2025-03-29

## 2025-03-17 RX ORDER — SODIUM CHLORIDE, SODIUM LACTATE, POTASSIUM CHLORIDE, CALCIUM CHLORIDE 600; 310; 30; 20 MG/100ML; MG/100ML; MG/100ML; MG/100ML
INJECTION, SOLUTION INTRAVENOUS CONTINUOUS
Status: ACTIVE | OUTPATIENT
Start: 2025-03-17 | End: 2025-03-17

## 2025-03-17 RX ORDER — ENOXAPARIN SODIUM 100 MG/ML
30 INJECTION SUBCUTANEOUS 2 TIMES DAILY
Status: DISCONTINUED | OUTPATIENT
Start: 2025-03-17 | End: 2025-03-24

## 2025-03-17 RX ORDER — SODIUM CHLORIDE 0.9 % (FLUSH) 0.9 %
5-40 SYRINGE (ML) INJECTION PRN
Status: DISCONTINUED | OUTPATIENT
Start: 2025-03-17 | End: 2025-03-17

## 2025-03-17 RX ORDER — SODIUM CHLORIDE 0.9 % (FLUSH) 0.9 %
5-40 SYRINGE (ML) INJECTION EVERY 12 HOURS SCHEDULED
Status: DISCONTINUED | OUTPATIENT
Start: 2025-03-17 | End: 2025-03-29 | Stop reason: HOSPADM

## 2025-03-17 RX ORDER — POLYETHYLENE GLYCOL 3350 17 G/17G
17 POWDER, FOR SOLUTION ORAL DAILY PRN
Status: DISCONTINUED | OUTPATIENT
Start: 2025-03-17 | End: 2025-03-17

## 2025-03-17 RX ORDER — SODIUM CHLORIDE 9 MG/ML
INJECTION, SOLUTION INTRAVENOUS PRN
Status: DISCONTINUED | OUTPATIENT
Start: 2025-03-17 | End: 2025-03-17

## 2025-03-17 RX ORDER — LACOSAMIDE 10 MG/ML
200 INJECTION, SOLUTION INTRAVENOUS 2 TIMES DAILY
Status: DISCONTINUED | OUTPATIENT
Start: 2025-03-17 | End: 2025-03-17

## 2025-03-17 RX ORDER — LEVETIRACETAM 100 MG/ML
2000 SOLUTION ORAL 2 TIMES DAILY
Status: ON HOLD | COMMUNITY
End: 2025-03-29 | Stop reason: HOSPADM

## 2025-03-17 RX ORDER — ZINC SULFATE 50(220)MG
50 CAPSULE ORAL DAILY
Status: DISCONTINUED | OUTPATIENT
Start: 2025-03-17 | End: 2025-03-29 | Stop reason: HOSPADM

## 2025-03-17 RX ORDER — POTASSIUM CHLORIDE 7.45 MG/ML
10 INJECTION INTRAVENOUS PRN
Status: ACTIVE | OUTPATIENT
Start: 2025-03-17 | End: 2025-03-19

## 2025-03-17 RX ORDER — PHENOBARBITAL SODIUM 65 MG/ML
60 INJECTION, SOLUTION INTRAMUSCULAR; INTRAVENOUS NIGHTLY
Status: DISCONTINUED | OUTPATIENT
Start: 2025-03-17 | End: 2025-03-28

## 2025-03-17 RX ORDER — LEVOCARNITINE 330 MG/1
330 TABLET ORAL 2 TIMES DAILY
Status: DISCONTINUED | OUTPATIENT
Start: 2025-03-17 | End: 2025-03-29 | Stop reason: HOSPADM

## 2025-03-17 RX ORDER — POTASSIUM CHLORIDE 1500 MG/1
40 TABLET, EXTENDED RELEASE ORAL PRN
Status: DISCONTINUED | OUTPATIENT
Start: 2025-03-17 | End: 2025-03-17

## 2025-03-17 RX ORDER — PHENOBARBITAL SODIUM 65 MG/ML
30 INJECTION, SOLUTION INTRAMUSCULAR; INTRAVENOUS DAILY
Status: DISCONTINUED | OUTPATIENT
Start: 2025-03-17 | End: 2025-03-28

## 2025-03-17 RX ORDER — ONDANSETRON 2 MG/ML
4 INJECTION INTRAMUSCULAR; INTRAVENOUS EVERY 6 HOURS PRN
Status: DISCONTINUED | OUTPATIENT
Start: 2025-03-17 | End: 2025-03-29 | Stop reason: HOSPADM

## 2025-03-17 RX ORDER — PHENOBARBITAL 32.4 MG/1
60 TABLET ORAL NIGHTLY
Status: DISCONTINUED | OUTPATIENT
Start: 2025-03-17 | End: 2025-03-24

## 2025-03-17 RX ORDER — IOPAMIDOL 755 MG/ML
80 INJECTION, SOLUTION INTRAVASCULAR
Status: COMPLETED | OUTPATIENT
Start: 2025-03-17 | End: 2025-03-17

## 2025-03-17 RX ORDER — ONDANSETRON 4 MG/1
4 TABLET, ORALLY DISINTEGRATING ORAL EVERY 8 HOURS PRN
Status: DISCONTINUED | OUTPATIENT
Start: 2025-03-17 | End: 2025-03-17

## 2025-03-17 RX ORDER — VENLAFAXINE 37.5 MG/1
75 TABLET ORAL 2 TIMES DAILY
Status: DISCONTINUED | OUTPATIENT
Start: 2025-03-17 | End: 2025-03-29 | Stop reason: HOSPADM

## 2025-03-17 RX ORDER — LIDOCAINE 4 G/G
1 PATCH TOPICAL DAILY
Status: DISCONTINUED | OUTPATIENT
Start: 2025-03-18 | End: 2025-03-29 | Stop reason: HOSPADM

## 2025-03-17 RX ORDER — LACTULOSE 10 G/15ML
20 SOLUTION ORAL 2 TIMES DAILY
COMMUNITY

## 2025-03-17 RX ORDER — MULTIVITAMIN WITH IRON
1 TABLET ORAL DAILY
Status: DISCONTINUED | OUTPATIENT
Start: 2025-03-17 | End: 2025-03-29 | Stop reason: HOSPADM

## 2025-03-17 RX ORDER — SODIUM CHLORIDE 0.9 % (FLUSH) 0.9 %
5-40 SYRINGE (ML) INJECTION EVERY 12 HOURS SCHEDULED
Status: DISCONTINUED | OUTPATIENT
Start: 2025-03-17 | End: 2025-03-17

## 2025-03-17 RX ORDER — SODIUM CHLORIDE 0.9 % (FLUSH) 0.9 %
5-40 SYRINGE (ML) INJECTION PRN
Status: DISCONTINUED | OUTPATIENT
Start: 2025-03-17 | End: 2025-03-29 | Stop reason: HOSPADM

## 2025-03-17 RX ORDER — VALPROIC ACID 250 MG/5ML
750 SOLUTION ORAL EVERY 8 HOURS SCHEDULED
Status: DISCONTINUED | OUTPATIENT
Start: 2025-03-17 | End: 2025-03-24

## 2025-03-17 RX ORDER — LACOSAMIDE 50 MG/1
200 TABLET ORAL 2 TIMES DAILY
Status: DISCONTINUED | OUTPATIENT
Start: 2025-03-17 | End: 2025-03-24

## 2025-03-17 RX ORDER — LEVOFLOXACIN 5 MG/ML
750 INJECTION, SOLUTION INTRAVENOUS EVERY 24 HOURS
Status: DISCONTINUED | OUTPATIENT
Start: 2025-03-18 | End: 2025-03-17

## 2025-03-17 RX ORDER — ASPIRIN 300 MG/1
75 SUPPOSITORY RECTAL DAILY
Status: DISCONTINUED | OUTPATIENT
Start: 2025-03-17 | End: 2025-03-25

## 2025-03-17 RX ORDER — ACETAMINOPHEN 325 MG/1
650 TABLET ORAL EVERY 6 HOURS PRN
Status: DISCONTINUED | OUTPATIENT
Start: 2025-03-17 | End: 2025-03-17

## 2025-03-17 RX ORDER — LEVOFLOXACIN 5 MG/ML
750 INJECTION, SOLUTION INTRAVENOUS ONCE
Status: COMPLETED | OUTPATIENT
Start: 2025-03-17 | End: 2025-03-17

## 2025-03-17 RX ADMIN — SODIUM CHLORIDE, PRESERVATIVE FREE 10 ML: 5 INJECTION INTRAVENOUS at 22:02

## 2025-03-17 RX ADMIN — LACOSAMIDE 200 MG: 10 INJECTION INTRAVENOUS at 21:25

## 2025-03-17 RX ADMIN — ASPIRIN 75 MG: 300 SUPPOSITORY RECTAL at 08:07

## 2025-03-17 RX ADMIN — ENOXAPARIN SODIUM 30 MG: 100 INJECTION SUBCUTANEOUS at 22:05

## 2025-03-17 RX ADMIN — LEVETIRACETAM 1000 MG: 100 INJECTION INTRAVENOUS at 17:37

## 2025-03-17 RX ADMIN — LACOSAMIDE 200 MG: 10 INJECTION INTRAVENOUS at 09:49

## 2025-03-17 RX ADMIN — PHENOBARBITAL SODIUM 30 MG: 65 INJECTION INTRAMUSCULAR at 08:06

## 2025-03-17 RX ADMIN — PHENOBARBITAL SODIUM 60 MG: 65 INJECTION INTRAMUSCULAR at 22:02

## 2025-03-17 RX ADMIN — VALPROATE SODIUM 750 MG: 100 INJECTION, SOLUTION INTRAVENOUS at 22:05

## 2025-03-17 RX ADMIN — VALPROATE SODIUM 750 MG: 100 INJECTION, SOLUTION INTRAVENOUS at 14:01

## 2025-03-17 RX ADMIN — IOPAMIDOL 80 ML: 755 INJECTION, SOLUTION INTRAVENOUS at 04:42

## 2025-03-17 RX ADMIN — LEVETIRACETAM 1000 MG: 100 INJECTION INTRAVENOUS at 06:25

## 2025-03-17 RX ADMIN — SODIUM CHLORIDE, SODIUM LACTATE, POTASSIUM CHLORIDE, AND CALCIUM CHLORIDE: .6; .31; .03; .02 INJECTION, SOLUTION INTRAVENOUS at 07:46

## 2025-03-17 RX ADMIN — ENOXAPARIN SODIUM 30 MG: 100 INJECTION SUBCUTANEOUS at 08:06

## 2025-03-17 RX ADMIN — LEVOFLOXACIN 750 MG: 5 INJECTION, SOLUTION INTRAVENOUS at 04:35

## 2025-03-17 RX ADMIN — VALPROATE SODIUM 750 MG: 100 INJECTION, SOLUTION INTRAVENOUS at 06:21

## 2025-03-17 ASSESSMENT — PAIN SCALES - WONG BAKER
WONGBAKER_NUMERICALRESPONSE: NO HURT

## 2025-03-17 ASSESSMENT — PAIN SCALES - GENERAL: PAINLEVEL_OUTOF10: 0

## 2025-03-17 NOTE — ED TRIAGE NOTES
Pt presents to the ED for evaluation of \"abdominal spasms\" per nursing home staff s/p intranasal versed administration. Staff at pts residence reports pt was having \"abdominal spasms\" which in the past had preceded seizures. Seizure activity was not observed however they did administer 5mg of intranasal versed. During triage pts eyes are open spontaneously.  She answers questions with an incomprehensible vocalization. VS assessed.

## 2025-03-17 NOTE — ED PROVIDER NOTES
[facility] EMERGENCY DEPARTMENT      EMERGENCY MEDICINE     Pt Name: Mariangel Mas  MRN: [id number]  Birthdate 1955  Date of evaluation: 3/17/2025  Provider: SUNDAY Ruiz CNP    CHIEF COMPLAINT       Chief Complaint   Patient presents with    Abdominal Pain     HISTORY OF PRESENT ILLNESS   Mariangel Mas is a pleasant 69 y.o. female who presents to the emergency department from home with c/o abdominal pain/spasms.  Patient is a resident of a nursing home and has a history of seizures.  The nursing home staff noted that the patient was having \"abdominal spasms\".  They thought maybe this was leading up to her having a seizure so they administered 5 mg of intranasal Versed.  Patient was very drowsy upon arrival but it is unknown if this was a postictal thing or related to the patient's Versed intake.  Patient was also indicating that she was having pain in her bottom and proceeded to have a large bowel movement.  Throughout evaluation, patient has progressively become more arousable and able to answer more questions.      History is obtained from:  patient  PASTMEDICAL HISTORY     Past Medical History:   Diagnosis Date    ADHD     Attention deficit hyperactivity disorder (ADHD)     Cerebral palsy (Formerly [facility])     Depression     Essential hypertension     Learning disability     Seizure (Formerly [facility])     Urinary urgency        Patient Active Problem List   Diagnosis Code    Urinary urgency R39.15    Attention deficit hyperactivity disorder (ADHD) F90.9    Depression F32.A    Seizure disorder (Formerly [facility]) G40.909    Constipation K59.00    Status epilepticus (Formerly [facility]) G40.901    Chronic osteomyelitis of left shoulder region (Formerly [facility]) M86.612    Developmental disorder F89    Weakness of both lower extremities R29.898    Normocytic anemia D64.9    Hypokalemia E87.6    History of osteomyelitis Z87.39    History of status epilepticus Z86.69    Seizure (Formerly [facility]) R56.9    Breakthrough seizure (Formerly [facility]) G40.919    Encephalopathy  a seizure.  Patient's mentation did improve throughout the visit however she needs further evaluation and monitoring.  Patient has very resistant UTIs therefore started on Levaquin and admitted to the hospitalist service for further evaluation.      Vitals Reviewed:    Vitals:    03/17/25 0153 03/17/25 0252   BP: 139/69 (!) 143/80   Pulse: 92 88   Resp: 27 22   Temp: 99 °F (37.2 °C)    SpO2: 92% 95%   Weight: 102.5 kg (226 lb)        The patient was seen and examined. Appropriate diagnostic testing was performed and results reviewed with the patient.         The results of pertinent diagnostic studies and exam findings were discussed. The patient’s provisional diagnosis and plan of care were discussed with the patient and present family who expressed understanding and agreement with the POC. Any medications were reviewed and indications and risks of medications were discussed with the patient /family present.     No notes of EC Admission Criteria type on file.        Patient was seen independently by myself. The patient's final impression and disposition and plan was determined by myself.     Strict return precautions and follow up instructions were discussed with the patient prior to discharge, with which the patient agrees.    Physical assessment findings, diagnostic testing(s) if applicable, and vital signs reviewed with patient/patient representative.  Questions answered.   Medications asdirected, including OTC medications for supportive care.   Education provided on medications.  Differential diagnosis(s) discussed with patient/patient representative.  Home care/self care instructions reviewed withpatient/patient representative.  Patient is to follow-up with family care provider in 2-3 days if no improvement.  Patient is to go to the emergency department if symptoms worsen.  Patient/patient representative isaware of care plan, questions answered, verbalizes understanding and is in agreement.     ED Medications

## 2025-03-17 NOTE — CONSULTS
[facility] MED SURG 8AB  [address] [address]  Dept: [phone]  Loc: [phone]  Visit Date: 3/17/2025    Urology Consult Note    Reason for Consult:  CT imaging revealed gas in bladder   Requesting Physician:  medicine    History Obtained From:  patient, electronic medical record    Chief Complaint: tremors    HISTORY OF PRESENT ILLNESS:                The patient is a 69 y.o. female  who presents with abd tremors  \"Based on history nursing staff identifies patient has abdominal tremors preceding seizure activity.  Patient was given intranasal Versed per nursing home staff with concerns she was having a seizure.  However seizure activity was not observed.   Recent [facility] hospitalization 1/31-2/5/25 patient received care for fatigue likely secondary to dehydration and suspected postictal state.  UTI secondary to Klebsiella pneumonia(POA).\"  URO consulted for air in bladder  She was straight cathed for urin while in ER  No URO hx found    Past Medical History:        Diagnosis Date    ADHD     Attention deficit hyperactivity disorder (ADHD)     Cerebral palsy (HCC)     Depression     Essential hypertension     Learning disability     Seizure (HCC)     Urinary urgency      Past Surgical History:        Procedure Laterality Date    GASTROSTOMY TUBE PLACEMENT  07/28/2023    EGD PEG TUBE PLACEMENT    GASTROSTOMY TUBE PLACEMENT N/A 7/28/2023    EGD PEG TUBE PLACEMENT performed by Ge Puente MD at [facility] OR    NASAL FRACTURE SURGERY N/A 3/13/2024    Closed Reduction of Nasal Bone performed by Storm Flor MD at [facility] OR    SHOULDER SURGERY Left     ORIF    -- had hardware infection afterwards requiring IV abx    UPPER GASTROINTESTINAL ENDOSCOPY N/A 2/9/2024    EGD BIOPSY performed by Grey Jimenes MD at [facility] ENDOSCOPY    UPPER GASTROINTESTINAL ENDOSCOPY  2/9/2024    EGD FOREIGN BODY REMOVAL performed by Grey Jimenes MD at [facility] ENDOSCOPY    UPPER

## 2025-03-17 NOTE — CARE COORDINATION
Case Management Assessment Initial Evaluation    Date/Time of Evaluation: 3/17/2025 8:03 AM  Assessment Completed by: Fang Bradley RN    If patient is discharged prior to next notation, then this note serves as note for discharge by case management.    Patient Name: Mariangel Mas                   YOB: 1955  Diagnosis: Urinary tract infection without hematuria, site unspecified [N39.0]  Altered mental status, unspecified altered mental status type [R41.82]  Sepsis with organ dysfunction (HCC) [A41.9, R65.20]                   Date / Time: 3/17/2025  1:48 AM  Location: [address] [place] [address]     Patient Admission Status: Inpatient   Readmission Risk Low 0-14, Mod 15-19), High > 20: Readmission Risk Score: 21.2    Current PCP: Analisa Ulloa MD  Health Care Decision Makers:   Primary Decision Maker: (APSI) Joya Godfrey - Legal Guardian, Legal Guardian - [phone]    Secondary Decision Maker: Donna Espinoza - Brother/Sister - [phone]    Additional Case Management Notes: Admitted through ER from [facility] with abd spasms. Hx of seizures following such spasms. Hx CP. No seizure activity noted. Urology consulted due to abn CT abd/pelvis. Wound/Ostomy consulted. SLP to eval and treat for reported dysphagia. LR at 100/hr. Levoquin.    Procedures: No    Imaging: CT chest 3/17/2025 bilateral lower lobe atelectasis. Degenerative changes of the spine and both shoulders.   CT AP 3/17/2025 moderate to large amount of stool in the rectum. Sigmoid diverticulosis without evidence for acute diverticulitis. Atherosclerotic calcifications with severe atherosclerotic narrowing of bilateral renal arteries.   Patient Goals/Plan/Treatment Preferences: From [facility]. SW following.

## 2025-03-17 NOTE — PROGRESS NOTES
Pt admitted from ED, skin not intact, pressure injury noted on coccyx, open, present on admission. Right axillary rash, redness, abdominal folds pink. Incontinent, q 2 turns.

## 2025-03-17 NOTE — H&P
Hospitalist  History and Physical    Patient:  Mariangel Mas  MRN: [id number]    CHIEF COMPLAINT: Tremors    History Obtained From:  patient, electronic medical record  PCP: Analisa Ulloa MD    HISTORY OF PRESENT ILLNESS:   Eloy Mas is a 69-year-old female presented to [facility] via EMS from [facility] on 3/17/2025 with complaint of abdominal tremors.  Based on history nursing staff identifies patient has abdominal tremors preceding seizure activity.  Patient was given intranasal Versed per nursing home staff with concerns she was having a seizure.  However seizure activity was not observed.     Recent [facility] hospitalization 1/31-2/5/25 patient received care for fatigue likely secondary to dehydration and suspected postictal state.  UTI secondary to Klebsiella pneumonia(POA).  Candiduria, mild oral laryngeal dysphagia, elevated troponin lower extremity edema.  Complaint of fatigue and refusing oral intake.  Patient had not taken any of her medications and was brought to [facility] as high risk for seizure activity without her medications.  Patient was discharged on Macrobid.     I did reach out to primary RN at [facility].  Nursing states patient is able to identify a prodrome prior to seizure activity.  Patient was having \"coughing fits \".  Concern of diaphragm spasms.  When questioning patient there was concern she thought she was having a seizure.  Nursing identifies concern of swallowing difficulty possible aspiration of pills earlier in the evening.  Message was left with guardian Joya Bekah of admission.     Past Medical History:        Diagnosis Date    ADHD     Attention deficit hyperactivity disorder (ADHD)     Cerebral palsy (HCC)     Depression     Essential hypertension     Learning disability     Seizure (HCC)     Urinary urgency    Lifetime non-smoker  TTE 2023 LVEF 55%.  Normal diastolic filling.  Negative bubble study, no shunt noted.  Normal right ventricular size and function.  No  significant valvular regurgitation or stenosis seen.  WILY  Dysphagia-prior PEG tube removed 2/2024  Bedbound  Dependent for all ADLs  Urinary incontinence  Essential hypertension  Seizure establish Neurology Dr. Butler  Cerebral palsy  Learning disability    Past Surgical History:        Procedure Laterality Date    GASTROSTOMY TUBE PLACEMENT  07/28/2023    EGD PEG TUBE PLACEMENT    GASTROSTOMY TUBE PLACEMENT N/A 7/28/2023    EGD PEG TUBE PLACEMENT performed by Ge Puente MD at [facility] OR    NASAL FRACTURE SURGERY N/A 3/13/2024    Closed Reduction of Nasal Bone performed by Storm Flor MD at [facility] OR    SHOULDER SURGERY Left     ORIF    -- had hardware infection afterwards requiring IV abx    UPPER GASTROINTESTINAL ENDOSCOPY N/A 2/9/2024    EGD BIOPSY performed by Grey Jimnees MD at [facility] ENDOSCOPY    UPPER GASTROINTESTINAL ENDOSCOPY  2/9/2024    EGD FOREIGN BODY REMOVAL performed by Grey Jimenes MD at [facility] ENDOSCOPY    UPPER GASTROINTESTINAL ENDOSCOPY N/A 4/23/2024    ESOPHAGOGASTRODUODENOSCOPY BIOPSY performed by Grey Jimenes MD at [facility] ENDOSCOPY       Medications Prior to Admission:    Prior to Admission medications    Medication Sig Start Date End Date Taking? Authorizing Provider   lacosamide (VIMPAT) 200 MG tablet Take 1 tablet by mouth 2 times daily for 30 days. Max Daily Amount: 400 mg 1/24/25 2/23/25  Yvon Thurston,    levETIRAcetam (KEPPRA) 1000 MG tablet Take 2 tablets by mouth 2 times daily 1/24/25   Yvon Thurston DO   PHENobarbital (LUMINAL) 30 MG tablet Take 2 tablets by mouth at bedtime.    Provider, MD Naila   lamoTRIgine (LAMICTAL) 100 MG tablet Take 1.5 tablets by mouth 2 times daily 1/24/25   Pradeep Robert MD   valproic acid (DEPAKENE) 250 MG/5ML SOLN oral solution Take 15 mLs by mouth every 8 hours 1/24/25   Pradeep Robert MD   venlafaxine (EFFEXOR) 75 MG tablet Take 1 tablet by mouth 2 times daily 1/24/25   Pradeep Robert MD   lactulose

## 2025-03-17 NOTE — ED NOTES
ED to inpatient nurses report      Chief Complaint:  Chief Complaint   Patient presents with    Abdominal Pain     Present to ED from: SNF    MOA:     LOC: alert to only name  Mobility: Fully dependent  Oxygen Baseline: RA    Current needs required: RA     Code Status:   Full Code    What abnormal results were found and what did you give/do to treat them? UTI  Any procedures or intervention occur? abx    Mental Status:  Level of Consciousness: Alert (0)    Psych Assessment:        Vitals:  Patient Vitals for the past 24 hrs:   BP Temp Pulse Resp SpO2 Weight   03/17/25 0337 (!) 143/73 -- 85 19 94 % --   03/17/25 0252 (!) 143/80 -- 88 22 95 % --   03/17/25 0153 139/69 99 °F (37.2 °C) 92 27 92 % 102.5 kg (226 lb)        LDAs:   Peripheral IV 03/17/25 Left Cephalic (Active)   Site Assessment Clean, dry & intact 03/17/25 0253   Line Status Normal saline locked 03/17/25 0253   Phlebitis Assessment No symptoms 03/17/25 0253   Infiltration Assessment 0 03/17/25 0253   Alcohol Cap Used Yes 03/17/25 0253   Dressing Status Clean, dry & intact 03/17/25 0253   Dressing Type Transparent 03/17/25 0253       Ambulatory Status:  No data recorded    Diagnosis:  DISPOSITION Admitted 03/17/2025 03:53:07 AM   Final diagnoses:   Urinary tract infection without hematuria, site unspecified   Altered mental status, unspecified altered mental status type        Consults:  None     Pain Score:       C-SSRS:        Sepsis Screening:       [place] Fall Risk:       Swallow Screening        Preferred Language:   English      ALLERGIES     Pcn [penicillins]    SURGICAL HISTORY       Past Surgical History:   Procedure Laterality Date    GASTROSTOMY TUBE PLACEMENT  07/28/2023    EGD PEG TUBE PLACEMENT    GASTROSTOMY TUBE PLACEMENT N/A 7/28/2023    EGD PEG TUBE PLACEMENT performed by Ge Puente MD at [facility] OR    NASAL FRACTURE SURGERY N/A 3/13/2024    Closed Reduction of Nasal Bone performed by Storm Flor MD at [facility] OR    SHOULDER SURGERY

## 2025-03-17 NOTE — ED NOTES
Pt straight cathed for urine. Incontinence care provided. Pt now able to verbalize pain near her buttocks.

## 2025-03-17 NOTE — PROGRESS NOTES
[facility]  SPEECH THERAPY  [facility] MED SURG 8AB  Clinical Swallow Evaluation    Discharge Recommendations: [facility]  DIET ORDER RECOMMENDATIONS AFTER EVALUATION: NPO pending improved alertness (puree + thin liquid thereafter)  Strategies:  Full Upright Position, Pulmonary Monitoring, Medications Crushed with Puree, and Direct 1:1 Supervision     SLP Individual Minutes  Time In: 1102  Time Out: 1114  Minutes: 12  Timed Code Treatment Minutes: 0 Minutes       Date: 3/17/2025  Patient Name: Mariangel Mas      CSN: [id number]   : 1955  (69 y.o.)  Gender: female   Referring Physician:  Kenna Lara, SUNDAY Anders CNP     Diagnosis: Sepsis with organ dysfunction (HCC)    History of Present Illness/Injury: Patient admitted with above medical diagnosis. See physician H&P for full history. Per medical chart review, \"Eloy Mas is a 69-year-old female presented to [facility] via EMS from [facility] on 3/17/2025 with complaint of abdominal tremors.  Based on history nursing staff identifies patient has abdominal tremors preceding seizure activity.  Patient was given intranasal Versed per nursing home staff with concerns she was having a seizure.  However seizure activity was not observed.      Recent [facility] hospitalization -25 patient received care for fatigue likely secondary to dehydration and suspected postictal state.  UTI secondary to Klebsiella pneumonia(POA).  Candiduria, mild oral laryngeal dysphagia, elevated troponin lower extremity edema.  Complaint of fatigue and refusing oral intake.  Patient had not taken any of her medications and was brought to [facility] as high risk for seizure activity without her medications.  Patient was discharged on Macrobid.      I did reach out to primary RN at [facility].  Nursing states patient is able to identify a prodrome prior to seizure activity.  Patient was having \"coughing fits \".  Concern of diaphragm spasms.  When questioning patient there  was concern she thought she was having a seizure.  Nursing identifies concern of swallowing difficulty possible aspiration of pills earlier in the evening.  Message was left with guardian Joya Godfrey of admission.\"    CT CHEST W CONTRAST 3/17/25 Impression:   Bilateral lower lobe atelectasis       Past Medical History:   Diagnosis Date    ADHD     Attention deficit hyperactivity disorder (ADHD)     Cerebral palsy (HCC)     Depression     Essential hypertension     Learning disability     Seizure (HCC)     Urinary urgency        SUBJECTIVE:  Patient seen following approval from DEZ Kim. Patient asleep in bed upon ST arrival to room. Unable to rouse to level of consistent eye opening; minimal participation throughout evaluation. Patient with unintelligible vocalizations throughout; documentation to support baseline dysarthria and dysphagia (likely r/t cerebral palsy). No documentation from ECF regarding current oral diet levels, and patient unable to verbalize for response upon request. Audible respirations also apparent consistently.    Recent MBS completion during past hospitalization on 1/22/25 with recommendations for puree solids + thin liquids. Additional chart review to identify past PEG tube presence; stopped utilizing 02/2024.     OBJECTIVE:    Pain:  No pain reported.    Current Diet: NPO    Respiratory Status:  Room Air    Behavioral Observation:  Confused, Lethargic, Dysarthric, and Limited Direction Following    CRANIAL NERVE ASSESSMENT   CN V (Trigeminal) Closes and Opens Mandible WFL    Rotary Jaw Movement WFL      CN VII (Facial) Cheeks Hold Food out of Sulci WFL    Opens, Closes/Seals, Protrudes, Retracts Lips Unable to elicit    General Appearance WFL    Sensation Not Tested      CN X (Vagus - Pharyngeal) Raises Back of Tongue Not Tested      CN XI (Accessory) Lifts Soft Palate Not Tested      CN XII (Hypoglossal) Elevates Tongue Up and Back Not Tested    Protrusion   WFL    Lateralizes Tongue

## 2025-03-17 NOTE — PROGRESS NOTES
[facility] Continence Nurse  Progress Note       Mariangel Mas  AGE: 69 y.o.   GENDER: female  : 1955  UNIT: 8B-33/033-A  TODAY'S DATE:  3/17/2025  ADMISSION DATE: 3/17/2025  1:48 AM    Subjective:       Mariangel Mas is a 69 y.o. female referred by:   [] Physician/ Resident/ PA/ SUNDAY-CNP  [] Nursing  [] Other:     Summary:      Wound ostomy consult received for Coccyx wound POA. Documentation reviewed. Staff documenting granulation tissue/pink wound appearance. Recommend staff to utilize Alex and Wound Care order sets. See below. If wound evolves during hospital admission, please call.     Treatment Recommendations:  -Turn every 2 hours and PRN  -Offload with pillows or wedges  -Ensure patient is on low air loss support surface ([place], SPR+, [place], Bariatric bed)  -Utilize moisture wicking underpads  -Limit use of depends, except when working with therapy  -If applicable, use waffle cushion when in chair    How to: Alex and Wound Care Orders         Type WOUND in order set search bar.  Right click Wound Care Orders, select add to favorites.  Right click GEN Alex Scale Focused, select add to favorites.        Check GEN Alex Scale Focus and Wound Care Orders.  Select appropriate orders.  Sign orders as Per Protocol: No Cosign Required.   These are independent nursing orders.

## 2025-03-17 NOTE — TELEPHONE ENCOUNTER
Consult for \"gas in bladder\"  Pt was straight cathed for urine prior to ct  Hx of recurrent UTI  Needs OV in 3-4 wks to establish care, UTI prevention

## 2025-03-17 NOTE — PROGRESS NOTES
Pharmacist Review and Automatic Dose Adjustment of Prophylactic Enoxaparin or Heparin    Reviewed reason for admission/hospital problem list    The reviewing pharmacist has made an adjustment to the ordered enoxaparin or heparin dose or converted to heparin per the approved [facility] protocol and table as identified below.      Recent Labs     03/17/25  0235   CREATININE 0.5     Estimated Creatinine Clearance: 115 mL/min (based on SCr of 0.5 mg/dL).    Recent Labs     03/17/25  0235   HGB 13.9   HCT 41.3        No results for input(s): \"INR\" in the last 72 hours.    Height:   Ht Readings from Last 1 Encounters:   02/01/25 1.549 m (5' 1\")     Weight:  Wt Readings from Last 1 Encounters:   03/17/25 102.5 kg (226 lb)       *Do not exceed enoxaparin 40mg daily or UFH 5000 units SUBQ TID in patients with epidurals,  lumbar drains, or external ventricular drains.    Plan:   Changed enoxaparin 40 mg subq daily to enoxaparin 30 mg subq BID.     Thank you,  Eloise Hurley [facility], BCPS, BCGP  3/17/2025     6:09 AM

## 2025-03-17 NOTE — CARE COORDINATION
3/17/25, 3:24 PM EDT  Discharge Planning Evaluation  Social work consult received, patient from [facility].    Patient/Family preference is to return to [facility] per CATINAI JAMEEL Godfrey Legal Guardian.  The patient's current payor source at the facility is Medicare, Medicaid.   Medicare skilled days available: yes  Medicare does the patient have a three midnight qualifying stay? yes  Insurance precert:  no  Spoke with Keturah at the facility.  Patient bed hold: yes  Anticipated transport plan: ambulance  Patient's Healthcare Decision Maker: Named in Scanned ACP Document      Readmission Risk Low 0-14, Mod 15-19), High > 20: Readmission Risk Score: 22    Current PCP: Analisa Ulloa MD  PCP verified by CM? Yes    Patient Orientation: Self    Patient Cognition: Alert  History Provided by: Medical Record    Advance Directives:      Code Status: Full Code   Patient's Primary Decision Maker is: Named in Scanned ACP Document    Primary Decision Maker: (APSI) Joay Godfrey - Legal Guardian, Legal Guardian - [phone]    Secondary Decision Maker: Donna Espinoza - Brother/Sister - [phone]     Discharge Planning:    Patient lives with:   Type of Home: Long-Term Care  Primary Care Giver: Other (Comment) ([facility])  Patient Support Systems include: Adult Protective Services   Current Financial resources: Medicaid, Medicare  Current community resources: ECF/Home Care  Current services prior to admission:              Current DME:              Type of Home Care services:       ADLS  Prior functional level: Assistance with the following:, Bathing, Dressing, Toileting, Feeding, Shopping, Housework, Mobility  Current functional level: Assistance with the following:, Bathing, Dressing, Toileting, Feeding, Cooking, Housework, Shopping, Mobility    Family can provide assistance at DC: No  Would you like Case Management to discuss the discharge plan with any other family members/significant others, and if so, who? Yes (Legal

## 2025-03-18 LAB
ANION GAP SERPL CALC-SCNC: 11 MEQ/L (ref 8–16)
BUN SERPL-MCNC: 13 MG/DL (ref 8–23)
CALCIUM SERPL-MCNC: 9.3 MG/DL (ref 8.8–10.2)
CHLORIDE SERPL-SCNC: 102 MEQ/L (ref 98–111)
CO2 SERPL-SCNC: 26 MEQ/L (ref 22–29)
CREAT SERPL-MCNC: 0.5 MG/DL (ref 0.5–0.9)
DEPRECATED RDW RBC AUTO: 48.3 FL (ref 35–45)
ERYTHROCYTE [DISTWIDTH] IN BLOOD BY AUTOMATED COUNT: 12.2 % (ref 11.5–14.5)
GFR SERPL CREATININE-BSD FRML MDRD: > 90 ML/MIN/1.73M2
GLUCOSE SERPL-MCNC: 87 MG/DL (ref 74–109)
HCT VFR BLD AUTO: 41.5 % (ref 37–47)
HGB BLD-MCNC: 13.1 GM/DL (ref 12–16)
LACTATE SERPL-SCNC: 1.2 MMOL/L (ref 0.5–2)
MCH RBC QN AUTO: 33.7 PG (ref 26–33)
MCHC RBC AUTO-ENTMCNC: 31.6 GM/DL (ref 32.2–35.5)
MCV RBC AUTO: 106.7 FL (ref 81–99)
PLATELET # BLD AUTO: 133 THOU/MM3 (ref 130–400)
PMV BLD AUTO: 12.3 FL (ref 9.4–12.4)
POTASSIUM SERPL-SCNC: 4 MEQ/L (ref 3.5–5.2)
RBC # BLD AUTO: 3.89 MILL/MM3 (ref 4.2–5.4)
SODIUM SERPL-SCNC: 139 MEQ/L (ref 135–145)
WBC # BLD AUTO: 6.6 THOU/MM3 (ref 4.8–10.8)

## 2025-03-18 PROCEDURE — 83605 ASSAY OF LACTIC ACID: CPT

## 2025-03-18 PROCEDURE — 99233 SBSQ HOSP IP/OBS HIGH 50: CPT

## 2025-03-18 PROCEDURE — 2580000003 HC RX 258

## 2025-03-18 PROCEDURE — 36415 COLL VENOUS BLD VENIPUNCTURE: CPT

## 2025-03-18 PROCEDURE — 92526 ORAL FUNCTION THERAPY: CPT

## 2025-03-18 PROCEDURE — 6370000000 HC RX 637 (ALT 250 FOR IP): Performed by: NURSE PRACTITIONER

## 2025-03-18 PROCEDURE — 2580000003 HC RX 258: Performed by: NURSE PRACTITIONER

## 2025-03-18 PROCEDURE — 1200000000 HC SEMI PRIVATE

## 2025-03-18 PROCEDURE — 6360000002 HC RX W HCPCS: Performed by: NURSE PRACTITIONER

## 2025-03-18 PROCEDURE — 80048 BASIC METABOLIC PNL TOTAL CA: CPT

## 2025-03-18 PROCEDURE — 1200000003 HC TELEMETRY R&B

## 2025-03-18 PROCEDURE — 2500000003 HC RX 250 WO HCPCS: Performed by: NURSE PRACTITIONER

## 2025-03-18 PROCEDURE — C9254 INJECTION, LACOSAMIDE: HCPCS | Performed by: NURSE PRACTITIONER

## 2025-03-18 PROCEDURE — 85027 COMPLETE CBC AUTOMATED: CPT

## 2025-03-18 RX ORDER — SODIUM CHLORIDE, SODIUM LACTATE, POTASSIUM CHLORIDE, CALCIUM CHLORIDE 600; 310; 30; 20 MG/100ML; MG/100ML; MG/100ML; MG/100ML
INJECTION, SOLUTION INTRAVENOUS CONTINUOUS
Status: DISCONTINUED | OUTPATIENT
Start: 2025-03-18 | End: 2025-03-20

## 2025-03-18 RX ADMIN — ENOXAPARIN SODIUM 30 MG: 100 INJECTION SUBCUTANEOUS at 09:45

## 2025-03-18 RX ADMIN — SODIUM CHLORIDE, PRESERVATIVE FREE 10 ML: 5 INJECTION INTRAVENOUS at 21:54

## 2025-03-18 RX ADMIN — VALPROATE SODIUM 750 MG: 100 INJECTION, SOLUTION INTRAVENOUS at 16:25

## 2025-03-18 RX ADMIN — LEVETIRACETAM 1000 MG: 100 INJECTION INTRAVENOUS at 17:56

## 2025-03-18 RX ADMIN — LEVETIRACETAM 1000 MG: 100 INJECTION INTRAVENOUS at 05:53

## 2025-03-18 RX ADMIN — SODIUM CHLORIDE, SODIUM LACTATE, POTASSIUM CHLORIDE, AND CALCIUM CHLORIDE: .6; .31; .03; .02 INJECTION, SOLUTION INTRAVENOUS at 18:36

## 2025-03-18 RX ADMIN — ASPIRIN 75 MG: 300 SUPPOSITORY RECTAL at 09:43

## 2025-03-18 RX ADMIN — VALPROATE SODIUM 750 MG: 100 INJECTION, SOLUTION INTRAVENOUS at 06:01

## 2025-03-18 RX ADMIN — LACOSAMIDE 200 MG: 10 INJECTION INTRAVENOUS at 22:12

## 2025-03-18 RX ADMIN — LACOSAMIDE 200 MG: 10 INJECTION INTRAVENOUS at 09:55

## 2025-03-18 RX ADMIN — SODIUM CHLORIDE, PRESERVATIVE FREE 10 ML: 5 INJECTION INTRAVENOUS at 09:55

## 2025-03-18 RX ADMIN — PHENOBARBITAL SODIUM 30 MG: 65 INJECTION INTRAMUSCULAR at 09:47

## 2025-03-18 RX ADMIN — LEVOFLOXACIN 750 MG: 5 INJECTION, SOLUTION INTRAVENOUS at 04:10

## 2025-03-18 RX ADMIN — PHENOBARBITAL SODIUM 60 MG: 65 INJECTION INTRAMUSCULAR at 21:56

## 2025-03-18 RX ADMIN — ENOXAPARIN SODIUM 30 MG: 100 INJECTION SUBCUTANEOUS at 21:54

## 2025-03-18 ASSESSMENT — PAIN SCALES - WONG BAKER
WONGBAKER_NUMERICALRESPONSE: NO HURT

## 2025-03-18 ASSESSMENT — PAIN SCALES - GENERAL: PAINLEVEL_OUTOF10: 0

## 2025-03-18 NOTE — PROGRESS NOTES
Hospitalist Progress Note      Patient:  Mariangel Mas 69 y.o. female     : 1955  Unit/Bed:[address] [place] [address]-A  Date of Admission: 3/17/2025        ASSESSMENT AND PLAN    Active Problems  #Sepsis 2/2 complicated UTI: PoA. UA:+ nitrates, pyuria, bacteria. Hx MDRO/ESBL. CTAP with normal renal size/no hydro. Renal US unremarkable. Urology consulted, low level concern for air in bladder given instrumentation placement previously. Urine cx currently with G- bacilli  -Continue Levaquin at this time  -F/u urology 3-4 weeks  #Dysphagia: Noted on admission. Hx PEG that was removed . Significant concern for dysphagia based on clinical assessment. SLP consulted, recommended strict NPO. Will continue abx at this time to allow for better mentation. Plan for continued speech eval, may require repeat PEG placement - light IVF started  #Hx seizures: With concern for ?seizure-like activity at SNF. Hx diaphragmatic spasm with seizures? No seizure-like activity since admission, will monitor. Continued on Keppra/phenobarbital/valproic acid/Vimpat  #Constipation: Noted on CTAP, enema ordered  #HTN: Norvasc held on admission, pressures stable, will monitor  #Cerebral palsy: with developmental delay. Patient bedbound/requires assistance with all ADLS      LDA: []CVC / []PICC / []Midline / []Barber / []Drains / []Mediport / []None  Antibiotics: Levaquin  Steroids: No  Labs (still needed?): [x]Yes / []No  IVF (still needed?): [x]Yes / []No    Level of care: []Step Down / []Med-Surg  Bed Status: []Inpatient / []Observation  Telemetry: []Yes / []No  PT/OT: []Yes / []No    DVT Prophylaxis: [] Lovenox / [] Heparin / [] SCDs / [] Already on Systemic Anticoagulation / [] None     Expected discharge date:  2-3 days  Disposition: [facility]     Code status: Full Code     ===================================================================    Chief Complaint: Tremors  Subjective (past 24 hours): Seen and examined this AM.  No acute complaints.

## 2025-03-18 NOTE — PROGRESS NOTES
[facility]  INPATIENT SPEECH THERAPY  STRZ MED SURG 8AB  DAILY NOTE    Discharge Recommendations: SNF    SLP Individual Minutes  Time In: 0907  Time Out: 0916  Minutes: 9  Timed Code Treatment Minutes: 0 Minutes       Date: 3/18/2025  Patient Name: Mariangel Mas      CSN: [id number]   : 1955  (69 y.o.)  Gender: female   Referring Physician:  Kenna Lara APRN - CNP   Diagnosis: Sepsis with organ dysfunction (HCC)  Precautions: Aspiration Risk, Fall Risk  Current Diet: NPO pending improved alertness   Respiratory Status: Room Air  Swallowing Strategies:  Full Upright Position, Pulmonary Monitoring, Medications Crushed with Puree, and Direct 1:1 Supervision     Date of Last MBS/FEES:  MBS 2025    Pain:  No pain reported.    Subjective:  Patient seen with DEZ Mares permission, though reported patient continues with waxing/waning alertness. Patient seen sitting upright in bed upon ST arrival; alert and cooperative throughout session with encouragement, though difficulty communicating with patient. No family present.    Short-Term Goals:  SHORT TERM GOAL #1:  Goal 1: Patient will consume conservative oral offerings demonstrating consistency of pharyngeal trigger and adequate alertness/endurance measures to determine readiness for oral diet initiation (puree, thins) and/or completion of an instrumental evaluation. GOAL NOT MET  REVISED GOAL 1: Patient will consume conservative oral offerings demonstrating consistency of pharyngeal trigger and adequate alertness/endurance measures to determine readiness for oral diet initiation vs. completion of an instrumental evaluation.   INTERVENTIONS: Patient completed conservative PO trials of thin liquids via cup and puree via teaspoon (with direct 1:1 assistance). Patient with reduced bolus control/formation, and AP transit of puree textures consumed with minimal oral residuals to follow. Observation of delayed, though persistent

## 2025-03-19 ENCOUNTER — APPOINTMENT (OUTPATIENT)
Dept: GENERAL RADIOLOGY | Age: 70
End: 2025-03-19
Payer: MEDICARE

## 2025-03-19 LAB
BACTERIA UR CULT: ABNORMAL
ORGANISM: ABNORMAL

## 2025-03-19 PROCEDURE — 1200000003 HC TELEMETRY R&B

## 2025-03-19 PROCEDURE — 6360000002 HC RX W HCPCS: Performed by: NURSE PRACTITIONER

## 2025-03-19 PROCEDURE — 36569 INSJ PICC 5 YR+ W/O IMAGING: CPT

## 2025-03-19 PROCEDURE — 76937 US GUIDE VASCULAR ACCESS: CPT

## 2025-03-19 PROCEDURE — 2500000003 HC RX 250 WO HCPCS: Performed by: STUDENT IN AN ORGANIZED HEALTH CARE EDUCATION/TRAINING PROGRAM

## 2025-03-19 PROCEDURE — C1751 CATH, INF, PER/CENT/MIDLINE: HCPCS

## 2025-03-19 PROCEDURE — 2580000003 HC RX 258: Performed by: NURSE PRACTITIONER

## 2025-03-19 PROCEDURE — C9254 INJECTION, LACOSAMIDE: HCPCS | Performed by: NURSE PRACTITIONER

## 2025-03-19 PROCEDURE — 1200000000 HC SEMI PRIVATE

## 2025-03-19 PROCEDURE — 2500000003 HC RX 250 WO HCPCS: Performed by: NURSE PRACTITIONER

## 2025-03-19 PROCEDURE — 99233 SBSQ HOSP IP/OBS HIGH 50: CPT | Performed by: STUDENT IN AN ORGANIZED HEALTH CARE EDUCATION/TRAINING PROGRAM

## 2025-03-19 PROCEDURE — 92526 ORAL FUNCTION THERAPY: CPT

## 2025-03-19 PROCEDURE — 74018 RADEX ABDOMEN 1 VIEW: CPT

## 2025-03-19 PROCEDURE — 6370000000 HC RX 637 (ALT 250 FOR IP): Performed by: NURSE PRACTITIONER

## 2025-03-19 PROCEDURE — 02HV33Z INSERTION OF INFUSION DEVICE INTO SUPERIOR VENA CAVA, PERCUTANEOUS APPROACH: ICD-10-PCS | Performed by: INTERNAL MEDICINE

## 2025-03-19 PROCEDURE — 2580000003 HC RX 258

## 2025-03-19 RX ORDER — SODIUM CHLORIDE 0.9 % (FLUSH) 0.9 %
5-40 SYRINGE (ML) INJECTION EVERY 12 HOURS SCHEDULED
Status: DISCONTINUED | OUTPATIENT
Start: 2025-03-19 | End: 2025-03-20 | Stop reason: SDUPTHER

## 2025-03-19 RX ORDER — LIDOCAINE HYDROCHLORIDE 10 MG/ML
50 INJECTION, SOLUTION EPIDURAL; INFILTRATION; INTRACAUDAL; PERINEURAL ONCE
Status: DISCONTINUED | OUTPATIENT
Start: 2025-03-19 | End: 2025-03-29 | Stop reason: HOSPADM

## 2025-03-19 RX ORDER — SODIUM CHLORIDE 0.9 % (FLUSH) 0.9 %
5-40 SYRINGE (ML) INJECTION PRN
Status: DISCONTINUED | OUTPATIENT
Start: 2025-03-19 | End: 2025-03-20 | Stop reason: SDUPTHER

## 2025-03-19 RX ORDER — SODIUM CHLORIDE 9 MG/ML
INJECTION, SOLUTION INTRAVENOUS PRN
Status: DISCONTINUED | OUTPATIENT
Start: 2025-03-19 | End: 2025-03-20 | Stop reason: SDUPTHER

## 2025-03-19 RX ADMIN — ENOXAPARIN SODIUM 30 MG: 100 INJECTION SUBCUTANEOUS at 08:29

## 2025-03-19 RX ADMIN — VALPROATE SODIUM 750 MG: 100 INJECTION, SOLUTION INTRAVENOUS at 08:32

## 2025-03-19 RX ADMIN — SODIUM CHLORIDE, PRESERVATIVE FREE 10 ML: 5 INJECTION INTRAVENOUS at 21:28

## 2025-03-19 RX ADMIN — SODIUM CHLORIDE, PRESERVATIVE FREE 10 ML: 5 INJECTION INTRAVENOUS at 08:32

## 2025-03-19 RX ADMIN — SODIUM CHLORIDE, SODIUM LACTATE, POTASSIUM CHLORIDE, AND CALCIUM CHLORIDE: .6; .31; .03; .02 INJECTION, SOLUTION INTRAVENOUS at 15:05

## 2025-03-19 RX ADMIN — PHENOBARBITAL SODIUM 60 MG: 65 INJECTION INTRAMUSCULAR at 21:26

## 2025-03-19 RX ADMIN — PHENOBARBITAL SODIUM 30 MG: 65 INJECTION INTRAMUSCULAR at 08:29

## 2025-03-19 RX ADMIN — LACOSAMIDE 200 MG: 10 INJECTION INTRAVENOUS at 09:33

## 2025-03-19 RX ADMIN — SODIUM CHLORIDE, PRESERVATIVE FREE 10 ML: 5 INJECTION INTRAVENOUS at 21:27

## 2025-03-19 RX ADMIN — VALPROATE SODIUM 750 MG: 100 INJECTION, SOLUTION INTRAVENOUS at 00:21

## 2025-03-19 RX ADMIN — VALPROATE SODIUM 750 MG: 100 INJECTION, SOLUTION INTRAVENOUS at 16:14

## 2025-03-19 RX ADMIN — LEVETIRACETAM 1000 MG: 100 INJECTION INTRAVENOUS at 05:38

## 2025-03-19 RX ADMIN — ASPIRIN 75 MG: 300 SUPPOSITORY RECTAL at 08:35

## 2025-03-19 RX ADMIN — LEVETIRACETAM 1000 MG: 100 INJECTION INTRAVENOUS at 17:42

## 2025-03-19 RX ADMIN — LEVOFLOXACIN 750 MG: 5 INJECTION, SOLUTION INTRAVENOUS at 03:55

## 2025-03-19 RX ADMIN — ENOXAPARIN SODIUM 30 MG: 100 INJECTION SUBCUTANEOUS at 21:26

## 2025-03-19 RX ADMIN — LACOSAMIDE 200 MG: 10 INJECTION INTRAVENOUS at 21:28

## 2025-03-19 ASSESSMENT — PAIN SCALES - GENERAL
PAINLEVEL_OUTOF10: 0
PAINLEVEL_OUTOF10: 0

## 2025-03-19 ASSESSMENT — PAIN SCALES - WONG BAKER: WONGBAKER_NUMERICALRESPONSE: NO HURT

## 2025-03-19 NOTE — PLAN OF CARE
Patient IV access occluded. MD ordered PICC line. This RN and Sp GARCES spoke with legal guardian , Joya Godfrey, who gave consent for PICC placement.

## 2025-03-19 NOTE — CONSULTS
Comprehensive Nutrition Assessment    Type and Reason for Visit: Initial, Consult (Order and Manage Tube Feeds)    Nutrition Recommendations/Plan:   Initiate tube feeds of Jevity 1.5 at 20 ml/hr and increase by 10 ml q6hr to goal rate of 50 ml/hr.   Water flushes of 150 ml q4hr or per MD.   Continue NPO per SLP recommendations.      Malnutrition Assessment:  Malnutrition Status: At risk for malnutrition  Context: Acute Illness       Findings of the 6 clinical characteristics of malnutrition:  Energy Intake:  No decrease in energy intake (SNF RN reports that patient was eating well, most of meals PTA)  Weight Loss:  No weight loss     Body Fat Loss:  No body fat loss     Muscle Mass Loss:  No muscle mass loss    Fluid Accumulation:  Mild Generalized   Strength:  Not Performed    Nutrition Assessment:    Pt. nutritionally compromised AEB failed SLP evaluation on 3/19, need for NGT feeds. At risk for further nutrition compromise r/t admitted d/t tremors, likely related to dehydration and suspected postictal state. Noted underlying medical condition (PMHx AHDH, Cerebral Palsy, depression, essential HTN, seizure).    Nutrition Related Findings:    Pt. Report/Treatments/Miscellaneous: Patient seen, AMS, not able to answer questions. Touched base with RN, plan to order tube feeds. RN communicating with legal guardian.   Per RN at [facility] patient was eating well PTA, usually eating most of meals.  RN mentions that patient was coughing and choking a lot while eating.   GI Status: Last BM 3/19  Wound: Stage III (coccyx)   Edema: generalized non-pitting edema, per flowsheet   Pertinent Labs:   Lab Results   Component Value Date    LABA1C 4.9 07/17/2023     Recent Labs     03/17/25  0235 03/18/25  1830    139   K 4.1 4.0    102   GLUCOSE 108 87   BUN 24* 13   CREATININE 0.5 0.5     Pertinent Medications: rocephin, lovenox, lamictal (held), keppra, MVI, phenobarbital (held), zinc sulfate    Current

## 2025-03-19 NOTE — DISCHARGE INSTR - COC
Continuity of Care Form    Patient Name: Mariangel Mas   :  1955  MRN:  [id number]    Admit date:  3/17/2025  Discharge date:  3/29/25    Code Status Order: Full Code   Advance Directives:     Admitting Physician:  Korin Scott DO  PCP: Analisa Ulloa MD    Discharging Nurse: ANURADHA Tello RN  Discharging Hospital Unit/Room#: 8B-33/033-A  Discharging Unit Phone Number: [phone]    Emergency Contact:   Extended Emergency Contact Information  Primary Emergency Contact: (APSI) Joya Godfrey  Home Phone: [phone]  Mobile Phone: [phone]  Relation: Legal Guardian  Preferred language: English   needed? No  Secondary Emergency Contact: Donna Espinoza  Mobile Phone: [phone]  Relation: Brother/Sister  Preferred language: English   needed? No    Past Surgical History:  Past Surgical History:   Procedure Laterality Date    GASTROSTOMY TUBE PLACEMENT  2023    EGD PEG TUBE PLACEMENT    GASTROSTOMY TUBE PLACEMENT N/A 2023    EGD PEG TUBE PLACEMENT performed by Ge Puente MD at [facility] OR    NASAL FRACTURE SURGERY N/A 3/13/2024    Closed Reduction of Nasal Bone performed by Storm Flor MD at [facility] OR    SHOULDER SURGERY Left     ORIF    -- had hardware infection afterwards requiring IV abx    UPPER GASTROINTESTINAL ENDOSCOPY N/A 2024    EGD BIOPSY performed by Grey Jimenes MD at [facility] ENDOSCOPY    UPPER GASTROINTESTINAL ENDOSCOPY  2024    EGD FOREIGN BODY REMOVAL performed by Grey Jimenes MD at [facility] ENDOSCOPY    UPPER GASTROINTESTINAL ENDOSCOPY N/A 2024    ESOPHAGOGASTRODUODENOSCOPY BIOPSY performed by Grey Jimenes MD at [facility] ENDOSCOPY       Immunization History:   Immunization History   Administered Date(s) Administered    COVID-19, PFIZER PURPLE top, DILUTE for use, (age 12 y+), 30mcg/0.3mL 2024       Active Problems:  Patient Active Problem List   Diagnosis Code    Urinary urgency R39.15    Attention deficit

## 2025-03-19 NOTE — PLAN OF CARE
Problem: Skin/Tissue Integrity  Goal: Skin integrity remains intact  Description: 1.  Monitor for areas of redness and/or skin breakdown  2.  Assess vascular access sites hourly  3.  Every 4-6 hours minimum:  Change oxygen saturation probe site  4.  Every 4-6 hours:  If on nasal continuous positive airway pressure, respiratory therapy assess nares and determine need for appliance change or resting period  3/18/2025 2351 by Eloina Daniels, RN  Outcome: Progressing  Flowsheets (Taken 3/18/2025 1055 by Suzan Ramires RN)  Skin Integrity Remains Intact: Monitor for areas of redness and/or skin breakdown  3/18/2025 1054 by Suzan Ramires, RN  Outcome: Progressing

## 2025-03-19 NOTE — PLAN OF CARE
Problem: Discharge Planning  Goal: Discharge to home or other facility with appropriate resources  3/19/2025 1012 by Suzan Ramires RN  Outcome: Progressing  3/19/2025 1012 by Suzan Ramires RN  Outcome: Progressing     Problem: Pain  Goal: Verbalizes/displays adequate comfort level or baseline comfort level  3/19/2025 1012 by Suzan Ramires RN  Outcome: Progressing  3/19/2025 1012 by Suzan Ramires RN  Outcome: Progressing     Problem: Safety - Adult  Goal: Free from fall injury  3/19/2025 1012 by Suzan Ramires RN  Outcome: Progressing  3/19/2025 1012 by Suzan Ramires RN  Outcome: Progressing     Problem: Skin/Tissue Integrity  Goal: Skin integrity remains intact  Description: 1.  Monitor for areas of redness and/or skin breakdown  2.  Assess vascular access sites hourly  3.  Every 4-6 hours minimum:  Change oxygen saturation probe site  4.  Every 4-6 hours:  If on nasal continuous positive airway pressure, respiratory therapy assess nares and determine need for appliance change or resting period  3/19/2025 1012 by Suzan Ramires RN  Outcome: Progressing  3/19/2025 1012 by Suzan Ramires RN  Outcome: Progressing  3/18/2025 2351 by Eloina Daniels, RN  Outcome: Progressing  Flowsheets (Taken 3/18/2025 1055 by Suzan Ramires RN)  Skin Integrity Remains Intact: Monitor for areas of redness and/or skin breakdown

## 2025-03-19 NOTE — PROGRESS NOTES
Hospitalist Progress Note      Patient:  Mariangel Terryr    Unit/Bed:8B-33/033-A  YOB: 1955  MRN: [id number]   Acct: [id number]   PCP: Analisa Ulloa MD  Date of Admission: 3/17/2025    Assessment/Plan:  Sepsis 2/2 complicated UTI  PoA. UA:+ nitrates, pyuria, bacteria. Hx MDRO/ESBL. CTAP with normal renal size/no hydro. Renal US unremarkable. Urology consulted, low level concern for air in bladder given instrumentation placement previously. Urine cx currently with E coli. No associated JESSIE.  -Stop Levaquin and switch to Rocephin per cultures  -F/u urology 3-4 weeks outpatient     Dysphagia  Hx PEG that was removed 2/24. Significant concern for dysphagia based on clinical assessment.   -SLP following, recommended strict NPO. Considering permanent PEG placement given recent admission for dehydration. Palliative following to assist with goals of care discussion.   -continue gentle IVF for now  -NG tube placement for TF initiation     Hx seizures: With concern for ?seizure-like activity at SNF. Hx diaphragmatic spasm with seizures? No seizure-like activity since admission.  -Continued on Keppra/phenobarbital/valproic acid/Vimpat IV for now. Seizure precautions.    Primary HTN: Norvasc held on admission due to sepsis,. Resume when appropriate, currently fluctuating BP. Monitor.     Cerebral palsy: with developmental delay. Patient bedbound/requires assistance with all ADLS. Has 2 legal guardians.     Constipation, resolved: Noted on CTAP, enema ordered. Had BM per chart.     Code Status: Full Code    Antibiotics: [x]Yes  []No  Steroids: []Yes  [x]No  Fluids: IV LR at 50 cc/hr  Diet: Diet NPO    DVT prophylaxis: [x] Lovenox                                 [] SCDs                                 [] SQ Heparin                                 [] Encourage ambulation                                 [] Already on Anticoagulation      LDA: []CVC  []PICC   HEAD WO CONTRAST   Final Result      No acute intracranial abnormality.      Atrophy and chronic microvascular ischemia.      This document has been electronically signed by: Ulisses Comer MD on    03/17/2025 05:30 AM      All CTs at this facility use dose modulation techniques and iterative    reconstructions, and/or weight-based dosing   when appropriate to reduce radiation to a low as reasonably achievable.        US RENAL COMPLETE  Result Date: 3/17/2025  PROCEDURE: US RENAL COMPLETE CLINICAL INFORMATION: Acute kidney injury TECHNIQUE: Ultrasound of the kidneys and urinary bladder was performed. Grayscale and color images were obtained. COMPARISON: None FINDINGS: The study is markedly limited due to the patient's body habitus. The right kidney is poorly visualized and the left kidney is not visualized by the sonographer. The right kidney measures 8.9 x 4.8 x 4.0 cm. Cortical thickness and echogenicity are normal. There is no hydronephrosis, calculi or intrarenal mass. The sonographer was unable to obtain the right arcuate resistive index. The urinary bladder is nondistended and cannot be evaluated on the current study.     1. Nonvisualization of the left kidney. 2. Unremarkable but poorly visualized right kidney. 3. Nondistended urinary bladder. **This report has been created using voice recognition software. It may contain minor errors which are inherent in voice recognition technology.** Electronically signed by Dr. Lc Rm    CT ABDOMEN PELVIS W IV CONTRAST Additional Contrast? None  Result Date: 3/17/2025  CT abdomen and pelvis with contrast COMPARISON: 01/10/2024 FINDINGS: The images are degraded by artifact secondary to the arms at the level of scanning. The liver, spleen, pancreas and both adrenal glands are unremarkable. The patient is post cholecystectomy. Both kidneys are normal in size without hydronephrosis. There is small gas within the urinary bladder which may be secondary to

## 2025-03-19 NOTE — PROGRESS NOTES
PICC Procedure Note    Mariangel Mas   Admitted- 3/17/2025  1:48 AM  Admission diagnosis- Urinary tract infection without hematuria, site unspecified [N39.0]  Altered mental status, unspecified altered mental status type [R41.82]  Sepsis with organ dysfunction (HCC) [A41.9, R65.20]      Attending Physician- Rose Smith MD  Ordering Physician-same  Indication for Insertion: Poor Vascular Access    Catheter Insertion Date- 3/19/2025   Lot Number- [id number]  Gauge-4  Lumen-dual    Insertion Site- FRANCISCO Basilic  Vein Diameter- 1.45 cm  Catheter Length- 42 cm  Internal Length- 42 cm  Exposed Catheter Length- 0cm   Upper Arm Circumference- 44cm  Tip Confirmation System Bundle met- Yes  If X-ray required, Tip Location- NA  Radiologist- NA    PICC insertion successful- Yes  Ultrasound- yes    Okay To Use PICC- Yes    Electronically signed by Lesvia Sainz, RN, RN on 3/19/2025 at 2:48 PM

## 2025-03-19 NOTE — PROGRESS NOTES
[facility]  INPATIENT SPEECH THERAPY  STRZ MED SURG 8AB  DAILY NOTE    Discharge Recommendations: SNF    SLP Individual Minutes  Time In: 09  Time Out: 0914  Minutes: 10  Timed Code Treatment Minutes: 0 Minutes       Date: 3/19/2025  Patient Name: Mariangel Mas      CSN: [id number]   : 1955  (69 y.o.)  Gender: female   Referring Physician:  Kenna Lara APRN - CNP   Diagnosis: Sepsis with organ dysfunction (HCC)  Precautions: Aspiration Risk, Fall Risk  Current Diet: NPO pending improved alertness   Respiratory Status: Room Air  Swallowing Strategies:  Full Upright Position, Pulmonary Monitoring, Medications Crushed with Puree, and Direct 1:1 Supervision     Date of Last MBS/FEES:  MBS 2025    Pain:  No pain reported.    Subjective:  DEZ Mares approved session and reports patient is more alert this date. Patient laying in bed upon ST arrival. Agreeable to skilled ST services. No family present. Patient alert and cooperative.    Short-Term Goals:  SHORT TERM GOAL #1:  GOAL 1: Patient will consume conservative oral offerings demonstrating consistency of pharyngeal trigger and adequate alertness/endurance measures to determine readiness for oral diet initiation vs. completion of an instrumental evaluation.   INTERVENTIONS: Patient completed conservative PO trials of thin liquids via cup and puree via teaspoon (with direct 1:1 assistance). Patient with decreased oral initiation with x2 occurrences of leaving oral cavity open and not taking PO from spoon. Good oral clearance this date. Concerns for potential airway invasion events with patient consistently grunting following PO trials and x2 weak coughs. Patient not appropriate for diet initiation or instrumental evaluation at this time. Concerns for patient ability to maintain adequate nutrition via PO.     Recommend continuation of NPO status with exceptions for ice chips following comprehensive oral care and

## 2025-03-19 NOTE — PLAN OF CARE
This RN called and spoke with patients legal guardian, Joya Godfrey, to gain consent for NG tube. Legal guardian in agreement. Second RN, Sp spoke with guardian as well.

## 2025-03-19 NOTE — PALLIATIVE CARE
Initial Evaluation        Patient:   Mariangel Mas  YOB: 1955  Age:  69 y.o.  Room:  [facility]/033-  MRN:  [id number]   Acct: [id number]    Date of Admission:  3/17/2025  1:48 AM  Date of Service:  3/19/2025  Completed By:  Manda Bhardwaj RN        Reason for Palliative Care Evaluation:-   Goals of Care     Current Concerns   Ng tube     Palliative Performance Scale   30%  Bed Bound; Extensive disease; Total care; intake reduced; LOC full/confusion     Goals of Care Discussions and Plan     Plan/Follow-Up:-   Spoke with primary RN and Dr Smith. Discussed legal guardian requiring physician to call to make changes in plan of care. Also discussed typically patients would need to be \"terminal\" to change plan. Encouraged care team to call if palliative could assist.    Treatment Limitations: They would want to attempt to sustain life by all medically effective means. This includes providing appropriate medical and surgical treatments as indicated to attempt to prolong life, including intensive care, mechanical ventilation and CPR. This is consistent with a code status of full code.    Code Status:Full Code No additional code details    ACP documents on file:  -Other Guardianship    Healthcare Power of /Healthcare Surrogate Decision Makers:  Legal Guardian - [employer]            Electronically signed by Manda Bhardwaj RN on 3/19/2025 at 3:45 PM           Palliative Care Office: [phone]

## 2025-03-20 ENCOUNTER — APPOINTMENT (OUTPATIENT)
Dept: GENERAL RADIOLOGY | Age: 70
End: 2025-03-20
Payer: MEDICARE

## 2025-03-20 LAB
ANION GAP SERPL CALC-SCNC: 9 MEQ/L (ref 8–16)
BUN SERPL-MCNC: 7 MG/DL (ref 8–23)
CALCIUM SERPL-MCNC: 8.8 MG/DL (ref 8.8–10.2)
CHLORIDE SERPL-SCNC: 104 MEQ/L (ref 98–111)
CO2 SERPL-SCNC: 27 MEQ/L (ref 22–29)
CREAT SERPL-MCNC: 0.4 MG/DL (ref 0.5–0.9)
GFR SERPL CREATININE-BSD FRML MDRD: > 90 ML/MIN/1.73M2
GLUCOSE BLD STRIP.AUTO-MCNC: 136 MG/DL (ref 70–108)
GLUCOSE SERPL-MCNC: 124 MG/DL (ref 74–109)
MAGNESIUM SERPL-MCNC: 1.6 MG/DL (ref 1.6–2.6)
POTASSIUM SERPL-SCNC: 3.8 MEQ/L (ref 3.5–5.2)
SODIUM SERPL-SCNC: 140 MEQ/L (ref 135–145)

## 2025-03-20 PROCEDURE — 2500000003 HC RX 250 WO HCPCS: Performed by: STUDENT IN AN ORGANIZED HEALTH CARE EDUCATION/TRAINING PROGRAM

## 2025-03-20 PROCEDURE — 2580000003 HC RX 258: Performed by: NURSE PRACTITIONER

## 2025-03-20 PROCEDURE — C9254 INJECTION, LACOSAMIDE: HCPCS | Performed by: NURSE PRACTITIONER

## 2025-03-20 PROCEDURE — 51798 US URINE CAPACITY MEASURE: CPT

## 2025-03-20 PROCEDURE — 83735 ASSAY OF MAGNESIUM: CPT

## 2025-03-20 PROCEDURE — 80048 BASIC METABOLIC PNL TOTAL CA: CPT

## 2025-03-20 PROCEDURE — 71045 X-RAY EXAM CHEST 1 VIEW: CPT

## 2025-03-20 PROCEDURE — 6360000002 HC RX W HCPCS: Performed by: NURSE PRACTITIONER

## 2025-03-20 PROCEDURE — 1200000000 HC SEMI PRIVATE

## 2025-03-20 PROCEDURE — 6360000002 HC RX W HCPCS: Performed by: STUDENT IN AN ORGANIZED HEALTH CARE EDUCATION/TRAINING PROGRAM

## 2025-03-20 PROCEDURE — 99222 1ST HOSP IP/OBS MODERATE 55: CPT | Performed by: SURGERY

## 2025-03-20 PROCEDURE — 2500000003 HC RX 250 WO HCPCS: Performed by: NURSE PRACTITIONER

## 2025-03-20 PROCEDURE — 36415 COLL VENOUS BLD VENIPUNCTURE: CPT

## 2025-03-20 PROCEDURE — 99233 SBSQ HOSP IP/OBS HIGH 50: CPT | Performed by: STUDENT IN AN ORGANIZED HEALTH CARE EDUCATION/TRAINING PROGRAM

## 2025-03-20 PROCEDURE — 82948 REAGENT STRIP/BLOOD GLUCOSE: CPT

## 2025-03-20 RX ORDER — MAGNESIUM SULFATE IN WATER 40 MG/ML
2000 INJECTION, SOLUTION INTRAVENOUS PRN
Status: DISCONTINUED | OUTPATIENT
Start: 2025-03-20 | End: 2025-03-29 | Stop reason: HOSPADM

## 2025-03-20 RX ORDER — FENTANYL CITRATE 50 UG/ML
25 INJECTION, SOLUTION INTRAMUSCULAR; INTRAVENOUS ONCE
Status: COMPLETED | OUTPATIENT
Start: 2025-03-20 | End: 2025-03-20

## 2025-03-20 RX ADMIN — VALPROATE SODIUM 750 MG: 100 INJECTION, SOLUTION INTRAVENOUS at 16:27

## 2025-03-20 RX ADMIN — ENOXAPARIN SODIUM 30 MG: 100 INJECTION SUBCUTANEOUS at 21:29

## 2025-03-20 RX ADMIN — PHENOBARBITAL SODIUM 30 MG: 65 INJECTION INTRAMUSCULAR at 09:07

## 2025-03-20 RX ADMIN — LEVETIRACETAM 1000 MG: 100 INJECTION INTRAVENOUS at 06:55

## 2025-03-20 RX ADMIN — SODIUM CHLORIDE, PRESERVATIVE FREE 10 ML: 5 INJECTION INTRAVENOUS at 21:29

## 2025-03-20 RX ADMIN — ENOXAPARIN SODIUM 30 MG: 100 INJECTION SUBCUTANEOUS at 09:06

## 2025-03-20 RX ADMIN — FENTANYL CITRATE 25 MCG: 50 INJECTION, SOLUTION INTRAMUSCULAR; INTRAVENOUS at 12:08

## 2025-03-20 RX ADMIN — SODIUM CHLORIDE, PRESERVATIVE FREE 10 ML: 5 INJECTION INTRAVENOUS at 08:02

## 2025-03-20 RX ADMIN — LACOSAMIDE 200 MG: 10 INJECTION INTRAVENOUS at 21:36

## 2025-03-20 RX ADMIN — VALPROATE SODIUM 750 MG: 100 INJECTION, SOLUTION INTRAVENOUS at 01:10

## 2025-03-20 RX ADMIN — LACOSAMIDE 200 MG: 10 INJECTION INTRAVENOUS at 09:10

## 2025-03-20 RX ADMIN — MAGNESIUM SULFATE HEPTAHYDRATE 2000 MG: 40 INJECTION, SOLUTION INTRAVENOUS at 09:42

## 2025-03-20 RX ADMIN — PHENOBARBITAL SODIUM 60 MG: 65 INJECTION INTRAMUSCULAR at 21:29

## 2025-03-20 RX ADMIN — WATER 1000 MG: 1 INJECTION INTRAMUSCULAR; INTRAVENOUS; SUBCUTANEOUS at 06:06

## 2025-03-20 RX ADMIN — VALPROATE SODIUM 750 MG: 100 INJECTION, SOLUTION INTRAVENOUS at 08:01

## 2025-03-20 RX ADMIN — LEVETIRACETAM 1000 MG: 100 INJECTION INTRAVENOUS at 17:36

## 2025-03-20 ASSESSMENT — PAIN SCALES - GENERAL: PAINLEVEL_OUTOF10: 5

## 2025-03-20 NOTE — PLAN OF CARE
Problem: Discharge Planning  Goal: Discharge to home or other facility with appropriate resources  Outcome: Progressing     Problem: Pain  Goal: Verbalizes/displays adequate comfort level or baseline comfort level  Outcome: Progressing     Problem: Safety - Adult  Goal: Free from fall injury  Outcome: Progressing     Problem: Skin/Tissue Integrity  Goal: Skin integrity remains intact  Description: 1.  Monitor for areas of redness and/or skin breakdown  2.  Assess vascular access sites hourly  3.  Every 4-6 hours minimum:  Change oxygen saturation probe site  4.  Every 4-6 hours:  If on nasal continuous positive airway pressure, respiratory therapy assess nares and determine need for appliance change or resting period  3/20/2025 1029 by Suzna Ramires, RN  Outcome: Progressing  3/20/2025 0226 by Eloina Daniels, RN  Outcome: Progressing     Problem: Nutrition Deficit:  Goal: Optimize nutritional status  Outcome: Progressing

## 2025-03-20 NOTE — CONSULTS
General Surgery Consult Note  Lc Jesus DO    Pt Name: Mariangel Mas  MRN: [id number]  YOB: 1955  Date of evaluation: 3/20/2025  Primary Care Physician: Analisa Ulloa MD  Patient evaluated at the request of  Medicine service  Reason for evaluation: need for enteral access  IMPRESSIONS:   Dysphagia related to CP  does not have any pertinent problems on file.  PLANS:   Tentative plans for EGD/PEG 3/24  NPO at midnight 3/23  Hold anticoagulants prior to the procedure  SUBJECTIVE:   History of Chief Complaint:    Mariangel Millan is a 69 y.o.female who has cerebral palsy. She has had several feedings tubes in the past. The last removed last month. She presented to the hospital due to potential seizure activity. Gi had seen in the past, but deferred PEG placement.   Past Medical History   has a past medical history of ADHD, Attention deficit hyperactivity disorder (ADHD), Cerebral palsy (HCC), Depression, Essential hypertension, Learning disability, Seizure (HCC), and Urinary urgency.  Past Surgical History   has a past surgical history that includes shoulder surgery (Left); Gastrostomy tube placement (07/28/2023); Gastrostomy tube placement (N/A, 7/28/2023); Upper gastrointestinal endoscopy (N/A, 2/9/2024); Upper gastrointestinal endoscopy (2/9/2024); Nasal fracture surgery (N/A, 3/13/2024); and Upper gastrointestinal endoscopy (N/A, 4/23/2024).  Medications  Prior to Admission medications    Medication Sig Start Date End Date Taking? Authorizing Provider   lacosamide (VIMPAT) 10 MG/ML SOLN oral solution Take 20 mLs by mouth 2 times daily. Max Daily Amount: 400 mg   Yes ProviderNaila MD   lactulose (CHRONULAC) 10 GM/15ML solution Take 30 mLs by mouth 2 times daily   Yes Naila De Souza MD   levETIRAcetam (KEPPRA) 100 MG/ML oral solution Take 20 mLs by mouth 2 times daily   Yes Naila De Souza MD   PHENobarbital (LUMINAL) 30 MG tablet Take 2 tablets by mouth at bedtime.

## 2025-03-20 NOTE — PROGRESS NOTES
Hospitalist Progress Note      Patient:  Mariangel Terryr    Unit/Bed:8B-33/033-A  YOB: 1955  MRN: [id number]   Acct: [id number]   PCP: Analisa Ulloa MD  Date of Admission: 3/17/2025    Assessment/Plan:  Acute hypoxic respiratory failure   Concern for aspiration   CXR without obvious acute changes from prior. Clinically no significant respiratory distress. Per patient, no major changes. Suspect atelectasis also contributing in the setting of patient's bedbound status and baseline mentation.   -Hold TF/ IVF  -Lung sounds improved on repeat exam, defer diuresis for now   -Monitor closely     Sepsis 2/2 complicated UTI  PoA. UA:+ nitrates, pyuria, bacteria. Hx MDRO/ESBL. CTAP with normal renal size/no hydro. Renal US unremarkable. Urology consulted, low level concern for air in bladder given instrumentation placement previously. Urine cx currently with E coli. No associated JESSIE.  -Started on Rocephin 3/19 per cultures, complete 5-day course  -F/u urology 3-4 weeks outpatient     Dysphagia  Hx PEG that was removed 2/24. Significant concern for dysphagia based on clinical assessment. Coughing more today.   -GI consulted for PEG. SLP following. Continue strict NPO.  -S/p NG tube placement for TF initiation, TF held for now due to concern for aspiration and hypoxia this morning.     Hx seizures: With concern for ?seizure-like activity at SNF. Hx diaphragmatic spasm with seizures? No seizure-like activity since admission.  -Continued on Keppra/phenobarbital/valproic acid/Vimpat IV for now. Seizure precautions.    Primary HTN: Norvasc held on admission due to sepsis,. Resume when appropriate, currently fluctuating BP. Monitor.     Cerebral palsy: with developmental delay. Patient bedbound/requires assistance with all ADLS. Has 2 legal guardians.     Constipation, resolved: Noted on CTAP, enema ordered. Had BM per chart.     Code Status: Full  CHEST W CONTRAST  Result Date: 3/17/2025  CT chest with contrast COMPARISON: No prior FINDINGS: The thoracic aorta is normal in caliber. There are atherosclerotic calcifications including coronary artery calcifications. The heart is normal in size. There is bilateral lower lobe atelectasis. No pulmonary consolidation, pleural effusion or pneumothorax is seen. There are degenerative changes of the spine and both shoulders.     Bilateral lower lobe atelectasis. Degenerative changes of the spine and both shoulders. This document has been electronically signed by: Ulisses Comer MD on 03/17/2025 05:32 AM All CTs at this facility use dose modulation techniques and iterative reconstructions, and/or weight-based dosing when appropriate to reduce radiation to a low as reasonably achievable.    CT HEAD WO CONTRAST  Result Date: 3/17/2025  CT head without contrast COMPARISON: 01/31/2025 FINDINGS: No infarct or bleed is seen. There is atrophy and chronic microvascular ischemia. There is no hydrocephalus or midline shift. The visualized paranasal sinuses and mastoid air cells are clear. There are 2 right-sided adrian holes.     No acute intracranial abnormality. Atrophy and chronic microvascular ischemia. This document has been electronically signed by: Ulisses Comer MD on 03/17/2025 05:30 AM All CTs at this facility use dose modulation techniques and iterative reconstructions, and/or weight-based dosing when appropriate to reduce radiation to a low as reasonably achievable.      Time Spent is more than 45 minutes in the examination, evaluation, counseling and review of medications and plan.      Electronically signed by Rose Smith MD 3/20/2025

## 2025-03-20 NOTE — CONSULTS
Consult History & Physical      Patient:  Mariangel Mas  YOB: 1955  MRN: [id number]     Acct: [id number]  Code Status: Full Code  PCP: Analisa Ulloa MD      Chief Complaint:    Chief Complaint   Patient presents with    Abdominal Pain       Date of Service: Pt seen/examined in consultation on 3/20/2025    History Of Present Illness:      Mariangel Mas  is a 69 y.o. female who we are asked to see/evaluate by Rose Smith MD for medical management of dysphagia and placement of PEG tube.  Of note patient had a PEG tube and she along asked that this be removed a year ago and it was removed by Dr. Jimenes 2/2024.  The peg was not being used and she had a MBS that showed no aspiration.  Patient was recently admitted to the hospital due to a seizure, since that time she has had decreased mental status.  Speech therapy has seen her multiple times. She had an NG tube placed today for nutritional support.      Past Medical History:    Past Medical History:   Diagnosis Date    ADHD     Attention deficit hyperactivity disorder (ADHD)     Cerebral palsy (HCC)     Depression     Essential hypertension     Learning disability     Seizure (HCC)     Urinary urgency        Home Medications:  Prior to Admission medications    Medication Sig Start Date End Date Taking? Authorizing Provider   lacosamide (VIMPAT) 10 MG/ML SOLN oral solution Take 20 mLs by mouth 2 times daily. Max Daily Amount: 400 mg   Yes Naila De Souza MD   lactulose (CHRONULAC) 10 GM/15ML solution Take 30 mLs by mouth 2 times daily   Yes Naila De Souza MD   levETIRAcetam (KEPPRA) 100 MG/ML oral solution Take 20 mLs by mouth 2 times daily   Yes Naila De Souza MD   PHENobarbital (LUMINAL) 30 MG tablet Take 2 tablets by mouth at bedtime.    Naila De Souza MD   lamoTRIgine (LAMICTAL) 100 MG tablet Take 1.5 tablets by mouth 2 times daily 1/24/25   Pradeep Robert MD   valproic acid (DEPAKENE) 250 MG/5ML SOLN  Sanjay Carlos DO   aspirin 81 MG chewable tablet 1 tablet by PEG Tube route daily  Patient taking differently: Take 1 tablet by mouth daily 7/31/23   Jorge Escoto DO       Surgical History:  Past Surgical History:   Procedure Laterality Date    GASTROSTOMY TUBE PLACEMENT  07/28/2023    EGD PEG TUBE PLACEMENT    GASTROSTOMY TUBE PLACEMENT N/A 7/28/2023    EGD PEG TUBE PLACEMENT performed by Ge Puente MD at [facility] OR    NASAL FRACTURE SURGERY N/A 3/13/2024    Closed Reduction of Nasal Bone performed by Storm Flor MD at [facility] OR    SHOULDER SURGERY Left     ORIF    -- had hardware infection afterwards requiring IV abx    UPPER GASTROINTESTINAL ENDOSCOPY N/A 2/9/2024    EGD BIOPSY performed by Grey Jimenes MD at [facility] ENDOSCOPY    UPPER GASTROINTESTINAL ENDOSCOPY  2/9/2024    EGD FOREIGN BODY REMOVAL performed by Grey Jimenes MD at [facility] ENDOSCOPY    UPPER GASTROINTESTINAL ENDOSCOPY N/A 4/23/2024    ESOPHAGOGASTRODUODENOSCOPY BIOPSY performed by Grey Jimenes MD at [facility] ENDOSCOPY       Family History:  History reviewed. No pertinent family history.    Past GI History:  Last EGD 4/2024 with Dr. Jimenes at [facility].     Allergies:  Pcn [penicillins]    Social History:   TOBACCO:   reports that she has never smoked. She has never been exposed to tobacco smoke. She has never used smokeless tobacco.  ETOH:   reports that she does not currently use alcohol.    Review of Systems   Unable to perform ROS: Mental status change            PHYSICAL EXAM:  /73   Pulse 84   Temp 98.6 °F (37 °C) (Oral)   Resp 18   Ht 1.549 m (5' 1\")   Wt 103 kg (227 lb 1.2 oz)   SpO2 94%   BMI 42.91 kg/m²     Physical Exam  Vitals and nursing note reviewed.   Constitutional:       General: She is not in acute distress.     Appearance: She is normal weight. She is ill-appearing.   HENT:      Mouth/Throat:      Mouth: Mucous membranes are dry.      Pharynx: Oropharynx is clear.   Cardiovascular:

## 2025-03-21 ENCOUNTER — APPOINTMENT (OUTPATIENT)
Dept: GENERAL RADIOLOGY | Age: 70
End: 2025-03-21
Payer: MEDICARE

## 2025-03-21 PROBLEM — Z51.5 PALLIATIVE CARE PATIENT: Status: ACTIVE | Noted: 2025-03-21

## 2025-03-21 LAB
ANION GAP SERPL CALC-SCNC: 12 MEQ/L (ref 8–16)
BASOPHILS ABSOLUTE: 0 THOU/MM3 (ref 0–0.1)
BASOPHILS NFR BLD AUTO: 0.3 %
BUN SERPL-MCNC: 5 MG/DL (ref 8–23)
CALCIUM SERPL-MCNC: 9.1 MG/DL (ref 8.8–10.2)
CHLORIDE SERPL-SCNC: 104 MEQ/L (ref 98–111)
CO2 SERPL-SCNC: 27 MEQ/L (ref 22–29)
CREAT SERPL-MCNC: 0.5 MG/DL (ref 0.5–0.9)
DEPRECATED RDW RBC AUTO: 47.5 FL (ref 35–45)
EOSINOPHIL NFR BLD AUTO: 1.3 %
EOSINOPHILS ABSOLUTE: 0.1 THOU/MM3 (ref 0–0.4)
ERYTHROCYTE [DISTWIDTH] IN BLOOD BY AUTOMATED COUNT: 12.7 % (ref 11.5–14.5)
GFR SERPL CREATININE-BSD FRML MDRD: > 90 ML/MIN/1.73M2
GLUCOSE SERPL-MCNC: 101 MG/DL (ref 74–109)
HCT VFR BLD AUTO: 37 % (ref 37–47)
HGB BLD-MCNC: 12.6 GM/DL (ref 12–16)
IMM GRANULOCYTES # BLD AUTO: 0.03 THOU/MM3 (ref 0–0.07)
IMM GRANULOCYTES NFR BLD AUTO: 0.4 %
LYMPHOCYTES ABSOLUTE: 2.4 THOU/MM3 (ref 1–4.8)
LYMPHOCYTES NFR BLD AUTO: 29.9 %
MCH RBC QN AUTO: 34.5 PG (ref 26–33)
MCHC RBC AUTO-ENTMCNC: 34.1 GM/DL (ref 32.2–35.5)
MCV RBC AUTO: 101.4 FL (ref 81–99)
MONOCYTES ABSOLUTE: 1.1 THOU/MM3 (ref 0.4–1.3)
MONOCYTES NFR BLD AUTO: 14 %
NEUTROPHILS ABSOLUTE: 4.3 THOU/MM3 (ref 1.8–7.7)
NEUTROPHILS NFR BLD AUTO: 54.1 %
NRBC BLD AUTO-RTO: 0 /100 WBC
PLATELET # BLD AUTO: 144 THOU/MM3 (ref 130–400)
PMV BLD AUTO: 11.8 FL (ref 9.4–12.4)
POTASSIUM SERPL-SCNC: 3.8 MEQ/L (ref 3.5–5.2)
RBC # BLD AUTO: 3.65 MILL/MM3 (ref 4.2–5.4)
SODIUM SERPL-SCNC: 143 MEQ/L (ref 135–145)
WBC # BLD AUTO: 7.9 THOU/MM3 (ref 4.8–10.8)

## 2025-03-21 PROCEDURE — C9254 INJECTION, LACOSAMIDE: HCPCS | Performed by: NURSE PRACTITIONER

## 2025-03-21 PROCEDURE — 31720 CLEARANCE OF AIRWAYS: CPT

## 2025-03-21 PROCEDURE — 99232 SBSQ HOSP IP/OBS MODERATE 35: CPT | Performed by: NURSE PRACTITIONER

## 2025-03-21 PROCEDURE — 99221 1ST HOSP IP/OBS SF/LOW 40: CPT

## 2025-03-21 PROCEDURE — 2500000003 HC RX 250 WO HCPCS: Performed by: NURSE PRACTITIONER

## 2025-03-21 PROCEDURE — 2500000003 HC RX 250 WO HCPCS: Performed by: STUDENT IN AN ORGANIZED HEALTH CARE EDUCATION/TRAINING PROGRAM

## 2025-03-21 PROCEDURE — 6360000002 HC RX W HCPCS: Performed by: NURSE PRACTITIONER

## 2025-03-21 PROCEDURE — 80048 BASIC METABOLIC PNL TOTAL CA: CPT

## 2025-03-21 PROCEDURE — 71045 X-RAY EXAM CHEST 1 VIEW: CPT

## 2025-03-21 PROCEDURE — 2580000003 HC RX 258: Performed by: NURSE PRACTITIONER

## 2025-03-21 PROCEDURE — 6360000002 HC RX W HCPCS

## 2025-03-21 PROCEDURE — 2060000000 HC ICU INTERMEDIATE R&B

## 2025-03-21 PROCEDURE — 36415 COLL VENOUS BLD VENIPUNCTURE: CPT

## 2025-03-21 PROCEDURE — 85025 COMPLETE CBC W/AUTO DIFF WBC: CPT

## 2025-03-21 PROCEDURE — 6360000002 HC RX W HCPCS: Performed by: STUDENT IN AN ORGANIZED HEALTH CARE EDUCATION/TRAINING PROGRAM

## 2025-03-21 RX ORDER — SODIUM CHLORIDE FOR INHALATION 3 %
4 VIAL, NEBULIZER (ML) INHALATION ONCE
Status: DISCONTINUED | OUTPATIENT
Start: 2025-03-21 | End: 2025-03-21

## 2025-03-21 RX ORDER — FUROSEMIDE 10 MG/ML
40 INJECTION INTRAMUSCULAR; INTRAVENOUS ONCE
Status: COMPLETED | OUTPATIENT
Start: 2025-03-21 | End: 2025-03-21

## 2025-03-21 RX ORDER — HYDRALAZINE HYDROCHLORIDE 20 MG/ML
10 INJECTION INTRAMUSCULAR; INTRAVENOUS EVERY 6 HOURS PRN
Status: DISCONTINUED | OUTPATIENT
Start: 2025-03-21 | End: 2025-03-27

## 2025-03-21 RX ORDER — GLYCOPYRROLATE 0.2 MG/ML
0.1 INJECTION INTRAMUSCULAR; INTRAVENOUS ONCE
Status: COMPLETED | OUTPATIENT
Start: 2025-03-21 | End: 2025-03-21

## 2025-03-21 RX ADMIN — VALPROATE SODIUM 750 MG: 100 INJECTION, SOLUTION INTRAVENOUS at 16:54

## 2025-03-21 RX ADMIN — LACOSAMIDE 200 MG: 10 INJECTION INTRAVENOUS at 21:40

## 2025-03-21 RX ADMIN — LACOSAMIDE 200 MG: 10 INJECTION INTRAVENOUS at 09:39

## 2025-03-21 RX ADMIN — WATER 1000 MG: 1 INJECTION INTRAMUSCULAR; INTRAVENOUS; SUBCUTANEOUS at 06:31

## 2025-03-21 RX ADMIN — SODIUM CHLORIDE, PRESERVATIVE FREE 10 ML: 5 INJECTION INTRAVENOUS at 21:38

## 2025-03-21 RX ADMIN — PHENOBARBITAL SODIUM 60 MG: 65 INJECTION INTRAMUSCULAR at 21:38

## 2025-03-21 RX ADMIN — SODIUM CHLORIDE, PRESERVATIVE FREE 10 ML: 5 INJECTION INTRAVENOUS at 10:36

## 2025-03-21 RX ADMIN — PHENOBARBITAL SODIUM 30 MG: 65 INJECTION INTRAMUSCULAR at 10:30

## 2025-03-21 RX ADMIN — VALPROATE SODIUM 750 MG: 100 INJECTION, SOLUTION INTRAVENOUS at 10:39

## 2025-03-21 RX ADMIN — ENOXAPARIN SODIUM 30 MG: 100 INJECTION SUBCUTANEOUS at 21:38

## 2025-03-21 RX ADMIN — LEVETIRACETAM 1000 MG: 100 INJECTION INTRAVENOUS at 06:27

## 2025-03-21 RX ADMIN — LEVETIRACETAM 1000 MG: 100 INJECTION INTRAVENOUS at 19:45

## 2025-03-21 RX ADMIN — GLYCOPYRROLATE 0.1 MG: 0.2 INJECTION INTRAMUSCULAR; INTRAVENOUS at 06:27

## 2025-03-21 RX ADMIN — ENOXAPARIN SODIUM 30 MG: 100 INJECTION SUBCUTANEOUS at 09:57

## 2025-03-21 RX ADMIN — SODIUM CHLORIDE, PRESERVATIVE FREE 10 ML: 5 INJECTION INTRAVENOUS at 10:37

## 2025-03-21 RX ADMIN — FUROSEMIDE 40 MG: 10 INJECTION, SOLUTION INTRAMUSCULAR; INTRAVENOUS at 03:44

## 2025-03-21 RX ADMIN — VALPROATE SODIUM 750 MG: 100 INJECTION, SOLUTION INTRAVENOUS at 00:49

## 2025-03-21 ASSESSMENT — PAIN SCALES - GENERAL
PAINLEVEL_OUTOF10: 0

## 2025-03-21 NOTE — PLAN OF CARE
Problem: Discharge Planning  Goal: Discharge to home or other facility with appropriate resources  3/21/2025 1433 by Dexter Huggins, RN  Outcome: Progressing  Flowsheets (Taken 3/21/2025 1433)  Discharge to home or other facility with appropriate resources:   Identify barriers to discharge with patient and caregiver   Arrange for needed discharge resources and transportation as appropriate   Refer to discharge planning if patient needs post-hospital services based on physician order or complex needs related to functional status, cognitive ability or social support system   Identify discharge learning needs (meds, wound care, etc)     Problem: Pain  Goal: Verbalizes/displays adequate comfort level or baseline comfort level  3/21/2025 1433 by Dexter Huggins, RN  Outcome: Progressing  Flowsheets (Taken 3/21/2025 1433)  Verbalizes/displays adequate comfort level or baseline comfort level:   Encourage patient to monitor pain and request assistance   Administer analgesics based on type and severity of pain and evaluate response   Consider cultural and social influences on pain and pain management   Assess pain using appropriate pain scale   Implement non-pharmacological measures as appropriate and evaluate response   Notify Licensed Independent Practitioner if interventions unsuccessful or patient reports new pain     Problem: Safety - Adult  Goal: Free from fall injury  3/21/2025 1433 by Dexter Huggins, RN  Outcome: Progressing  Flowsheets (Taken 3/21/2025 1433)  Free From Fall Injury: Instruct family/caregiver on patient safety     Problem: Skin/Tissue Integrity  Goal: Skin integrity remains intact  Description: 1.  Monitor for areas of redness and/or skin breakdown  2.  Assess vascular access sites hourly  3.  Every 4-6 hours minimum:  Change oxygen saturation probe site  4.  Every 4-6 hours:  If on nasal continuous positive airway pressure, respiratory therapy assess nares and determine need for appliance

## 2025-03-21 NOTE — PROGRESS NOTES
Pt admitted to  4K26 from 8B.   Complaints: change in respiratory status.      IV site free of s/s of infection or infiltration.   Vital signs obtained. Assessment and data collection initiated.   Two nurse skin assessment performed by royce GARCES and nikkie GARCES.  Oriented to room. Policies and procedures for 4K explained. All questions answered with no further questions at this time. Fall prevention and safety brochure discussed with patient.  Bed alarm on. Call light in reach.      Swallow Screening done at Bedside, patient was able unable to complete due to being NPO (See \"Head to Toe\" in \"Flowsheets\" for details)

## 2025-03-21 NOTE — PLAN OF CARE
Problem: Skin/Tissue Integrity  Goal: Skin integrity remains intact  Description: 1.  Monitor for areas of redness and/or skin breakdown  2.  Assess vascular access sites hourly  3.  Every 4-6 hours minimum:  Change oxygen saturation probe site  4.  Every 4-6 hours:  If on nasal continuous positive airway pressure, respiratory therapy assess nares and determine need for appliance change or resting period  3/21/2025 0009 by Eloina Daniels, RN  Outcome: Progressing  3/20/2025 1029 by Suzan Ramires, RN  Outcome: Progressing  Flowsheets (Taken 3/20/2025 1029)  Skin Integrity Remains Intact: Monitor for areas of redness and/or skin breakdown

## 2025-03-21 NOTE — CARE COORDINATION
3/21/25, 8:16 AM EDT    DISCHARGE BARRIERS        Patient transferred to . Report given to unit SW, Haley, regarding discharge plan for this patient.

## 2025-03-21 NOTE — PROGRESS NOTES
Comprehensive Nutrition Assessment    Type and Reason for Visit:  Reassess, Nutrition support    Nutrition Recommendations/Plan:   RN mentions NGT removed (3/20) due to \" gurgling\". Per surgery notes 3/20, pt supposed to have EGD/PEG (3/24). I addressed concern with RN re: concern pt not being fed will be ~ 4 days until able to receive PEG/EGD. RN aware.    If/when PEG able to be placed, suggest Jevity 1.5cal/ml 20ml/hr & increase by 15ml/hr every 6 hrs as tolerated to goal 50ml/hr is met as tolerated.   Free water flushes per surgery.   Consider MVI as appropriate.    Palliatvie care following.         Malnutrition Assessment:  Malnutrition Status:  At risk for malnutrition (03/19/25 7973)    Context:  Acute Illness     Findings of the 6 clinical characteristics of malnutrition:  Energy Intake:  No decrease in energy intake (SNF RN reports that patient was eating well, most of meals PTA)  Weight Loss:  No weight loss     Body Fat Loss:  No body fat loss     Muscle Mass Loss:  No muscle mass loss    Fluid Accumulation:  Mild Generalized   Strength:  Not Performed    Nutrition Assessment:     Pt. nutritionally not improving AEB failed SLP evaluation on 3/19, need for enteral nutrition support however NGT was removed 3/20 per RN due to pt \" gurgly\" and plan is not for EGD/PEG until 3/24 per Dr Jesus surgery notes on (3/20). At risk for further nutrition compromise r/t admitted d/t tremors, likely related to dehydration and suspected postictal state, sepsis due to complicated UTI, Dysphagia. Noted underlying medical condition (PMHx AHDH, Cerebral Palsy, depression, essential HTN, seizure)     Nutrition Related Findings:    Pt. Report/Treatments/Miscellaneous: Pt seen, NGT removed (3/20) & TF stopped. Please see above. Pt sleeping &sounds gurgly. Pt confused per RN.   GI Status: BM x 1 (3/20)  Pertinent Labs: reviewed  Pertinent Meds: reviewed     Wound Type: Stage III (coccyx)       Current Nutrition Intake &

## 2025-03-21 NOTE — PROGRESS NOTES
having a seizure.  However seizure activity was not observed.      Recent [facility] hospitalization 1/31-2/5/25 patient received care for fatigue likely secondary to dehydration and suspected postictal state.  UTI secondary to Klebsiella pneumonia(POA).  Candiduria, mild oral laryngeal dysphagia, elevated troponin lower extremity edema.  Complaint of fatigue and refusing oral intake.  Patient had not taken any of her medications and was brought to [facility] as high risk for seizure activity without her medications.  Patient was discharged on Macrobid.      I did reach out to primary RN at [facility].  Nursing states patient is able to identify a prodrome prior to seizure activity.  Patient was having \"coughing fits \".  Concern of diaphragm spasms.  When questioning patient there was concern she thought she was having a seizure.  Nursing identifies concern of swallowing difficulty possible aspiration of pills earlier in the evening.  Message was left with guardian Joya Godfrey of admission.      Subjective: Patient examined and evaluated was resting on the bed on O2 via nasal cannula no acute distress. Patient was sleepy awakens on command then easily falls asleep, oriented to self, makes random answers says is ok.  Discussed care with RN no overnight events reported      Medications:  Reviewed    Infusion Medications    sodium chloride       Scheduled Medications    cefTRIAXone (ROCEPHIN) IV  1,000 mg IntraVENous Q24H    lidocaine 1 % injection  50 mg IntraDERmal Once    sodium chloride flush  5-40 mL IntraVENous 2 times per day    enoxaparin  30 mg SubCUTAneous BID    [Held by provider] amLODIPine  5 mg Oral Daily    [Held by provider] aspirin  81 mg PEG Tube Daily    [Held by provider] aspirin  75 mg Rectal Daily    [Held by provider] lacosamide  200 mg Oral BID    [Held by provider] lamoTRIgine  150 mg Oral BID    levETIRAcetam  1,000 mg IntraVENous Q12H    [Held by provider] levETIRAcetam  2,000 mg Oral BID     microvascular ischemia.      This document has been electronically signed by: Ulisses Comer MD on    03/17/2025 05:30 AM      All CTs at this facility use dose modulation techniques and iterative    reconstructions, and/or weight-based dosing   when appropriate to reduce radiation to a low as reasonably achievable.          Diet: Diet NPO  ADULT TUBE FEEDING; Nasogastric; Standard with Fiber; Continuous; 20; Yes; 10; Q 6 hours; 50; 150; Q 4 hours    DVT prophylaxis: [x] Lovenox                                 [] SCDs                                 [] SQ Heparin                                 [] Encourage ambulation           [] Already on Anticoagulation     Disposition:    [] Home       [] TCU       [] Rehab       [] Psych       [] SNF       [] Long Term Care Facility       [] Other-    Code Status: Full Code    PT/OT Eval Status:         Electronically signed by SUNDAY Sandoval CNP on 3/21/2025 at 8:43 AM

## 2025-03-21 NOTE — PROGRESS NOTES
Patient was having a difficulty breathing and continued to need suctioning. The resource nurse was contacted. He did and ENT. The patient continued to have secretions. The doctor arrived and assessed the patient. I administered Lasix 40 mg IV. Due to the patients oxygen levels and work of breathing the patient was transfer to [facility] beds at the [facility]. Bedside report was given to nurse.

## 2025-03-21 NOTE — CONSULTS
at this facility use dose modulation techniques and iterative    reconstructions, and/or weight-based dosing   when appropriate to reduce radiation to a low as reasonably achievable.           Palliative Performance Scale   40%  Mainly in bed; Extensive disease; Mainly assist; intake normal or reduced; LOC full/confusion    Assessment and Plan   Mariangel Mas is a 69 y.o. who is admitted to [facility] for Sepsis with organ dysfunction (HCC). Palliative care is consulted for Goals of Care.  Patient was minimally responsive during visit. Responded briefly to sternal rub. She is breathing very shallowly. Patient has a legal guardian. Placed a call to legal guardian to discuss PEG tube, she reports Analisa already discussed this with her and that she is okay with it. Brought up her CODE STATUS, guardian quickly responded \"no changes\" and that she is to remain a FULL CODE. I fear that the patient is not appropriately protecting her airway.  I am also concerned that she may still have aspiration with the PEG tube in place and may end up rehospitalized with recurrent pneumonia.  She was transferred to stepdown overnight due to unmanageable secretions and difficulty breathing. Palliative Care will continue to follow the patient while they are hospitalized.         Principal Problem:    Sepsis with organ dysfunction (HCC)  Active Problems:    Palliative care patient  Resolved Problems:    * No resolved hospital problems. *       Continue current plan of care  PEG tube to be placed on 03/23/25   Further goals of care discussions will depend on the clinical course  Please PerfectServe or call the Palliative Care team with any questions or concerns    CURRENT CODE STATUS:  Full Code No additional code details     Case reviewed and discussed with Dr. Forte    Parts of this note may have been dictated by use of voice recognition software and electronically transcribed. The note may contain errors not detected  in proofreading    Electronically signed by SUNDAY Larson CNP on 3/21/2025 at 4:18 PM           Palliative Care Office: [phone]

## 2025-03-22 LAB
ANION GAP SERPL CALC-SCNC: 11 MEQ/L (ref 8–16)
BACTERIA BLD AEROBE CULT: NORMAL
BACTERIA BLD AEROBE CULT: NORMAL
BASOPHILS ABSOLUTE: 0 THOU/MM3 (ref 0–0.1)
BASOPHILS NFR BLD AUTO: 0.2 %
BUN SERPL-MCNC: 7 MG/DL (ref 8–23)
CALCIUM SERPL-MCNC: 9.1 MG/DL (ref 8.8–10.2)
CHLORIDE SERPL-SCNC: 106 MEQ/L (ref 98–111)
CO2 SERPL-SCNC: 28 MEQ/L (ref 22–29)
CREAT SERPL-MCNC: 0.3 MG/DL (ref 0.5–0.9)
DEPRECATED RDW RBC AUTO: 47.1 FL (ref 35–45)
EOSINOPHIL NFR BLD AUTO: 1.7 %
EOSINOPHILS ABSOLUTE: 0.1 THOU/MM3 (ref 0–0.4)
ERYTHROCYTE [DISTWIDTH] IN BLOOD BY AUTOMATED COUNT: 12.5 % (ref 11.5–14.5)
GFR SERPL CREATININE-BSD FRML MDRD: > 90 ML/MIN/1.73M2
GLUCOSE SERPL-MCNC: 88 MG/DL (ref 74–109)
HCT VFR BLD AUTO: 38.8 % (ref 37–47)
HGB BLD-MCNC: 13 GM/DL (ref 12–16)
IMM GRANULOCYTES # BLD AUTO: 0.02 THOU/MM3 (ref 0–0.07)
IMM GRANULOCYTES NFR BLD AUTO: 0.2 %
LYMPHOCYTES ABSOLUTE: 2.4 THOU/MM3 (ref 1–4.8)
LYMPHOCYTES NFR BLD AUTO: 30.2 %
MCH RBC QN AUTO: 34.6 PG (ref 26–33)
MCHC RBC AUTO-ENTMCNC: 33.5 GM/DL (ref 32.2–35.5)
MCV RBC AUTO: 103.2 FL (ref 81–99)
MONOCYTES ABSOLUTE: 1.1 THOU/MM3 (ref 0.4–1.3)
MONOCYTES NFR BLD AUTO: 13.3 %
NEUTROPHILS ABSOLUTE: 4.4 THOU/MM3 (ref 1.8–7.7)
NEUTROPHILS NFR BLD AUTO: 54.4 %
NRBC BLD AUTO-RTO: 0 /100 WBC
PLATELET # BLD AUTO: 139 THOU/MM3 (ref 130–400)
PMV BLD AUTO: 11.4 FL (ref 9.4–12.4)
POTASSIUM SERPL-SCNC: 3.9 MEQ/L (ref 3.5–5.2)
RBC # BLD AUTO: 3.76 MILL/MM3 (ref 4.2–5.4)
SODIUM SERPL-SCNC: 145 MEQ/L (ref 135–145)
WBC # BLD AUTO: 8.1 THOU/MM3 (ref 4.8–10.8)

## 2025-03-22 PROCEDURE — 2060000000 HC ICU INTERMEDIATE R&B

## 2025-03-22 PROCEDURE — 6360000002 HC RX W HCPCS: Performed by: NURSE PRACTITIONER

## 2025-03-22 PROCEDURE — 2580000003 HC RX 258: Performed by: NURSE PRACTITIONER

## 2025-03-22 PROCEDURE — 2700000000 HC OXYGEN THERAPY PER DAY

## 2025-03-22 PROCEDURE — 94761 N-INVAS EAR/PLS OXIMETRY MLT: CPT

## 2025-03-22 PROCEDURE — 85025 COMPLETE CBC W/AUTO DIFF WBC: CPT

## 2025-03-22 PROCEDURE — 6360000002 HC RX W HCPCS: Performed by: STUDENT IN AN ORGANIZED HEALTH CARE EDUCATION/TRAINING PROGRAM

## 2025-03-22 PROCEDURE — 36415 COLL VENOUS BLD VENIPUNCTURE: CPT

## 2025-03-22 PROCEDURE — C9254 INJECTION, LACOSAMIDE: HCPCS | Performed by: NURSE PRACTITIONER

## 2025-03-22 PROCEDURE — 2500000003 HC RX 250 WO HCPCS: Performed by: NURSE PRACTITIONER

## 2025-03-22 PROCEDURE — 80048 BASIC METABOLIC PNL TOTAL CA: CPT

## 2025-03-22 PROCEDURE — 99232 SBSQ HOSP IP/OBS MODERATE 35: CPT | Performed by: INTERNAL MEDICINE

## 2025-03-22 PROCEDURE — 2500000003 HC RX 250 WO HCPCS: Performed by: STUDENT IN AN ORGANIZED HEALTH CARE EDUCATION/TRAINING PROGRAM

## 2025-03-22 RX ADMIN — ENOXAPARIN SODIUM 30 MG: 100 INJECTION SUBCUTANEOUS at 21:36

## 2025-03-22 RX ADMIN — ENOXAPARIN SODIUM 30 MG: 100 INJECTION SUBCUTANEOUS at 08:53

## 2025-03-22 RX ADMIN — VALPROATE SODIUM 750 MG: 100 INJECTION, SOLUTION INTRAVENOUS at 16:34

## 2025-03-22 RX ADMIN — SODIUM CHLORIDE, PRESERVATIVE FREE 10 ML: 5 INJECTION INTRAVENOUS at 08:53

## 2025-03-22 RX ADMIN — VALPROATE SODIUM 750 MG: 100 INJECTION, SOLUTION INTRAVENOUS at 01:28

## 2025-03-22 RX ADMIN — LACOSAMIDE 200 MG: 10 INJECTION INTRAVENOUS at 09:03

## 2025-03-22 RX ADMIN — PHENOBARBITAL SODIUM 30 MG: 65 INJECTION INTRAMUSCULAR at 08:53

## 2025-03-22 RX ADMIN — LACOSAMIDE 200 MG: 10 INJECTION INTRAVENOUS at 21:43

## 2025-03-22 RX ADMIN — LEVETIRACETAM 1000 MG: 100 INJECTION INTRAVENOUS at 17:37

## 2025-03-22 RX ADMIN — WATER 1000 MG: 1 INJECTION INTRAMUSCULAR; INTRAVENOUS; SUBCUTANEOUS at 05:30

## 2025-03-22 RX ADMIN — PHENOBARBITAL SODIUM 60 MG: 65 INJECTION INTRAMUSCULAR at 21:36

## 2025-03-22 RX ADMIN — SODIUM CHLORIDE, PRESERVATIVE FREE 10 ML: 5 INJECTION INTRAVENOUS at 21:36

## 2025-03-22 RX ADMIN — LEVETIRACETAM 1000 MG: 100 INJECTION INTRAVENOUS at 05:31

## 2025-03-22 RX ADMIN — VALPROATE SODIUM 750 MG: 100 INJECTION, SOLUTION INTRAVENOUS at 10:33

## 2025-03-22 ASSESSMENT — PAIN SCALES - GENERAL
PAINLEVEL_OUTOF10: 0
PAINLEVEL_OUTOF10: 0

## 2025-03-22 NOTE — PROGRESS NOTES
Hospitalist Progress Note  Internal Medicine Resident      Patient: Mariangel Mas 69 y.o. female      Unit/Bed: -26/026-A    Admit Date: 3/17/2025      ASSESSMENT AND PLAN  Active Problems  UTI: POA UA positive for nitrites, pyuria, bacteria.  Patient with history of MDRO/ESBL.  CT AP normal-size/no hydro, noted for small gas in the urinary bladder which may be secondary to instrumentation.  Urine culture positive for E. Coli. Renal ultrasound unremarkable.  Urology initially consulted, no acute intervention needed.  Continue Rocephin 1 g IV, (320-3/24)  Dysphagia secondary to cerebral palsy: Patient noted for difficult swallowing, history of PEG tube, removed on 2/24.  Current diet NPO.,  GI was consulted recommended general surgery for PEG tube placement.  SPL consulted, recommended n.p.o. General surgery consulted, PEG tube placement tentatively scheduled on 3/24/25.   Hold any anticoagulation/blood thinner medication.  Resolved Problems  Acute hypoxic respiratory failure secondary to aspiration, resolved.     Chronic Conditions (reviewed and stable unless otherwise stated)  History of seizures: Possible concern of ongoing seizures?,  As per chart review noted abdominal muscle spasms as considered prodromal syndromes of seizure.  Patient has not had any noted seizure since admission.  Continue Keppra/phenobarb/valproic acid/Vimpat.   HTN: Currently holding Norvasc due to n.p.o. status.  Hydralazine 10 mg every 6 as needed for systolic blood pressure about 170.  Cerebral palsy: Patient noted for developmental delays.  Patient is bedbound with requiring assistance with ADLs.      LDA: []CVC / []PICC / []Midline / []Barber / []Drains / []Mediport / [x]None  Antibiotics: Ceftriaxone  Steroids: None  Labs (still needed?): [x]Yes / []No  IVF (still needed?): []Yes / [x]No    Level of care: [x]Step Down / []Med-Surg  Bed Status: [x]Inpatient / []Observation  Telemetry: [x]Yes / []No  PT/OT: []Yes /  on 3/22/2025 at 2:22 PM  Case was discussed with Attending, Dr. Estrella DO.

## 2025-03-22 NOTE — PLAN OF CARE
Problem: Discharge Planning  Goal: Discharge to home or other facility with appropriate resources  Outcome: Progressing  Flowsheets (Taken 3/21/2025 1433 by Dexter Huggins, RN)  Discharge to home or other facility with appropriate resources:   Identify barriers to discharge with patient and caregiver   Arrange for needed discharge resources and transportation as appropriate   Refer to discharge planning if patient needs post-hospital services based on physician order or complex needs related to functional status, cognitive ability or social support system   Identify discharge learning needs (meds, wound care, etc)     Problem: Pain  Goal: Verbalizes/displays adequate comfort level or baseline comfort level  Outcome: Progressing  Flowsheets (Taken 3/21/2025 1433 by Dexter Huggins, RN)  Verbalizes/displays adequate comfort level or baseline comfort level:   Encourage patient to monitor pain and request assistance   Administer analgesics based on type and severity of pain and evaluate response   Consider cultural and social influences on pain and pain management   Assess pain using appropriate pain scale   Implement non-pharmacological measures as appropriate and evaluate response   Notify Licensed Independent Practitioner if interventions unsuccessful or patient reports new pain     Problem: Safety - Adult  Goal: Free from fall injury  Outcome: Progressing  Flowsheets (Taken 3/22/2025 0617)  Free From Fall Injury: Instruct family/caregiver on patient safety     Problem: Skin/Tissue Integrity  Goal: Skin integrity remains intact  Description: 1.  Monitor for areas of redness and/or skin breakdown  2.  Assess vascular access sites hourly  3.  Every 4-6 hours minimum:  Change oxygen saturation probe site  4.  Every 4-6 hours:  If on nasal continuous positive airway pressure, respiratory therapy assess nares and determine need for appliance change or resting period  Outcome: Progressing  Flowsheets (Taken 3/21/2025

## 2025-03-22 NOTE — PLAN OF CARE
Problem: Discharge Planning  Goal: Discharge to home or other facility with appropriate resources  3/22/2025 1115 by Harriet Sanchez RN  Outcome: Progressing  Flowsheets (Taken 3/22/2025 0800)  Discharge to home or other facility with appropriate resources:   Identify barriers to discharge with patient and caregiver   Arrange for needed discharge resources and transportation as appropriate   Identify discharge learning needs (meds, wound care, etc)   Arrange for interpreters to assist at discharge as needed     Problem: Pain  Goal: Verbalizes/displays adequate comfort level or baseline comfort level  3/22/2025 1115 by Harriet Sanchez RN  Outcome: Progressing  Flowsheets (Taken 3/22/2025 0830)  Verbalizes/displays adequate comfort level or baseline comfort level:   Encourage patient to monitor pain and request assistance   Assess pain using appropriate pain scale   Administer analgesics based on type and severity of pain and evaluate response   Implement non-pharmacological measures as appropriate and evaluate response     Problem: Safety - Adult  Goal: Free from fall injury  3/22/2025 1115 by Harriet Sanchez RN  Outcome: Progressing     Problem: Skin/Tissue Integrity  Goal: Skin integrity remains intact  Description: 1.  Monitor for areas of redness and/or skin breakdown  2.  Assess vascular access sites hourly  3.  Every 4-6 hours minimum:  Change oxygen saturation probe site  4.  Every 4-6 hours:  If on nasal continuous positive airway pressure, respiratory therapy assess nares and determine need for appliance change or resting period  3/22/2025 1115 by Harriet Sanchez RN  Outcome: Progressing  Flowsheets (Taken 3/22/2025 0800)  Skin Integrity Remains Intact:   Monitor for areas of redness and/or skin breakdown   Assess vascular access sites hourly   Every 4-6 hours minimum: Change oxygen saturation probe site   Every 4-6 hours: If on nasal continuous positive airway pressure, respiratory

## 2025-03-23 LAB
ANION GAP SERPL CALC-SCNC: 14 MEQ/L (ref 8–16)
BUN SERPL-MCNC: 10 MG/DL (ref 8–23)
CALCIUM SERPL-MCNC: 8.8 MG/DL (ref 8.8–10.2)
CHLORIDE SERPL-SCNC: 103 MEQ/L (ref 98–111)
CO2 SERPL-SCNC: 24 MEQ/L (ref 22–29)
CREAT SERPL-MCNC: 0.4 MG/DL (ref 0.5–0.9)
DEPRECATED RDW RBC AUTO: 46.2 FL (ref 35–45)
ERYTHROCYTE [DISTWIDTH] IN BLOOD BY AUTOMATED COUNT: 12.2 % (ref 11.5–14.5)
GFR SERPL CREATININE-BSD FRML MDRD: > 90 ML/MIN/1.73M2
GLUCOSE SERPL-MCNC: 83 MG/DL (ref 74–109)
HCT VFR BLD AUTO: 40.2 % (ref 37–47)
HGB BLD-MCNC: 13.4 GM/DL (ref 12–16)
MAGNESIUM SERPL-MCNC: 1.8 MG/DL (ref 1.6–2.6)
MCH RBC QN AUTO: 34.2 PG (ref 26–33)
MCHC RBC AUTO-ENTMCNC: 33.3 GM/DL (ref 32.2–35.5)
MCV RBC AUTO: 102.6 FL (ref 81–99)
PHOSPHATE SERPL-MCNC: 3 MG/DL (ref 2.5–4.5)
PLATELET # BLD AUTO: 147 THOU/MM3 (ref 130–400)
PMV BLD AUTO: 11.8 FL (ref 9.4–12.4)
POTASSIUM SERPL-SCNC: 3.6 MEQ/L (ref 3.5–5.2)
RBC # BLD AUTO: 3.92 MILL/MM3 (ref 4.2–5.4)
SODIUM SERPL-SCNC: 141 MEQ/L (ref 135–145)
WBC # BLD AUTO: 6.8 THOU/MM3 (ref 4.8–10.8)

## 2025-03-23 PROCEDURE — 6360000002 HC RX W HCPCS: Performed by: STUDENT IN AN ORGANIZED HEALTH CARE EDUCATION/TRAINING PROGRAM

## 2025-03-23 PROCEDURE — 2580000003 HC RX 258: Performed by: NURSE PRACTITIONER

## 2025-03-23 PROCEDURE — C9254 INJECTION, LACOSAMIDE: HCPCS | Performed by: NURSE PRACTITIONER

## 2025-03-23 PROCEDURE — 99232 SBSQ HOSP IP/OBS MODERATE 35: CPT | Performed by: INTERNAL MEDICINE

## 2025-03-23 PROCEDURE — 80048 BASIC METABOLIC PNL TOTAL CA: CPT

## 2025-03-23 PROCEDURE — 36415 COLL VENOUS BLD VENIPUNCTURE: CPT

## 2025-03-23 PROCEDURE — 2500000003 HC RX 250 WO HCPCS: Performed by: NURSE PRACTITIONER

## 2025-03-23 PROCEDURE — 6360000002 HC RX W HCPCS: Performed by: NURSE PRACTITIONER

## 2025-03-23 PROCEDURE — 83735 ASSAY OF MAGNESIUM: CPT

## 2025-03-23 PROCEDURE — 2500000003 HC RX 250 WO HCPCS: Performed by: STUDENT IN AN ORGANIZED HEALTH CARE EDUCATION/TRAINING PROGRAM

## 2025-03-23 PROCEDURE — 2060000000 HC ICU INTERMEDIATE R&B

## 2025-03-23 PROCEDURE — 85027 COMPLETE CBC AUTOMATED: CPT

## 2025-03-23 PROCEDURE — 84100 ASSAY OF PHOSPHORUS: CPT

## 2025-03-23 RX ADMIN — PHENOBARBITAL SODIUM 30 MG: 65 INJECTION INTRAMUSCULAR at 08:23

## 2025-03-23 RX ADMIN — LEVETIRACETAM 1000 MG: 100 INJECTION INTRAVENOUS at 06:21

## 2025-03-23 RX ADMIN — LACOSAMIDE 200 MG: 10 INJECTION INTRAVENOUS at 08:35

## 2025-03-23 RX ADMIN — SODIUM CHLORIDE, PRESERVATIVE FREE 10 ML: 5 INJECTION INTRAVENOUS at 21:13

## 2025-03-23 RX ADMIN — ENOXAPARIN SODIUM 30 MG: 100 INJECTION SUBCUTANEOUS at 08:35

## 2025-03-23 RX ADMIN — WATER 1000 MG: 1 INJECTION INTRAMUSCULAR; INTRAVENOUS; SUBCUTANEOUS at 06:21

## 2025-03-23 RX ADMIN — LACOSAMIDE 200 MG: 10 INJECTION INTRAVENOUS at 21:19

## 2025-03-23 RX ADMIN — VALPROATE SODIUM 750 MG: 100 INJECTION, SOLUTION INTRAVENOUS at 08:31

## 2025-03-23 RX ADMIN — VALPROATE SODIUM 750 MG: 100 INJECTION, SOLUTION INTRAVENOUS at 00:18

## 2025-03-23 RX ADMIN — PHENOBARBITAL SODIUM 60 MG: 65 INJECTION INTRAMUSCULAR at 21:13

## 2025-03-23 RX ADMIN — LEVETIRACETAM 1000 MG: 100 INJECTION INTRAVENOUS at 17:06

## 2025-03-23 RX ADMIN — SODIUM CHLORIDE, PRESERVATIVE FREE 10 ML: 5 INJECTION INTRAVENOUS at 08:25

## 2025-03-23 RX ADMIN — VALPROATE SODIUM 750 MG: 100 INJECTION, SOLUTION INTRAVENOUS at 16:13

## 2025-03-23 ASSESSMENT — PAIN SCALES - GENERAL
PAINLEVEL_OUTOF10: 0
PAINLEVEL_OUTOF10: 0
PAINLEVEL_OUTOF10: 6

## 2025-03-23 NOTE — PROGRESS NOTES
Hospitalist Progress Note  Internal Medicine Resident      Patient: Mariangel Mas 69 y.o. female      Unit/Bed: -26/026-A    Admit Date: 3/17/2025      ASSESSMENT AND PLAN  Active Problems  Dysphagia secondary to cerebral palsy: Patient noted for difficult swallowing, history of PEG tube, removed on 2/24.  Current diet NPO.,  GI was consulted recommended general surgery for PEG tube placement.  SPL consulted, recommended n.p.o. General surgery consulted, PEG tube placement tentatively scheduled on 3/24/25.   Hold any anticoagulation/blood thinner medication.  Resolved Problems  Acute hypoxic respiratory failure secondary to aspiration, resolved.   UTI: POA UA positive for nitrites, pyuria, bacteria.  Patient with history of MDRO/ESBL.  CT AP normal-size/no hydro, noted for small gas in the urinary bladder which may be secondary to instrumentation.  Urine culture positive for E. Coli. Renal ultrasound unremarkable.  Urology initially consulted, no acute intervention needed. Completed course of Rocephin 1 g IV (320-3/23)  Chronic Conditions (reviewed and stable unless otherwise stated)  History of seizures: Possible concern of ongoing seizures?,  As per chart review noted abdominal muscle spasms as considered prodromal syndromes of seizure.  Patient has not had any noted seizure since admission.  Continue Keppra/phenobarb/valproic acid/Vimpat.   HTN: Currently holding Norvasc due to n.p.o. status.  Hydralazine 10 mg every 6 as needed for systolic blood pressure about 170.  Cerebral palsy: Patient noted for developmental delays.  Patient is bedbound with requiring assistance with ADLs.      LDA: []CVC / []PICC / []Midline / []Barber / []Drains / []Mediport / [x]None  Antibiotics: Ceftriaxone  Steroids: None  Labs (still needed?): [x]Yes / []No  IVF (still needed?): []Yes / [x]No    Level of care: [x]Step Down / []Med-Surg  Bed Status: [x]Inpatient / []Observation  Telemetry: [x]Yes / []No  PT/OT: []Yes /

## 2025-03-23 NOTE — PLAN OF CARE
reorientation, refocusing and direction   Decrease environmental stimuli, including noise as appropriate   Monitor and intervene to maintain adequate nutrition, hydration, elimination, sleep and activity     Problem: Skin/Tissue Integrity - Adult  Goal: Skin integrity remains intact  Description: 1.  Monitor for areas of redness and/or skin breakdown  2.  Assess vascular access sites hourly  3.  Every 4-6 hours minimum:  Change oxygen saturation probe site  4.  Every 4-6 hours:  If on nasal continuous positive airway pressure, respiratory therapy assess nares and determine need for appliance change or resting period  3/22/2025 2315 by Stacy Fagan RN  Outcome: Progressing  Flowsheets (Taken 3/22/2025 0800 by Harriet Sanchez RN)  Skin Integrity Remains Intact:   Monitor for areas of redness and/or skin breakdown   Assess vascular access sites hourly   Every 4-6 hours minimum: Change oxygen saturation probe site   Every 4-6 hours: If on nasal continuous positive airway pressure, respiratory therapy assesses nares and determine need for appliance change or resting period  3/22/2025 1115 by Harriet Sanchez RN  Outcome: Progressing  Flowsheets (Taken 3/22/2025 0800)  Skin Integrity Remains Intact:   Monitor for areas of redness and/or skin breakdown   Assess vascular access sites hourly   Every 4-6 hours minimum: Change oxygen saturation probe site   Every 4-6 hours: If on nasal continuous positive airway pressure, respiratory therapy assesses nares and determine need for appliance change or resting period  Goal: Incisions, wounds, or drain sites healing without S/S of infection  3/22/2025 2315 by Stacy Fagan RN  Outcome: Progressing  Flowsheets (Taken 3/22/2025 0800 by Harriet Sanchez RN)  Incisions, Wounds, or Drain Sites Healing Without Sign and Symptoms of Infection:   ADMISSION and DAILY: Assess and document risk factors for pressure ulcer development   TWICE DAILY: Assess and document skin  with transfers and ambulation using safe patient handling equipment as needed   Ensure adequate protection for wounds/incisions during mobilization   Obtain physical therapy/occupational therapy consults as needed   Apply continuous passive motion per provider or physical therapy orders to increase flexion toward goal  Goal: Maintain proper alignment of affected body part  3/22/2025 2315 by Stacy Fagan RN  Outcome: Progressing  Flowsheets (Taken 3/22/2025 0800 by Harriet Sanchez, RN)  Maintain proper alignment of affected body part: Support and protect limb and body alignment per provider's orders  3/22/2025 1115 by Harriet Sanchez RN  Outcome: Progressing  Flowsheets (Taken 3/22/2025 0800)  Maintain proper alignment of affected body part: Support and protect limb and body alignment per provider's orders  Goal: Return ADL status to a safe level of function  3/22/2025 2315 by Stacy Fagan RN  Outcome: Progressing  Flowsheets (Taken 3/22/2025 0800 by Harriet Sanchez, RN)  Return ADL Status to a Safe Level of Function:   Administer medication as ordered   Assess activities of daily living deficits and provide assistive devices as needed   Obtain physical therapy/occupational therapy consults as needed   Assist and instruct patient to increase activity and self care as tolerated  3/22/2025 1115 by Harriet Sanchez RN  Outcome: Progressing  Flowsheets (Taken 3/22/2025 0800)  Return ADL Status to a Safe Level of Function:   Administer medication as ordered   Assess activities of daily living deficits and provide assistive devices as needed   Obtain physical therapy/occupational therapy consults as needed   Assist and instruct patient to increase activity and self care as tolerated     Problem: Gastrointestinal - Adult  Goal: Minimal or absence of nausea and vomiting  3/22/2025 2315 by Stacy Fagan RN  Outcome: Progressing  Flowsheets (Taken 3/22/2025 0800 by Harriet Sanchez RN)  Minimal or

## 2025-03-24 ENCOUNTER — APPOINTMENT (OUTPATIENT)
Dept: ENDOSCOPY | Age: 70
End: 2025-03-24
Attending: SURGERY
Payer: MEDICARE

## 2025-03-24 ENCOUNTER — ANESTHESIA EVENT (OUTPATIENT)
Dept: ENDOSCOPY | Age: 70
End: 2025-03-24
Payer: MEDICARE

## 2025-03-24 ENCOUNTER — ANESTHESIA (OUTPATIENT)
Dept: ENDOSCOPY | Age: 70
End: 2025-03-24
Payer: MEDICARE

## 2025-03-24 LAB
ANION GAP SERPL CALC-SCNC: 15 MEQ/L (ref 8–16)
BUN SERPL-MCNC: 10 MG/DL (ref 8–23)
CALCIUM SERPL-MCNC: 8.7 MG/DL (ref 8.8–10.2)
CHLORIDE SERPL-SCNC: 103 MEQ/L (ref 98–111)
CO2 SERPL-SCNC: 23 MEQ/L (ref 22–29)
CREAT SERPL-MCNC: 0.4 MG/DL (ref 0.5–0.9)
DEPRECATED RDW RBC AUTO: 47.3 FL (ref 35–45)
ERYTHROCYTE [DISTWIDTH] IN BLOOD BY AUTOMATED COUNT: 12.3 % (ref 11.5–14.5)
GFR SERPL CREATININE-BSD FRML MDRD: > 90 ML/MIN/1.73M2
GLUCOSE BLD STRIP.AUTO-MCNC: 82 MG/DL (ref 70–108)
GLUCOSE SERPL-MCNC: 82 MG/DL (ref 74–109)
HCT VFR BLD AUTO: 41.1 % (ref 37–47)
HGB BLD-MCNC: 13.6 GM/DL (ref 12–16)
MAGNESIUM SERPL-MCNC: 1.7 MG/DL (ref 1.6–2.6)
MCH RBC QN AUTO: 34.6 PG (ref 26–33)
MCHC RBC AUTO-ENTMCNC: 33.1 GM/DL (ref 32.2–35.5)
MCV RBC AUTO: 104.6 FL (ref 81–99)
PHOSPHATE SERPL-MCNC: 3.2 MG/DL (ref 2.5–4.5)
PLATELET # BLD AUTO: 180 THOU/MM3 (ref 130–400)
PMV BLD AUTO: 11.8 FL (ref 9.4–12.4)
POTASSIUM SERPL-SCNC: 3.8 MEQ/L (ref 3.5–5.2)
RBC # BLD AUTO: 3.93 MILL/MM3 (ref 4.2–5.4)
SODIUM SERPL-SCNC: 141 MEQ/L (ref 135–145)
WBC # BLD AUTO: 7.1 THOU/MM3 (ref 4.8–10.8)

## 2025-03-24 PROCEDURE — 3E0G76Z INTRODUCTION OF NUTRITIONAL SUBSTANCE INTO UPPER GI, VIA NATURAL OR ARTIFICIAL OPENING: ICD-10-PCS | Performed by: SURGERY

## 2025-03-24 PROCEDURE — 3700000001 HC ADD 15 MINUTES (ANESTHESIA): Performed by: SURGERY

## 2025-03-24 PROCEDURE — 82948 REAGENT STRIP/BLOOD GLUCOSE: CPT

## 2025-03-24 PROCEDURE — 84100 ASSAY OF PHOSPHORUS: CPT

## 2025-03-24 PROCEDURE — 2580000003 HC RX 258: Performed by: NURSE PRACTITIONER

## 2025-03-24 PROCEDURE — 3700000000 HC ANESTHESIA ATTENDED CARE: Performed by: SURGERY

## 2025-03-24 PROCEDURE — 99232 SBSQ HOSP IP/OBS MODERATE 35: CPT | Performed by: INTERNAL MEDICINE

## 2025-03-24 PROCEDURE — 2500000003 HC RX 250 WO HCPCS: Performed by: NURSE PRACTITIONER

## 2025-03-24 PROCEDURE — 36415 COLL VENOUS BLD VENIPUNCTURE: CPT

## 2025-03-24 PROCEDURE — 6360000002 HC RX W HCPCS: Performed by: NURSE ANESTHETIST, CERTIFIED REGISTERED

## 2025-03-24 PROCEDURE — 0DH63UZ INSERTION OF FEEDING DEVICE INTO STOMACH, PERCUTANEOUS APPROACH: ICD-10-PCS | Performed by: SURGERY

## 2025-03-24 PROCEDURE — 6360000002 HC RX W HCPCS: Performed by: NURSE PRACTITIONER

## 2025-03-24 PROCEDURE — 85027 COMPLETE CBC AUTOMATED: CPT

## 2025-03-24 PROCEDURE — C9254 INJECTION, LACOSAMIDE: HCPCS | Performed by: NURSE PRACTITIONER

## 2025-03-24 PROCEDURE — 2580000003 HC RX 258: Performed by: SURGERY

## 2025-03-24 PROCEDURE — 2580000003 HC RX 258: Performed by: NURSE ANESTHETIST, CERTIFIED REGISTERED

## 2025-03-24 PROCEDURE — 7100000010 HC PHASE II RECOVERY - FIRST 15 MIN: Performed by: SURGERY

## 2025-03-24 PROCEDURE — 80048 BASIC METABOLIC PNL TOTAL CA: CPT

## 2025-03-24 PROCEDURE — 2709999900 HC NON-CHARGEABLE SUPPLY: Performed by: SURGERY

## 2025-03-24 PROCEDURE — C9254 INJECTION, LACOSAMIDE: HCPCS | Performed by: SURGERY

## 2025-03-24 PROCEDURE — 83735 ASSAY OF MAGNESIUM: CPT

## 2025-03-24 PROCEDURE — 2060000000 HC ICU INTERMEDIATE R&B

## 2025-03-24 PROCEDURE — 43246 EGD PLACE GASTROSTOMY TUBE: CPT | Performed by: SURGERY

## 2025-03-24 PROCEDURE — 7100000011 HC PHASE II RECOVERY - ADDTL 15 MIN: Performed by: SURGERY

## 2025-03-24 PROCEDURE — 2500000003 HC RX 250 WO HCPCS: Performed by: SURGERY

## 2025-03-24 PROCEDURE — 6360000002 HC RX W HCPCS: Performed by: SURGERY

## 2025-03-24 PROCEDURE — 3609013300 HC EGD TUBE PLACEMENT: Performed by: SURGERY

## 2025-03-24 RX ORDER — AMLODIPINE BESYLATE 5 MG/1
5 TABLET ORAL DAILY
Status: DISCONTINUED | OUTPATIENT
Start: 2025-03-25 | End: 2025-03-29 | Stop reason: HOSPADM

## 2025-03-24 RX ORDER — SODIUM CHLORIDE 0.9 % (FLUSH) 0.9 %
5-40 SYRINGE (ML) INJECTION PRN
Status: DISCONTINUED | OUTPATIENT
Start: 2025-03-24 | End: 2025-03-29 | Stop reason: HOSPADM

## 2025-03-24 RX ORDER — PROPOFOL 10 MG/ML
INJECTION, EMULSION INTRAVENOUS
Status: DISCONTINUED | OUTPATIENT
Start: 2025-03-24 | End: 2025-03-24 | Stop reason: SDUPTHER

## 2025-03-24 RX ORDER — SODIUM CHLORIDE 450 MG/100ML
INJECTION, SOLUTION INTRAVENOUS CONTINUOUS
Status: DISCONTINUED | OUTPATIENT
Start: 2025-03-24 | End: 2025-03-24

## 2025-03-24 RX ORDER — SODIUM CHLORIDE 9 MG/ML
INJECTION, SOLUTION INTRAVENOUS PRN
Status: DISCONTINUED | OUTPATIENT
Start: 2025-03-24 | End: 2025-03-29 | Stop reason: HOSPADM

## 2025-03-24 RX ORDER — LIDOCAINE HYDROCHLORIDE 20 MG/ML
INJECTION, SOLUTION INFILTRATION; PERINEURAL
Status: DISCONTINUED | OUTPATIENT
Start: 2025-03-24 | End: 2025-03-24 | Stop reason: SDUPTHER

## 2025-03-24 RX ORDER — SODIUM CHLORIDE 0.9 % (FLUSH) 0.9 %
5-40 SYRINGE (ML) INJECTION EVERY 12 HOURS SCHEDULED
Status: DISCONTINUED | OUTPATIENT
Start: 2025-03-24 | End: 2025-03-29 | Stop reason: HOSPADM

## 2025-03-24 RX ORDER — ENOXAPARIN SODIUM 100 MG/ML
30 INJECTION SUBCUTANEOUS 2 TIMES DAILY
Status: DISCONTINUED | OUTPATIENT
Start: 2025-03-24 | End: 2025-03-29 | Stop reason: HOSPADM

## 2025-03-24 RX ORDER — SODIUM CHLORIDE 9 MG/ML
INJECTION, SOLUTION INTRAVENOUS
Status: DISCONTINUED | OUTPATIENT
Start: 2025-03-24 | End: 2025-03-24 | Stop reason: SDUPTHER

## 2025-03-24 RX ADMIN — VALPROATE SODIUM 750 MG: 100 INJECTION, SOLUTION INTRAVENOUS at 08:03

## 2025-03-24 RX ADMIN — LACOSAMIDE 200 MG: 10 INJECTION INTRAVENOUS at 08:39

## 2025-03-24 RX ADMIN — SODIUM CHLORIDE, PRESERVATIVE FREE 10 ML: 5 INJECTION INTRAVENOUS at 21:26

## 2025-03-24 RX ADMIN — SODIUM CHLORIDE, PRESERVATIVE FREE 10 ML: 5 INJECTION INTRAVENOUS at 16:12

## 2025-03-24 RX ADMIN — VALPROATE SODIUM 750 MG: 100 INJECTION, SOLUTION INTRAVENOUS at 01:01

## 2025-03-24 RX ADMIN — PHENOBARBITAL SODIUM 30 MG: 65 INJECTION INTRAMUSCULAR at 08:04

## 2025-03-24 RX ADMIN — LEVETIRACETAM 1000 MG: 100 INJECTION INTRAVENOUS at 06:06

## 2025-03-24 RX ADMIN — SODIUM CHLORIDE, PRESERVATIVE FREE 10 ML: 5 INJECTION INTRAVENOUS at 17:20

## 2025-03-24 RX ADMIN — LEVETIRACETAM 1000 MG: 100 INJECTION INTRAVENOUS at 17:19

## 2025-03-24 RX ADMIN — SODIUM CHLORIDE: 0.45 INJECTION, SOLUTION INTRAVENOUS at 08:17

## 2025-03-24 RX ADMIN — LIDOCAINE HYDROCHLORIDE 100 MG: 20 INJECTION, SOLUTION INFILTRATION; PERINEURAL at 11:59

## 2025-03-24 RX ADMIN — SODIUM CHLORIDE: 9 INJECTION, SOLUTION INTRAVENOUS at 11:53

## 2025-03-24 RX ADMIN — LACOSAMIDE 200 MG: 10 INJECTION INTRAVENOUS at 21:31

## 2025-03-24 RX ADMIN — PHENOBARBITAL SODIUM 60 MG: 65 INJECTION INTRAMUSCULAR at 21:25

## 2025-03-24 RX ADMIN — SODIUM CHLORIDE, PRESERVATIVE FREE 10 ML: 5 INJECTION INTRAVENOUS at 21:27

## 2025-03-24 RX ADMIN — VALPROATE SODIUM 750 MG: 100 INJECTION, SOLUTION INTRAVENOUS at 16:15

## 2025-03-24 RX ADMIN — SODIUM CHLORIDE, PRESERVATIVE FREE 10 ML: 5 INJECTION INTRAVENOUS at 08:04

## 2025-03-24 RX ADMIN — PROPOFOL 150 MG: 10 INJECTION, EMULSION INTRAVENOUS at 11:59

## 2025-03-24 ASSESSMENT — PAIN - FUNCTIONAL ASSESSMENT: PAIN_FUNCTIONAL_ASSESSMENT: NONE - DENIES PAIN

## 2025-03-24 NOTE — ANESTHESIA POSTPROCEDURE EVALUATION
Department of Anesthesiology  Postprocedure Note    Patient: Mariangel Mas  MRN: [id number]  YOB: 1955  Date of evaluation: 3/24/2025    Procedure Summary       Date: 03/24/25 Room / Location: [place] [address] / [facility]    Anesthesia Start: 1153 Anesthesia Stop: 1218    Procedure: EGD Peg Tube Placement (Abdomen) Diagnosis:       Sepsis with organ dysfunction (HCC)      (Sepsis with organ dysfunction (HCC) [A41.9, R65.20])    Surgeons: Lc Jesus DO Responsible Provider: Munir Cardenas MD    Anesthesia Type: MAC ASA Status: 3            Anesthesia Type: No value filed.    Benji Phase I: Benji Score: 10    Benji Phase II:      Anesthesia Post Evaluation    Patient location during evaluation: bedside  Patient participation: complete - patient participated  Level of consciousness: awake  Airway patency: patent  Nausea & Vomiting: no vomiting and no nausea  Cardiovascular status: hemodynamically stable  Respiratory status: acceptable  Hydration status: stable  Pain management: adequate        No notable events documented.

## 2025-03-24 NOTE — OP NOTE
[address] [address]                            OPERATIVE REPORT      PATIENT NAME: NOEMÍ DIOP           : 1955  MED REC NO: [id number]                       ROOM: [facility]  ACCOUNT NO: [id number]                       ADMIT DATE: 2025  PROVIDER: Lc Jesus DO      DATE OF PROCEDURE:  2025    SURGEON:  Lc Jesus DO    PREOPERATIVE DIAGNOSES:  Cerebral palsy, dysphagia.    POSTOPERATIVE DIAGNOSES:  Cerebral palsy, dysphagia.    PROCEDURE PERFORMED:  Esophagogastroduodenoscopy with percutaneous endoscopic gastrostomy tube insertion.    ANESTHESIA:  Monitored anesthesia care provided by the anesthesia department as well as local anesthetic.    SPECIMENS:  None.    COMPLICATIONS:  None immediately appreciated.    DISCUSSION:  Patient is a 69-year-old female with cerebral palsy, who presented to the hospital with recurrent dysphagia.  After history and physical examination performed, potential diagnostic and therapeutic modalities were discussed.  Operative and nonoperative management were reviewed, and informed consent was obtained.  Patient was brought to the operating room on 2025, for the procedure.    DESCRIPTION OF PROCEDURE:  The patient was brought to the endoscopy suite, placed on to continuous cardiac telemetry, blood pressure, and pulse oximeter monitoring, placed under IV sedation by the anesthesia department.  A bite block was placed in the patient's mouth and video gastroscope inserted to the oropharynx, cannulating the cricopharyngeus.  The scope was advanced under direct visualization through the esophagus and esophageal contour was within normal limits.  The scope was advanced into the stomach.  Pylorus was cannulated.  First and second portions of the duodenum were without pathology.  The scope was retracted back into the stomach.  The stomach was maximally insufflated

## 2025-03-24 NOTE — PROGRESS NOTES
EGD with PEG tube placement complete, PEG tube marked at 5 cm at the skin. pt tolerated procedure well. Scope Number  used. No antibiotics per Dr. Jesus.

## 2025-03-24 NOTE — PROGRESS NOTES
Nutrition Note:   Pt is NPO due to just received EGD with PEG today per nursing staff. . When PEG feeds able to be started, suggest Jevity 1.5cal/ml 20ml/hr & increase by 15ml/hr every 6 hrs as tolerated to goal 50ml/hr is met as tolerated. Free water flushes per surgery. Labs: (3/24) Ca 8.7, POC Glucose 136. May refer to progress note from (3/21/25)Caitlin Rose, RD, LD (3/24)

## 2025-03-24 NOTE — PROGRESS NOTES
[facility]  SPEECH THERAPY MISSED TREATMENT NOTE  STRZ ICU STEPDOWN TELEMETRY 4K      Date: 3/24/2025  Patient Name: Mariangel Mas        MRN: [id number]    : 1955  (69 y.o.)    REASON FOR MISSED TREATMENT:  Novel orders received from Oriana Christian MD for completion of CSE, however, patient already on caseload. ST attempted to see patient this am for dysphagia interventions. DEZ Kamara reports patient is strict NPO for PEG tube placement later this date. ST to re-attempt on subsequent date as patient is medically appropriate and available.     Shy Burgess MS, CCC-SLP [id number]

## 2025-03-24 NOTE — PROGRESS NOTES
Pt admitted to Endo department and admitted to Endo room 11  Plan of care reviewed with patient.   Call light within reach.   Bed in lowest position, locked, with one bed rail up.   Appropriate arm bands on patient.   Bathroom offered.   All questions and concerns of patient addressed.  Allergies confirmed with patient.    Legal Guardian Joya Godfrey called.

## 2025-03-24 NOTE — PROGRESS NOTES
Hospitalist Progress Note  Internal Medicine Resident      Patient: Mariangel Mas 69 y.o. female      Unit/Bed: -26/026-A    Admit Date: 3/17/2025      ASSESSMENT AND PLAN  Active Problems  Dysphagia secondary to cerebral palsy: Patient was noted to have difficulty swallowing.  History of PEG tube as removed 2/2024.  GI was initially consulted who recommended GEN surge consultation for PEG tube placement.  Plan for PEG tube placement 3/24  Plan to transition patient's medications to be delivered via PEG tube once placed and confirmed  Holding anticoagulation/blood thinners prior to procedure    Resolved Problems  Acute hypoxic respiratory failure secondary to aspiration  UTI    Chronic Conditions (reviewed and stable unless otherwise stated)  Seizure, history of: No seizure-like activity since admission.  Continue Vimpat 200 mg twice daily, Keppra 1000 mg twice daily, phenobarbital 30 mg daily and 60 mg nightly, Depacon 750 mg 3 times daily  Hypertension: Holding Norvasc secondary to n.p.o. status.  Continue hydralazine 10 mg as needed for SBP greater than 170.  Cerebral palsy: Patient noted for developmental delays.  Patient is bedbound requiring assistance with ADLs.      LDA: []CVC / [x]PICC / []Midline / []Barber / []Drains / []Mediport / []None  Antibiotics: None  Steroids: None  Labs (still needed?): [x]Yes / []No  IVF (still needed?): [x]Yes / []No    Level of care: [x]Step Down / []Med-Surg  Bed Status: [x]Inpatient / []Observation  Telemetry: [x]Yes / []No  PT/OT: [x]Yes / []No    DVT Prophylaxis: [] Lovenox / [] Heparin / [x] SCDs / [] Already on Systemic Anticoagulation / [] None     Expected discharge date: Pending clinical course  Disposition: SNF     Code status: Full Code     ===================================================================    Chief Complaint: Abdominal muscle spasm/seizure-like activity  Subjective (past 24 hours): Patient seen and evaluated this morning at bedside.   Patient would not answer questions however nursing staff reports the patient was alert and oriented x 3.  Patient was able to follow commands without issues and nodded and shook her head appropriately to questions.  She denied any chest pain, abdominal pain, nausea, vomiting, shakes, fever, chills, palpitations, diarrhea, constipation.  Plan for PEG tube placement today on 3/24.    HPI / Hospital Course:  Yana August is a 69-year-old female with past medical history significant for cerebral palsy, depression, hypertension who presented 3/17 for abdominal tremors.  Nursing staff noted patient having abdominal tremors she is usually a precursor for seizure activity.  Patient was given IM Versed and per nursing staff at the home with concerns that she may be having a seizure.  Patient was then transferred to [facility] for further monitoring.  Patient was noted to have UTI on arrival and completed course of Rocephin on 3/23.  Patient was noted to have dysphagia secondary to cerebral palsy.  Initially GI was consulted who recommended general surgery.  Plans for PEG tube placement on 3/24.    Medications:    Infusion Medications    sodium chloride 75 mL/hr at 03/24/25 0817    sodium chloride      sodium chloride      Scheduled Medications    sodium chloride flush  5-40 mL IntraVENous 2 times per day    lidocaine 1 % injection  50 mg IntraDERmal Once    sodium chloride flush  5-40 mL IntraVENous 2 times per day    [Held by provider] enoxaparin  30 mg SubCUTAneous BID    [Held by provider] amLODIPine  5 mg Oral Daily    [Held by provider] aspirin  81 mg PEG Tube Daily    [Held by provider] aspirin  75 mg Rectal Daily    [Held by provider] lacosamide  200 mg Oral BID    [Held by provider] lamoTRIgine  150 mg Oral BID    levETIRAcetam  1,000 mg IntraVENous Q12H    [Held by provider] levETIRAcetam  2,000 mg Oral BID    [Held by provider] levOCARNitine  330 mg Oral BID    [Held by provider] multivitamin  1 tablet Oral Daily

## 2025-03-24 NOTE — H&P
DO CHRISSIE Chavez DR GENERAL SURGERY  PRE SEDATION HISTORY AND PHYSICAL      Pt Name: Mariangel Mas  MRN: [id number]  YOB: 1955  Provider Performing Procedure: DO JESSICA Chavez  Primary Care Physician: Analisa Ulloa MD  PRE-PROCEDURE   DNR-CCA/DNR-CC - No  Brief History/Pre-Procedure Diagnosis  Pre-Op Diagnosis Codes:      * Sepsis with organ dysfunction (HCC) [A41.9, R65.20]   MEDICAL HISTORY       has a past medical history of ADHD, Attention deficit hyperactivity disorder (ADHD), Cerebral palsy (HCC), Depression, Essential hypertension, Learning disability, Seizure (HCC), and Urinary urgency.  SURGICAL HISTORY   has a past surgical history that includes shoulder surgery (Left); Gastrostomy tube placement (07/28/2023); Gastrostomy tube placement (N/A, 7/28/2023); Upper gastrointestinal endoscopy (N/A, 2/9/2024); Upper gastrointestinal endoscopy (2/9/2024); Nasal fracture surgery (N/A, 3/13/2024); and Upper gastrointestinal endoscopy (N/A, 4/23/2024).  ALLERGIES   Allergies as of 03/17/2025 - Fully Reviewed 03/17/2025   Allergen Reaction Noted    Pcn [penicillins]  01/07/2023     MEDICATIONS   Coumadin Use Last 7 Days: No  Antiplatelet drug therapy use last 7 days: No  Other anticoagulant use last 7 days: No  Prior to Admission medications    Medication Sig Start Date End Date Taking? Authorizing Provider   lacosamide (VIMPAT) 10 MG/ML SOLN oral solution Take 20 mLs by mouth 2 times daily. Max Daily Amount: 400 mg   Yes Naila De Souza MD   lactulose (CHRONULAC) 10 GM/15ML solution Take 30 mLs by mouth 2 times daily   Yes Naila De Souza MD   levETIRAcetam (KEPPRA) 100 MG/ML oral solution Take 20 mLs by mouth 2 times daily   Yes Naila De Souza MD   PHENobarbital (LUMINAL) 30 MG tablet Take 2 tablets by mouth at bedtime.    Naila De Souza MD   lamoTRIgine (LAMICTAL) 100 MG tablet Take 1.5 tablets by mouth 2 times daily 1/24/25   Pradeep Robert

## 2025-03-24 NOTE — PLAN OF CARE
Problem: Discharge Planning  Goal: Discharge to home or other facility with appropriate resources  Outcome: Progressing  Flowsheets (Taken 3/24/2025 1825)  Discharge to home or other facility with appropriate resources:   Identify barriers to discharge with patient and caregiver   Identify discharge learning needs (meds, wound care, etc)   Refer to discharge planning if patient needs post-hospital services based on physician order or complex needs related to functional status, cognitive ability or social support system   Arrange for needed discharge resources and transportation as appropriate     Problem: Pain  Goal: Verbalizes/displays adequate comfort level or baseline comfort level  Outcome: Progressing  Flowsheets (Taken 3/24/2025 1825)  Verbalizes/displays adequate comfort level or baseline comfort level:   Encourage patient to monitor pain and request assistance   Consider cultural and social influences on pain and pain management   Administer analgesics based on type and severity of pain and evaluate response   Assess pain using appropriate pain scale   Implement non-pharmacological measures as appropriate and evaluate response     Problem: Safety - Adult  Goal: Free from fall injury  Outcome: Progressing  Flowsheets (Taken 3/24/2025 1825)  Free From Fall Injury:   Instruct family/caregiver on patient safety   Based on caregiver fall risk screen, instruct family/caregiver to ask for assistance with transferring infant if caregiver noted to have fall risk factors     Problem: Skin/Tissue Integrity  Goal: Skin integrity remains intact  Description: 1.  Monitor for areas of redness and/or skin breakdown  2.  Assess vascular access sites hourly  3.  Every 4-6 hours minimum:  Change oxygen saturation probe site  4.  Every 4-6 hours:  If on nasal continuous positive airway pressure, respiratory therapy assess nares and determine need for appliance change or resting period  Outcome: Progressing  Flowsheets (Taken  (Taken 3/24/2025 1825)  Absence of infection at discharge:   Assess and monitor for signs and symptoms of infection   Monitor lab/diagnostic results   Monitor all insertion sites i.e., indwelling lines, tubes and drains   Instruct and encourage patient and family to use good hand hygiene technique   Identify and instruct in appropriate isolation precautions for identified infection/condition     Problem: Infection - Adult  Goal: Absence of infection during hospitalization  Outcome: Progressing  Flowsheets (Taken 3/24/2025 1825)  Absence of infection during hospitalization:   Assess and monitor for signs and symptoms of infection   Monitor lab/diagnostic results   Monitor all insertion sites i.e., indwelling lines, tubes and drains   Identify and instruct in appropriate isolation precautions for identified infection/condition   Administer medications as ordered     Problem: Metabolic/Fluid and Electrolytes - Adult  Goal: Electrolytes maintained within normal limits  Outcome: Progressing  Flowsheets (Taken 3/24/2025 1825)  Electrolytes maintained within normal limits:   Monitor labs and assess patient for signs and symptoms of electrolyte imbalances   Administer electrolyte replacement as ordered     Problem: Decision Making  Goal: Pt/Family able to effectively weigh alternatives and participate in decision making related to treatment and care  Description: INTERVENTIONS:  1. Determine when there are differences between patient's view, family's view, and healthcare provider's view of condition  2. Facilitate patient and family articulation of goals for care  3. Help patient and family identify pros/cons of alternative solutions  4. Provide information as requested by patient/family  5. Respect patient/family right to receive or not to receive information  6. Serve as a liaison between patient and family and health care team  7. Initiate Consults from Ethics, Palliative Care or initiate Family Care Conference as is

## 2025-03-24 NOTE — ANESTHESIA PRE PROCEDURE
Department of Anesthesiology  Preprocedure Note       Name:  Mariangel Mas   Age:  69 y.o.  :  1955                                          MRN:  [id number]         Date:  3/24/2025      Surgeon: Surgeon(s):  Lc Jesus DO    Procedure: Procedure(s):  EGD Peg Tube Placement    Medications prior to admission:   Prior to Admission medications    Medication Sig Start Date End Date Taking? Authorizing Provider   lacosamide (VIMPAT) 10 MG/ML SOLN oral solution Take 20 mLs by mouth 2 times daily. Max Daily Amount: 400 mg   Yes Naila De Souza MD   lactulose (CHRONULAC) 10 GM/15ML solution Take 30 mLs by mouth 2 times daily   Yes Naila De Souza MD   levETIRAcetam (KEPPRA) 100 MG/ML oral solution Take 20 mLs by mouth 2 times daily   Yes Naila De Souza MD   PHENobarbital (LUMINAL) 30 MG tablet Take 2 tablets by mouth at bedtime.    Naila De Souza MD   lamoTRIgine (LAMICTAL) 100 MG tablet Take 1.5 tablets by mouth 2 times daily 25   Pradeep Robert MD   valproic acid (DEPAKENE) 250 MG/5ML SOLN oral solution Take 15 mLs by mouth every 8 hours 25   Pradeep Robert MD   venlafaxine (EFFEXOR) 75 MG tablet Take 1 tablet by mouth 2 times daily 25   Pradeep Robert MD   midazolam (NAYZILAM) 5 MG/0.1ML SOLN nasal spray 0.1 mLs by Alternating Nares route every 10 minutes as needed (Seizure) As needed for seizure, 1 spray seizure lasting > 5 min, if ineffective after 10 min. May given 2nd dose in alternating nostril. Do not repeat if pt having trouble breathing or excessive sedation. 24   Naila De Souza MD   acetaminophen (TYLENOL) 325 MG tablet Take 2 tablets by mouth every 6 hours as needed for Pain    Naila De Souza MD   ondansetron (ZOFRAN) 4 MG tablet Take 1 tablet by mouth every 4 hours as needed for Nausea or Vomiting    Naila De Souza MD   lidocaine (HM LIDOCAINE PATCH) 4 % external patch Place 1 patch onto the skin daily Apply to

## 2025-03-24 NOTE — BRIEF OP NOTE
Brief Postoperative Note      Patient: Mariangel Mas  YOB: 1955  MRN: [id number]    Date of Procedure: 3/24/2025    Pre-Op Diagnosis Codes:      * Sepsis with organ dysfunction (HCC) [A41.9, R65.20] Dysphagia    Post-Op Diagnosis: Same       Procedure(s):  EGD Peg Tube Placement    Surgeon(s):  Lc Jesus DO    Assistant:  * No surgical staff found *    Anesthesia: Monitor Anesthesia Care    Estimated Blood Loss (mL): 5    Complications: None    Specimens:   * No specimens in log *    Implants:  * No implants in log *      Drains:   External Urinary Catheter (Active)   Site Assessment Clean,dry & intact 03/23/25 0138   Placement Replaced 03/23/25 0138   Securement Method Other (Comment) 03/23/25 0138   Catheter Care Catheter/Wick replaced 03/21/25 0248   Perineal Care Yes 03/21/25 0248   Suction 40 mmgHg continuous 03/23/25 0138   Urine Color Clarita 03/23/25 1613   Urine Appearance Clear 03/23/25 1613   Urine Odor Malodorous 03/23/25 1613   Output (mL) 300 mL 03/23/25 1613       [REMOVED] NG/OG/NJ/NE Tube Nasogastric 14 fr Left nostril (Removed)   Surrounding Skin Clean, dry & intact 03/20/25 1945   Securement device Adhesive based chaidez 03/20/25 1945   Status Clamped 03/20/25 1945   Placement Verified X-Ray (Initial) 03/19/25 1531   NG/OG/NJ/NE External Measurement (cm) 65 cm 03/20/25 1945   Tube Feeding Standard with Fiber 03/20/25 0943   Tube feeding/verify rate (mL/hr) 50 mL/hr 03/20/25 0943   Output (mL) 0 ml 03/20/25 0943   Action Taken Placement verified (comment) 03/20/25 0943   Residual Volume (ml) 0 ml 03/20/25 0943       Findings:  Infection Present At Time Of Surgery (PATOS) (choose all levels that have infection present):  No infection present    Electronically signed by Lc Jesus DO on 3/24/2025 at 11:43 AM

## 2025-03-25 LAB
ANION GAP SERPL CALC-SCNC: 14 MEQ/L (ref 8–16)
BUN SERPL-MCNC: 6 MG/DL (ref 8–23)
CALCIUM SERPL-MCNC: 8.4 MG/DL (ref 8.8–10.2)
CHLORIDE SERPL-SCNC: 101 MEQ/L (ref 98–111)
CO2 SERPL-SCNC: 25 MEQ/L (ref 22–29)
CREAT SERPL-MCNC: 0.3 MG/DL (ref 0.5–0.9)
DEPRECATED RDW RBC AUTO: 45.7 FL (ref 35–45)
ERYTHROCYTE [DISTWIDTH] IN BLOOD BY AUTOMATED COUNT: 12 % (ref 11.5–14.5)
GFR SERPL CREATININE-BSD FRML MDRD: > 90 ML/MIN/1.73M2
GLUCOSE BLD STRIP.AUTO-MCNC: 113 MG/DL (ref 70–108)
GLUCOSE BLD STRIP.AUTO-MCNC: 130 MG/DL (ref 70–108)
GLUCOSE BLD STRIP.AUTO-MCNC: 73 MG/DL (ref 70–108)
GLUCOSE BLD STRIP.AUTO-MCNC: 94 MG/DL (ref 70–108)
GLUCOSE SERPL-MCNC: 83 MG/DL (ref 74–109)
HCT VFR BLD AUTO: 38.4 % (ref 37–47)
HGB BLD-MCNC: 12.6 GM/DL (ref 12–16)
MAGNESIUM SERPL-MCNC: 1.6 MG/DL (ref 1.6–2.6)
MCH RBC QN AUTO: 34 PG (ref 26–33)
MCHC RBC AUTO-ENTMCNC: 32.8 GM/DL (ref 32.2–35.5)
MCV RBC AUTO: 103.5 FL (ref 81–99)
PHOSPHATE SERPL-MCNC: 3.3 MG/DL (ref 2.5–4.5)
PLATELET # BLD AUTO: 142 THOU/MM3 (ref 130–400)
PMV BLD AUTO: 11.2 FL (ref 9.4–12.4)
POTASSIUM SERPL-SCNC: 3.3 MEQ/L (ref 3.5–5.2)
RBC # BLD AUTO: 3.71 MILL/MM3 (ref 4.2–5.4)
SODIUM SERPL-SCNC: 140 MEQ/L (ref 135–145)
WBC # BLD AUTO: 8.4 THOU/MM3 (ref 4.8–10.8)

## 2025-03-25 PROCEDURE — C9254 INJECTION, LACOSAMIDE: HCPCS | Performed by: SURGERY

## 2025-03-25 PROCEDURE — 85027 COMPLETE CBC AUTOMATED: CPT

## 2025-03-25 PROCEDURE — 80048 BASIC METABOLIC PNL TOTAL CA: CPT

## 2025-03-25 PROCEDURE — 6370000000 HC RX 637 (ALT 250 FOR IP): Performed by: SURGERY

## 2025-03-25 PROCEDURE — 2500000003 HC RX 250 WO HCPCS: Performed by: SURGERY

## 2025-03-25 PROCEDURE — 99232 SBSQ HOSP IP/OBS MODERATE 35: CPT | Performed by: INTERNAL MEDICINE

## 2025-03-25 PROCEDURE — 84100 ASSAY OF PHOSPHORUS: CPT

## 2025-03-25 PROCEDURE — 82948 REAGENT STRIP/BLOOD GLUCOSE: CPT

## 2025-03-25 PROCEDURE — 92526 ORAL FUNCTION THERAPY: CPT

## 2025-03-25 PROCEDURE — 83735 ASSAY OF MAGNESIUM: CPT

## 2025-03-25 PROCEDURE — 6360000002 HC RX W HCPCS: Performed by: SURGERY

## 2025-03-25 PROCEDURE — 6370000000 HC RX 637 (ALT 250 FOR IP)

## 2025-03-25 PROCEDURE — 2580000003 HC RX 258: Performed by: SURGERY

## 2025-03-25 PROCEDURE — 36415 COLL VENOUS BLD VENIPUNCTURE: CPT

## 2025-03-25 PROCEDURE — 2060000000 HC ICU INTERMEDIATE R&B

## 2025-03-25 PROCEDURE — 99024 POSTOP FOLLOW-UP VISIT: CPT | Performed by: SURGERY

## 2025-03-25 RX ADMIN — VENLAFAXINE 75 MG: 37.5 TABLET ORAL at 20:24

## 2025-03-25 RX ADMIN — POTASSIUM BICARBONATE 40 MEQ: 782 TABLET, EFFERVESCENT ORAL at 08:49

## 2025-03-25 RX ADMIN — LEVETIRACETAM 1000 MG: 100 INJECTION INTRAVENOUS at 17:26

## 2025-03-25 RX ADMIN — SODIUM CHLORIDE, PRESERVATIVE FREE 10 ML: 5 INJECTION INTRAVENOUS at 21:41

## 2025-03-25 RX ADMIN — SODIUM CHLORIDE, PRESERVATIVE FREE 10 ML: 5 INJECTION INTRAVENOUS at 09:00

## 2025-03-25 RX ADMIN — ENOXAPARIN SODIUM 30 MG: 100 INJECTION SUBCUTANEOUS at 20:25

## 2025-03-25 RX ADMIN — PHENOBARBITAL SODIUM 30 MG: 65 INJECTION INTRAMUSCULAR at 08:49

## 2025-03-25 RX ADMIN — VALPROATE SODIUM 750 MG: 100 INJECTION, SOLUTION INTRAVENOUS at 00:07

## 2025-03-25 RX ADMIN — CALCIUM POLYCARBOPHIL 625 MG: 625 TABLET, FILM COATED ORAL at 14:51

## 2025-03-25 RX ADMIN — LEVOCARNITINE 330 MG: 330 TABLET ORAL at 20:24

## 2025-03-25 RX ADMIN — AMLODIPINE BESYLATE 5 MG: 5 TABLET ORAL at 08:49

## 2025-03-25 RX ADMIN — LEVETIRACETAM 1000 MG: 100 INJECTION INTRAVENOUS at 05:35

## 2025-03-25 RX ADMIN — PHENOBARBITAL SODIUM 60 MG: 65 INJECTION INTRAMUSCULAR at 20:32

## 2025-03-25 RX ADMIN — LACOSAMIDE 200 MG: 10 INJECTION INTRAVENOUS at 09:11

## 2025-03-25 RX ADMIN — VALPROATE SODIUM 750 MG: 100 INJECTION, SOLUTION INTRAVENOUS at 16:49

## 2025-03-25 RX ADMIN — VALPROATE SODIUM 750 MG: 100 INJECTION, SOLUTION INTRAVENOUS at 09:10

## 2025-03-25 RX ADMIN — ENOXAPARIN SODIUM 30 MG: 100 INJECTION SUBCUTANEOUS at 08:49

## 2025-03-25 RX ADMIN — LACOSAMIDE 200 MG: 10 INJECTION INTRAVENOUS at 20:36

## 2025-03-25 RX ADMIN — ACETAMINOPHEN 650 MG: 650 SUPPOSITORY RECTAL at 21:33

## 2025-03-25 ASSESSMENT — PAIN DESCRIPTION - LOCATION
LOCATION: BACK
LOCATION: GENERALIZED

## 2025-03-25 ASSESSMENT — PAIN SCALES - GENERAL
PAINLEVEL_OUTOF10: 3
PAINLEVEL_OUTOF10: 0
PAINLEVEL_OUTOF10: 4
PAINLEVEL_OUTOF10: 0

## 2025-03-25 ASSESSMENT — PAIN DESCRIPTION - DESCRIPTORS: DESCRIPTORS: DISCOMFORT

## 2025-03-25 ASSESSMENT — PAIN DESCRIPTION - ORIENTATION: ORIENTATION: RIGHT;LEFT;MID

## 2025-03-25 ASSESSMENT — PAIN - FUNCTIONAL ASSESSMENT: PAIN_FUNCTIONAL_ASSESSMENT: ACTIVITIES ARE NOT PREVENTED

## 2025-03-25 ASSESSMENT — PAIN SCALES - WONG BAKER: WONGBAKER_NUMERICALRESPONSE: NO HURT

## 2025-03-25 NOTE — PROGRESS NOTES
MD [facility] GENERAL SURGERY  General Surgery Postoperative Progress Note    Pt Name: Mariangel Mas  Medical Record Number: [id number]  Date of Birth 1955   Today's Date: 3/25/2025    CC:  Chief Complaint   Patient presents with    Abdominal Pain       ASSESSMENT   POD # 1 s/p PEG  HD # 8  PLAN   OK to use PEG  GS signing off  SUBJECTIVE   Mariangel Millan is doing ok.   CURRENT MEDICATIONS   Scheduled Meds:   sodium chloride flush  5-40 mL IntraVENous 2 times per day    [Held by provider] enoxaparin  30 mg SubCUTAneous BID    amLODIPine  5 mg PEG Tube Daily    lidocaine 1 % injection  50 mg IntraDERmal Once    sodium chloride flush  5-40 mL IntraVENous 2 times per day    [Held by provider] aspirin  81 mg PEG Tube Daily    [Held by provider] aspirin  75 mg Rectal Daily    levETIRAcetam  1,000 mg IntraVENous Q12H    [Held by provider] levOCARNitine  330 mg Oral BID    [Held by provider] multivitamin  1 tablet Oral Daily    PHENobarbital  60 mg IntraVENous Nightly    [Held by provider] polycarbophil  625 mg Per G Tube TID    [Held by provider] venlafaxine  75 mg Oral BID    [Held by provider] zinc sulfate  50 mg Oral Daily    valproate (DEPACON) 750 mg in sodium chloride 0.9 % 100 mL IVPB  750 mg IntraVENous Q8H    PHENobarbital  30 mg IntraVENous Daily    lacosamide (VIMPAT) 200 mg in sodium chloride 0.9 % 70 mL IVPB  200 mg IntraVENous BID    [Held by provider] lidocaine  1 patch TransDERmal Daily     Continuous Infusions:   sodium chloride      sodium chloride       PRN Meds:.sodium chloride flush, sodium chloride, hydrALAZINE, magnesium sulfate, [DISCONTINUED] ondansetron **OR** ondansetron, [DISCONTINUED] acetaminophen **OR** acetaminophen, sodium chloride flush, sodium chloride  ROS:   History obtained from chart review and the patient, General ROS:   OBJECTIVE   CURRENT VITALS:  height is 1.549 m (5' 1\") and weight is 99.4 kg (219 lb 2.2 oz). Her axillary temperature is 97.4 °F (36.3

## 2025-03-25 NOTE — PLAN OF CARE
Problem: Skin/Tissue Integrity - Adult  Goal: Skin integrity remains intact  Description: 1.  Monitor for areas of redness and/or skin breakdown  2.  Assess vascular access sites hourly  3.  Every 4-6 hours minimum:  Change oxygen saturation probe site  4.  Every 4-6 hours:  If on nasal continuous positive airway pressure, respiratory therapy assess nares and determine need for appliance change or resting period  Outcome: Progressing  Flowsheets (Taken 3/24/2025 2239 by Mariano Norman, RN)  Skin Integrity Remains Intact: Monitor for areas of redness and/or skin breakdown     Problem: Nutrition Deficit:  Goal: Optimize nutritional status  Flowsheets (Taken 3/24/2025 2239 by Mariano Norman, RN)  Nutrient intake appropriate for improving, restoring, or maintaining nutritional needs: Assess nutritional status and recommend course of action  Note: PEG tube feedings started today. Jevity 1.5

## 2025-03-25 NOTE — PLAN OF CARE
Problem: Discharge Planning  Goal: Discharge to home or other facility with appropriate resources  3/24/2025 2239 by Mariano Norman RN  Outcome: Progressing  Flowsheets (Taken 3/24/2025 2239)  Discharge to home or other facility with appropriate resources:   Identify barriers to discharge with patient and caregiver   Arrange for needed discharge resources and transportation as appropriate   Identify discharge learning needs (meds, wound care, etc)  3/24/2025 1825 by Anh Faulkner RN  Outcome: Progressing  Flowsheets (Taken 3/24/2025 1825)  Discharge to home or other facility with appropriate resources:   Identify barriers to discharge with patient and caregiver   Identify discharge learning needs (meds, wound care, etc)   Refer to discharge planning if patient needs post-hospital services based on physician order or complex needs related to functional status, cognitive ability or social support system   Arrange for needed discharge resources and transportation as appropriate     Problem: Pain  Goal: Verbalizes/displays adequate comfort level or baseline comfort level  3/24/2025 2239 by Mariano Norman RN  Outcome: Progressing  Flowsheets (Taken 3/24/2025 2239)  Verbalizes/displays adequate comfort level or baseline comfort level:   Encourage patient to monitor pain and request assistance   Assess pain using appropriate pain scale   Administer analgesics based on type and severity of pain and evaluate response   Implement non-pharmacological measures as appropriate and evaluate response  3/24/2025 1825 by Anh Faulkner, RN  Outcome: Progressing  Flowsheets (Taken 3/24/2025 1825)  Verbalizes/displays adequate comfort level or baseline comfort level:   Encourage patient to monitor pain and request assistance   Consider cultural and social influences on pain and pain management   Administer analgesics based on type and severity of pain and evaluate response   Assess pain using appropriate pain scale   Implement  hospitalization  3/24/2025 2239 by Mariano Norman RN  Outcome: Progressing  Flowsheets (Taken 3/24/2025 2044)  Absence of infection during hospitalization:   Assess and monitor for signs and symptoms of infection   Monitor lab/diagnostic results   Monitor all insertion sites i.e., indwelling lines, tubes and drains  3/24/2025 1825 by Anh Faulkner RN  Outcome: Progressing  Flowsheets (Taken 3/24/2025 1825)  Absence of infection during hospitalization:   Assess and monitor for signs and symptoms of infection   Monitor lab/diagnostic results   Monitor all insertion sites i.e., indwelling lines, tubes and drains   Identify and instruct in appropriate isolation precautions for identified infection/condition   Administer medications as ordered     Problem: Metabolic/Fluid and Electrolytes - Adult  Goal: Electrolytes maintained within normal limits  3/24/2025 2239 by Mariano Norman RN  Outcome: Progressing  Flowsheets (Taken 3/24/2025 2044)  Electrolytes maintained within normal limits: Monitor labs and assess patient for signs and symptoms of electrolyte imbalances  3/24/2025 1825 by Anh Faulkner RN  Outcome: Progressing  Flowsheets (Taken 3/24/2025 1825)  Electrolytes maintained within normal limits:   Monitor labs and assess patient for signs and symptoms of electrolyte imbalances   Administer electrolyte replacement as ordered

## 2025-03-25 NOTE — PROGRESS NOTES
Spiritual Health History and Assessment/Progress Note  [facility]    (P) Initial Encounter,  ,  ,      Name: Mariangel Mas MRN: [id number]    Age: 69 y.o.     Sex: female   Language: English   Uatsdin: Non-Zoroastrianism   Sepsis with organ dysfunction (HCC)     Date: 3/25/2025            Total Time Calculated: (P) 13 min              Spiritual Assessment began in [facility] ICU STEPDOWN TELEMETRY 4K        Referral/Consult From: (P) Rounding   Encounter Overview/Reason: (P) Initial Encounter  Service Provided For: (P) Patient    Zoila, Belief, Meaning:   Patient unable to assess at this time  Family/Friends No family/friends present      Importance and Influence:  Patient unable to assess at this time  Family/Friends No family/friends present    Community:  Patient feels well-supported. Support system includes: Other: Brother  Family/Friends No family/friends present    Assessment and Plan of Care: Patient in bed alone, and welcomed . There was some difficulty in the spiritual assessment, due to patient's difficulties in communication, including speaking. Patient couldn't tell me why she was here in the hospital. Patient did say she feels supported by her brother, but that her sister doesn't come see her or support her.  offered words of encouragement and prayed with patient, at patient's request.     Patient Interventions include: Facilitated expression of thoughts and feelings and Provided sacramental/Judaism ritual  Family/Friends Interventions include: No family/friends present    Patient Plan of Care: Spiritual Care available upon further referral  Family/Friends Plan of Care: No family/friends present    Electronically signed by Kristy Adrianlain Intern on 3/25/2025 at 1:46 PM

## 2025-03-25 NOTE — PROGRESS NOTES
[facility]  INPATIENT SPEECH THERAPY  [facility] ICU STEPDOWN TELEMETRY 4K   DAILY NOTE    Discharge Recommendations: SNF    SLP Individual Minutes  Time In: 08  Time Out: 0831  Minutes: 8  Timed Code Treatment Minutes: 0 Minutes       Date: 3/25/2025  Patient Name: Mariangel Mas      CSN: [id number]   : 1955  (69 y.o.)  Gender: female   Referring Physician:  Kenna Lara APRN - CNP   Diagnosis: Sepsis with organ dysfunction (HCC)  Precautions: Aspiration Risk, Fall Risk  Current Diet: NPO + PEG tube  Respiratory Status: Room Air  Swallowing Strategies:  Full Upright Position, Pulmonary Monitoring, Medications Crushed with Puree, and Direct 1:1 Supervision     Date of Last MBS/FEES:  MBS 2025    Pain:  No pain reported.    Subjective:  DEZ Rabago approved session. Patient laying in bed upon ST arrival. Agreeable to skilled ST services. No family present. Patient alert and cooperative.    Short-Term Goals:  SHORT TERM GOAL #1:  GOAL 1: Patient will consume conservative oral offerings demonstrating consistency of pharyngeal trigger and adequate alertness/endurance measures to determine readiness for oral diet initiation vs. completion of an instrumental evaluation.   INTERVENTIONS: Patient completed conservative PO trials of thin liquids via straw and puree. Patient with appropriate oral initiation, bolus control, and oral clearance. Concerns for potential airway invasion events with patient having consistent audible swallow and x1 delayed cough, however, certainly unable to rule out airway invasion events at bedside alone.     Recommend continuation of NPO status with exceptions for ice chips following comprehensive oral care + PEG tube. ST to follow for potential diet initiation/completion of instrumental evaluation as clinically indicated.    Patient with call light in place and withOUT respiratory distress upon leaving room; DEZ Rabago notified re: recommendations

## 2025-03-25 NOTE — PROGRESS NOTES
Hospitalist Progress Note  Internal Medicine Resident      Patient: Mariangel Mas 69 y.o. female      Unit/Bed: K-26/026-A    Admit Date: 3/17/2025      ASSESSMENT AND PLAN  Active Problems  Dysphagia secondary to cerebral palsy: Patient noted for difficult swallowing, history of PEG tube, removed on 2/24.  Current diet NPO.,  GI was consulted recommended general surgery for PEG tube placement.  SPL consulted, recommended n.p.o. General surgery consulted, signed off.   PEG tube placed on 3/24/2025, general surgery okay to use.  Dietitian consulted, started PEG tube, with slowly titrating to the goal.  Added free water flushes at 200 mL/q4hr.   Resolved Problems  Acute hypoxic respiratory failure secondary to aspiration, resolved.   UTI  Chronic Conditions (reviewed and stable unless otherwise stated)  History of seizures: As per chart review noted abdominal muscle spasms as considered prodromal syndromes of seizure.  Patient has not had any noted seizure since admission.  Continue Keppra/phenobarb/valproic acid/Vimpat.   HTN: Resumed Norvasc through PEG tube.  Hydralazine 10 mg every 6 as needed for systolic blood pressure about 170.  Cerebral palsy: Patient noted for developmental delays.  Patient is bedbound with requiring assistance with ADLs.      LDA: []CVC / [x]PICC / []Midline / []Barber / [x] PEG tube/ []Mediport / []None  Antibiotics: None  Steroids: None  Labs (still needed?): [x]Yes / []No  IVF (still needed?): [x]Yes / []No    Level of care: [x]Step Down / []Med-Surg  Bed Status: [x]Inpatient / []Observation  Telemetry: [x]Yes / []No  PT/OT: [x]Yes / []No    DVT Prophylaxis: [x] Lovenox / [] Heparin / [] SCDs / [] Already on Systemic Anticoagulation / [] None     Expected discharge date: TBD  Disposition: SNF     Code status: Full Code     ===================================================================    Chief Complaint: Abdominal muscle spasm/seizure-like activity  Subjective (past 24

## 2025-03-25 NOTE — PROGRESS NOTES
0730 Report taken from nurse Quintana.    Head to Toe Assessment    [facility] Student Nurse  Head to Toe Assessment Performed at 0820  Skin  General skin appearance / Description: cool, dry, pale  Incisions / Wounds: wound to buttocks & coccyx, scattered abrasions & ecchymosis    Neuro/Head  LOC: A+O x 2 name, place  Speech: delayed; slurred  Eyes PERRLA 4 mm  Symmetry of face / tongue: symmetrical  JVD of neck: absent    Chest  Heart sounds / Apical Pulse: 90  Dyspnea: absent  Lung sounds: crackles throughout  Cough: present    Abdomen/ Pelvis  Bowel sounds: hypoactive x4  Passing flatus: yes  Palpation / Inspection: soft, obese, nontender  Last BM: 3/25  Stool assessment: soft, brown, medium   Any GI issues: none  Urinary issues: none  Continence: incontinent   Urine color / turbidity: peri, clear    Extremities  Edema: nonpitting edema to RUE, RLE, LUE, LLE  Altered Sensation: none  Upper extremity push / pulls: weak, equal  Left Arm Radial Pulse: weak, equal   Left Upper extremity Capillary Refill: <3 sec.  Right Arm Radial Pulse: weak, equal   Right Upper extremity Capillary Refill: <3 sec.  Lower extremity pedal push / pulls: weak, equal  Left pedal pulse: weak, equal   Left posterior tibialis pulse: weak, equal   Left lower extremity capillary refill: <3 sec.  Right pedal pulse: weak, equal   Right posterior tibialis pulse: weak, equal   Right lower extremity capillary refill: <3 sec.  Drift of extremities: positive   Pain: 4/10 pain to back, pt repositioned, turned left    Other issues: none    1105 Reassessment done; bowel sounds active x4  1329 Reported off to primary RN, Mati Deleon  Electronically signed by Melanie Fatima on 3/25/2025 at 1:30 PM  RSC / SN

## 2025-03-25 NOTE — CARE COORDINATION
3/25/25, 3:07 PM EDT    DISCHARGE ON GOING EVALUATION    Mariangel Mas       Hospital day: 8  Location: -26/026-A Reason for admit: Urinary tract infection without hematuria, site unspecified [N39.0]  Altered mental status, unspecified altered mental status type [R41.82]  Sepsis with organ dysfunction (HCC) [A41.9, R65.20]     Procedures:   3/19 NG in  3/24 NG out, EGD Peg tube placement    Imaging since last note:   3/21 CXR:   1. Previously noted atelectasis in the left lung base is no longer present. No definite focal infiltrate or consolidation.  2. Density projecting over the medial aspect of the right upper lobe is thought to represent something external to the patient. This area can be further assessed on a follow-up exam.    Barriers to Discharge: Hospitalist and general surgery following. NPO. TF started today. Dietitian managing. Jevity 1.5 with goal of 50 mL/hr. Discharge anticipated in 1-2 days once tolerating goal feed.     PCP: Analisa Ulloa MD  Readmission Risk Score: 25    Patient Goals/Plan/Treatment Preferences: Return to [facility]. No precert. See SW notes for further details.

## 2025-03-25 NOTE — PROGRESS NOTES
Comprehensive Nutrition Assessment    Type and Reason for Visit:  Reassess, Nutrition support    Nutrition Recommendations/Plan:   When tube feeding able to be started, initiate  Jevity 1.5cal/ml to start 20ml/hr & increase by 15ml/hr every 6 hrs as tolerated to goal 50ml/hr as tolerated. Bolus 1 packet Gian with 120ml water BID. Discussed tube feeding orders with Minna Rabago RN.   Free water flushes per Dr Luis. He was perfect served & responded will take care of.   Monitor NPO status, tube feeding tolerance, labs & wt.      Malnutrition Assessment:  Malnutrition Status:  At risk for malnutrition (03/19/25 5347)    Context:  Acute Illness     Findings of the 6 clinical characteristics of malnutrition:  Energy Intake:  No decrease in energy intake (SNF RN reports that patient was eating well, most of meals PTA)  Weight Loss:  No weight loss     Body Fat Loss:  No body fat loss     Muscle Mass Loss:  No muscle mass loss    Fluid Accumulation:  Mild Generalized   Strength:  Not Performed    Nutrition Assessment:      Pt. nutritionally improving AEB  PEG placed (3/24) & tube feeding is to start tioday per Dr Luis orders at rounds today. ). At risk for further nutrition compromise r/t admitted d/t tremors, likely related to dehydration and suspected postictal state, sepsis due to complicated UTI, Dysphagia,failed SLP evaluation on 3/19, need for enteral nutrition support however NGT was removed 3/20 per RN due to pt \" gurgly\", was NPO  from 3/20 until 3/25 due to waiting on PEG to be placed ( PEG placed 3/24/25) & Dr Luis gave  at rounds to start tube feeds (3/25). Noted underlying medical condition (PMHx AHDH, Cerebral Palsy, depression, essential HTN, seizure)     Pt. Report/Treatments/Miscellaneous: Dr Luis gave me orders at rounds to start tube feeds. PEG was placed (3/24). Pt was NPO ( see above) 3/20-2/25 due to waiting on PEG to be placed.    GI Status: BM x 1 (3/21)  Pertinent Labs: (3/25) K+ 3.3, Ca

## 2025-03-26 LAB
ANION GAP SERPL CALC-SCNC: 9 MEQ/L (ref 8–16)
BUN SERPL-MCNC: 15 MG/DL (ref 8–23)
CALCIUM SERPL-MCNC: 8.8 MG/DL (ref 8.8–10.2)
CHLORIDE SERPL-SCNC: 101 MEQ/L (ref 98–111)
CO2 SERPL-SCNC: 29 MEQ/L (ref 22–29)
CREAT SERPL-MCNC: 0.4 MG/DL (ref 0.5–0.9)
DEPRECATED RDW RBC AUTO: 45.1 FL (ref 35–45)
ERYTHROCYTE [DISTWIDTH] IN BLOOD BY AUTOMATED COUNT: 12.1 % (ref 11.5–14.5)
GFR SERPL CREATININE-BSD FRML MDRD: > 90 ML/MIN/1.73M2
GLUCOSE BLD STRIP.AUTO-MCNC: 114 MG/DL (ref 70–108)
GLUCOSE BLD STRIP.AUTO-MCNC: 135 MG/DL (ref 70–108)
GLUCOSE BLD STRIP.AUTO-MCNC: 137 MG/DL (ref 70–108)
GLUCOSE SERPL-MCNC: 137 MG/DL (ref 74–109)
HCT VFR BLD AUTO: 37.1 % (ref 37–47)
HGB BLD-MCNC: 12.3 GM/DL (ref 12–16)
MAGNESIUM SERPL-MCNC: 1.7 MG/DL (ref 1.6–2.6)
MCH RBC QN AUTO: 33.4 PG (ref 26–33)
MCHC RBC AUTO-ENTMCNC: 33.2 GM/DL (ref 32.2–35.5)
MCV RBC AUTO: 100.8 FL (ref 81–99)
PHOSPHATE SERPL-MCNC: 2.2 MG/DL (ref 2.5–4.5)
PLATELET # BLD AUTO: 150 THOU/MM3 (ref 130–400)
PMV BLD AUTO: 11.2 FL (ref 9.4–12.4)
POTASSIUM SERPL-SCNC: 3.5 MEQ/L (ref 3.5–5.2)
RBC # BLD AUTO: 3.68 MILL/MM3 (ref 4.2–5.4)
SODIUM SERPL-SCNC: 139 MEQ/L (ref 135–145)
WBC # BLD AUTO: 7.4 THOU/MM3 (ref 4.8–10.8)

## 2025-03-26 PROCEDURE — 80048 BASIC METABOLIC PNL TOTAL CA: CPT

## 2025-03-26 PROCEDURE — 6370000000 HC RX 637 (ALT 250 FOR IP)

## 2025-03-26 PROCEDURE — 83735 ASSAY OF MAGNESIUM: CPT

## 2025-03-26 PROCEDURE — 2580000003 HC RX 258

## 2025-03-26 PROCEDURE — 84132 ASSAY OF SERUM POTASSIUM: CPT

## 2025-03-26 PROCEDURE — 36415 COLL VENOUS BLD VENIPUNCTURE: CPT

## 2025-03-26 PROCEDURE — 6360000002 HC RX W HCPCS: Performed by: SURGERY

## 2025-03-26 PROCEDURE — 85027 COMPLETE CBC AUTOMATED: CPT

## 2025-03-26 PROCEDURE — 2060000000 HC ICU INTERMEDIATE R&B

## 2025-03-26 PROCEDURE — 82948 REAGENT STRIP/BLOOD GLUCOSE: CPT

## 2025-03-26 PROCEDURE — 2580000003 HC RX 258: Performed by: SURGERY

## 2025-03-26 PROCEDURE — 2500000003 HC RX 250 WO HCPCS

## 2025-03-26 PROCEDURE — 84100 ASSAY OF PHOSPHORUS: CPT

## 2025-03-26 PROCEDURE — C9254 INJECTION, LACOSAMIDE: HCPCS | Performed by: SURGERY

## 2025-03-26 PROCEDURE — 2500000003 HC RX 250 WO HCPCS: Performed by: SURGERY

## 2025-03-26 PROCEDURE — 2700000000 HC OXYGEN THERAPY PER DAY

## 2025-03-26 RX ADMIN — AMLODIPINE BESYLATE 5 MG: 5 TABLET ORAL at 09:31

## 2025-03-26 RX ADMIN — VALPROATE SODIUM 750 MG: 100 INJECTION, SOLUTION INTRAVENOUS at 17:38

## 2025-03-26 RX ADMIN — VALPROATE SODIUM 750 MG: 100 INJECTION, SOLUTION INTRAVENOUS at 00:02

## 2025-03-26 RX ADMIN — VALPROATE SODIUM 750 MG: 100 INJECTION, SOLUTION INTRAVENOUS at 23:44

## 2025-03-26 RX ADMIN — LACOSAMIDE 200 MG: 10 INJECTION INTRAVENOUS at 10:01

## 2025-03-26 RX ADMIN — ENOXAPARIN SODIUM 30 MG: 100 INJECTION SUBCUTANEOUS at 20:47

## 2025-03-26 RX ADMIN — ENOXAPARIN SODIUM 30 MG: 100 INJECTION SUBCUTANEOUS at 09:32

## 2025-03-26 RX ADMIN — VALPROATE SODIUM 750 MG: 100 INJECTION, SOLUTION INTRAVENOUS at 10:08

## 2025-03-26 RX ADMIN — VENLAFAXINE 75 MG: 37.5 TABLET ORAL at 20:47

## 2025-03-26 RX ADMIN — PHENOBARBITAL SODIUM 60 MG: 65 INJECTION INTRAMUSCULAR at 20:47

## 2025-03-26 RX ADMIN — LEVETIRACETAM 1000 MG: 100 INJECTION INTRAVENOUS at 17:41

## 2025-03-26 RX ADMIN — SODIUM CHLORIDE, PRESERVATIVE FREE 10 ML: 5 INJECTION INTRAVENOUS at 20:51

## 2025-03-26 RX ADMIN — LEVOCARNITINE 330 MG: 330 TABLET ORAL at 20:47

## 2025-03-26 RX ADMIN — VENLAFAXINE 75 MG: 37.5 TABLET ORAL at 09:32

## 2025-03-26 RX ADMIN — SODIUM CHLORIDE, PRESERVATIVE FREE 10 ML: 5 INJECTION INTRAVENOUS at 20:47

## 2025-03-26 RX ADMIN — LEVETIRACETAM 1000 MG: 100 INJECTION INTRAVENOUS at 05:13

## 2025-03-26 RX ADMIN — SODIUM CHLORIDE, PRESERVATIVE FREE 10 ML: 5 INJECTION INTRAVENOUS at 10:11

## 2025-03-26 RX ADMIN — LEVOCARNITINE 330 MG: 330 TABLET ORAL at 09:32

## 2025-03-26 RX ADMIN — POTASSIUM PHOSPHATE, MONOBASIC POTASSIUM PHOSPHATE, DIBASIC 15 MMOL: 224; 236 INJECTION, SOLUTION, CONCENTRATE INTRAVENOUS at 10:52

## 2025-03-26 RX ADMIN — PHENOBARBITAL SODIUM 30 MG: 65 INJECTION INTRAMUSCULAR at 09:32

## 2025-03-26 RX ADMIN — CALCIUM POLYCARBOPHIL 625 MG: 625 TABLET, FILM COATED ORAL at 20:47

## 2025-03-26 RX ADMIN — CALCIUM POLYCARBOPHIL 625 MG: 625 TABLET, FILM COATED ORAL at 09:32

## 2025-03-26 RX ADMIN — SODIUM CHLORIDE, PRESERVATIVE FREE 10 ML: 5 INJECTION INTRAVENOUS at 09:33

## 2025-03-26 RX ADMIN — LACOSAMIDE 200 MG: 10 INJECTION INTRAVENOUS at 20:56

## 2025-03-26 RX ADMIN — ASPIRIN 81 MG: 81 TABLET, CHEWABLE ORAL at 09:32

## 2025-03-26 RX ADMIN — CALCIUM POLYCARBOPHIL 625 MG: 625 TABLET, FILM COATED ORAL at 13:10

## 2025-03-26 ASSESSMENT — PAIN SCALES - GENERAL
PAINLEVEL_OUTOF10: 1
PAINLEVEL_OUTOF10: 0

## 2025-03-26 NOTE — PLAN OF CARE
Problem: Discharge Planning  Goal: Discharge to home or other facility with appropriate resources  Outcome: Progressing  Flowsheets  Taken 3/26/2025 0222  Discharge to home or other facility with appropriate resources:   Identify barriers to discharge with patient and caregiver   Identify discharge learning needs (meds, wound care, etc)   Arrange for needed discharge resources and transportation as appropriate   Arrange for interpreters to assist at discharge as needed  Taken 3/25/2025 2027  Discharge to home or other facility with appropriate resources:   Identify barriers to discharge with patient and caregiver   Arrange for needed discharge resources and transportation as appropriate   Identify discharge learning needs (meds, wound care, etc)     Problem: Pain  Goal: Verbalizes/displays adequate comfort level or baseline comfort level  Outcome: Progressing  Flowsheets (Taken 3/26/2025 0222)  Verbalizes/displays adequate comfort level or baseline comfort level:   Encourage patient to monitor pain and request assistance   Assess pain using appropriate pain scale     Problem: Safety - Adult  Goal: Free from fall injury  Outcome: Progressing  Flowsheets (Taken 3/26/2025 0222)  Free From Fall Injury: Instruct family/caregiver on patient safety     Problem: Skin/Tissue Integrity  Goal: Skin integrity remains intact  Description: 1.  Monitor for areas of redness and/or skin breakdown  2.  Assess vascular access sites hourly  3.  Every 4-6 hours minimum:  Change oxygen saturation probe site  4.  Every 4-6 hours:  If on nasal continuous positive airway pressure, respiratory therapy assess nares and determine need for appliance change or resting period  3/26/2025 0222 by Miriam Reyes, RN  Outcome: Progressing  Flowsheets  Taken 3/26/2025 0222  Skin Integrity Remains Intact:   Monitor for areas of redness and/or skin breakdown   Assess vascular access sites hourly  Taken 3/25/2025 2027  Skin Integrity Remains

## 2025-03-26 NOTE — PROGRESS NOTES
Report taken from nurse Mati Rabago at 0700.    Head to Toe Assessment    [facility] Student Nurse  Head to Toe Assessment Performed at 0800  Skin  General skin appearance / Description: warm, dry, intact  Incisions / Wounds: yes, wound on coccyx     Neuro/Head  LOC: A+O x 2, reoriented to time and place   Speech: mumbled   Eyes PERRLA 4 to 3 mm  Symmetry of face / tongue: yes  JVD of neck: no    Chest  Heart sounds / Apical Pulse: strong  Dyspnea: no  Lung sounds: clear  Cough: no    Abdomen/ Pelvis  Bowel sounds: active  Passing flatus: no  Palpation / Inspection: soft, non tender  Last BM: 3/26/25  Stool assessment: loose, brow  Any GI issues: no  Urinary issues: incontinent   Continence: no  Urine color / turbidity: N/A    Extremities  Edema: right and left legs and feet, no pitting   Altered Sensation: no  Upper extremity push / pulls: unable to assess   Left Arm Radial Pulse: strong   Left Upper extremity Capillary Refill: less than 3 seconds  Right Arm Radial Pulse: strong   Right Upper extremity Capillary Refill: less than 3 seconds  Lower extremity pedal push / pulls: unable to assess   Left pedal pulse: strong   Left posterior tibialis pulse: strong   Left lower extremity capillary refill: less than 3 seconds  Right pedal pulse: strong   Right posterior tibialis pulse: strong   Right lower extremity capillary refill: less than 3 seconds   Drift of extremities: unable to assess   Pain: no    Other issues: no     1230 Reassessment done no changes    Reported off to primary RNMati @ 3730     Signature Salome OLEA /

## 2025-03-27 LAB
ANION GAP SERPL CALC-SCNC: 9 MEQ/L (ref 8–16)
BUN SERPL-MCNC: 15 MG/DL (ref 8–23)
CALCIUM SERPL-MCNC: 8.7 MG/DL (ref 8.8–10.2)
CHLORIDE SERPL-SCNC: 102 MEQ/L (ref 98–111)
CO2 SERPL-SCNC: 29 MEQ/L (ref 22–29)
CREAT SERPL-MCNC: 0.3 MG/DL (ref 0.5–0.9)
DEPRECATED RDW RBC AUTO: 45.7 FL (ref 35–45)
ERYTHROCYTE [DISTWIDTH] IN BLOOD BY AUTOMATED COUNT: 12.4 % (ref 11.5–14.5)
GFR SERPL CREATININE-BSD FRML MDRD: > 90 ML/MIN/1.73M2
GLUCOSE BLD STRIP.AUTO-MCNC: 87 MG/DL (ref 70–108)
GLUCOSE SERPL-MCNC: 106 MG/DL (ref 74–109)
HCT VFR BLD AUTO: 35.3 % (ref 37–47)
HGB BLD-MCNC: 11.7 GM/DL (ref 12–16)
MAGNESIUM SERPL-MCNC: 1.9 MG/DL (ref 1.6–2.6)
MCH RBC QN AUTO: 33.3 PG (ref 26–33)
MCHC RBC AUTO-ENTMCNC: 33.1 GM/DL (ref 32.2–35.5)
MCV RBC AUTO: 100.6 FL (ref 81–99)
PHOSPHATE SERPL-MCNC: 2.6 MG/DL (ref 2.5–4.5)
PHOSPHATE SERPL-MCNC: 2.6 MG/DL (ref 2.5–4.5)
PLATELET # BLD AUTO: 153 THOU/MM3 (ref 130–400)
PMV BLD AUTO: 11.4 FL (ref 9.4–12.4)
POTASSIUM SERPL-SCNC: 3.9 MEQ/L (ref 3.5–5.2)
POTASSIUM SERPL-SCNC: 4.2 MEQ/L (ref 3.5–5.2)
RBC # BLD AUTO: 3.51 MILL/MM3 (ref 4.2–5.4)
SODIUM SERPL-SCNC: 140 MEQ/L (ref 135–145)
WBC # BLD AUTO: 6.2 THOU/MM3 (ref 4.8–10.8)

## 2025-03-27 PROCEDURE — 2500000003 HC RX 250 WO HCPCS: Performed by: SURGERY

## 2025-03-27 PROCEDURE — 36415 COLL VENOUS BLD VENIPUNCTURE: CPT

## 2025-03-27 PROCEDURE — 6370000000 HC RX 637 (ALT 250 FOR IP)

## 2025-03-27 PROCEDURE — 6360000002 HC RX W HCPCS: Performed by: SURGERY

## 2025-03-27 PROCEDURE — 36592 COLLECT BLOOD FROM PICC: CPT

## 2025-03-27 PROCEDURE — 84100 ASSAY OF PHOSPHORUS: CPT

## 2025-03-27 PROCEDURE — C9254 INJECTION, LACOSAMIDE: HCPCS | Performed by: SURGERY

## 2025-03-27 PROCEDURE — 2580000003 HC RX 258: Performed by: SURGERY

## 2025-03-27 PROCEDURE — 80048 BASIC METABOLIC PNL TOTAL CA: CPT

## 2025-03-27 PROCEDURE — 99232 SBSQ HOSP IP/OBS MODERATE 35: CPT | Performed by: INTERNAL MEDICINE

## 2025-03-27 PROCEDURE — 82948 REAGENT STRIP/BLOOD GLUCOSE: CPT

## 2025-03-27 PROCEDURE — 1200000000 HC SEMI PRIVATE

## 2025-03-27 PROCEDURE — 83735 ASSAY OF MAGNESIUM: CPT

## 2025-03-27 PROCEDURE — 85027 COMPLETE CBC AUTOMATED: CPT

## 2025-03-27 RX ADMIN — CALCIUM POLYCARBOPHIL 625 MG: 625 TABLET, FILM COATED ORAL at 21:02

## 2025-03-27 RX ADMIN — AMLODIPINE BESYLATE 5 MG: 5 TABLET ORAL at 07:49

## 2025-03-27 RX ADMIN — CALCIUM POLYCARBOPHIL 625 MG: 625 TABLET, FILM COATED ORAL at 14:15

## 2025-03-27 RX ADMIN — LEVETIRACETAM 1000 MG: 100 INJECTION INTRAVENOUS at 04:58

## 2025-03-27 RX ADMIN — SODIUM CHLORIDE, PRESERVATIVE FREE 10 ML: 5 INJECTION INTRAVENOUS at 07:50

## 2025-03-27 RX ADMIN — SODIUM CHLORIDE, PRESERVATIVE FREE 10 ML: 5 INJECTION INTRAVENOUS at 21:04

## 2025-03-27 RX ADMIN — LACOSAMIDE 200 MG: 10 INJECTION INTRAVENOUS at 20:57

## 2025-03-27 RX ADMIN — PHENOBARBITAL SODIUM 30 MG: 65 INJECTION INTRAMUSCULAR at 07:46

## 2025-03-27 RX ADMIN — VENLAFAXINE 75 MG: 37.5 TABLET ORAL at 21:02

## 2025-03-27 RX ADMIN — CALCIUM POLYCARBOPHIL 625 MG: 625 TABLET, FILM COATED ORAL at 07:49

## 2025-03-27 RX ADMIN — LEVETIRACETAM 1000 MG: 100 INJECTION INTRAVENOUS at 18:42

## 2025-03-27 RX ADMIN — ASPIRIN 81 MG: 81 TABLET, CHEWABLE ORAL at 07:45

## 2025-03-27 RX ADMIN — ENOXAPARIN SODIUM 30 MG: 100 INJECTION SUBCUTANEOUS at 07:47

## 2025-03-27 RX ADMIN — VENLAFAXINE 75 MG: 37.5 TABLET ORAL at 07:49

## 2025-03-27 RX ADMIN — VALPROATE SODIUM 750 MG: 100 INJECTION, SOLUTION INTRAVENOUS at 07:59

## 2025-03-27 RX ADMIN — LEVOCARNITINE 330 MG: 330 TABLET ORAL at 07:49

## 2025-03-27 RX ADMIN — PHENOBARBITAL SODIUM 60 MG: 65 INJECTION INTRAMUSCULAR at 21:51

## 2025-03-27 RX ADMIN — SODIUM CHLORIDE, PRESERVATIVE FREE 10 ML: 5 INJECTION INTRAVENOUS at 21:03

## 2025-03-27 RX ADMIN — SODIUM CHLORIDE, PRESERVATIVE FREE 10 ML: 5 INJECTION INTRAVENOUS at 07:46

## 2025-03-27 RX ADMIN — ENOXAPARIN SODIUM 30 MG: 100 INJECTION SUBCUTANEOUS at 21:02

## 2025-03-27 RX ADMIN — LACOSAMIDE 200 MG: 10 INJECTION INTRAVENOUS at 09:03

## 2025-03-27 RX ADMIN — LEVOCARNITINE 330 MG: 330 TABLET ORAL at 21:02

## 2025-03-27 RX ADMIN — VALPROATE SODIUM 750 MG: 100 INJECTION, SOLUTION INTRAVENOUS at 15:36

## 2025-03-27 ASSESSMENT — PAIN SCALES - GENERAL
PAINLEVEL_OUTOF10: 0

## 2025-03-27 ASSESSMENT — PAIN SCALES - WONG BAKER: WONGBAKER_NUMERICALRESPONSE: NO HURT

## 2025-03-27 NOTE — PLAN OF CARE
Problem: Discharge Planning  Goal: Discharge to home or other facility with appropriate resources  Outcome: Progressing  Flowsheets (Taken 3/27/2025 1114)  Discharge to home or other facility with appropriate resources:   Identify barriers to discharge with patient and caregiver   Identify discharge learning needs (meds, wound care, etc)   Arrange for needed discharge resources and transportation as appropriate   Arrange for interpreters to assist at discharge as needed   Refer to discharge planning if patient needs post-hospital services based on physician order or complex needs related to functional status, cognitive ability or social support system  Note: Patient plans to return to [facility] at discharge and no needs voiced at this time. Patient working with social work for discharge planning.        Problem: Pain  Goal: Verbalizes/displays adequate comfort level or baseline comfort level  Outcome: Progressing  Flowsheets (Taken 3/27/2025 1114)  Verbalizes/displays adequate comfort level or baseline comfort level:   Encourage patient to monitor pain and request assistance   Administer analgesics based on type and severity of pain and evaluate response   Consider cultural and social influences on pain and pain management   Assess pain using appropriate pain scale   Implement non-pharmacological measures as appropriate and evaluate response   Notify Licensed Independent Practitioner if interventions unsuccessful or patient reports new pain  Note: Pain Assessment: Adult Nonverbal Pain Scale (NPVS)  Pain Level: 0   Patient's Stated Pain Goal: 0 - No pain   Is pain goal met at this time?  Yes     Non-Pharmaceutical Pain Intervention(s): Repositioned (turned left)       Problem: Safety - Adult  Goal: Free from fall injury  Outcome: Progressing  Flowsheets (Taken 3/27/2025 1114)  Free From Fall Injury: Instruct family/caregiver on patient safety     Problem: Skin/Tissue Integrity  Goal: Skin integrity remains

## 2025-03-27 NOTE — PROGRESS NOTES
SubCUTAneous BID    amLODIPine  5 mg PEG Tube Daily    lidocaine 1 % injection  50 mg IntraDERmal Once    sodium chloride flush  5-40 mL IntraVENous 2 times per day    aspirin  81 mg PEG Tube Daily    levETIRAcetam  1,000 mg IntraVENous Q12H    levOCARNitine  330 mg Oral BID    [Held by provider] multivitamin  1 tablet Oral Daily    PHENobarbital  60 mg IntraVENous Nightly    polycarbophil  625 mg Per G Tube TID    venlafaxine  75 mg Oral BID    [Held by provider] zinc sulfate  50 mg Oral Daily    valproate (DEPACON) 750 mg in sodium chloride 0.9 % 100 mL IVPB  750 mg IntraVENous Q8H    PHENobarbital  30 mg IntraVENous Daily    lacosamide (VIMPAT) 200 mg in sodium chloride 0.9 % 70 mL IVPB  200 mg IntraVENous BID    [Held by provider] lidocaine  1 patch TransDERmal Daily    PRN Meds: sodium chloride flush, sodium chloride, hydrALAZINE, magnesium sulfate, [DISCONTINUED] ondansetron **OR** ondansetron, [DISCONTINUED] acetaminophen **OR** acetaminophen, sodium chloride flush, sodium chloride    Exam:  BP (!) 113/51   Pulse 68   Temp 98.2 °F (36.8 °C) (Oral)   Resp 18   Ht 1.549 m (5' 1\")   Wt 98.3 kg (216 lb 11.4 oz)   SpO2 97%   BMI 40.95 kg/m²   General: No distress, appears stated age.  Eyes:  PERRL. Conjunctivae/corneas clear.  HENT: Head normal appearing. Nares normal. Oral mucosa moist.  Hearing intact.   Neck: Supple, with full range of motion. Trachea midline.  No gross JVD appreciated.  Respiratory:  Normal effort. Clear to auscultation, without rales or wheezes or rhonchi.  Cardiovascular: Normal rate, regular rhythm with normal S1/S2 without murmurs.    No lower extremity edema.   Abdomen: Soft, non-tender, non-distended with normal bowel sounds.  PEG tube left upper quadrant.   Musculoskeletal: No joint swelling or tenderness. Normal tone. No abnormal movements.   Skin: Warm and dry. No rashes or lesions.  Neurologic:  No focal sensory/motor deficits in the upper or lower extremities. Cranial nerves:   grossly non-focal 2-12.     Psychiatric: Alert and oriented, normal insight and thought content.   Capillary Refill: Brisk,< 3 seconds.  Peripheral Pulses: +2 palpable, equal bilaterally.       Labs/Radiology: See chart or assessment above.     Electronically signed by Mikey Caban DO on 3/27/2025 at 1:27 PM  Case was discussed with Attending, Dr. Cleveland MD.

## 2025-03-27 NOTE — PROGRESS NOTES
Hospitalist Progress Note  Internal Medicine Resident      Patient: Mariangel Mas 69 y.o. female      Unit/Bed: K-26/026-A    Admit Date: 3/17/2025      ASSESSMENT AND PLAN  Active Problems  Dysphagia secondary to cerebral palsy: Patient noted for difficult swallowing, history of PEG tube, removed on 2/24.  Current diet NPO.,  GI was consulted recommended general surgery for PEG tube placement.  SPL consulted, recommended n.p.o. General surgery consulted, signed off.   PEG tube placed on 3/24/2025, general surgery okay to use.  Due to ongoing diarrhea, slowed down to tube feeds to 20 mL/h but continuing water flushes to 200 mL/q4hr. monitor patient for diarrhea, if diarrhea improves will increase tube feeds tomorrow.   Hypophosphatemia secondary to diarrhea: Phosphorus 2.2, replaced with potassium phosphorus 15 mmol, rechecked ordered potassium and phosphorus at the 1500 on 3/26/2025.   Resolved Problems  Acute hypoxic respiratory failure secondary to aspiration, resolved.   UTI  Chronic Conditions (reviewed and stable unless otherwise stated)  History of seizures: As per chart review noted abdominal muscle spasms as considered prodromal syndromes of seizure.  Patient has not had any noted seizure since admission.  Continue Keppra/phenobarb/valproic acid/Vimpat.   HTN: Resumed Norvasc through PEG tube.  Hydralazine 10 mg every 6 as needed for systolic blood pressure about 170.  Cerebral palsy: Patient noted for developmental delays.  Patient is bedbound with requiring assistance with ADLs.      LDA: []CVC / [x]PICC / []Midline / []Barber / [x] PEG tube/ []Mediport / []None  Antibiotics: None  Steroids: None  Labs (still needed?): [x]Yes / []No  IVF (still needed?): [x]Yes / []No    Level of care: [x]Step Down / []Med-Surg  Bed Status: [x]Inpatient / []Observation  Telemetry: [x]Yes / []No  PT/OT: [x]Yes / []No    DVT Prophylaxis: [x] Lovenox / [] Heparin / [] SCDs / [] Already on Systemic Anticoagulation /

## 2025-03-27 NOTE — PROGRESS NOTES
Patient arrived on floor per transport from 4K. Vitals taken and assessed. Lab drawn as ordered per PICC line and sent to lab. External set up to wall suction. Patient opened eyes but did not communicate.

## 2025-03-28 LAB
ANION GAP SERPL CALC-SCNC: 8 MEQ/L (ref 8–16)
BUN SERPL-MCNC: 16 MG/DL (ref 8–23)
CALCIUM SERPL-MCNC: 8.9 MG/DL (ref 8.8–10.2)
CHLORIDE SERPL-SCNC: 102 MEQ/L (ref 98–111)
CO2 SERPL-SCNC: 27 MEQ/L (ref 22–29)
CREAT SERPL-MCNC: 0.3 MG/DL (ref 0.5–0.9)
DEPRECATED RDW RBC AUTO: 47.2 FL (ref 35–45)
ERYTHROCYTE [DISTWIDTH] IN BLOOD BY AUTOMATED COUNT: 12.4 % (ref 11.5–14.5)
FERRITIN SERPL IA-MCNC: 175 NG/ML (ref 13–150)
FOLATE SERPL-MCNC: 7.7 NG/ML (ref 4.6–34.8)
GFR SERPL CREATININE-BSD FRML MDRD: > 90 ML/MIN/1.73M2
GLUCOSE BLD STRIP.AUTO-MCNC: 107 MG/DL (ref 70–108)
GLUCOSE BLD STRIP.AUTO-MCNC: 108 MG/DL (ref 70–108)
GLUCOSE SERPL-MCNC: 132 MG/DL (ref 74–109)
HCT VFR BLD AUTO: 37.3 % (ref 37–47)
HGB BLD-MCNC: 12.2 GM/DL (ref 12–16)
IRON SATN MFR SERPL: 22 % (ref 20–50)
IRON SERPL-MCNC: 46 UG/DL (ref 37–145)
MAGNESIUM SERPL-MCNC: 2 MG/DL (ref 1.6–2.6)
MCH RBC QN AUTO: 34 PG (ref 26–33)
MCHC RBC AUTO-ENTMCNC: 32.7 GM/DL (ref 32.2–35.5)
MCV RBC AUTO: 103.9 FL (ref 81–99)
PHOSPHATE SERPL-MCNC: 2.6 MG/DL (ref 2.5–4.5)
PLATELET # BLD AUTO: 167 THOU/MM3 (ref 130–400)
PMV BLD AUTO: 11.1 FL (ref 9.4–12.4)
POTASSIUM SERPL-SCNC: 4.2 MEQ/L (ref 3.5–5.2)
RBC # BLD AUTO: 3.59 MILL/MM3 (ref 4.2–5.4)
SODIUM SERPL-SCNC: 137 MEQ/L (ref 135–145)
TIBC SERPL-MCNC: 212 UG/DL (ref 171–450)
VIT B12 SERPL-MCNC: 966 PG/ML (ref 232–1245)
WBC # BLD AUTO: 6 THOU/MM3 (ref 4.8–10.8)

## 2025-03-28 PROCEDURE — 36415 COLL VENOUS BLD VENIPUNCTURE: CPT

## 2025-03-28 PROCEDURE — 6360000002 HC RX W HCPCS: Performed by: SURGERY

## 2025-03-28 PROCEDURE — 6370000000 HC RX 637 (ALT 250 FOR IP)

## 2025-03-28 PROCEDURE — 2500000003 HC RX 250 WO HCPCS: Performed by: SURGERY

## 2025-03-28 PROCEDURE — C9254 INJECTION, LACOSAMIDE: HCPCS | Performed by: SURGERY

## 2025-03-28 PROCEDURE — 1200000000 HC SEMI PRIVATE

## 2025-03-28 PROCEDURE — 6370000000 HC RX 637 (ALT 250 FOR IP): Performed by: SURGERY

## 2025-03-28 PROCEDURE — 2580000003 HC RX 258: Performed by: SURGERY

## 2025-03-28 PROCEDURE — 82607 VITAMIN B-12: CPT

## 2025-03-28 PROCEDURE — 99232 SBSQ HOSP IP/OBS MODERATE 35: CPT | Performed by: INTERNAL MEDICINE

## 2025-03-28 PROCEDURE — 83735 ASSAY OF MAGNESIUM: CPT

## 2025-03-28 PROCEDURE — 83540 ASSAY OF IRON: CPT

## 2025-03-28 PROCEDURE — 83550 IRON BINDING TEST: CPT

## 2025-03-28 PROCEDURE — 82746 ASSAY OF FOLIC ACID SERUM: CPT

## 2025-03-28 PROCEDURE — 80048 BASIC METABOLIC PNL TOTAL CA: CPT

## 2025-03-28 PROCEDURE — 84100 ASSAY OF PHOSPHORUS: CPT

## 2025-03-28 PROCEDURE — 82728 ASSAY OF FERRITIN: CPT

## 2025-03-28 PROCEDURE — 85027 COMPLETE CBC AUTOMATED: CPT

## 2025-03-28 PROCEDURE — 82948 REAGENT STRIP/BLOOD GLUCOSE: CPT

## 2025-03-28 RX ORDER — PHENOBARBITAL 20 MG/5ML
30 ELIXIR ORAL DAILY
Status: DISCONTINUED | OUTPATIENT
Start: 2025-03-29 | End: 2025-03-29 | Stop reason: HOSPADM

## 2025-03-28 RX ORDER — PHENOBARBITAL 20 MG/5ML
60 ELIXIR ORAL NIGHTLY
Status: DISCONTINUED | OUTPATIENT
Start: 2025-03-28 | End: 2025-03-29 | Stop reason: HOSPADM

## 2025-03-28 RX ORDER — DIVALPROEX SODIUM 125 MG/1
250 CAPSULE, COATED PELLETS ORAL EVERY 8 HOURS SCHEDULED
Status: DISCONTINUED | OUTPATIENT
Start: 2025-03-28 | End: 2025-03-29 | Stop reason: HOSPADM

## 2025-03-28 RX ORDER — LACOSAMIDE 50 MG/1
200 TABLET ORAL 2 TIMES DAILY
Status: DISCONTINUED | OUTPATIENT
Start: 2025-03-28 | End: 2025-03-29 | Stop reason: HOSPADM

## 2025-03-28 RX ADMIN — SODIUM CHLORIDE, PRESERVATIVE FREE 10 ML: 5 INJECTION INTRAVENOUS at 19:08

## 2025-03-28 RX ADMIN — ENOXAPARIN SODIUM 30 MG: 100 INJECTION SUBCUTANEOUS at 20:15

## 2025-03-28 RX ADMIN — Medication 50 MG: at 09:30

## 2025-03-28 RX ADMIN — LACOSAMIDE 200 MG: 10 INJECTION INTRAVENOUS at 10:52

## 2025-03-28 RX ADMIN — SODIUM CHLORIDE, PRESERVATIVE FREE 10 ML: 5 INJECTION INTRAVENOUS at 09:32

## 2025-03-28 RX ADMIN — VALPROATE SODIUM 750 MG: 100 INJECTION, SOLUTION INTRAVENOUS at 09:29

## 2025-03-28 RX ADMIN — LEVETIRACETAM 1000 MG: 100 INJECTION INTRAVENOUS at 18:56

## 2025-03-28 RX ADMIN — LEVOCARNITINE 330 MG: 330 TABLET ORAL at 20:14

## 2025-03-28 RX ADMIN — CALCIUM POLYCARBOPHIL 625 MG: 625 TABLET, FILM COATED ORAL at 21:32

## 2025-03-28 RX ADMIN — AMLODIPINE BESYLATE 5 MG: 5 TABLET ORAL at 09:31

## 2025-03-28 RX ADMIN — PHENOBARBITAL SODIUM 30 MG: 65 INJECTION INTRAMUSCULAR at 09:43

## 2025-03-28 RX ADMIN — ENOXAPARIN SODIUM 30 MG: 100 INJECTION SUBCUTANEOUS at 09:29

## 2025-03-28 RX ADMIN — SODIUM CHLORIDE, PRESERVATIVE FREE 10 ML: 5 INJECTION INTRAVENOUS at 09:31

## 2025-03-28 RX ADMIN — DIVALPROEX SODIUM 250 MG: 125 CAPSULE ORAL at 21:32

## 2025-03-28 RX ADMIN — Medication 1 TABLET: at 09:30

## 2025-03-28 RX ADMIN — LEVOCARNITINE 330 MG: 330 TABLET ORAL at 09:31

## 2025-03-28 RX ADMIN — ACETAMINOPHEN 650 MG: 650 SUPPOSITORY RECTAL at 15:50

## 2025-03-28 RX ADMIN — LEVETIRACETAM 1000 MG: 100 INJECTION INTRAVENOUS at 07:04

## 2025-03-28 RX ADMIN — VENLAFAXINE 75 MG: 37.5 TABLET ORAL at 09:30

## 2025-03-28 RX ADMIN — VALPROATE SODIUM 750 MG: 100 INJECTION, SOLUTION INTRAVENOUS at 15:55

## 2025-03-28 RX ADMIN — ASPIRIN 81 MG: 81 TABLET, CHEWABLE ORAL at 09:29

## 2025-03-28 RX ADMIN — CALCIUM POLYCARBOPHIL 625 MG: 625 TABLET, FILM COATED ORAL at 09:29

## 2025-03-28 RX ADMIN — SODIUM CHLORIDE, PRESERVATIVE FREE 10 ML: 5 INJECTION INTRAVENOUS at 20:15

## 2025-03-28 RX ADMIN — CALCIUM POLYCARBOPHIL 625 MG: 625 TABLET, FILM COATED ORAL at 13:36

## 2025-03-28 RX ADMIN — PHENOBARBITAL 60 MG: 20 ELIXIR ORAL at 20:15

## 2025-03-28 RX ADMIN — LACOSAMIDE 200 MG: 50 TABLET, FILM COATED ORAL at 20:14

## 2025-03-28 RX ADMIN — VENLAFAXINE 75 MG: 37.5 TABLET ORAL at 21:32

## 2025-03-28 RX ADMIN — VALPROATE SODIUM 750 MG: 100 INJECTION, SOLUTION INTRAVENOUS at 00:30

## 2025-03-28 ASSESSMENT — PAIN DESCRIPTION - LOCATION: LOCATION: NECK

## 2025-03-28 ASSESSMENT — PAIN SCALES - GENERAL
PAINLEVEL_OUTOF10: 0
PAINLEVEL_OUTOF10: 3

## 2025-03-28 ASSESSMENT — PAIN SCALES - WONG BAKER: WONGBAKER_NUMERICALRESPONSE: NO HURT

## 2025-03-28 ASSESSMENT — PAIN DESCRIPTION - DESCRIPTORS: DESCRIPTORS: ACHING

## 2025-03-28 ASSESSMENT — PAIN DESCRIPTION - ORIENTATION: ORIENTATION: POSTERIOR

## 2025-03-28 NOTE — PROGRESS NOTES
Received report from primary RN.  Pt A&O x 3. Patient nonverbal. PERRl 4 mm 3 mm. Upper extremities warm, pink, and dry. Hand grasp weak. Edema present. S1 and S2 auscultated. Lungs clear throughout. Bowel sounds hypoactive x 4. Lower extremities warm, pink, and dry. Edema present +3. Pt repositioned to R side. Pt has a stage 2 wound between buttocks. PEG intact. PICC intact. External catheter measured and emptied. Left in bed with call light and bedside table within reach.

## 2025-03-28 NOTE — PLAN OF CARE
Problem: Discharge Planning  Goal: Discharge to home or other facility with appropriate resources  Outcome: Progressing    Discharge plan is in process. Plan discharge to ECF.     Problem: Pain  Goal: Verbalizes/displays adequate comfort level or baseline comfort level  Outcome: Progressing  Patient states pain relief from PRN pain medications. Pain reassessed one hour post PRN pain medication given.  Patient rates pain 0 on RICHIE 0-10 scale.     Problem: Safety - Adult  Goal: Free from fall injury  Outcome: Progressing  Fall assessment completed. Patient using call light appropriately to call for assistance with ambulation to bathroom.  Personal items within reach. Patient is also compliant with use of non-skid slippers.      Problem: Skin/Tissue Integrity  Goal: Skin integrity remains intact  Description: 1.  Monitor for areas of redness and/or skin breakdown  2.  Assess vascular access sites hourly  3.  Every 4-6 hours minimum:  Change oxygen saturation probe site  4.  Every 4-6 hours:  If on nasal continuous positive airway pressure, respiratory therapy assess nares and determine need for appliance change or resting period  Outcome: Progressing  Flowsheets (Taken 3/28/2025 0800)  Skin Integrity Remains Intact: Monitor for areas of redness and/or skin breakdown    No skin breakdown this shift. Patient being assisted with turning. Patients states understanding of repositioning every two hours.     Problem: Nutrition Deficit:  Goal: Optimize nutritional status  3/28/2025 1410 by Char Hardy RN  Outcome: Progressing  Patient on continues peg tube infusion per order. Tolerating well.     Problem: Neurosensory - Adult  Goal: Absence of seizures  Outcome: Progressing    No seizures for shift. VS- WNL.     Problem: Neurosensory - Adult  Goal: Achieves stable or improved neurological status  Outcome: Progressing  Patient alert & oriented x 1. Patient able to follow commands.      Problem: Respiratory -

## 2025-03-28 NOTE — PLAN OF CARE
Problem: Discharge Planning  Goal: Discharge to home or other facility with appropriate resources  3/27/2025 1114 by Janice Kraus RN  Outcome: Progressing  Flowsheets (Taken 3/27/2025 1114)  Discharge to home or other facility with appropriate resources:   Identify barriers to discharge with patient and caregiver   Identify discharge learning needs (meds, wound care, etc)   Arrange for needed discharge resources and transportation as appropriate   Arrange for interpreters to assist at discharge as needed   Refer to discharge planning if patient needs post-hospital services based on physician order or complex needs related to functional status, cognitive ability or social support system  Note: Patient plans to return to [facility] at discharge and no needs voiced at this time. Patient working with social work for discharge planning.        Problem: Pain  Goal: Verbalizes/displays adequate comfort level or baseline comfort level  Recent Flowsheet Documentation  Taken 3/27/2025 1600 by Rica Truong RN  Verbalizes/displays adequate comfort level or baseline comfort level: Encourage patient to monitor pain and request assistance  3/27/2025 1114 by Janice Kraus RN  Outcome: Progressing  Flowsheets (Taken 3/27/2025 1114)  Verbalizes/displays adequate comfort level or baseline comfort level:   Encourage patient to monitor pain and request assistance   Administer analgesics based on type and severity of pain and evaluate response   Consider cultural and social influences on pain and pain management   Assess pain using appropriate pain scale   Implement non-pharmacological measures as appropriate and evaluate response   Notify Licensed Independent Practitioner if interventions unsuccessful or patient reports new pain  Note: Pain Assessment: Adult Nonverbal Pain Scale (NPVS)  Pain Level: 0   Patient's Stated Pain Goal: 0 - No pain   Is pain goal met at this time?  Yes     Non-Pharmaceutical Pain Intervention(s):

## 2025-03-28 NOTE — PROGRESS NOTES
Comprehensive Nutrition Assessment    Type and Reason for Visit:  Reassess    Nutrition Recommendations/Plan:   Continue current TF Jevity 1.5 at 50 ml/hr (goal).  Flush Gian BID (mix w/ 120 ml free water per packet).  Additional free water flush per provider - order for 200 ml every 4 hours.  If any signs of overhydration - consider decrease flushes to 200 ml every 6 hours.     Malnutrition Assessment:  Malnutrition Status:  At risk for malnutrition (03/19/25 8616)    Context:  Acute Illness     Findings of the 6 clinical characteristics of malnutrition:  Energy Intake:  No decrease in energy intake (SNF RN reports that patient was eating well, most of meals PTA)  Weight Loss:  No weight loss     Body Fat Loss:  No body fat loss     Muscle Mass Loss:  No muscle mass loss    Fluid Accumulation:  Mild Generalized   Strength:  Not Performed    Nutrition Assessment:     Pt. nutritionally compromised AEB failed SLP evaluation on 3/19, PEG tube placement for nutrition support. At risk for further nutrition compromise r/t increased nutrient needs for wound healing; admitted d/t tremors, likely related to dehydration and suspected postictal state. Noted underlying medical condition (PMHx AHDH, Cerebral Palsy, depression, essential HTN, seizure).     Nutrition Related Findings:    Pt. Report/Treatments/Miscellaneous:   Pt. Seen - sleeping soundly - does not awake to name; TF infusing at 50 ml/hr; RN reports pt. Tolerating TF at goal; plan SNF at discharge  GI Status: BM 3/27  Pertinent Labs: 3/28: Glucose 132, BUN 16, Cr 0.3, Sodium 137  Pertinent Meds: MVI, Zinc, Fibercon, Keppra, Zofran      Wound Type: Stage III (coccyx)       Current Nutrition Intake & Therapies:    Average Meal Intake: NPO  Average Supplements Intake: NPO  Diet NPO Exceptions are: Sips of Water with Meds  ADULT TUBE FEEDING; PEG; 2.0 Calorie; Continuous; 20; Yes; 15; Q 6 hours; 50; 200; Q 4 hours; Wound Healing; 1 Dose; BID  Current Tube Feeding

## 2025-03-28 NOTE — PROGRESS NOTES
Hospitalist Progress Note  Internal Medicine Resident      Patient: Mariangel Mas 69 y.o. female      Unit/Bed: -06/006-A    Admit Date: 3/17/2025      ASSESSMENT AND PLAN  Active Problems  Dysphagia secondary to cerebral palsy: Patient noted for difficult swallowing, history of PEG tube, removed on 2/24.  Current diet NPO.,  GI was consulted recommended general surgery for PEG tube placement.  SPL consulted, recommended n.p.o. General surgery consulted, signed off. PEG tube placed on 3/24/2025, as per general surgery okay to use.  Due to ongoing diarrhea (improving), increased tube feeds to goal of 50mL/hr from 30ml/hr and continuing water flushes to 200 mL/q4hr. monitor patient for diarrhea.  Patient had 2 diarrheal bowel movements overnight.     Resolved Problems  Acute hypoxic respiratory failure secondary to aspiration, resolved.   UTI, resolved.  Hypophosphatemia secondary to diarrhea, resolved  Mild macrocytic anemia: Hemoglobin 11.7, baseline 13.5, .6. (3/8/2025), ferritin 175, iron 46, TIBC 212, percent saturation 22, B12 966, folate 7.7. No active signs of bleed.   Chronic Conditions (reviewed and stable unless otherwise stated)  History of seizures: As per chart review noted abdominal muscle spasms as considered prodromal syndromes of seizure.  Patient has not had any noted seizure since admission.  Continue Keppra/phenobarb/valproic acid/Vimpat.   HTN: continue Norvasc through PEG tube.   Cerebral palsy: Patient noted for developmental delays.  Patient is bedbound with requiring assistance with ADLs. Pt does understand at baseline, but unable to communicate clearly in full sentences. Pt has POA listed on the chart, to be contact if any changes in the treatment plan.       LDA: []CVC / [x]PICC / []Midline / []Barber / [x] PEG tube/ []Mediport / []None  Antibiotics: None  Steroids: None  Labs (still needed?): [x]Yes / []No  IVF (still needed?): []Yes / [x]No    Level of care: [x]Step Down

## 2025-03-28 NOTE — CARE COORDINATION
3/28/25, 8:12 AM EDT    DISCHARGE BARRIERS        Patient transferred to . Report given to unit SWYa, regarding discharge plan for this patient.

## 2025-03-29 VITALS
HEART RATE: 71 BPM | BODY MASS INDEX: 40.92 KG/M2 | RESPIRATION RATE: 16 BRPM | DIASTOLIC BLOOD PRESSURE: 51 MMHG | SYSTOLIC BLOOD PRESSURE: 103 MMHG | OXYGEN SATURATION: 97 % | HEIGHT: 61 IN | WEIGHT: 216.71 LBS | TEMPERATURE: 98.8 F

## 2025-03-29 PROBLEM — R82.71 BACTERIURIA: Status: ACTIVE | Noted: 2025-03-29

## 2025-03-29 PROBLEM — T17.908A ASPIRATION INTO AIRWAY: Status: ACTIVE | Noted: 2025-03-29

## 2025-03-29 PROBLEM — R13.19 OTHER DYSPHAGIA: Status: ACTIVE | Noted: 2025-03-29

## 2025-03-29 PROCEDURE — 2500000003 HC RX 250 WO HCPCS: Performed by: SURGERY

## 2025-03-29 PROCEDURE — 6370000000 HC RX 637 (ALT 250 FOR IP)

## 2025-03-29 PROCEDURE — 99238 HOSP IP/OBS DSCHRG MGMT 30/<: CPT | Performed by: INTERNAL MEDICINE

## 2025-03-29 PROCEDURE — 6360000002 HC RX W HCPCS: Performed by: SURGERY

## 2025-03-29 RX ORDER — DIVALPROEX SODIUM 125 MG/1
250 CAPSULE, COATED PELLETS ORAL EVERY 8 HOURS SCHEDULED
DISCHARGE
Start: 2025-03-29

## 2025-03-29 RX ORDER — PHENOBARBITAL 20 MG/5ML
30 ELIXIR ORAL DAILY
Qty: 75 ML | Refills: 0 | Status: SHIPPED | OUTPATIENT
Start: 2025-03-30 | End: 2025-04-09

## 2025-03-29 RX ORDER — LIDOCAINE 4 G/G
1 PATCH TOPICAL DAILY
DISCHARGE
Start: 2025-03-29

## 2025-03-29 RX ORDER — PHENOBARBITAL 20 MG/5ML
60 ELIXIR ORAL NIGHTLY
Qty: 150 ML | Refills: 0 | Status: SHIPPED | OUTPATIENT
Start: 2025-03-29 | End: 2025-04-08

## 2025-03-29 RX ORDER — PHENOBARBITAL 20 MG/5ML
30 ELIXIR ORAL DAILY
Status: SHIPPED | DISCHARGE
Start: 2025-03-30 | End: 2025-03-29

## 2025-03-29 RX ORDER — LACOSAMIDE 200 MG/1
200 TABLET ORAL 2 TIMES DAILY
Qty: 60 TABLET | Refills: 1 | Status: SHIPPED | DISCHARGE
Start: 2025-03-29 | End: 2025-05-28

## 2025-03-29 RX ORDER — LEVETIRACETAM 100 MG/ML
1000 SOLUTION ORAL 2 TIMES DAILY
Status: DISCONTINUED | OUTPATIENT
Start: 2025-03-29 | End: 2025-03-29 | Stop reason: HOSPADM

## 2025-03-29 RX ORDER — AMLODIPINE BESYLATE 5 MG/1
5 TABLET ORAL DAILY
DISCHARGE
Start: 2025-03-30

## 2025-03-29 RX ORDER — PHENOBARBITAL 20 MG/5ML
60 ELIXIR ORAL NIGHTLY
Status: SHIPPED | DISCHARGE
Start: 2025-03-29 | End: 2025-03-29

## 2025-03-29 RX ORDER — LEVETIRACETAM 100 MG/ML
1000 SOLUTION ORAL 2 TIMES DAILY
DISCHARGE
Start: 2025-03-29

## 2025-03-29 RX ADMIN — DIVALPROEX SODIUM 250 MG: 125 CAPSULE ORAL at 14:33

## 2025-03-29 RX ADMIN — ASPIRIN 81 MG: 81 TABLET, CHEWABLE ORAL at 07:37

## 2025-03-29 RX ADMIN — LEVETIRACETAM 1000 MG: 100 INJECTION INTRAVENOUS at 05:34

## 2025-03-29 RX ADMIN — DIVALPROEX SODIUM 250 MG: 125 CAPSULE ORAL at 05:35

## 2025-03-29 RX ADMIN — LEVOCARNITINE 330 MG: 330 TABLET ORAL at 07:37

## 2025-03-29 RX ADMIN — ENOXAPARIN SODIUM 30 MG: 100 INJECTION SUBCUTANEOUS at 07:37

## 2025-03-29 RX ADMIN — Medication 50 MG: at 07:37

## 2025-03-29 RX ADMIN — SODIUM CHLORIDE, PRESERVATIVE FREE 10 ML: 5 INJECTION INTRAVENOUS at 07:39

## 2025-03-29 RX ADMIN — VENLAFAXINE 75 MG: 37.5 TABLET ORAL at 07:37

## 2025-03-29 RX ADMIN — Medication 1 TABLET: at 07:37

## 2025-03-29 RX ADMIN — CALCIUM POLYCARBOPHIL 625 MG: 625 TABLET, FILM COATED ORAL at 07:37

## 2025-03-29 RX ADMIN — PHENOBARBITAL 30 MG: 20 ELIXIR ORAL at 07:38

## 2025-03-29 RX ADMIN — LACOSAMIDE 200 MG: 50 TABLET, FILM COATED ORAL at 07:37

## 2025-03-29 RX ADMIN — CALCIUM POLYCARBOPHIL 625 MG: 625 TABLET, FILM COATED ORAL at 14:33

## 2025-03-29 NOTE — DISCHARGE SUMMARY
Resident Discharge Summary (Hospitalist)      Patient: Mariangel Mas 69 y.o. female  : 1955  MRN: [id number]   Account: [id number]   Patient's PCP: Analisa Ulloa MD    Admit Date: 3/17/2025   Discharge Date: 3/29/2025      Admitting Physician: Gm De La Rosa, DO  Discharge Physician: Mikey Caban, DO       Discharge Diagnoses:  Dysphagia secondary to cerebral palsy: Patient noted for difficult swallowing, history of PEG tube, removed on .  Current diet NPO.,  GI was consulted recommended general surgery for PEG tube placement.  SPL consulted, recommended n.p.o. General surgery consulted, signed off. PEG tube placed on 3/24/2025, as per general surgery okay to use.  Patient was restarted on feeds through the PEG tube with continuous feeding.  Initially patient was increased to the goal quickly patient noted to have diarrhea of about 4 bowel movements.  Tube feed was decreased to 30 mL/hr and slowly increased to 50 mL/hr, patient's only had about a tiny bowel movement less ferm prior to discharge.  Patient continued to be continuous feeding at 50 mL/hr.   UTI, resolved: Urine culture noted for E. coli, completed treatment with ceftriaxone.     Chronic Conditions (reviewed and stable unless otherwise stated)  History of seizures: As per chart review noted abdominal muscle spasms as considered prodromal syndromes of seizure.  Patient has not had any noted seizure since admission.  Continue on Keppra/phenobarb/valproic acid/Vimpat.   HTN: continue Norvasc through PEG tube.   Cerebral palsy: Patient noted for developmental delays.  Patient is bedbound with requiring assistance with ADLs. Pt does understand at baseline, but unable to communicate clearly in full sentences.      Hospital Course:   Mariangel Mas is a 69 y.o. female with PMHx hx of seizure, HTN, cerebral palsy, admitted to [facility] on 3/17/2025 with complaint of abdominal tremors. Based on the history with the  appreciated.  Respiratory:  Normal effort. Clear to auscultation, without rales or wheezes or rhonchi.  Cardiovascular: Normal rate, regular rhythm with normal S1/S2 without murmurs.    No lower extremity edema.   Abdomen: Soft, non-tender, non-distended with normal bowel sounds.  PEG tube left upper quadrant.   Musculoskeletal: No joint swelling or tenderness. Normal tone. No abnormal movements.   Skin: Warm and dry. No rashes or lesions.  Neurologic:  No focal sensory/motor deficits in the upper or lower extremities. Cranial nerves:  grossly non-focal 2-12.     Psychiatric: Alert and oriented, normal insight and thought content.   Capillary Refill: Brisk,< 3 seconds.  Peripheral Pulses: +2 palpable, equal bilaterally.       Labs: For convenience the most recent labs are provided:  CBC:    Lab Results   Component Value Date/Time    WBC 6.0 03/28/2025 06:10 AM    HGB 12.2 03/28/2025 06:10 AM    HCT 37.3 03/28/2025 06:10 AM     03/28/2025 06:10 AM     Renal:    Lab Results   Component Value Date/Time     03/28/2025 06:10 AM    K 4.2 03/28/2025 06:10 AM    K 3.8 03/21/2025 08:00 AM     03/28/2025 06:10 AM    CO2 27 03/28/2025 06:10 AM    BUN 16 03/28/2025 06:10 AM    CREATININE 0.3 03/28/2025 06:10 AM    CALCIUM 8.9 03/28/2025 06:10 AM    PHOS 2.6 03/28/2025 06:10 AM     Liver:   Lab Results   Component Value Date/Time    AST 21 03/17/2025 02:35 AM    ALT 14 03/17/2025 02:35 AM         Significant Diagnostic Studies    Radiology:   XR CHEST PORTABLE   Final Result   1. Previously noted atelectasis in the left lung base is no longer    present. No definite focal infiltrate or consolidation.   2. Density projecting over the medial aspect of the right upper lobe is    thought to represent something external to the patient. This area can be    further assessed on a follow-up exam.      This document has been electronically signed by: Sulaiman Black M.D. on    03/21/2025 04:42 AM      XR CHEST PORTABLE

## 2025-03-29 NOTE — PROGRESS NOTES
Hourly rounding maintained. Pt in bed with call light in place.         Physical Therapy Discharge Summary    Reason for therapy discharge:    All goals and outcomes met, no further needs identified.    Progress towards therapy goal(s). See goals on Care Plan in [facility] electronic health record for goal details.  Goals met    Therapy recommendation(s):    Ambulate without assistive device, increasing time and distance as able.    Hyacinth Jaimes PT

## 2025-03-29 NOTE — PROGRESS NOTES
Called and spoke with case management who is on staff for today. Made her aware pt has discharge orders but now has continuous tube feeds and wanted to see if [facility] had equipment for this. She informed this RN she is not aware of this and stated to [facility] the [facility] and see if they have the equipment. This RN asked case management if they don't have it what is the next option, she informed this RN she was not sure. She stated we can send the bottles of the feed with pt is needed.

## 2025-03-29 NOTE — PLAN OF CARE
Problem: Discharge Planning  Goal: Discharge to home or other facility with appropriate resources  3/28/2025 2051 by Nevaeh Stroud RN  Outcome: Progressing     Problem: Pain  Goal: Verbalizes/displays adequate comfort level or baseline comfort level  3/28/2025 2051 by Nevaeh Stroud RN  Outcome: Progressing   Pain Assessment: 0-10  Pain Level: 0   Patient's Stated Pain Goal: 0 - No pain   Is pain goal met at this time?  Yes     Non-Pharmaceutical Pain Intervention(s): Repositioned (turned left)   Problem: Safety - Adult  Goal: Free from fall injury  3/28/2025 2051 by Nevaeh Stroud RN  Outcome: Progressing   All fall precautions in place. Bed in low position, alarm activated and appropriate use of call light.      Problem: Skin/Tissue Integrity  Goal: Skin integrity remains intact  Description: 1.  Monitor for areas of redness and/or skin breakdown  2.  Assess vascular access sites hourly  3.  Every 4-6 hours minimum:  Change oxygen saturation probe site  4.  Every 4-6 hours:  If on nasal continuous positive airway pressure, respiratory therapy assess nares and determine need for appliance change or resting period  3/28/2025 2051 by Nevaeh Stroud RN  Outcome: Progressing     Problem: Nutrition Deficit:  Goal: Optimize nutritional status  3/28/2025 2051 by Nevaeh Stroud RN  Outcome: Progressing     Problem: Neurosensory - Adult  Goal: Absence of seizures  3/28/2025 2051 by Nevaeh Stroud RN  Outcome: Progressing     Problem: Neurosensory - Adult  Goal: Achieves stable or improved neurological status  3/28/2025 2051 by Nevaeh Stroud RN  Outcome: Progressing     Problem: Neurosensory - Adult  Goal: Remains free of injury related to seizures activity  Outcome: Progressing     Problem: Neurosensory - Adult  Goal: Achieves maximal functionality and self care  Outcome: Progressing     Problem: Respiratory - Adult  Goal: Achieves optimal ventilation and oxygenation  3/28/2025 2051 by Nevaeh Stroud

## 2025-03-29 NOTE — PLAN OF CARE
Problem: Discharge Planning  Goal: Discharge to home or other facility with appropriate resources  Outcome: Adequate for Discharge     Problem: Pain  Goal: Verbalizes/displays adequate comfort level or baseline comfort level  Outcome: Adequate for Discharge     Problem: Safety - Adult  Goal: Free from fall injury  Outcome: Adequate for Discharge     Problem: Skin/Tissue Integrity  Goal: Skin integrity remains intact  Description: 1.  Monitor for areas of redness and/or skin breakdown  2.  Assess vascular access sites hourly  3.  Every 4-6 hours minimum:  Change oxygen saturation probe site  4.  Every 4-6 hours:  If on nasal continuous positive airway pressure, respiratory therapy assess nares and determine need for appliance change or resting period  Outcome: Adequate for Discharge     Problem: Nutrition Deficit:  Goal: Optimize nutritional status  Outcome: Adequate for Discharge     Problem: Neurosensory - Adult  Goal: Absence of seizures  Outcome: Adequate for Discharge     Problem: Neurosensory - Adult  Goal: Achieves stable or improved neurological status  Outcome: Adequate for Discharge     Problem: Neurosensory - Adult  Goal: Remains free of injury related to seizures activity  Outcome: Adequate for Discharge     Problem: Neurosensory - Adult  Goal: Achieves maximal functionality and self care  Outcome: Adequate for Discharge     Problem: Respiratory - Adult  Goal: Achieves optimal ventilation and oxygenation  Outcome: Adequate for Discharge     Problem: Decision Making  Goal: Pt/Family able to effectively weigh alternatives and participate in decision making related to treatment and care  Description: INTERVENTIONS:  1. Determine when there are differences between patient's view, family's view, and healthcare provider's view of condition  2. Facilitate patient and family articulation of goals for care  3. Help patient and family identify pros/cons of alternative solutions  4. Provide information as

## 2025-03-29 NOTE — PROGRESS NOTES
Called and spoke with at DEZ Guzmán at [facility]. This RN asked staff if they have equipment for tube feeds, staff stated they have the pump but no tubing or Jevity. This RN made staff aware we will sent tubing and Jevity feeds with pt until they can get more, staff stated she will order more at this time. Called and left voicemail for Joya pt legal guardian and informed her of pts discharge and plan to return to [facility]. Received the verbal okay from Mikey Caban DO to clamp tube feeds for tansport. Discharge education and instructions provided to DEZ Guzmán at [facility]. DEZ Guzmán verbalized understanding at this time. No questions asked. AVS and CHASE faxed to [facility]. PICC line removed by IV Team. All personal belongings, AVS and 3 bottles of Jevity along with 3 new things of tubing present with patient. Transported via [facility] ambulance.

## 2025-03-29 NOTE — PROGRESS NOTES
Order received for PICC removal per Mikey Caban DO. Patient placed in bed.  Transparent dressing removed. Skin accessed appears clean, dry and intact. Area cleaned with chloro prep, sterile gauze and Vaseline gauze placed over site, PICC removed with tip intact, pressure held with sterile gauze for 3 minutes. New transparent dressing applied. Educated the patient on remaining in bed for 30 min, dressing stays on for 24 hours, signs and symptoms of infection and notify primary nurse if any blood or oozing. Rosalinda GARCES notified.

## 2025-03-31 NOTE — PROGRESS NOTES
Physician Progress Note      PATIENT:               NOEMÍ DIOP  [facility] #:                  [id number]  :                       1955  ADMIT DATE:       3/17/2025 1:48 AM  DISCH DATE:        3/29/2025 3:15 PM  RESPONDING  PROVIDER #:        Vikram Hurley MD          QUERY TEXT:    Patient admitted with UTI/sepsis. In order to support the diagnosis of sepsis,   please include additional clinical indicators in your documentation.  Or   please document if the diagnosis of sepsis has been ruled out after further   study    The medical record reflects the following:  Risk Factors: 69-year-old female presented to [facility] via EMS from SNF on   3/17/2025 with complaint of abdominal tremors. Recent [facility] hospitalization   -25 for fatigue likely secondary to dehydration and suspected   postictal state.  UTI secondary to Klebsiella pneumonia.  Clinical Indicators: H&P \"Complicated UTI 3/17/2025 urine positive for   nitrates greater than 100 WBCs with many bacteria\"  Urine culture: Escherichia coli-Ekron count: >100,000 CFU/mL  WBC 15.9, LA 2.1, procal <0.02  Discharge summary \"UTI, resolved: Urine culture noted for E. coli, completed   treatment with ceftriaxone.\"  Attestation by attending on discharge summary \"from review of chart, did not   see sign of sepsis with organ dysfunction; suspect the bacteriuria is   chronic.\"  Treatment: Merrem, Rocephin, Levaquin  Options provided:  -- Sepsis present as evidenced by, Please document evidence.  -- Sepsis was ruled out after study  -- Sepsis and UTI ruled out  -- Other - I will add my own diagnosis  -- Disagree - Not applicable / Not valid  -- Disagree - Clinically unable to determine / Unknown  -- Refer to Clinical Documentation Reviewer    PROVIDER RESPONSE TEXT:    Sepsis was ruled out after study.    Query created by: Monica Way on 3/31/2025 11:49 AM      Electronically signed by:  Vikram Hurley MD 3/31/2025 2:32 PM

## 2025-03-31 NOTE — CARE COORDINATION
3/31/25, 7:18 AM EDT    Patient goals/plan/ treatment preferences discussed by  and .  Patient goals/plan/ treatment preferences reviewed with patient/ family.  Patient/ family verbalize understanding of discharge plan and are in agreement with goal/plan/treatment preferences.  Understanding was demonstrated using the teach back method.  AVS provided by RN at time of discharge, which includes all necessary medical information pertaining to the patients current course of illness, treatment, post-discharge goals of care, and treatment preferences.     Services At/After Discharge: Skilled Nursing Facility (SNF)    Patient discharged to [facility] over the weekend.

## 2025-06-05 NOTE — PROGRESS NOTES
[facility] [place] [facility]  [facility]  [address]  [address]  [facility] [address]  Dept: [phone]  Loc: [phone]    Visit Date: 6/6/2025    History of Present Illness  Mariangel Mas is a 69 y.o. female,Established patient, here for evaluation of the following chief complaint(s):  Follow-Up from Hospital (Pt has gas in the bladder here to establish care for UTI prevention/)    Here today for hospital follow up. Here with legal guardian and aide from SNF.     Recurrent UTI  E. Coli 1/20/25, Klebsiella and candida 1/31/25, E. Coli 3/17/25.  Only symptom nursing home gets for infections is confusion  Not on any preventative meds  Receives nutrition via PEG    Hx Cerebral Palsy, resident at [facility]    Current Outpatient Medications   Medication Sig Dispense Refill    methenamine (HIPREX) 1 g tablet Take 1 tablet by mouth 2 times daily (with meals) 180 tablet 3    Cranberry-D Mannose 158-500 MG CAPS Take 500 mg by mouth 2 times daily 90 capsule 3    levETIRAcetam (KEPPRA) 100 MG/ML oral solution Take 10 mLs by mouth 2 times daily      amLODIPine (NORVASC) 5 MG tablet 1 tablet by PEG Tube route daily      lidocaine (HM LIDOCAINE PATCH) 4 % external patch Place 1 patch onto the skin daily      divalproex (DEPAKOTE SPRINKLE) 125 MG DR capsule Take 2 capsules by mouth every 8 hours      lactulose (CHRONULAC) 10 GM/15ML solution Take 30 mLs by mouth 2 times daily      venlafaxine (EFFEXOR) 75 MG tablet Take 1 tablet by mouth 2 times daily      midazolam (NAYZILAM) 5 MG/0.1ML SOLN nasal spray 0.1 mLs by Alternating Nares route every 10 minutes as needed (Seizure) As needed for seizure, 1 spray seizure lasting > 5 min, if ineffective after 10 min. May given 2nd dose in alternating nostril. Do not repeat if pt having trouble breathing or excessive sedation.      acetaminophen (TYLENOL) 325 MG tablet Take 2 tablets by mouth every 6 hours as needed for Pain      ondansetron

## 2025-06-06 ENCOUNTER — OFFICE VISIT (OUTPATIENT)
Dept: UROLOGY | Age: 70
End: 2025-06-06
Payer: MEDICARE

## 2025-06-06 VITALS — HEIGHT: 61 IN | WEIGHT: 216 LBS | RESPIRATION RATE: 22 BRPM | BODY MASS INDEX: 40.78 KG/M2

## 2025-06-06 DIAGNOSIS — N39.0 RECURRENT UTI: Primary | ICD-10-CM

## 2025-06-06 PROCEDURE — 99214 OFFICE O/P EST MOD 30 MIN: CPT

## 2025-06-06 PROCEDURE — G8427 DOCREV CUR MEDS BY ELIG CLIN: HCPCS

## 2025-06-06 PROCEDURE — 3017F COLORECTAL CA SCREEN DOC REV: CPT

## 2025-06-06 PROCEDURE — 1123F ACP DISCUSS/DSCN MKR DOCD: CPT

## 2025-06-06 PROCEDURE — G8400 PT W/DXA NO RESULTS DOC: HCPCS

## 2025-06-06 PROCEDURE — 1036F TOBACCO NON-USER: CPT

## 2025-06-06 PROCEDURE — G8417 CALC BMI ABV UP PARAM F/U: HCPCS

## 2025-06-06 PROCEDURE — 1090F PRES/ABSN URINE INCON ASSESS: CPT

## 2025-06-06 PROCEDURE — 1159F MED LIST DOCD IN RCRD: CPT

## 2025-06-06 RX ORDER — METHENAMINE HIPPURATE 1000 MG/1
1 TABLET ORAL 2 TIMES DAILY WITH MEALS
Qty: 180 TABLET | Refills: 3 | Status: SHIPPED | OUTPATIENT
Start: 2025-06-06

## 2025-06-06 ASSESSMENT — ENCOUNTER SYMPTOMS
NAUSEA: 0
ABDOMINAL PAIN: 0
VOMITING: 0

## 2025-06-06 NOTE — PATIENT INSTRUCTIONS
Please call the office at [phone] if you have any questions or concerns following your visit. If you were prescribed a new medication and have questions, feel free to call. For any emergent issues, please go to the nearest emergency room for further evaluation.

## (undated) DEVICE — PEG KIT STD 20 FR PUSH W/ ENFIT ENDOVIVE

## (undated) DEVICE — SYRINGE MED 10ML TRNSLUC BRL PLUNG BLK MRK POLYPR CTRL

## (undated) DEVICE — GLOVE SURG 7.5 PF POLYMER WHT STRL SIGN LTX ESSENTIAL LTX

## (undated) DEVICE — Device

## (undated) DEVICE — SNARE ENDOSCP M L240CM LOOP W27MM SHTH DIA2.4MM OVL FLX

## (undated) DEVICE — Device: Brand: DENVER SPLINT

## (undated) DEVICE — TUBING, SUCTION, 1/4" X 20', STRAIGHT: Brand: MEDLINE INDUSTRIES, INC.

## (undated) DEVICE — CODMAN® SURGICAL PATTIES 1/2" X 3" (1.27CM X 7.62CM): Brand: CODMAN®

## (undated) DEVICE — MARKER,SKIN,WI/RULER AND LABELS: Brand: MEDLINE

## (undated) DEVICE — NEEDLE HYPO 27GA L15IN REG BVL W O SFTY FOR SYR DISPOSABLE

## (undated) DEVICE — PACK-MAJOR

## (undated) DEVICE — GAUZE,SPONGE,8"X4",12PLY,XRAY,STRL,LF: Brand: MEDLINE

## (undated) DEVICE — STRIP,CLOSURE,WOUND,MEDI-STRIP,1/2X4: Brand: MEDLINE